# Patient Record
Sex: MALE | Race: WHITE | NOT HISPANIC OR LATINO | Employment: OTHER | URBAN - METROPOLITAN AREA
[De-identification: names, ages, dates, MRNs, and addresses within clinical notes are randomized per-mention and may not be internally consistent; named-entity substitution may affect disease eponyms.]

---

## 2018-03-22 ENCOUNTER — OFFICE VISIT (OUTPATIENT)
Dept: MEDICAL GROUP | Facility: MEDICAL CENTER | Age: 73
End: 2018-03-22
Payer: MEDICARE

## 2018-03-22 VITALS
HEIGHT: 74 IN | TEMPERATURE: 98.6 F | WEIGHT: 277 LBS | DIASTOLIC BLOOD PRESSURE: 74 MMHG | BODY MASS INDEX: 35.55 KG/M2 | OXYGEN SATURATION: 94 % | HEART RATE: 74 BPM | SYSTOLIC BLOOD PRESSURE: 122 MMHG | RESPIRATION RATE: 16 BRPM

## 2018-03-22 DIAGNOSIS — D49.2 ABNORMAL SKIN GROWTH: ICD-10-CM

## 2018-03-22 DIAGNOSIS — N40.1 BENIGN PROSTATIC HYPERPLASIA WITH NOCTURIA: ICD-10-CM

## 2018-03-22 DIAGNOSIS — Z12.5 SCREENING FOR PROSTATE CANCER: ICD-10-CM

## 2018-03-22 DIAGNOSIS — L60.3 NAIL DYSTROPHY: ICD-10-CM

## 2018-03-22 DIAGNOSIS — R26.89 IMBALANCE: ICD-10-CM

## 2018-03-22 DIAGNOSIS — H26.9 CATARACT, UNSPECIFIED CATARACT TYPE, UNSPECIFIED LATERALITY: ICD-10-CM

## 2018-03-22 DIAGNOSIS — L03.039 CELLULITIS OF TOE, UNSPECIFIED LATERALITY: ICD-10-CM

## 2018-03-22 DIAGNOSIS — E66.9 OBESITY (BMI 30-39.9): ICD-10-CM

## 2018-03-22 DIAGNOSIS — R35.1 BENIGN PROSTATIC HYPERPLASIA WITH NOCTURIA: ICD-10-CM

## 2018-03-22 DIAGNOSIS — Z13.6 SCREENING FOR CARDIOVASCULAR CONDITION: ICD-10-CM

## 2018-03-22 PROCEDURE — 99203 OFFICE O/P NEW LOW 30 MIN: CPT | Performed by: FAMILY MEDICINE

## 2018-03-22 ASSESSMENT — PATIENT HEALTH QUESTIONNAIRE - PHQ9: CLINICAL INTERPRETATION OF PHQ2 SCORE: 0

## 2018-03-22 NOTE — LETTER
[facility]  Hugo Chase M.D.  [address]  [place] [address]  Fax: [phone]   Authorization for Release/Disclosure of   Protected Health Information   Name: SINA DIEHL : 1945 SSN: [id number]   [place] [facility] [address] Phone:    [phone] (home)    I authorize the entity listed below to release/disclose the PHI below to:   [facility]/Hugo Chase M.D. and Hugo Chase M.D.   Provider or Entity Name:  Dr Noguera ([facility])   Address   City, [facility], [facility]   Phone:      Fax:     Reason for request: continuity of care   Information to be released:    [x ] LAST COLONOSCOPY,  including any PATH REPORT and follow-up  [  ] LAST FIT/COLOGUARD RESULT [  ] LAST DEXA  [  ] LAST MAMMOGRAM  [  ] LAST PAP  [  ] LAST LABS [  ] RETINA EXAM REPORT  [  ] IMMUNIZATION RECORDS  [  ] Release all info      [  ] Check here and initial the line next to each item to release ALL health information INCLUDING  _____ Care and treatment for drug and / or alcohol abuse  _____ HIV testing, infection status, or AIDS  _____ Genetic Testing    DATES OF SERVICE OR TIME PERIOD TO BE DISCLOSED: _____________  I understand and acknowledge that:  * This Authorization may be revoked at any time by you in writing, except if your health information has already been used or disclosed.  * Your health information that will be used or disclosed as a result of you signing this authorization could be re-disclosed by the recipient. If this occurs, your re-disclosed health information may no longer be protected by State or Federal laws.  * You may refuse to sign this Authorization. Your refusal will not affect your ability to obtain treatment.  * This Authorization becomes effective upon signing and will  on (date) __________.      If no date is indicated, this Authorization will  one (1) year from the signature date.    Name: Sina DIEHL    Signature:   Date:     3/22/2018       PLEASE FAX REQUESTED RECORDS BACK TO: [phone]

## 2018-03-22 NOTE — LETTER
[facility]  Hugo Chase M.D.  [address]  [place] [address]  Fax: [phone]   Authorization for Release/Disclosure of   Protected Health Information   Name: SINA MILLIGAN SHANITA : 1945 SSN: [id number]   Address: [address] [place] [address] Phone:    [phone] (home)    I authorize the entity listed below to release/disclose the PHI below to:   [facility]/Hugo Chase M.D. and Hugo Chase M.D.   Provider or Entity Name:  [facility]   Address   City, [facility], [place], [place] Phone:      Fax:     Reason for request: continuity of care   Information to be released:    [  ] LAST COLONOSCOPY,  including any PATH REPORT and follow-up  [  ] LAST FIT/COLOGUARD RESULT [  ] LAST DEXA  [  ] LAST MAMMOGRAM  [  ] LAST PAP  [  ] LAST LABS [  ] RETINA EXAM REPORT  [  ] IMMUNIZATION RECORDS  [ x] Release all info      [  ] Check here and initial the line next to each item to release ALL health information INCLUDING  _____ Care and treatment for drug and / or alcohol abuse  _____ HIV testing, infection status, or AIDS  _____ Genetic Testing    DATES OF SERVICE OR TIME PERIOD TO BE DISCLOSED: _____________  I understand and acknowledge that:  * This Authorization may be revoked at any time by you in writing, except if your health information has already been used or disclosed.  * Your health information that will be used or disclosed as a result of you signing this authorization could be re-disclosed by the recipient. If this occurs, your re-disclosed health information may no longer be protected by State or Federal laws.  * You may refuse to sign this Authorization. Your refusal will not affect your ability to obtain treatment.  * This Authorization becomes effective upon signing and will  on (date) __________.      If no date is indicated, this Authorization will  one (1) year from the signature date.    Name: Sina MILLIGAN SHANITA    Signature:   Date:          3/22/2018       PLEASE FAX REQUESTED RECORDS BACK TO: [phone]

## 2018-03-22 NOTE — PROGRESS NOTES
cc: BPH    Subjective:     Sina DIEHL is a 72 y.o. male presenting with his wife to establish care:    1. BPH: Has a long-standing history of an enlarged prostate. He underwent TURP twice. He continues to have nocturia, but it is not significant for him, is able to cope with the symptoms. Has tried Cardura, finasteride, Flomax in the past. Is not interested in trying another medication. He would like his TSH checked. Follows with urology regularly.    2. Dermatology: Sees his regular dermatologist about once or twice a year. Would like a referral    3. Toenail: He was seeing a podiatrist regularly for toenail trimming as he is unable to do this himself. Denies any symptoms today. He did have an ulcer in the past on his toe, was admitted to the hospital with an infection, but this has resolved.    Review of systems:  See above.   Denies any weight loss, fevers, fatigue, vision changes, sore throat, swollen glands, rashes, shortness of breath, chest pain, numbness, nausea, vomiting, diarrhea, constipation, abdominal pain, dysuria, hematuria,  joint pain, muscle weakness, depression. He does note occasionally feeling off balance. He continues to work as a  and walks on very narrow beams.    No current outpatient prescriptions on file.    Allergies   Allergen Reactions   • Azithromycin      nausea   • Nsaids      bleeding   • Penicillins Anaphylaxis       Past Medical History:   Diagnosis Date   • BPH (benign prostatic hyperplasia)    • Sciatica      Past Surgical History:   Procedure Laterality Date   • APPENDECTOMY     • HIP REPLACEMENT, TOTAL Right    • TRANS URETHRAL RESECTION      x2     Family History   Problem Relation Age of Onset   • Hypertension Mother    • Diabetes Mother      Social History     Social History   • Marital status: Single     Spouse name: N/A   • Number of children: N/A   • Years of education: N/A     Occupational History   • Not on file.     Social History Main Topics   •  "Smoking status: Never Smoker   • Smokeless tobacco: Never Used   • Alcohol use Yes      Comment: 4 beers/year   • Drug use: No   • Sexual activity: Not on file     Other Topics Concern   • Not on file     Social History Narrative           Objective:     Vitals: /74   Pulse 74   Temp 37 °C (98.6 °F)   Resp 16   Ht 1.88 m (6' 2\")   Wt (!) 125.6 kg (277 lb)   SpO2 94%   BMI 35.56 kg/m²   General: Alert, pleasant, NAD  HEENT: Normocephalic.  EOMI, no icterus or pallor.  Conjunctivae and lids normal. External ears normal. Oropharynx non-erythematous, mucous membranes moist.  Neck supple.  No thyromegaly or masses palpated. No cervical or supraclavicular lymphadenopathy.  Heart: Regular rate and rhythm.  S1 and S2 normal.  No murmurs appreciated.  Respiratory: Normal respiratory effort.  Clear to auscultation bilaterally.  Abdomen: Non-distended, soft  Skin: Warm, dry, no rashes.  Musculoskeletal: Gait is normal.  Moves all extremities well.  Extremities: No leg edema.    Neurological: No tremors  Psych:  Affect/mood is normal, judgement is good, memory is intact, grooming is appropriate.    Assessment/Plan:     Sina was seen today for [facility] care and other.    Diagnoses and all orders for this visit:    Benign prostatic hyperplasia with nocturia  Stable, continue monitoring. Follow up in 6 months  -     PROSTATE SPECIFIC AG SCREENING; Future    Nail dystrophy  -     REFERRAL TO PODIATRY    Abnormal skin growth  -     REFERRAL TO DERMATOLOGY    Imbalance  -     REFERRAL TO PHYSICAL THERAPY Reason for Therapy: Eval/Treat/Report    Cataract, unspecified cataract type, unspecified laterality  Patient reports that he will be undergoing cataract surgery in the near future    Screening for cardiovascular condition  -     LIPID PROFILE; Future    Cellulitis of toe, unspecified laterality  Has resolved, records requested.  -     CBC WITHOUT DIFFERENTIAL; Future  -     COMP METABOLIC PANEL; " Future    Screening for prostate cancer  -     PROSTATE SPECIFIC AG SCREENING; Future    Obesity (BMI 30-39.9)  -     Patient identified as having weight management issue.  Appropriate orders and counseling given.      Return in about 6 months (around 9/22/2018) for routine follow up.

## 2018-03-22 NOTE — LETTER
[facility]  Hugo Chase M.D.  [address]  [place] [address]  Fax: [phone]   Authorization for Release/Disclosure of   Protected Health Information   Name: SINA MILLIGAN SHANITA : 1945 SSN: [id number]   [address] [address] [facility] [place] [address] Phone:    [phone] (home)    I authorize the entity listed below to release/disclose the PHI below to:   [facility]/Hugo Chase M.D. and Hugo Chase M.D.   Provider or Entity Name:  Dr Mcclure   Address   City, State, Zip   Phone:  [phone]    Fax:  [phone]   Reason for request: continuity of care   Information to be released:    [  ] LAST COLONOSCOPY,  including any PATH REPORT and follow-up  [  ] LAST FIT/COLOGUARD RESULT [  ] LAST DEXA  [  ] LAST MAMMOGRAM  [  ] LAST PAP  [  ] LAST LABS [  ] RETINA EXAM REPORT  [x ] IMMUNIZATION RECORDS  [x ] Release all info      [  ] Check here and initial the line next to each item to release ALL health information INCLUDING  _____ Care and treatment for drug and / or alcohol abuse  _____ HIV testing, infection status, or AIDS  _____ Genetic Testing    DATES OF SERVICE OR TIME PERIOD TO BE DISCLOSED: _____________  I understand and acknowledge that:  * This Authorization may be revoked at any time by you in writing, except if your health information has already been used or disclosed.  * Your health information that will be used or disclosed as a result of you signing this authorization could be re-disclosed by the recipient. If this occurs, your re-disclosed health information may no longer be protected by State or Federal laws.  * You may refuse to sign this Authorization. Your refusal will not affect your ability to obtain treatment.  * This Authorization becomes effective upon signing and will  on (date) __________.      If no date is indicated, this Authorization will  one (1) year from the signature date.    Name: Sina DIEHL    Signature:   Date:          3/22/2018       PLEASE FAX REQUESTED RECORDS BACK TO: [phone]

## 2018-03-30 ENCOUNTER — HOSPITAL ENCOUNTER (OUTPATIENT)
Dept: LAB | Facility: MEDICAL CENTER | Age: 73
End: 2018-03-30
Attending: FAMILY MEDICINE
Payer: MEDICARE

## 2018-03-30 DIAGNOSIS — Z13.6 SCREENING FOR CARDIOVASCULAR CONDITION: ICD-10-CM

## 2018-03-30 DIAGNOSIS — Z12.5 SCREENING FOR PROSTATE CANCER: ICD-10-CM

## 2018-03-30 DIAGNOSIS — N40.1 BENIGN PROSTATIC HYPERPLASIA WITH NOCTURIA: ICD-10-CM

## 2018-03-30 DIAGNOSIS — R35.1 BENIGN PROSTATIC HYPERPLASIA WITH NOCTURIA: ICD-10-CM

## 2018-03-30 DIAGNOSIS — L03.039 CELLULITIS OF TOE, UNSPECIFIED LATERALITY: ICD-10-CM

## 2018-03-30 LAB
ALBUMIN SERPL BCP-MCNC: 4.3 G/DL (ref 3.2–4.9)
ALBUMIN/GLOB SERPL: 1.9 G/DL
ALP SERPL-CCNC: 50 U/L (ref 30–99)
ALT SERPL-CCNC: 27 U/L (ref 2–50)
ANION GAP SERPL CALC-SCNC: 7 MMOL/L (ref 0–11.9)
AST SERPL-CCNC: 32 U/L (ref 12–45)
BILIRUB SERPL-MCNC: 0.9 MG/DL (ref 0.1–1.5)
BUN SERPL-MCNC: 11 MG/DL (ref 8–22)
CALCIUM SERPL-MCNC: 9.6 MG/DL (ref 8.5–10.5)
CHLORIDE SERPL-SCNC: 106 MMOL/L (ref 96–112)
CHOLEST SERPL-MCNC: 214 MG/DL (ref 100–199)
CO2 SERPL-SCNC: 25 MMOL/L (ref 20–33)
CREAT SERPL-MCNC: 0.78 MG/DL (ref 0.5–1.4)
ERYTHROCYTE [DISTWIDTH] IN BLOOD BY AUTOMATED COUNT: 44 FL (ref 35.9–50)
GLOBULIN SER CALC-MCNC: 2.3 G/DL (ref 1.9–3.5)
GLUCOSE SERPL-MCNC: 110 MG/DL (ref 65–99)
HCT VFR BLD AUTO: 50.5 % (ref 42–52)
HDLC SERPL-MCNC: 38 MG/DL
HGB BLD-MCNC: 17 G/DL (ref 14–18)
LDLC SERPL CALC-MCNC: 140 MG/DL
MCH RBC QN AUTO: 33 PG (ref 27–33)
MCHC RBC AUTO-ENTMCNC: 33.7 G/DL (ref 33.7–35.3)
MCV RBC AUTO: 98.1 FL (ref 81.4–97.8)
PLATELET # BLD AUTO: 249 K/UL (ref 164–446)
PMV BLD AUTO: 11 FL (ref 9–12.9)
POTASSIUM SERPL-SCNC: 4.6 MMOL/L (ref 3.6–5.5)
PROT SERPL-MCNC: 6.6 G/DL (ref 6–8.2)
PSA SERPL-MCNC: 4.24 NG/ML (ref 0–4)
RBC # BLD AUTO: 5.15 M/UL (ref 4.7–6.1)
SODIUM SERPL-SCNC: 138 MMOL/L (ref 135–145)
TRIGL SERPL-MCNC: 181 MG/DL (ref 0–149)
WBC # BLD AUTO: 7 K/UL (ref 4.8–10.8)

## 2018-03-30 PROCEDURE — 36415 COLL VENOUS BLD VENIPUNCTURE: CPT

## 2018-03-30 PROCEDURE — 80053 COMPREHEN METABOLIC PANEL: CPT

## 2018-03-30 PROCEDURE — 85027 COMPLETE CBC AUTOMATED: CPT

## 2018-03-30 PROCEDURE — 80061 LIPID PANEL: CPT

## 2018-03-30 PROCEDURE — 84153 ASSAY OF PSA TOTAL: CPT

## 2018-05-25 ENCOUNTER — OFFICE VISIT (OUTPATIENT)
Dept: MEDICAL GROUP | Facility: MEDICAL CENTER | Age: 73
End: 2018-05-25
Payer: MEDICARE

## 2018-05-25 VITALS
DIASTOLIC BLOOD PRESSURE: 80 MMHG | OXYGEN SATURATION: 96 % | HEIGHT: 74 IN | WEIGHT: 275 LBS | SYSTOLIC BLOOD PRESSURE: 136 MMHG | RESPIRATION RATE: 14 BRPM | TEMPERATURE: 98.7 F | BODY MASS INDEX: 35.29 KG/M2 | HEART RATE: 72 BPM

## 2018-05-25 DIAGNOSIS — N40.1 BENIGN PROSTATIC HYPERPLASIA WITH NOCTURIA: ICD-10-CM

## 2018-05-25 DIAGNOSIS — E78.2 MIXED HYPERLIPIDEMIA: ICD-10-CM

## 2018-05-25 DIAGNOSIS — R07.9 CHEST PAIN, UNSPECIFIED TYPE: ICD-10-CM

## 2018-05-25 DIAGNOSIS — Z00.00 MEDICARE ANNUAL WELLNESS VISIT, INITIAL: ICD-10-CM

## 2018-05-25 DIAGNOSIS — E66.9 OBESITY (BMI 30-39.9): ICD-10-CM

## 2018-05-25 DIAGNOSIS — R42 DIZZINESS: ICD-10-CM

## 2018-05-25 DIAGNOSIS — H26.9 CATARACT, UNSPECIFIED CATARACT TYPE, UNSPECIFIED LATERALITY: ICD-10-CM

## 2018-05-25 DIAGNOSIS — R35.1 BENIGN PROSTATIC HYPERPLASIA WITH NOCTURIA: ICD-10-CM

## 2018-05-25 DIAGNOSIS — R06.02 SHORTNESS OF BREATH ON EXERTION: ICD-10-CM

## 2018-05-25 PROCEDURE — 99213 OFFICE O/P EST LOW 20 MIN: CPT | Mod: 25 | Performed by: FAMILY MEDICINE

## 2018-05-25 PROCEDURE — G0439 PPPS, SUBSEQ VISIT: HCPCS | Performed by: FAMILY MEDICINE

## 2018-05-25 PROCEDURE — 93000 ELECTROCARDIOGRAM COMPLETE: CPT | Performed by: FAMILY MEDICINE

## 2018-05-25 RX ORDER — ATORVASTATIN CALCIUM 40 MG/1
40 TABLET, FILM COATED ORAL DAILY
Qty: 90 TAB | Refills: 1 | Status: SHIPPED | OUTPATIENT
Start: 2018-05-25 | End: 2019-02-22 | Stop reason: SDUPTHER

## 2018-05-25 ASSESSMENT — PATIENT HEALTH QUESTIONNAIRE - PHQ9: CLINICAL INTERPRETATION OF PHQ2 SCORE: 0

## 2018-05-25 ASSESSMENT — ACTIVITIES OF DAILY LIVING (ADL): BATHING_REQUIRES_ASSISTANCE: 0

## 2018-05-25 ASSESSMENT — ENCOUNTER SYMPTOMS: GENERAL WELL-BEING: GOOD

## 2018-05-25 NOTE — PROGRESS NOTES
Cc: shortness of breath      HPI:  Sina DIEHL is a 73 y.o. here for Medicare Annual Wellness Visit     Patient Active Problem List    Diagnosis Date Noted   • Mixed hyperlipidemia 05/25/2018   • Benign prostatic hyperplasia with nocturia 03/22/2018   • Obesity (BMI 30-39.9) 03/22/2018   • Cataract 03/22/2018       Current Outpatient Prescriptions   Medication Sig Dispense Refill   • atorvastatin (LIPITOR) 40 MG Tab Take 1 Tab by mouth every day. 90 Tab 1     No current facility-administered medications for this visit.             Current supplements as per medication list.       Allergies: Azithromycin; Nsaids; and Penicillins    Current social contact/activities: working, family     He  reports that he has never smoked. He has never used smokeless tobacco. He reports that he drinks alcohol. He reports that he does not use drugs.  Counseling given: Yes      DPA/Advanced Directive:  Patient does not have an Advanced Directive.  A packet and workshop information was given on Advanced Directives.    ROS:    Gait: Uses no assistive device  Ostomy: No  Other tubes: No  Amputations: No  Chronic oxygen use: No  Last eye exam: past month  Wears hearing aids: No   : Denies any urinary leakage during the last 6 months    Screening:    Depression Screening    Little interest or pleasure in doing things?  0 - not at all  Feeling down, depressed , or hopeless? 0 - not at all  Patient Health Questionnaire Score: 0     If depressive symptoms identified deferred to follow up visit unless specifically addressed in assessment and plan.    Interpretation of PHQ-9 Total Score   Score Severity   1-4 No Depression   5-9 Mild Depression   10-14 Moderate Depression   15-19 Moderately Severe Depression   20-27 Severe Depression    Screening for Cognitive Impairment    Three Minute Recall (leader, season, table) 2/3 2/3  Jerome clock face with all 12 numbers and set the hands to show 10 past 11.  Yes 5/5  Cognitive concerns identified  deferred for follow up unless specifically addressed in assessment and plan.    Fall Risk Assessment    Has the patient had two or more falls in the last year or any fall with injury in the last year?  No    Safety Assessment    Throw rugs on floor.  Yes  Handrails on all stairs.  Yes  Good lighting in all hallways.  Yes  Difficulty hearing.  No  Patient counseled about all safety risks that were identified.    Functional Assessment ADLs    Are there any barriers preventing you from cooking for yourself or meeting nutritional needs?  No.    Are there any barriers preventing you from driving safely or obtaining transportation?  No.    Are there any barriers preventing you from using a telephone or calling for help?  No.    Are there any barriers preventing you from shopping?  No.    Are there any barriers preventing you from taking care of your own finances?  No.    Are there any barriers preventing you from managing your medications?  No.    Are there any barriers preventing you from showering, bathing or dressing yourself?  No.    Are you currently engaging in any exercise or physical activity?  Yes.     What is your perception of your health?  Good.      Health Maintenance Summary                Annual Wellness Visit Overdue 1945     IMM DTaP/Tdap/Td Vaccine Overdue 4/8/1962     COLONOSCOPY Overdue 4/8/1993     IMM PNEUMOCOCCAL 65+ (ADULT) LOW/MEDIUM RISK SERIES Overdue 4/8/2008     IMM INFLUENZA Next Due 9/1/2018           Patient Care Team:  Hugo Chase M.D. as PCP - General (Family Medicine)  Liz Hyman M.D. (Dermatology)  Aldo Nair M.D. (Ophthalmology)  Cruz Reoblledo (Audiology)        Social History   Substance Use Topics   • Smoking status: Never Smoker   • Smokeless tobacco: Never Used   • Alcohol use Yes      Comment: 4 beers/year     Family History   Problem Relation Age of Onset   • Hypertension Mother    • Diabetes Mother      He  has a past medical history of BPH (benign prostatic  "hyperplasia) and Sciatica. He also has no past medical history of Diabetes or Hypertension.   Past Surgical History:   Procedure Laterality Date   • APPENDECTOMY     • HIP REPLACEMENT, TOTAL Right    • TRANS URETHRAL RESECTION      x2       Exam:   Blood pressure 136/80, pulse 72, temperature 37.1 °C (98.7 °F), resp. rate 14, height 1.88 m (6' 2.02\"), weight 124.7 kg (275 lb), SpO2 96 %. Body mass index is 35.29 kg/m².    Hearing good.    Dentition fair  Alert, oriented in no acute distress.  Eye contact is good, speech goal directed, affect calm    Assessment and Plan. The following treatment and monitoring plan is recommended:      Medicare annual wellness visit, initial  -     Initial Wellness Visit - Includes PPPS ()    Mixed hyperlipidemia  Starting Lipitor  -     atorvastatin (LIPITOR) 40 MG Tab; Take 1 Tab by mouth every day.  -     CBC WITHOUT DIFFERENTIAL; Future  -     BASIC METABOLIC PANEL; Future  -     TSH WITH REFLEX TO FT4; Future  -     ECHOCARDIOGRAM COMP W/O CONT; Future  -     CARDIAC STRESS TEST TREADMILL ONLY    Shortness of breath on exertion  Doing following workup  -     EKG  -     PULMONARY FUNCTION TESTS Test requested: Complete Pulmonary Function Test  -     CBC WITHOUT DIFFERENTIAL; Future  -     BASIC METABOLIC PANEL; Future  -     TSH WITH REFLEX TO FT4; Future  -     ECHOCARDIOGRAM COMP W/O CONT; Future  -     CARDIAC STRESS TEST TREADMILL ONLY    Dizziness  Doing following workup  -     EKG  -     PULMONARY FUNCTION TESTS Test requested: Complete Pulmonary Function Test  -     CBC WITHOUT DIFFERENTIAL; Future  -     BASIC METABOLIC PANEL; Future  -     TSH WITH REFLEX TO FT4; Future  -     ECHOCARDIOGRAM COMP W/O CONT; Future  -     CARDIAC STRESS TEST TREADMILL ONLY    Chest pain, unspecified type  Doing following workup  -     CARDIAC STRESS TEST TREADMILL ONLY    Benign prostatic hyperplasia with nocturia  Stable, followed by urology  -     Initial Wellness Visit - Includes " PPPS ()    Cataract, unspecified cataract type, unspecified laterality  Stable, followed by ophthalmology  -     Initial Wellness Visit - Includes PPPS ()    Obesity (BMI 30-39.9)  Stable, exercising regularly.  -     Initial Wellness Visit - Includes PPPS ()        Services suggested: No services needed at this time  Health Care Screening: Age-appropriate preventive services recommended by USPTF and ACIP covered by Medicare were discussed today. Services ordered if indicated and agreed upon by the patient.  Referrals offered: Community-based lifestyle interventions to reduce health risks and promote self-management and wellness, fall prevention, nutrition, physical activity, tobacco-use cessation, weight loss, and mental health services as per orders if indicated.    Discussion today about general wellness and lifestyle habits:    · Prevent falls and reduce trip hazards; Cautioned about securing or removing rugs.  · Have a working fire alarm and carbon monoxide detector;   · Engage in regular physical activity and social activities     Follow-up: Return in about 6 months (around 11/25/2018) for Med check.

## 2018-05-25 NOTE — PROGRESS NOTES
cc: Shortness of breath    Subjective:     Sina DIEHL is a 73 y.o. male presenting with his wife to discuss shortness of breath. Describes mild shortness of breath with exertion, has been going on for the past several weeks, has been stable. Associated with dizziness and occasional numbness in the left hand. The shortness of breath will usually resolve after a couple minutes on its own. Dizziness and numbness usually lasts for about 10 minutes and then resolve on its own. Symptoms are not limiting his function in any way. Has tried nothing. Symptoms only occur with exertion. Better with rest. Denies any headaches, nausea, vomiting, chest pain, palpitations, abdominal pain, diarrhea, constipation, melena, leg swelling. He does occasionally get blood in the urine, about a couple times a year, but he has kidney stones, bladder stones, and multiple TURPs, is followed by urology. He has not had any blood in the urine recently, he avoids NSAIDs as this is a major trigger.      Review of systems:  See above.        Current Outpatient Prescriptions:   •  atorvastatin (LIPITOR) 40 MG Tab, Take 1 Tab by mouth every day., Disp: 90 Tab, Rfl: 1    Allergies   Allergen Reactions   • Azithromycin      nausea   • Nsaids      bleeding   • Penicillins Anaphylaxis       Past Medical History:   Diagnosis Date   • BPH (benign prostatic hyperplasia)    • Sciatica      Past Surgical History:   Procedure Laterality Date   • APPENDECTOMY     • HIP REPLACEMENT, TOTAL Right    • TRANS URETHRAL RESECTION      x2     Family History   Problem Relation Age of Onset   • Hypertension Mother    • Diabetes Mother      Social History     Social History   • Marital status: Single     Spouse name: N/A   • Number of children: N/A   • Years of education: N/A     Occupational History   • Not on file.     Social History Main Topics   • Smoking status: Never Smoker   • Smokeless tobacco: Never Used   • Alcohol use Yes      Comment: 4 beers/year   • Drug  "use: No   • Sexual activity: Not on file     Other Topics Concern   • Not on file     Social History Narrative           Objective:     Vitals: /80   Pulse 72   Temp 37.1 °C (98.7 °F)   Resp 14   Ht 1.88 m (6' 2.02\")   Wt 124.7 kg (275 lb)   SpO2 96%   BMI 35.29 kg/m²   General: Alert, pleasant, NAD  HEENT: Normocephalic.  PERRL, EOMI, no icterus or pallor.  Conjunctivae and lids normal. External ears normal.   Oropharynx non-erythematous, mucous membranes moist.  Neck supple.  No thyromegaly or masses palpated. No cervical or supraclavicular lymphadenopathy.  Heart: Regular rate and rhythm.  S1 and S2 normal.  No murmurs appreciated. No carotid bruits.  Respiratory: Normal respiratory effort.  Clear to auscultation bilaterally.  Abdomen: Non-distended, soft  Skin: Warm, dry, no rashes.  Musculoskeletal: Gait is normal.  Moves all extremities well.  Extremities: No leg edema. Radial pulses 2+ symmetric.   Psych:  Affect/mood is normal, judgement is good, memory is intact, grooming is appropriate.    EKG: Sinus bradycardia. Heart rate 58 bpm. Left axis deviation. Otherwise normal EKG    Assessment/Plan:     Diagnoses and all orders for this visit:    Mixed hyperlipidemia  Discussed recent lipid panel. ASCVD risk is around 30%. He was willing to start a statin. Lipitor 40 mg sent to pharmacy. We also discussed a baby aspirin. However, patient would like to hold off on this for now as this may give him hematuria  -     atorvastatin (LIPITOR) 40 MG Tab; Take 1 Tab by mouth every day.  -     CBC WITHOUT DIFFERENTIAL; Future  -     BASIC METABOLIC PANEL; Future  -     TSH WITH REFLEX TO FT4; Future  -     ECHOCARDIOGRAM COMP W/O CONT; Future  -     CARDIAC STRESS TEST TREADMILL ONLY    Shortness of breath on exertion  Dizziness  Chest pain, unspecified type  EKG was unremarkable today. We'll check the following labs and exams.  -     EKG  -     PULMONARY FUNCTION TESTS Test requested: Complete " Pulmonary Function Test  -     CBC WITHOUT DIFFERENTIAL; Future  -     BASIC METABOLIC PANEL; Future  -     TSH WITH REFLEX TO FT4; Future  -     ECHOCARDIOGRAM COMP W/O CONT; Future  -     CARDIAC STRESS TEST TREADMILL ONLY      Return in about 6 months (around 11/25/2018) for Med check.

## 2018-05-26 ENCOUNTER — HOSPITAL ENCOUNTER (OUTPATIENT)
Dept: LAB | Facility: MEDICAL CENTER | Age: 73
End: 2018-05-26
Attending: FAMILY MEDICINE
Payer: MEDICARE

## 2018-05-26 DIAGNOSIS — E78.2 MIXED HYPERLIPIDEMIA: ICD-10-CM

## 2018-05-26 DIAGNOSIS — R06.02 SHORTNESS OF BREATH ON EXERTION: ICD-10-CM

## 2018-05-26 DIAGNOSIS — R42 DIZZINESS: ICD-10-CM

## 2018-05-26 LAB
ANION GAP SERPL CALC-SCNC: 10 MMOL/L (ref 0–11.9)
BUN SERPL-MCNC: 13 MG/DL (ref 8–22)
CALCIUM SERPL-MCNC: 9.4 MG/DL (ref 8.5–10.5)
CHLORIDE SERPL-SCNC: 104 MMOL/L (ref 96–112)
CO2 SERPL-SCNC: 24 MMOL/L (ref 20–33)
CREAT SERPL-MCNC: 0.81 MG/DL (ref 0.5–1.4)
ERYTHROCYTE [DISTWIDTH] IN BLOOD BY AUTOMATED COUNT: 43.7 FL (ref 35.9–50)
GLUCOSE SERPL-MCNC: 91 MG/DL (ref 65–99)
HCT VFR BLD AUTO: 48.9 % (ref 42–52)
HGB BLD-MCNC: 16.4 G/DL (ref 14–18)
MCH RBC QN AUTO: 32.6 PG (ref 27–33)
MCHC RBC AUTO-ENTMCNC: 33.5 G/DL (ref 33.7–35.3)
MCV RBC AUTO: 97.2 FL (ref 81.4–97.8)
PLATELET # BLD AUTO: 243 K/UL (ref 164–446)
PMV BLD AUTO: 10.9 FL (ref 9–12.9)
POTASSIUM SERPL-SCNC: 4.3 MMOL/L (ref 3.6–5.5)
RBC # BLD AUTO: 5.03 M/UL (ref 4.7–6.1)
SODIUM SERPL-SCNC: 138 MMOL/L (ref 135–145)
TSH SERPL DL<=0.005 MIU/L-ACNC: 1.04 UIU/ML (ref 0.38–5.33)
WBC # BLD AUTO: 7.3 K/UL (ref 4.8–10.8)

## 2018-05-26 PROCEDURE — 85027 COMPLETE CBC AUTOMATED: CPT

## 2018-05-26 PROCEDURE — 80048 BASIC METABOLIC PNL TOTAL CA: CPT

## 2018-05-26 PROCEDURE — 84443 ASSAY THYROID STIM HORMONE: CPT

## 2018-05-26 PROCEDURE — 36415 COLL VENOUS BLD VENIPUNCTURE: CPT

## 2018-05-28 ENCOUNTER — HOSPITAL ENCOUNTER (OUTPATIENT)
Dept: CARDIOLOGY | Facility: MEDICAL CENTER | Age: 73
End: 2018-05-28
Attending: FAMILY MEDICINE
Payer: MEDICARE

## 2018-05-28 DIAGNOSIS — E78.2 MIXED HYPERLIPIDEMIA: ICD-10-CM

## 2018-05-28 DIAGNOSIS — R06.02 SHORTNESS OF BREATH ON EXERTION: ICD-10-CM

## 2018-05-28 DIAGNOSIS — R42 DIZZINESS: ICD-10-CM

## 2018-05-28 LAB
LV EJECT FRACT  99904: 60
LV EJECT FRACT MOD 2C 99903: 54.46
LV EJECT FRACT MOD 4C 99902: 62.61
LV EJECT FRACT MOD BP 99901: 56.12

## 2018-05-28 PROCEDURE — 93306 TTE W/DOPPLER COMPLETE: CPT | Mod: 26 | Performed by: INTERNAL MEDICINE

## 2018-05-28 PROCEDURE — 93306 TTE W/DOPPLER COMPLETE: CPT

## 2018-05-29 ENCOUNTER — OFFICE VISIT (OUTPATIENT)
Dept: MEDICAL GROUP | Facility: MEDICAL CENTER | Age: 73
End: 2018-05-29
Payer: MEDICARE

## 2018-05-29 VITALS
DIASTOLIC BLOOD PRESSURE: 78 MMHG | HEART RATE: 68 BPM | TEMPERATURE: 98.6 F | SYSTOLIC BLOOD PRESSURE: 128 MMHG | HEIGHT: 74 IN | OXYGEN SATURATION: 94 % | WEIGHT: 273 LBS | RESPIRATION RATE: 16 BRPM | BODY MASS INDEX: 35.04 KG/M2

## 2018-05-29 DIAGNOSIS — R68.82 LOW LIBIDO: ICD-10-CM

## 2018-05-29 DIAGNOSIS — N52.9 ERECTILE DYSFUNCTION, UNSPECIFIED ERECTILE DYSFUNCTION TYPE: ICD-10-CM

## 2018-05-29 DIAGNOSIS — R42 DIZZINESS: ICD-10-CM

## 2018-05-29 PROCEDURE — 99213 OFFICE O/P EST LOW 20 MIN: CPT | Performed by: FAMILY MEDICINE

## 2018-05-29 NOTE — PROGRESS NOTES
"cc: Low libido    Subjective:     Sina DIEHL is a 73 y.o. male presenting to discuss a low libido. This has been progressively getting worse over the past several months to years. He has had erectile dysfunction for years, has been using Viagra with good results. He is able to orgasm. Testicular size is normal, no masses. Has been gaining weight. No hair changes or breast development. Relationship with her significant other is good. Is interested in getting his testosterone levels checked.      Review of systems:  See above.      Current Outpatient Prescriptions:   •  atorvastatin (LIPITOR) 40 MG Tab, Take 1 Tab by mouth every day., Disp: 90 Tab, Rfl: 1    Allergies   Allergen Reactions   • Azithromycin      nausea   • Nsaids      bleeding   • Other Misc      Bee and wasp   • Penicillins Anaphylaxis       Past Medical History:   Diagnosis Date   • BPH (benign prostatic hyperplasia)    • Sciatica      Past Surgical History:   Procedure Laterality Date   • APPENDECTOMY     • HIP REPLACEMENT, TOTAL Right    • TRANS URETHRAL RESECTION      x2     Family History   Problem Relation Age of Onset   • Hypertension Mother    • Diabetes Mother      Social History     Social History   • Marital status: Single     Spouse name: N/A   • Number of children: N/A   • Years of education: N/A     Occupational History   • Not on file.     Social History Main Topics   • Smoking status: Never Smoker   • Smokeless tobacco: Never Used   • Alcohol use Yes      Comment: 4 beers/year   • Drug use: No   • Sexual activity: Not on file     Other Topics Concern   • Not on file     Social History Narrative           Objective:     Vitals: /78   Pulse 68   Temp 37 °C (98.6 °F)   Resp 16   Ht 1.88 m (6' 2.02\")   Wt 123.8 kg (273 lb)   SpO2 94%   BMI 35.04 kg/m²   General: Alert, pleasant, NAD  HEENT: Normocephalic.    Psych:  Affect/mood is normal, judgement is good, memory is intact, grooming is " appropriate.    Assessment/Plan:     Diagnoses and all orders for this visit:    Erectile dysfunction, unspecified erectile dysfunction type  Low libido  We discussed continuing Viagra as needed. We also discussed checking his testosterone levels. he was interested in doing testosterone supplementation if needed, discussed pros and cons, would be interested in the topical gel if needed..  -     TESTOSTERONE SERUM; Future  -     TESTOSTERONE F&T MALE ADULT; Future    Dizziness  Discussed normal labs and echocardiogram. He has the stress test scheduled in 2 days. We'll also check a carotid ultrasound  -     US-CAROTID DOPPLER; Future

## 2018-05-30 ENCOUNTER — HOSPITAL ENCOUNTER (OUTPATIENT)
Dept: LAB | Facility: MEDICAL CENTER | Age: 73
End: 2018-05-30
Attending: FAMILY MEDICINE
Payer: MEDICARE

## 2018-05-30 DIAGNOSIS — R68.82 LOW LIBIDO: ICD-10-CM

## 2018-05-30 LAB — TESTOST SERPL-MCNC: 204 NG/DL (ref 175–781)

## 2018-05-30 PROCEDURE — 36415 COLL VENOUS BLD VENIPUNCTURE: CPT

## 2018-05-30 PROCEDURE — 84403 ASSAY OF TOTAL TESTOSTERONE: CPT

## 2018-05-30 PROCEDURE — 84270 ASSAY OF SEX HORMONE GLOBUL: CPT

## 2018-05-31 ENCOUNTER — HOSPITAL ENCOUNTER (OUTPATIENT)
Dept: RADIOLOGY | Facility: MEDICAL CENTER | Age: 73
End: 2018-05-31
Attending: FAMILY MEDICINE
Payer: MEDICARE

## 2018-05-31 DIAGNOSIS — R42 DIZZINESS: ICD-10-CM

## 2018-05-31 PROCEDURE — 93880 EXTRACRANIAL BILAT STUDY: CPT

## 2018-06-01 LAB
SHBG SERPL-SCNC: 43 NMOL/L (ref 11–80)
TESTOST FREE MFR SERPL: 1.5 % (ref 1.6–2.9)
TESTOST FREE SERPL-MCNC: 36 PG/ML (ref 47–244)
TESTOST SERPL-MCNC: 238 NG/DL (ref 300–720)

## 2018-06-04 ENCOUNTER — NON-PROVIDER VISIT (OUTPATIENT)
Dept: CARDIOLOGY | Facility: MEDICAL CENTER | Age: 73
End: 2018-06-04
Attending: FAMILY MEDICINE
Payer: MEDICARE

## 2018-06-04 VITALS
SYSTOLIC BLOOD PRESSURE: 150 MMHG | WEIGHT: 273 LBS | HEIGHT: 74 IN | DIASTOLIC BLOOD PRESSURE: 84 MMHG | OXYGEN SATURATION: 94 % | HEART RATE: 61 BPM | BODY MASS INDEX: 35.04 KG/M2

## 2018-06-04 DIAGNOSIS — R42 DIZZINESS: ICD-10-CM

## 2018-06-04 DIAGNOSIS — R06.09 DOE (DYSPNEA ON EXERTION): ICD-10-CM

## 2018-06-04 LAB — TREADMILL STRESS: NORMAL

## 2018-06-04 PROCEDURE — 93015 CV STRESS TEST SUPVJ I&R: CPT | Performed by: INTERNAL MEDICINE

## 2018-06-14 DIAGNOSIS — N40.1 BENIGN PROSTATIC HYPERPLASIA WITH NOCTURIA: ICD-10-CM

## 2018-06-14 DIAGNOSIS — E66.9 OBESITY (BMI 30-39.9): ICD-10-CM

## 2018-06-14 DIAGNOSIS — R35.1 BENIGN PROSTATIC HYPERPLASIA WITH NOCTURIA: ICD-10-CM

## 2018-06-14 DIAGNOSIS — N52.9 ERECTILE DYSFUNCTION, UNSPECIFIED ERECTILE DYSFUNCTION TYPE: ICD-10-CM

## 2018-06-14 DIAGNOSIS — R68.82 LOW LIBIDO: ICD-10-CM

## 2018-06-14 DIAGNOSIS — E04.1 THYROID NODULE: ICD-10-CM

## 2019-01-10 ENCOUNTER — OFFICE VISIT (OUTPATIENT)
Dept: MEDICAL GROUP | Facility: MEDICAL CENTER | Age: 74
End: 2019-01-10
Payer: MEDICARE

## 2019-01-10 VITALS
BODY MASS INDEX: 34.27 KG/M2 | WEIGHT: 267 LBS | DIASTOLIC BLOOD PRESSURE: 90 MMHG | HEIGHT: 74 IN | HEART RATE: 78 BPM | TEMPERATURE: 98.6 F | OXYGEN SATURATION: 95 % | SYSTOLIC BLOOD PRESSURE: 156 MMHG | RESPIRATION RATE: 16 BRPM

## 2019-01-10 DIAGNOSIS — H61.23 BILATERAL IMPACTED CERUMEN: ICD-10-CM

## 2019-01-10 DIAGNOSIS — L60.3 NAIL DYSTROPHY: ICD-10-CM

## 2019-01-10 DIAGNOSIS — I10 ESSENTIAL HYPERTENSION: ICD-10-CM

## 2019-01-10 DIAGNOSIS — N40.1 BENIGN PROSTATIC HYPERPLASIA WITH NOCTURIA: ICD-10-CM

## 2019-01-10 DIAGNOSIS — Z23 NEED FOR VACCINATION: ICD-10-CM

## 2019-01-10 DIAGNOSIS — R35.1 BENIGN PROSTATIC HYPERPLASIA WITH NOCTURIA: ICD-10-CM

## 2019-01-10 DIAGNOSIS — R79.89 LOW TESTOSTERONE LEVEL IN MALE: ICD-10-CM

## 2019-01-10 DIAGNOSIS — E78.2 MIXED HYPERLIPIDEMIA: ICD-10-CM

## 2019-01-10 DIAGNOSIS — E04.1 THYROID NODULE: ICD-10-CM

## 2019-01-10 DIAGNOSIS — R97.20 ELEVATED PSA: ICD-10-CM

## 2019-01-10 DIAGNOSIS — D49.2 ABNORMAL SKIN GROWTH: ICD-10-CM

## 2019-01-10 PROBLEM — R68.82 LOW LIBIDO: Status: RESOLVED | Noted: 2018-05-29 | Resolved: 2019-01-10

## 2019-01-10 PROCEDURE — 99215 OFFICE O/P EST HI 40 MIN: CPT | Mod: 25 | Performed by: FAMILY MEDICINE

## 2019-01-10 PROCEDURE — G0009 ADMIN PNEUMOCOCCAL VACCINE: HCPCS | Performed by: FAMILY MEDICINE

## 2019-01-10 PROCEDURE — 90715 TDAP VACCINE 7 YRS/> IM: CPT | Performed by: FAMILY MEDICINE

## 2019-01-10 PROCEDURE — 90670 PCV13 VACCINE IM: CPT | Performed by: FAMILY MEDICINE

## 2019-01-10 PROCEDURE — 96372 THER/PROPH/DIAG INJ SC/IM: CPT | Performed by: FAMILY MEDICINE

## 2019-01-10 PROCEDURE — 8041 PR SCP AHA: Performed by: FAMILY MEDICINE

## 2019-01-10 PROCEDURE — 90472 IMMUNIZATION ADMIN EACH ADD: CPT | Performed by: FAMILY MEDICINE

## 2019-01-10 RX ORDER — AMLODIPINE BESYLATE 5 MG/1
5 TABLET ORAL DAILY
COMMUNITY
End: 2019-04-11 | Stop reason: SDUPTHER

## 2019-01-10 RX ORDER — TESTOSTERONE CYPIONATE 200 MG/ML
200 INJECTION, SOLUTION INTRAMUSCULAR
Status: DISCONTINUED | OUTPATIENT
Start: 2019-01-10 | End: 2019-12-02

## 2019-01-10 RX ORDER — TESTOSTERONE CYPIONATE 200 MG/ML
200 INJECTION, SOLUTION INTRAMUSCULAR
COMMUNITY
End: 2019-09-12 | Stop reason: SDUPTHER

## 2019-01-10 RX ORDER — TESTOSTERONE CYPIONATE 200 MG/ML
50 INJECTION, SOLUTION INTRAMUSCULAR ONCE
COMMUNITY
End: 2019-01-10

## 2019-01-10 RX ADMIN — TESTOSTERONE CYPIONATE 200 MG: 200 INJECTION, SOLUTION INTRAMUSCULAR at 15:23

## 2019-01-10 ASSESSMENT — PATIENT HEALTH QUESTIONNAIRE - PHQ9: CLINICAL INTERPRETATION OF PHQ2 SCORE: 0

## 2019-01-10 NOTE — PROGRESS NOTES
cc:  Testosterone    Subjective:     Sina Oliver is a 73 y.o. male presenting with his significant other for:    1.  Low testosterone: Was diagnosed with low testosterone levels.  He also has BPH, underwent a TURP twice.  He consulted with his urologist in [place], was started on testosterone injections 200 mg days.  He reports that his last testosterone level was checked in May in [place].  He would like a referral to a new urologist now that he is back in [place].    2.  Hyperlipidemia: Has a history of elevated cholesterol.  Has been stable.  Is currently on atorvastatin 40 mg daily.  Denies any myalgias, no side effects.    3.  Thyroid nodule: On a carotid ultrasound, the thyroid was noted to be somewhat suspicious for nodules.  Is asymptomatic. Denies any heat/cold intolerance, diarrhea/constipation, abdominal pain, tremors, skin changes, palpitations.    4.  Hypertension: His blood pressure became elevated while he was in [place].  His primary care in [place] started him on amlodipine 5 mg daily.  Has been checking his blood pressures at home, have a range in the 135-140s systolic.  Denies any chest pain, shortness of breath, lightheadedness, dizziness, leg swelling.  He plans to start exercising more regularly.  Has lost about 6 pounds of weight since moving back to [place].  He has also missed a couple doses of his medication    5. Dermatology: Sees his regular dermatologist about once or twice a year. Would like a referral     6.  Toenail: He was seeing a podiatrist regularly for toenail trimming as he is unable to do this himself. Denies any symptoms today.       Review of systems:  See above.       Current Outpatient Prescriptions:   •  amLODIPine (NORVASC) 5 MG Tab, Take 5 mg by mouth every day., Disp: , Rfl:   •  testosterone cypionate (DEPO-TESTOSTERONE) 200 MG/ML Solution injection, 200 mg by Intramuscular route every 14 days., Disp: , Rfl:   •  atorvastatin (LIPITOR) 40 MG Tab, Take 1 Tab by mouth  "every day., Disp: 90 Tab, Rfl: 1    Current Facility-Administered Medications:   •  testosterone cypionate (DEPO-TESTOSTERONE) injection 200 mg, 200 mg, Intramuscular, Q14 DAYS, Hugo Chase M.D.    Allergies, past medical history, past surgical history, family history, social history reviewed and updated    Objective:     Vitals: /90 (BP Location: Right arm, Patient Position: Sitting)   Pulse 78   Temp 37 °C (98.6 °F) (Temporal)   Resp 16   Ht 1.88 m (6' 2.02\")   Wt 121.1 kg (267 lb)   SpO2 95%   BMI 34.27 kg/m²   General: Alert, pleasant, NAD  HEENT: Normocephalic.  EOMI, no icterus or pallor.  Conjunctivae and lids normal. External ears normal. Tympanic membranes obscured by wax bilaterally initially; pearly/opaque after irrigation.  Oropharynx non-erythematous, mucous membranes moist.  Neck supple.  No thyromegaly or masses palpated. No cervical or supraclavicular lymphadenopathy.  Heart: Regular rate and rhythm.  S1 and S2 normal.  No murmurs appreciated.  Respiratory: Normal respiratory effort.  Clear to auscultation bilaterally.  Abdomen: Non-distended, soft  Skin: Warm, dry, no rashes.  Musculoskeletal: Gait is normal.  Moves all extremities well.  Extremities: No leg edema.    Psych:  Affect/mood is normal, judgement is good, memory is intact, grooming is appropriate.    Assessment/Plan:     Diagnoses and all orders for this visit:    Low testosterone level in male  Benign prostatic hyperplasia with nocturia  Elevated PSA  Stable, referral to urology placed.  We will recheck a testosterone level.  He brings in his testosterone today so that he can get it injected by the medical assistants every 2 weeks per pt request.  -     REFERRAL TO UROLOGY  -     TESTOSTERONE SERUM; Future  -     testosterone cypionate (DEPO-TESTOSTERONE) injection 200 mg; 1 mL by Intramuscular route every 14 days.    Mixed hyperlipidemia  Stable, continue atorvastatin.  -     COMP METABOLIC PANEL; Future  -     " Lipid Profile; Future    Thyroid nodule  We will check the following labs and an ultrasound of the thyroid  -     TSH; Future  -     TRIIDOTHYRONINE; Future  -     FREE THYROXINE; Future  -     THYROID PEROXIDASE  (TPO) AB; Future  -     ANTITHYROGLOBULIN AB; Future  -     US-SOFT TISSUES OF HEAD - NECK; Future    Essential hypertension  Somewhat elevated today.  He will continue checking his blood pressures at home, continue amlodipine for now.  We discussed potentially increasing in the future if he is unable to lower his blood pressure with lifestyle modification.  Follow-up in 3 months  -     CBC WITH DIFFERENTIAL; Future  -     COMP METABOLIC PANEL; Future    Nail dystrophy  -     REFERRAL TO PODIATRY    Abnormal skin growth  -     REFERRAL TO DERMATOLOGY    Need for vaccination  -     Tdap Vaccine =>8YO IM  -     Pneumococcal Conjugate Vaccine 13-Valent    Bilateral impacted cerumen  Irrigated today with clearance of cerumen      Return in about 3 months (around 4/10/2019) for routine follow up, High blood pressure.       Patient was seen for a total of 40 minutes face-to-face by myself, with more than half of the time spent counseling treatment and coordinating care regarding his low testosterone level, BPH, elevated PSA, hyperlipidemia, thyroid nodule, essential hypertension, nail dystrophy, abnormal skin issues, vaccinations, impacted cerumen

## 2019-01-10 NOTE — LETTER
[employer] [facility]  Hugo Chase M.D.  [address]  [place] [address]  Fax: [phone]   Authorization for Release/Disclosure of   Protected Health Information   Name: SINA MILLIGAN SHANITA : 1945 SSN: [id number]   [address] [facility] [address] Phone:    [phone] (home)    I authorize the entity listed below to release/disclose the PHI below to:   Pending sale to [facility]/Hugo Chase M.D. and Hugo Chase M.D.   Provider or Entity Name:   Dr Alonzo ([facility])   Address   City, [facility]   Phone:      Fax:     Reason for request: continuity of care   Information to be released:    [x ] LAST COLONOSCOPY,  including any PATH REPORT and follow-up  [  ] LAST FIT/COLOGUARD RESULT [  ] LAST DEXA  [  ] LAST MAMMOGRAM  [  ] LAST PAP  [  ] LAST LABS [  ] RETINA EXAM REPORT  [  ] IMMUNIZATION RECORDS  [  ] Release all info      [  ] Check here and initial the line next to each item to release ALL health information INCLUDING  _____ Care and treatment for drug and / or alcohol abuse  _____ HIV testing, infection status, or AIDS  _____ Genetic Testing    DATES OF SERVICE OR TIME PERIOD TO BE DISCLOSED: _____________  I understand and acknowledge that:  * This Authorization may be revoked at any time by you in writing, except if your health information has already been used or disclosed.  * Your health information that will be used or disclosed as a result of you signing this authorization could be re-disclosed by the recipient. If this occurs, your re-disclosed health information may no longer be protected by State or Federal laws.  * You may refuse to sign this Authorization. Your refusal will not affect your ability to obtain treatment.  * This Authorization becomes effective upon signing and will  on (date) __________.      If no date is indicated, this Authorization will  one (1) year from the signature date.    Name: Sina Oliver    Signature:   Date:      1/10/2019       PLEASE FAX REQUESTED RECORDS BACK TO: [phone]

## 2019-01-10 NOTE — PROGRESS NOTES
Annual Health Assessment Questions:    1.  Are you currently engaging in any exercise or physical activity? Yes    2.  How would you describe your mood or emotional well-being today? good    3.  Have you had any falls in the last year? No    4.  Have you noticed any problems with your balance or had difficulty walking? Yes    5.  In the last six months have you experienced any leakage of urine? Yes    6. DPA/Advanced Directive: Patient has Advanced Directive, but it is not on file. Instructed to bring in a copy to scan into their chart.

## 2019-01-16 ENCOUNTER — HOSPITAL ENCOUNTER (OUTPATIENT)
Dept: LAB | Facility: MEDICAL CENTER | Age: 74
End: 2019-01-16
Attending: FAMILY MEDICINE
Payer: MEDICARE

## 2019-01-16 DIAGNOSIS — E78.2 MIXED HYPERLIPIDEMIA: ICD-10-CM

## 2019-01-16 DIAGNOSIS — I10 ESSENTIAL HYPERTENSION: ICD-10-CM

## 2019-01-16 DIAGNOSIS — E04.1 THYROID NODULE: ICD-10-CM

## 2019-01-16 DIAGNOSIS — R79.89 LOW TESTOSTERONE LEVEL IN MALE: ICD-10-CM

## 2019-01-16 LAB
ALBUMIN SERPL BCP-MCNC: 4.8 G/DL (ref 3.2–4.9)
ALBUMIN/GLOB SERPL: 1.8 G/DL
ALP SERPL-CCNC: 55 U/L (ref 30–99)
ALT SERPL-CCNC: 32 U/L (ref 2–50)
ANION GAP SERPL CALC-SCNC: 8 MMOL/L (ref 0–11.9)
AST SERPL-CCNC: 30 U/L (ref 12–45)
BILIRUB SERPL-MCNC: 0.9 MG/DL (ref 0.1–1.5)
BUN SERPL-MCNC: 13 MG/DL (ref 8–22)
CALCIUM SERPL-MCNC: 9.7 MG/DL (ref 8.5–10.5)
CHLORIDE SERPL-SCNC: 103 MMOL/L (ref 96–112)
CHOLEST SERPL-MCNC: 125 MG/DL (ref 100–199)
CO2 SERPL-SCNC: 26 MMOL/L (ref 20–33)
CREAT SERPL-MCNC: 0.81 MG/DL (ref 0.5–1.4)
FASTING STATUS PATIENT QL REPORTED: NORMAL
GLOBULIN SER CALC-MCNC: 2.6 G/DL (ref 1.9–3.5)
GLUCOSE SERPL-MCNC: 91 MG/DL (ref 65–99)
HDLC SERPL-MCNC: 40 MG/DL
LDLC SERPL CALC-MCNC: 71 MG/DL
POTASSIUM SERPL-SCNC: 4.1 MMOL/L (ref 3.6–5.5)
PROT SERPL-MCNC: 7.4 G/DL (ref 6–8.2)
SODIUM SERPL-SCNC: 137 MMOL/L (ref 135–145)
T3 SERPL-MCNC: 98 NG/DL (ref 60–181)
T4 FREE SERPL-MCNC: 0.79 NG/DL (ref 0.53–1.43)
TESTOST SERPL-MCNC: 795 NG/DL (ref 175–781)
THYROPEROXIDASE AB SERPL-ACNC: 0.3 IU/ML (ref 0–9)
TRIGL SERPL-MCNC: 71 MG/DL (ref 0–149)
TSH SERPL DL<=0.005 MIU/L-ACNC: 0.95 UIU/ML (ref 0.38–5.33)

## 2019-01-16 PROCEDURE — 84439 ASSAY OF FREE THYROXINE: CPT

## 2019-01-16 PROCEDURE — 86376 MICROSOMAL ANTIBODY EACH: CPT

## 2019-01-16 PROCEDURE — 84480 ASSAY TRIIODOTHYRONINE (T3): CPT

## 2019-01-16 PROCEDURE — 80061 LIPID PANEL: CPT

## 2019-01-16 PROCEDURE — 86800 THYROGLOBULIN ANTIBODY: CPT

## 2019-01-16 PROCEDURE — 84443 ASSAY THYROID STIM HORMONE: CPT

## 2019-01-16 PROCEDURE — 85025 COMPLETE CBC W/AUTO DIFF WBC: CPT

## 2019-01-16 PROCEDURE — 80053 COMPREHEN METABOLIC PANEL: CPT

## 2019-01-16 PROCEDURE — 36415 COLL VENOUS BLD VENIPUNCTURE: CPT

## 2019-01-16 PROCEDURE — 84403 ASSAY OF TOTAL TESTOSTERONE: CPT

## 2019-01-17 LAB
BASOPHILS # BLD AUTO: 0.7 % (ref 0–1.8)
BASOPHILS # BLD: 0.05 K/UL (ref 0–0.12)
EOSINOPHIL # BLD AUTO: 0.09 K/UL (ref 0–0.51)
EOSINOPHIL NFR BLD: 1.2 % (ref 0–6.9)
ERYTHROCYTE [DISTWIDTH] IN BLOOD BY AUTOMATED COUNT: 45.2 FL (ref 35.9–50)
HCT VFR BLD AUTO: 53.4 % (ref 42–52)
HGB BLD-MCNC: 17.6 G/DL (ref 14–18)
IMM GRANULOCYTES # BLD AUTO: 0.02 K/UL (ref 0–0.11)
IMM GRANULOCYTES NFR BLD AUTO: 0.3 % (ref 0–0.9)
LYMPHOCYTES # BLD AUTO: 0.94 K/UL (ref 1–4.8)
LYMPHOCYTES NFR BLD: 12.7 % (ref 22–41)
MCH RBC QN AUTO: 32.1 PG (ref 27–33)
MCHC RBC AUTO-ENTMCNC: 33 G/DL (ref 33.7–35.3)
MCV RBC AUTO: 97.4 FL (ref 81.4–97.8)
MONOCYTES # BLD AUTO: 0.64 K/UL (ref 0–0.85)
MONOCYTES NFR BLD AUTO: 8.6 % (ref 0–13.4)
NEUTROPHILS # BLD AUTO: 5.69 K/UL (ref 1.82–7.42)
NEUTROPHILS NFR BLD: 76.5 % (ref 44–72)
NRBC # BLD AUTO: 0 K/UL
NRBC BLD-RTO: 0 /100 WBC
PLATELET # BLD AUTO: 240 K/UL (ref 164–446)
PMV BLD AUTO: 11.2 FL (ref 9–12.9)
RBC # BLD AUTO: 5.48 M/UL (ref 4.7–6.1)
WBC # BLD AUTO: 7.4 K/UL (ref 4.8–10.8)

## 2019-01-19 LAB — THYROGLOB AB SERPL-ACNC: <0.9 IU/ML (ref 0–4)

## 2019-01-23 ENCOUNTER — NON-PROVIDER VISIT (OUTPATIENT)
Dept: MEDICAL GROUP | Facility: MEDICAL CENTER | Age: 74
End: 2019-01-23
Payer: MEDICARE

## 2019-01-23 PROCEDURE — 96372 THER/PROPH/DIAG INJ SC/IM: CPT | Performed by: FAMILY MEDICINE

## 2019-01-23 RX ADMIN — TESTOSTERONE CYPIONATE 200 MG: 200 INJECTION, SOLUTION INTRAMUSCULAR at 16:15

## 2019-01-24 NOTE — PROGRESS NOTES
Sina Oliver is a 73 y.o. male here for a non-provider visit for testosterone injection.    Reason for injection: in need of hormones  Order in MAR?: Yes  Patient supplied?:Yes  Minimum interval has been met for this injection (per MAR order): Yes    Order and dose verified by: CD  Patient tolerated injection and no adverse effects were observed or reported: Yes

## 2019-01-29 ENCOUNTER — HOSPITAL ENCOUNTER (OUTPATIENT)
Dept: RADIOLOGY | Facility: MEDICAL CENTER | Age: 74
End: 2019-01-29
Attending: FAMILY MEDICINE
Payer: MEDICARE

## 2019-01-29 DIAGNOSIS — E04.1 THYROID NODULE: ICD-10-CM

## 2019-01-29 PROCEDURE — 76536 US EXAM OF HEAD AND NECK: CPT

## 2019-01-31 ENCOUNTER — TELEPHONE (OUTPATIENT)
Dept: MEDICAL GROUP | Facility: MEDICAL CENTER | Age: 74
End: 2019-01-31

## 2019-01-31 DIAGNOSIS — E04.1 THYROID NODULE: ICD-10-CM

## 2019-02-13 ENCOUNTER — PATIENT OUTREACH (OUTPATIENT)
Dept: HEALTH INFORMATION MANAGEMENT | Facility: OTHER | Age: 74
End: 2019-02-13

## 2019-02-13 ENCOUNTER — NON-PROVIDER VISIT (OUTPATIENT)
Dept: MEDICAL GROUP | Facility: MEDICAL CENTER | Age: 74
End: 2019-02-13
Payer: MEDICARE

## 2019-02-13 PROCEDURE — 96372 THER/PROPH/DIAG INJ SC/IM: CPT | Performed by: FAMILY MEDICINE

## 2019-02-13 RX ADMIN — TESTOSTERONE CYPIONATE 200 MG: 200 INJECTION, SOLUTION INTRAMUSCULAR at 15:11

## 2019-02-13 NOTE — NON-PROVIDER
Sina Oliver is a 73 y.o. male here for a non-provider visit for testosterone injection.  Reason for injection:  Low testosterone level in male     Order in MAR?: Yes  Patient supplied?:Yes  Minimum interval has been met for this injection (per MAR order): Yes    Order and dose verified by: cd  Patient tolerated injection and no adverse effects were observed or reported: Yes    # of Administrations remaining in MAR: 3

## 2019-02-21 NOTE — PROGRESS NOTES
Outcome: Called pt 2 times and he was unable to hear me. Pt said he would call back couldn't hear anyhting     Please transfer to Patient Outreach Team at [phone] when patient returns call.    Attempt # 2

## 2019-02-22 DIAGNOSIS — E78.2 MIXED HYPERLIPIDEMIA: ICD-10-CM

## 2019-02-22 RX ORDER — ATORVASTATIN CALCIUM 40 MG/1
40 TABLET, FILM COATED ORAL DAILY
Qty: 90 TAB | Refills: 3 | Status: SHIPPED | OUTPATIENT
Start: 2019-02-22 | End: 2019-04-11 | Stop reason: SDUPTHER

## 2019-03-01 ENCOUNTER — NON-PROVIDER VISIT (OUTPATIENT)
Dept: MEDICAL GROUP | Facility: MEDICAL CENTER | Age: 74
End: 2019-03-01
Payer: MEDICARE

## 2019-03-01 PROCEDURE — 96372 THER/PROPH/DIAG INJ SC/IM: CPT | Performed by: FAMILY MEDICINE

## 2019-03-01 RX ADMIN — TESTOSTERONE CYPIONATE 200 MG: 200 INJECTION, SOLUTION INTRAMUSCULAR at 13:12

## 2019-03-01 NOTE — NON-PROVIDER
Sina Oliver is a 73 y.o. male here for a non-provider visit for testosterone injection.    Reason for injection:  Low testosterone level in male     Order in MAR?: Yes  Patient supplied?:Yes  Minimum interval has been met for this injection (per MAR order): Yes    Order and dose verified by: AL  Patient tolerated injection and no adverse effects were observed or reported: Yes    # of Administrations remaining in MAR: 2

## 2019-03-05 ENCOUNTER — OFFICE VISIT (OUTPATIENT)
Dept: MEDICAL GROUP | Facility: MEDICAL CENTER | Age: 74
End: 2019-03-05
Payer: MEDICARE

## 2019-03-05 VITALS
SYSTOLIC BLOOD PRESSURE: 136 MMHG | HEIGHT: 74 IN | HEART RATE: 78 BPM | BODY MASS INDEX: 32.47 KG/M2 | WEIGHT: 253 LBS | RESPIRATION RATE: 16 BRPM | TEMPERATURE: 98.6 F | OXYGEN SATURATION: 96 % | DIASTOLIC BLOOD PRESSURE: 78 MMHG

## 2019-03-05 DIAGNOSIS — E04.1 THYROID NODULE: ICD-10-CM

## 2019-03-05 DIAGNOSIS — L03.115 CELLULITIS OF RIGHT LOWER EXTREMITY: ICD-10-CM

## 2019-03-05 PROCEDURE — 99214 OFFICE O/P EST MOD 30 MIN: CPT | Performed by: FAMILY MEDICINE

## 2019-03-05 RX ORDER — SULFAMETHOXAZOLE AND TRIMETHOPRIM 800; 160 MG/1; MG/1
1 TABLET ORAL 2 TIMES DAILY
Qty: 14 TAB | Refills: 0 | Status: SHIPPED | OUTPATIENT
Start: 2019-03-05 | End: 2019-03-12

## 2019-03-05 NOTE — PROGRESS NOTES
"cc: Rash    Subjective:     Sina Oliver is a 73 y.o. male presenting to discuss a rash on his right leg.  He noticed it about 3 days ago, is red, sore, and somewhat swollen.  Denies any fevers, nausea, vomiting, lesions elsewhere.  He overall feels quite well.  He is exercising regularly and eating healthier, has been able to lose about 15 pounds over the past 2 months.      Review of systems:  See above.  Denies any chest pain, shortness of breath, lightheadedness, dizziness      Current Outpatient Prescriptions:   •  sulfamethoxazole-trimethoprim (BACTRIM DS) 800-160 MG tablet, Take 1 Tab by mouth 2 times a day for 7 days., Disp: 14 Tab, Rfl: 0  •  atorvastatin (LIPITOR) 40 MG Tab, Take 1 Tab by mouth every day., Disp: 90 Tab, Rfl: 3  •  amLODIPine (NORVASC) 5 MG Tab, Take 5 mg by mouth every day., Disp: , Rfl:   •  testosterone cypionate (DEPO-TESTOSTERONE) 200 MG/ML Solution injection, 200 mg by Intramuscular route every 14 days., Disp: , Rfl:     Current Facility-Administered Medications:   •  testosterone cypionate (DEPO-TESTOSTERONE) injection 200 mg, 200 mg, Intramuscular, Q14 DAYS, Hugo Chase M.D., 200 mg at 03/01/19 1312    Allergies, past medical history, past surgical history, family history, social history reviewed and updated    Objective:     Vitals: /78 (BP Location: Right arm, Patient Position: Sitting)   Pulse 78   Temp 37 °C (98.6 °F) (Temporal)   Resp 16   Ht 1.88 m (6' 2.02\")   Wt 114.8 kg (253 lb)   SpO2 96%   BMI 32.47 kg/m²   General: Alert, pleasant, NAD  HEENT: Normocephalic.   Skin: Warm, dry.  Approximately 3 cm in diameter irregularly-shaped with ill-defined borders, blanchable, erythematous patch on his anterior right leg just below the tibial tuberosity.  Area of induration in the central portion of the erythematous patch.  Slight warmth, tenderness  Psych:  Affect/mood is normal, judgement is good, memory is intact, grooming is appropriate.    Assessment/Plan: "     Diagnoses and all orders for this visit:    Cellulitis of right lower extremity  New problem, appears to have a cellulitis, possible early abscess, but no areas of fluctuance, nothing to drain today.  We will start a course of Bactrim for a week.  He will let us know if symptoms worsen in any way.  He has an MA visit in 10 days for his testosterone injection, will reevaluate the skin at that time too  -     sulfamethoxazole-trimethoprim (BACTRIM DS) 800-160 MG tablet; Take 1 Tab by mouth 2 times a day for 7 days.    Thyroid nodule  He has not scheduled an appointment with ENT yet.  Information given to patient.        Return in about 2 weeks (around 3/19/2019), or if symptoms worsen or fail to improve.

## 2019-03-07 ENCOUNTER — APPOINTMENT (OUTPATIENT)
Dept: ADMISSIONS | Facility: MEDICAL CENTER | Age: 74
End: 2019-03-07
Attending: OPHTHALMOLOGY
Payer: MEDICARE

## 2019-03-12 ENCOUNTER — HOSPITAL ENCOUNTER (OUTPATIENT)
Facility: MEDICAL CENTER | Age: 74
End: 2019-03-12
Attending: OPHTHALMOLOGY | Admitting: OPHTHALMOLOGY
Payer: MEDICARE

## 2019-03-12 VITALS
TEMPERATURE: 97.1 F | WEIGHT: 252.65 LBS | RESPIRATION RATE: 15 BRPM | SYSTOLIC BLOOD PRESSURE: 140 MMHG | HEART RATE: 61 BPM | HEIGHT: 74 IN | DIASTOLIC BLOOD PRESSURE: 74 MMHG | OXYGEN SATURATION: 92 % | BODY MASS INDEX: 32.42 KG/M2

## 2019-03-12 PROCEDURE — 160046 HCHG PACU - 1ST 60 MINS PHASE II: Performed by: OPHTHALMOLOGY

## 2019-03-12 PROCEDURE — 160002 HCHG RECOVERY MINUTES (STAT): Performed by: OPHTHALMOLOGY

## 2019-03-12 PROCEDURE — 501539 HCHG TIP, PHACO: Performed by: OPHTHALMOLOGY

## 2019-03-12 PROCEDURE — 160048 HCHG OR STATISTICAL LEVEL 1-5: Performed by: OPHTHALMOLOGY

## 2019-03-12 PROCEDURE — 700111 HCHG RX REV CODE 636 W/ 250 OVERRIDE (IP)

## 2019-03-12 PROCEDURE — 700101 HCHG RX REV CODE 250

## 2019-03-12 PROCEDURE — 160029 HCHG SURGERY MINUTES - 1ST 30 MINS LEVEL 4: Performed by: OPHTHALMOLOGY

## 2019-03-12 PROCEDURE — 160035 HCHG PACU - 1ST 60 MINS PHASE I: Performed by: OPHTHALMOLOGY

## 2019-03-12 PROCEDURE — V2788 PRESBYOPIA-CORRECT FUNCTION: HCPCS | Performed by: OPHTHALMOLOGY

## 2019-03-12 PROCEDURE — 160025 RECOVERY II MINUTES (STATS): Performed by: OPHTHALMOLOGY

## 2019-03-12 PROCEDURE — 500855 HCHG NEEDLE, IRRIG CYSTOTOME 27G: Performed by: OPHTHALMOLOGY

## 2019-03-12 PROCEDURE — 502240 HCHG MISC OR SUPPLY RC 0272: Performed by: OPHTHALMOLOGY

## 2019-03-12 PROCEDURE — 99152 MOD SED SAME PHYS/QHP 5/>YRS: CPT | Performed by: OPHTHALMOLOGY

## 2019-03-12 RX ORDER — ONDANSETRON 2 MG/ML
4 INJECTION INTRAMUSCULAR; INTRAVENOUS
Status: DISCONTINUED | OUTPATIENT
Start: 2019-03-12 | End: 2019-03-12 | Stop reason: HOSPADM

## 2019-03-12 RX ORDER — PHENYLEPHRINE HYDROCHLORIDE 25 MG/ML
SOLUTION/ DROPS OPHTHALMIC
Status: COMPLETED
Start: 2019-03-12 | End: 2019-03-12

## 2019-03-12 RX ORDER — SODIUM CHLORIDE, SODIUM LACTATE, POTASSIUM CHLORIDE, CALCIUM CHLORIDE 600; 310; 30; 20 MG/100ML; MG/100ML; MG/100ML; MG/100ML
INJECTION, SOLUTION INTRAVENOUS CONTINUOUS
Status: DISCONTINUED | OUTPATIENT
Start: 2019-03-12 | End: 2019-03-12 | Stop reason: HOSPADM

## 2019-03-12 RX ORDER — TROPICAMIDE 10 MG/ML
SOLUTION/ DROPS OPHTHALMIC
Status: COMPLETED
Start: 2019-03-12 | End: 2019-03-12

## 2019-03-12 RX ORDER — TETRACAINE HYDROCHLORIDE 5 MG/ML
SOLUTION OPHTHALMIC
Status: COMPLETED
Start: 2019-03-12 | End: 2019-03-12

## 2019-03-12 RX ORDER — METOPROLOL TARTRATE 1 MG/ML
1 INJECTION, SOLUTION INTRAVENOUS
Status: DISCONTINUED | OUTPATIENT
Start: 2019-03-12 | End: 2019-03-12 | Stop reason: HOSPADM

## 2019-03-12 RX ADMIN — TETRACAINE HYDROCHLORIDE 1 DROP: 5 SOLUTION OPHTHALMIC at 12:40

## 2019-03-12 RX ADMIN — TROPICAMIDE 1 DROP: 10 SOLUTION/ DROPS OPHTHALMIC at 12:40

## 2019-03-12 RX ADMIN — PHENYLEPHRINE HYDROCHLORIDE 1 DROP: 2.5 SOLUTION/ DROPS OPHTHALMIC at 12:40

## 2019-03-12 NOTE — OR NURSING
1349 Pt to PACU from OR. Report from anesthesia and OR RN. R eye dilated. Respirations even and unlabored. VSS. Declines pain/nausea.     1354 Tolerating sips of water, wife brought to bedside.     1401 Discharge orders received. Meets discharge criteria at this time. Declines pain. No nausea. Tolerating PO. On RA. All lines and monitors discontinued. Reviewed discharge paperwork with pt and wife. Discussed diet, activity, medications, follow up care and worsening symptoms. No questions at this time. Pt to be discharged to home via private vehicle. Offered hospital escort and wc, pt declined, ambulated out of department with wife, RN, and all belongings.

## 2019-03-12 NOTE — DISCHARGE INSTRUCTIONS
HOME CARE INSTRUCTIONS FOR CATARACT SURGERY    ACTIVITY: Rest and take it easy for the first 24 hours. We strongly suggest that a responsible adult remain with you during that time. It is normal to feel sleepy. We encourage you to not do anything that requires balance, judgment or coordination. Be extra careful when walking (with a dilated eye, it is easier to trip and fall).     FOR 24 HOURS, DO NOT:       Drive, operate machinery or run household appliances.        Drink beer or alcoholic beverages.        Make important decisions or sign legal documents.     DIET: To avoid nausea, slowly advance diet as tolerated, avoiding spicy or greasy foods for the first meal.     MEDICATIONS: Resume taking daily medication. You may take Tylenol for mild discomfort, if needed.     SURGICAL DRESSING: Eye shield as instructed by your doctor. Dark glasses should be worn while in the sunlight.     Follow your Physician's instruction Sheet. Eye Kit Given    A follow-up appointment is scheduled with your doctor tomorrow at __9:15___ am.     You should call 911 if you develop problems with breathing or chest pain.  You should CALL YOUR PHYSICIAN if you develop: Sharp stabbing pain or sudden change in vision in your operative eye. If you are unable to contact your doctor or the surgical center, you should go to the nearest emergency room or urgent care center. Physician's telephone # ____Dr. [email or web address]_[phone]_____________________    If any questions arise, call your doctor. If your doctor is not available, please feel free to call Same Day Surgery at [phone]. You can also call the [employer] Hotline open 24 hours/day, 7 days/week and speak to a nurse at [phone] or [phone].     I acknowledge receipt and understanding of these Home Care Instructions.    ______________________________     _______________________________            Signature of Patient / Responsible Adult                                                        RN Signature    A registered nurse may call you a few days after your surgery to see how you are doing.   You may also receive a survey in the mail within the next two weeks and we ask that you take a few moments to complete and return the survey. Our goal is to provide you with very good care and we value your comments. Thank you for choosing [facility].

## 2019-03-12 NOTE — OP REPORT
DATE OF SERVICE:  03/12/2019    PREOPERATIVE DIAGNOSIS:  Cataract, right eye.    POSTOPERATIVE DIAGNOSIS:  Cataract, right eye.    PROCEDURE:  Phacoemulsification with intraocular lens implant, right eye.    SURGEON:  Eddie Nair MD    ANESTHESIA:  Local MAC.    PROSTHETIC DEVICES:  Ulises and [employer] Vision intraocular lens, model ZXR00,   22.5 diopter, serial #[id number].    INDICATIONS:  The patient has a visually significant cataract in the right   eye.    DESCRIPTION OF OPERATION:  The patient was placed in the supine position on   the operating room table.  Appropriate anesthetic monitors were positioned.    The eye was prepped and draped in the usual sterile fashion for ocular surgery   using Betadine solution.  A wire lid speculum was positioned for exposure.  A   clear cornea temporal incision was made with a myron keratome and a   paracentesis was made with a myron knife.  The anterior chamber was filled   with viscoelastic and a continuous curvilinear capsulorrhexis was made with   the capsulorrhexis forceps.  The lens was hydrodissected and the nucleus was   removed using the phacoemulsification handpiece and the cortex was aspirated   with the IA handpiece.  The capsular bag was filled with viscoelastic and the   intraocular lens was positioned into the capsular bag.  Residual viscoelastic   was aspirated from the anterior chamber, and the wound was hydrated with   saline solution producing a watertight closure.  The wire lid speculum was   removed and the patient was taken to the recovery room in stable condition.       ____________________________________     EDDIE NAIR MD    CAM / NTS    DD:  03/12/2019 14:35:43  DT:  03/12/2019 14:51:29    D#:  [id number]  Job#:  [id number]

## 2019-03-20 ENCOUNTER — NON-PROVIDER VISIT (OUTPATIENT)
Dept: MEDICAL GROUP | Facility: MEDICAL CENTER | Age: 74
End: 2019-03-20
Payer: MEDICARE

## 2019-03-20 PROCEDURE — 96372 THER/PROPH/DIAG INJ SC/IM: CPT | Performed by: FAMILY MEDICINE

## 2019-03-20 RX ADMIN — TESTOSTERONE CYPIONATE 200 MG: 200 INJECTION, SOLUTION INTRAMUSCULAR at 11:40

## 2019-03-20 NOTE — NON-PROVIDER
Sina Oliver is a 73 y.o. male here for a non-provider visit for testosterone injection.    Reason for injection:  Low testosterone level in male     Order in MAR?: Yes  Patient supplied?:Yes  Minimum interval has been met for this injection (per MAR order): Yes    Order and dose verified by: al  Patient tolerated injection and no adverse effects were observed or reported: Yes    # of Administrations remaining in MAR: 3

## 2019-03-26 ENCOUNTER — ANESTHESIA (OUTPATIENT)
Dept: SURGERY | Facility: MEDICAL CENTER | Age: 74
End: 2019-03-26
Payer: MEDICARE

## 2019-03-26 ENCOUNTER — HOSPITAL ENCOUNTER (OUTPATIENT)
Facility: MEDICAL CENTER | Age: 74
End: 2019-03-26
Attending: OPHTHALMOLOGY | Admitting: OPHTHALMOLOGY
Payer: MEDICARE

## 2019-03-26 ENCOUNTER — ANESTHESIA EVENT (OUTPATIENT)
Dept: SURGERY | Facility: MEDICAL CENTER | Age: 74
End: 2019-03-26
Payer: MEDICARE

## 2019-03-26 VITALS
TEMPERATURE: 97.6 F | RESPIRATION RATE: 17 BRPM | HEIGHT: 74 IN | DIASTOLIC BLOOD PRESSURE: 76 MMHG | HEART RATE: 55 BPM | OXYGEN SATURATION: 95 % | BODY MASS INDEX: 31.46 KG/M2 | SYSTOLIC BLOOD PRESSURE: 140 MMHG | WEIGHT: 245.15 LBS

## 2019-03-26 PROCEDURE — 160048 HCHG OR STATISTICAL LEVEL 1-5: Performed by: OPHTHALMOLOGY

## 2019-03-26 PROCEDURE — 160009 HCHG ANES TIME/MIN: Performed by: OPHTHALMOLOGY

## 2019-03-26 PROCEDURE — V2788 PRESBYOPIA-CORRECT FUNCTION: HCPCS | Performed by: OPHTHALMOLOGY

## 2019-03-26 PROCEDURE — 501539 HCHG TIP, PHACO: Performed by: OPHTHALMOLOGY

## 2019-03-26 PROCEDURE — 160035 HCHG PACU - 1ST 60 MINS PHASE I: Performed by: OPHTHALMOLOGY

## 2019-03-26 PROCEDURE — 700111 HCHG RX REV CODE 636 W/ 250 OVERRIDE (IP): Performed by: ANESTHESIOLOGY

## 2019-03-26 PROCEDURE — 700101 HCHG RX REV CODE 250

## 2019-03-26 PROCEDURE — 502240 HCHG MISC OR SUPPLY RC 0272: Performed by: OPHTHALMOLOGY

## 2019-03-26 PROCEDURE — 160002 HCHG RECOVERY MINUTES (STAT): Performed by: OPHTHALMOLOGY

## 2019-03-26 PROCEDURE — 160025 RECOVERY II MINUTES (STATS): Performed by: OPHTHALMOLOGY

## 2019-03-26 PROCEDURE — 160029 HCHG SURGERY MINUTES - 1ST 30 MINS LEVEL 4: Performed by: OPHTHALMOLOGY

## 2019-03-26 PROCEDURE — 700111 HCHG RX REV CODE 636 W/ 250 OVERRIDE (IP)

## 2019-03-26 PROCEDURE — 160046 HCHG PACU - 1ST 60 MINS PHASE II: Performed by: OPHTHALMOLOGY

## 2019-03-26 PROCEDURE — 500855 HCHG NEEDLE, IRRIG CYSTOTOME 27G: Performed by: OPHTHALMOLOGY

## 2019-03-26 DEVICE — IMPLANTABLE DEVICE: Type: IMPLANTABLE DEVICE | Site: EYE | Status: FUNCTIONAL

## 2019-03-26 RX ORDER — OXYCODONE HCL 5 MG/5 ML
5 SOLUTION, ORAL ORAL
Status: DISCONTINUED | OUTPATIENT
Start: 2019-03-26 | End: 2019-03-26 | Stop reason: HOSPADM

## 2019-03-26 RX ORDER — DIPHENHYDRAMINE HYDROCHLORIDE 50 MG/ML
12.5 INJECTION INTRAMUSCULAR; INTRAVENOUS
Status: DISCONTINUED | OUTPATIENT
Start: 2019-03-26 | End: 2019-03-26 | Stop reason: HOSPADM

## 2019-03-26 RX ORDER — SODIUM CHLORIDE, SODIUM LACTATE, POTASSIUM CHLORIDE, CALCIUM CHLORIDE 600; 310; 30; 20 MG/100ML; MG/100ML; MG/100ML; MG/100ML
INJECTION, SOLUTION INTRAVENOUS CONTINUOUS
Status: DISCONTINUED | OUTPATIENT
Start: 2019-03-26 | End: 2019-03-26 | Stop reason: HOSPADM

## 2019-03-26 RX ORDER — TETRACAINE HYDROCHLORIDE 5 MG/ML
SOLUTION OPHTHALMIC
Status: COMPLETED
Start: 2019-03-26 | End: 2019-03-26

## 2019-03-26 RX ORDER — HALOPERIDOL 5 MG/ML
1 INJECTION INTRAMUSCULAR
Status: DISCONTINUED | OUTPATIENT
Start: 2019-03-26 | End: 2019-03-26 | Stop reason: HOSPADM

## 2019-03-26 RX ORDER — PHENYLEPHRINE HYDROCHLORIDE 25 MG/ML
SOLUTION/ DROPS OPHTHALMIC
Status: COMPLETED
Start: 2019-03-26 | End: 2019-03-26

## 2019-03-26 RX ORDER — ONDANSETRON 2 MG/ML
4 INJECTION INTRAMUSCULAR; INTRAVENOUS
Status: DISCONTINUED | OUTPATIENT
Start: 2019-03-26 | End: 2019-03-26 | Stop reason: HOSPADM

## 2019-03-26 RX ORDER — OXYCODONE HCL 5 MG/5 ML
10 SOLUTION, ORAL ORAL
Status: DISCONTINUED | OUTPATIENT
Start: 2019-03-26 | End: 2019-03-26 | Stop reason: HOSPADM

## 2019-03-26 RX ORDER — TROPICAMIDE 10 MG/ML
SOLUTION/ DROPS OPHTHALMIC
Status: COMPLETED
Start: 2019-03-26 | End: 2019-03-26

## 2019-03-26 RX ORDER — IPRATROPIUM BROMIDE AND ALBUTEROL SULFATE 2.5; .5 MG/3ML; MG/3ML
3 SOLUTION RESPIRATORY (INHALATION)
Status: DISCONTINUED | OUTPATIENT
Start: 2019-03-26 | End: 2019-03-26 | Stop reason: HOSPADM

## 2019-03-26 RX ADMIN — TETRACAINE HYDROCHLORIDE 1 DROP: 5 SOLUTION OPHTHALMIC at 07:35

## 2019-03-26 RX ADMIN — SODIUM CHLORIDE, SODIUM LACTATE, POTASSIUM CHLORIDE, CALCIUM CHLORIDE: 600; 310; 30; 20 INJECTION, SOLUTION INTRAVENOUS at 07:49

## 2019-03-26 RX ADMIN — FENTANYL CITRATE 50 MCG: 50 INJECTION, SOLUTION INTRAMUSCULAR; INTRAVENOUS at 08:39

## 2019-03-26 RX ADMIN — MIDAZOLAM HYDROCHLORIDE 1 MG: 1 INJECTION, SOLUTION INTRAMUSCULAR; INTRAVENOUS at 08:39

## 2019-03-26 RX ADMIN — PHENYLEPHRINE HYDROCHLORIDE 1 DROP: 2.5 SOLUTION/ DROPS OPHTHALMIC at 07:35

## 2019-03-26 RX ADMIN — TROPICAMIDE 1 DROP: 10 SOLUTION/ DROPS OPHTHALMIC at 07:35

## 2019-03-26 ASSESSMENT — PAIN SCALES - GENERAL: PAIN_LEVEL: 0

## 2019-03-26 NOTE — DISCHARGE INSTRUCTIONS
HOME CARE INSTRUCTIONS FOR CATARACT SURGERY    ACTIVITY: Rest and take it easy for the first 24 hours. We strongly suggest that a responsible adult remain with you during that time. It is normal to feel sleepy. We encourage you to not do anything that requires balance, judgment or coordination. Be extra careful when walking (with a dilated eye, it is easier to trip and fall).     FOR 24 HOURS, DO NOT:       Drive, operate machinery or run household appliances.        Drink beer or alcoholic beverages.        Make important decisions or sign legal documents.     DIET: To avoid nausea, slowly advance diet as tolerated, avoiding spicy or greasy foods for the first meal.     MEDICATIONS: Resume taking daily medication. You may take Tylenol for mild discomfort, if needed.     SURGICAL DRESSING: Eye shield as instructed by your doctor. Dark glasses should be worn while in the sunlight.     Follow your Physician's instruction Sheet. Eye Kit Given    A follow-up appointment is scheduled with your doctor tomorrow at ____8:30___ am.     You should call 911 if you develop problems with breathing or chest pain.  You should CALL YOUR PHYSICIAN if you develop: Sharp stabbing pain or sudden change in vision in your operative eye. If you are unable to contact your doctor or the surgical center, you should go to the nearest emergency room or urgent care center. Physician's telephone # _____[phone]_____________________    If any questions arise, call your doctor. If your doctor is not available, please feel free to call Same Day Surgery at [phone]. You can also call the [employer] [employer] Hotline open 24 hours/day, 7 days/week and speak to a nurse at [phone] or [phone].     I acknowledge receipt and understanding of these Home Care Instructions.    ______________________________     _______________________________            Signature of Patient / Responsible Adult                                                       RN  Signature    A registered nurse may call you a few days after your surgery to see how you are doing.   You may also receive a survey in the mail within the next two weeks and we ask that you take a few moments to complete and return the survey. Our goal is to provide you with very good care and we value your comments. Thank you for choosing [facility].

## 2019-03-26 NOTE — ANESTHESIA PREPROCEDURE EVALUATION
Left eye cataract.     Relevant Problems   (+) Essential hypertension       Physical Exam    Airway   Mallampati: II  TM distance: >3 FB  Neck ROM: full       Cardiovascular - normal exam  Rhythm: regular  Rate: normal  (-) murmur     Dental - normal exam         Pulmonary - normal exam  Breath sounds clear to auscultation     Abdominal    Neurological - normal exam                 Anesthesia Plan    ASA 2       Plan - MAC             Induction: intravenous      Pertinent diagnostic labs and testing reviewed    Informed Consent:    Anesthetic plan and risks discussed with patient.

## 2019-03-26 NOTE — ANESTHESIA TIME REPORT
Anesthesia Start and Stop Event Times     Date Time Event    3/26/2019 0838 Anesthesia Start     0901 Anesthesia Stop        Responsible Staff  03/26/19    Name Role Begin End    Pieter Núñez M.D. Anesth 0838 0901        Preop Diagnosis (Free Text):  Pre-op Diagnosis     COMBINED FORM CATARACT        Preop Diagnosis (Codes):  Diagnosis Information     Diagnosis Code(s): Cataract [366]        Post op Diagnosis  Cataract      Premium Reason  Non-Premium    Comments:

## 2019-03-26 NOTE — ANESTHESIA QCDR
2019 Qualified Clinical Data Registry (for Quality Improvement)     Postoperative nausea/vomiting risk protocol (Adult = 18 yrs and Pediatric 3-17 yrs)- (430 and 463)  General inhalation anesthetic (NOT TIVA) with PONV risk factors: No  Provision of anti-emetic therapy with at least 2 different classes of agents: N/A  Patient DID NOT receive anti-emetic therapy and reason is documented in Medical Record: N/A    Multimodal Pain Management- (AQI59)  Patient undergoing Elective Surgery (i.e. Outpatient, or ASC, or Prescheduled Surgery prior to Hospital Admission): Yes  Use of Multimodal Pain Management, two or more drugs and/or interventions, NOT including systemic opioids: No   Exception: Documented allergy to multiple classes of analgesics: No        PACU assessment of acute postoperative pain prior to Anesthesia Care End- Applies to Patients Age = 18- (ABG7)  Initial PACU pain score is which of the following: < 7/10  Patient unable to report pain score: N/A    Post-anesthetic transfer of care checklist/protocol to PACU/ICU- (426 and 427)  Upon conclusion of case, patient transferred to which of the following locations: PACU/Non-ICU  Use of transfer checklist/protocol: Yes  Exclusion: Service Performed in Patient Hospital Room (and thus did not require transfer): N/A    PACU Reintubation- (AQI31)  General anesthesia requiring endotracheal intubation (ETT) along with subsequent extubation in OR or PACU: No  Required reintubation in the PACU: N/A  Extubation was a planned trial documented in the medical record prior to removal of the original airway device: N/A    Unplanned admission to ICU related to anesthesia service up through end of PACU care- (MD51)  Unplanned admission to ICU (not initially anticipated at anesthesia start time): No

## 2019-03-26 NOTE — OP REPORT
DATE OF SERVICE:  03/26/2019    PREOPERATIVE DIAGNOSIS:  Cataract, left eye.    POSTOPERATIVE DIAGNOSIS:  Cataract, left eye.    PROCEDURE:  Phacoemulsification with intraocular lens implant, left eye.    SURGEON:  Aldo Nair MD    ANESTHESIA:  Local MAC.    PROSTHETIC DEVICES:  Ulises and [employer] Vision intraocular lens, model ZLB00,   22.0 diopter plus 3.25 diopter add, serial #[id number].    INDICATIONS:  The patient has a visually significant cataract in the left eye.    Following a discussion of the risks and benefits of surgery, the decision   was made to proceed with elective cataract surgery using a multifocal lens   implant.    DESCRIPTION OF OPERATION:  The patient was placed in the supine position on   the operating room table.  Appropriate anesthetic monitors were positioned.    The eye was prepped and draped in the usual sterile fashion for ocular surgery   using Betadine solution.  A wire lid speculum was positioned for exposure.  A   clear cornea temporal incision was made with a myron keratome and a   paracentesis was made with a myron knife.  The anterior chamber was filled   with viscoelastic and a continuous curvilinear capsulorrhexis was made with   the capsulorrhexis forceps.  The lens was hydrodissected and the nucleus was   removed using the phacoemulsification handpiece and the cortex was aspirated   with the IA handpiece.  The capsular bag was filled with viscoelastic and the   intraocular lens was positioned into the capsular bag.  Residual viscoelastic   was aspirated from the anterior chamber, and the wound was hydrated with   saline solution producing a watertight closure.  The wire lid speculum was   removed and the patient was taken to the recovery room in stable condition.    Because the pupil failed to adequately dilate, a pupillary expansion device   (I-ring) was inserted into the anterior chamber under viscoelastic prior to   performing the capsulorrhexis.  This device  was removed following insertion of   the lens implant at the completion of the surgery.       ____________________________________     MD CORBY COTA / CINDY    DD:  03/26/2019 14:11:29  DT:  03/26/2019 14:19:12    D#:  [id number]  Job#:  [id number]

## 2019-03-26 NOTE — ANESTHESIA POSTPROCEDURE EVALUATION
Patient: Sina Oliver    Procedure Summary     Date:  03/26/19 Room / Location:  [facility] ROOM 27 / SURGERY SAME DAY [facility]    Anesthesia Start:  0838 Anesthesia Stop:  0901    Procedure:  CATARACT PHACO WITH IOL (Left Eye) Diagnosis:       Cataract      (COMBINED FORM CATARACT)    Surgeon:  Aldo Nair M.D. Responsible Provider:  Pieter Núñez M.D.    Anesthesia Type:  MAC ASA Status:  2          Final Anesthesia Type: MAC  Last vitals  BP   Blood Pressure : 140/76, NIBP: 123/65    Temp   36.4 °C (97.6 °F)    Pulse   Pulse: (!) 55, Heart Rate (Monitored): (!) 55   Resp   17    SpO2   95 %      Anesthesia Post Evaluation    Patient location during evaluation: PACU  Patient participation: complete - patient participated  Level of consciousness: awake and alert  Pain score: 0    Airway patency: patent  Anesthetic complications: no  Cardiovascular status: hemodynamically stable  Respiratory status: acceptable  Hydration status: euvolemic    PONV: none           Nurse Pain Score: 0 (NPRS)

## 2019-04-11 ENCOUNTER — OFFICE VISIT (OUTPATIENT)
Dept: MEDICAL GROUP | Facility: MEDICAL CENTER | Age: 74
End: 2019-04-11
Payer: MEDICARE

## 2019-04-11 VITALS
SYSTOLIC BLOOD PRESSURE: 118 MMHG | RESPIRATION RATE: 16 BRPM | HEIGHT: 74 IN | TEMPERATURE: 98.6 F | HEART RATE: 76 BPM | OXYGEN SATURATION: 98 % | BODY MASS INDEX: 32.08 KG/M2 | DIASTOLIC BLOOD PRESSURE: 72 MMHG | WEIGHT: 250 LBS

## 2019-04-11 DIAGNOSIS — E66.9 OBESITY (BMI 30-39.9): ICD-10-CM

## 2019-04-11 DIAGNOSIS — I10 ESSENTIAL HYPERTENSION: ICD-10-CM

## 2019-04-11 DIAGNOSIS — N40.1 BENIGN PROSTATIC HYPERPLASIA WITH NOCTURIA: ICD-10-CM

## 2019-04-11 DIAGNOSIS — E78.2 MIXED HYPERLIPIDEMIA: ICD-10-CM

## 2019-04-11 DIAGNOSIS — R79.89 LOW TESTOSTERONE LEVEL IN MALE: ICD-10-CM

## 2019-04-11 DIAGNOSIS — E04.1 THYROID NODULE: ICD-10-CM

## 2019-04-11 DIAGNOSIS — R35.1 BENIGN PROSTATIC HYPERPLASIA WITH NOCTURIA: ICD-10-CM

## 2019-04-11 PROBLEM — H26.9 CATARACT: Status: RESOLVED | Noted: 2018-03-22 | Resolved: 2019-04-11

## 2019-04-11 PROCEDURE — 99214 OFFICE O/P EST MOD 30 MIN: CPT | Performed by: FAMILY MEDICINE

## 2019-04-11 RX ORDER — ATORVASTATIN CALCIUM 40 MG/1
40 TABLET, FILM COATED ORAL DAILY
Qty: 90 TAB | Refills: 1 | Status: SHIPPED | OUTPATIENT
Start: 2019-04-11 | End: 2019-04-22 | Stop reason: SDUPTHER

## 2019-04-11 RX ORDER — AMLODIPINE BESYLATE 5 MG/1
5 TABLET ORAL DAILY
Qty: 90 TAB | Refills: 1 | Status: SHIPPED | OUTPATIENT
Start: 2019-04-11 | End: 2019-04-25 | Stop reason: SDUPTHER

## 2019-04-11 RX ADMIN — TESTOSTERONE CYPIONATE 200 MG: 200 INJECTION, SOLUTION INTRAMUSCULAR at 14:17

## 2019-04-11 NOTE — PROGRESS NOTES
cc:  HLD    Subjective:     Sina Oliver is a 74 y.o. male presenting for:    1. Hyperlipidemia: Has a history of elevated cholesterol.  Has been stable.  Is currently on atorvastatin 40 mg daily.  Denies any myalgias, no side effects.      2.  Hypertension: Has a history of arterial hypertension, has been stable.  Is currently on amlodipine 5 mg daily with no issues.  Denies any chest pain, shortness of breath, lightheadedness, dizziness, leg swelling.  He has been exercising regularly, has been gradually losing weight.     3.  Thyroid nodule: He has a suspicious thyroid nodule, referral to ENT was placed at his last visit.  He has not schedule an appointment yet, plans to do so soon. Denies any heat/cold intolerance, diarrhea/constipation, abdominal pain, tremors, skin changes, palpitations.     4.  Low testosterone, elevated PSA: Was diagnosed with low testosterone levels.  He also has BPH, underwent a TURP twice.  He consulted with his urologist in [place], was started on testosterone injections 200 mg every 2 weeks.     He reports seeing a urologist in [facility], was told that he can continue with the testosterone injections.      Review of systems:  See above.       Current Outpatient Prescriptions:   •  atorvastatin (LIPITOR) 40 MG Tab, Take 1 Tab by mouth every day., Disp: 90 Tab, Rfl: 1  •  amLODIPine (NORVASC) 5 MG Tab, Take 1 Tab by mouth every day., Disp: 90 Tab, Rfl: 1  •  testosterone cypionate (DEPO-TESTOSTERONE) 200 MG/ML Solution injection, 200 mg by Intramuscular route every 14 days., Disp: , Rfl:     Current Facility-Administered Medications:   •  testosterone cypionate (DEPO-TESTOSTERONE) injection 200 mg, 200 mg, Intramuscular, Q14 DAYS, Hugo Chase M.D., 200 mg at 03/20/19 1140    Allergies, past medical history, past surgical history, family history, social history reviewed and updated    Objective:     Vitals: /72 (BP Location: Right arm, Patient Position: Sitting)   Pulse  "76   Temp 37 °C (98.6 °F) (Temporal)   Resp 16   Ht 1.88 m (6' 2.02\")   Wt 113.4 kg (250 lb)   SpO2 98%   BMI 32.08 kg/m²    General: Alert, pleasant, NAD  HEENT: Normocephalic.    Heart: Regular rate and rhythm.  S1 and S2 normal.  No murmurs appreciated.  Respiratory: Normal respiratory effort.  Clear to auscultation bilaterally.  Abdomen: Non-distended, soft  Skin: Warm, dry, no rashes.  Musculoskeletal: Gait is normal.  Moves all extremities well.  Extremities: No leg edema.  Psych:  Affect/mood is normal, judgement is good, memory is intact, grooming is appropriate.    Assessment/Plan:     Diagnoses and all orders for this visit:    Mixed hyperlipidemia  Stable, continue atorvastatin.  Follow-up in 6 months  -     atorvastatin (LIPITOR) 40 MG Tab; Take 1 Tab by mouth every day.    Essential hypertension  Stable, continue amlodipine.  Follow-up in 6 months  -     amLODIPine (NORVASC) 5 MG Tab; Take 1 Tab by mouth every day.    Thyroid nodule  Stable, advised him to make an appointment in the future.    Obesity (BMI 30-39.9)  Stable, improving.  Continue healthy diet and exercise  -     Patient identified as having weight management issue.  Appropriate orders and counseling given.    Low testosterone level in male  Benign prostatic hyperplasia with nocturia  Stable, continue testosterone, followed by urology      Return in about 6 months (around 10/11/2019) for Med check, (long, 40min appt).  "

## 2019-04-22 DIAGNOSIS — E78.2 MIXED HYPERLIPIDEMIA: ICD-10-CM

## 2019-04-22 RX ORDER — ATORVASTATIN CALCIUM 40 MG/1
40 TABLET, FILM COATED ORAL DAILY
Qty: 100 TAB | Refills: 1 | Status: SHIPPED | OUTPATIENT
Start: 2019-04-22 | End: 2019-04-25 | Stop reason: SDUPTHER

## 2019-04-25 DIAGNOSIS — E78.2 MIXED HYPERLIPIDEMIA: ICD-10-CM

## 2019-04-25 DIAGNOSIS — I10 ESSENTIAL HYPERTENSION: ICD-10-CM

## 2019-04-25 RX ORDER — AMLODIPINE BESYLATE 5 MG/1
5 TABLET ORAL DAILY
Qty: 90 TAB | Refills: 1 | Status: SHIPPED | OUTPATIENT
Start: 2019-04-25 | End: 2019-08-15 | Stop reason: SDUPTHER

## 2019-04-25 RX ORDER — ATORVASTATIN CALCIUM 40 MG/1
40 TABLET, FILM COATED ORAL DAILY
Qty: 100 TAB | Refills: 1 | Status: SHIPPED | OUTPATIENT
Start: 2019-04-25 | End: 2020-08-05

## 2019-04-26 ENCOUNTER — NON-PROVIDER VISIT (OUTPATIENT)
Dept: MEDICAL GROUP | Facility: MEDICAL CENTER | Age: 74
End: 2019-04-26
Payer: MEDICARE

## 2019-04-26 PROCEDURE — 96372 THER/PROPH/DIAG INJ SC/IM: CPT | Performed by: FAMILY MEDICINE

## 2019-04-26 RX ADMIN — TESTOSTERONE CYPIONATE 200 MG: 200 INJECTION, SOLUTION INTRAMUSCULAR at 11:23

## 2019-04-26 NOTE — PROGRESS NOTES
Sina Oliver is a 74 y.o. male here for a non-provider visit for testosterone injection.    Reason for injection:   Low testosterone level in male       Order in MAR?: Yes  Patient supplied?:Yes  Minimum interval has been met for this injection (per MAR order): Yes    Order and dose verified by: al  Patient tolerated injection and no adverse effects were observed or reported: Yes    # of Administrations remaining in MAR: 2

## 2019-05-06 ENCOUNTER — NON-PROVIDER VISIT (OUTPATIENT)
Dept: MEDICAL GROUP | Facility: MEDICAL CENTER | Age: 74
End: 2019-05-06
Payer: MEDICARE

## 2019-05-06 PROCEDURE — 96372 THER/PROPH/DIAG INJ SC/IM: CPT | Performed by: FAMILY MEDICINE

## 2019-05-06 RX ADMIN — TESTOSTERONE CYPIONATE 200 MG: 200 INJECTION, SOLUTION INTRAMUSCULAR at 13:53

## 2019-05-06 NOTE — NON-PROVIDER
Sina Oliver is a 74 y.o. male here for a non-provider visit for TESTOSTERONE injection.    Reason for injection:  Low testosterone level in male     Order in MAR?: Yes  Patient supplied?:Yes  Minimum interval has been met for this injection (per MAR order): Yes    Order and dose verified by: NPM  Patient tolerated injection and no adverse effects were observed or reported: Yes    # of Administrations remaining in MAR: 2

## 2019-05-22 ENCOUNTER — NON-PROVIDER VISIT (OUTPATIENT)
Dept: MEDICAL GROUP | Facility: MEDICAL CENTER | Age: 74
End: 2019-05-22
Payer: MEDICARE

## 2019-05-22 PROCEDURE — 96372 THER/PROPH/DIAG INJ SC/IM: CPT | Performed by: FAMILY MEDICINE

## 2019-05-22 RX ADMIN — TESTOSTERONE CYPIONATE 200 MG: 200 INJECTION, SOLUTION INTRAMUSCULAR at 13:05

## 2019-06-06 ENCOUNTER — TELEPHONE (OUTPATIENT)
Dept: MEDICAL GROUP | Facility: MEDICAL CENTER | Age: 74
End: 2019-06-06

## 2019-06-06 ENCOUNTER — NON-PROVIDER VISIT (OUTPATIENT)
Dept: MEDICAL GROUP | Facility: MEDICAL CENTER | Age: 74
End: 2019-06-06
Payer: MEDICARE

## 2019-06-06 PROCEDURE — 96372 THER/PROPH/DIAG INJ SC/IM: CPT | Performed by: FAMILY MEDICINE

## 2019-06-06 RX ADMIN — TESTOSTERONE CYPIONATE 200 MG: 200 INJECTION, SOLUTION INTRAMUSCULAR at 13:50

## 2019-06-06 NOTE — TELEPHONE ENCOUNTER
He can stop the cholesterol medicine if he wants, but the side effect of stopping the medication would be an increase in cholesterol levels, risk of heart attacks/strokes.

## 2019-06-06 NOTE — TELEPHONE ENCOUNTER
Patient is asking if he can stop his cholesterol medication and if he does, what are the side affects to it?

## 2019-06-06 NOTE — PROGRESS NOTES
Sina Oliver is a 74 y.o. male here for a non-provider visit for testosterone injection.    Reason for injection: Deficiency   Order in MAR?: Yes  Patient supplied?:Yes  Minimum interval has been met for this injection (per MAR order): Yes    Order and dose verified by: NP  Patient tolerated injection and no adverse effects were observed or reported: Yes    # of Administrations remaining in MAR:

## 2019-06-17 ENCOUNTER — NON-PROVIDER VISIT (OUTPATIENT)
Dept: MEDICAL GROUP | Facility: MEDICAL CENTER | Age: 74
End: 2019-06-17
Payer: MEDICARE

## 2019-06-17 DIAGNOSIS — R79.89 LOW TESTOSTERONE LEVEL IN MALE: ICD-10-CM

## 2019-06-17 DIAGNOSIS — R35.1 BENIGN PROSTATIC HYPERPLASIA WITH NOCTURIA: ICD-10-CM

## 2019-06-17 DIAGNOSIS — R97.20 ELEVATED PSA: ICD-10-CM

## 2019-06-17 DIAGNOSIS — N40.1 BENIGN PROSTATIC HYPERPLASIA WITH NOCTURIA: ICD-10-CM

## 2019-06-17 PROCEDURE — 96372 THER/PROPH/DIAG INJ SC/IM: CPT | Performed by: FAMILY MEDICINE

## 2019-06-17 RX ADMIN — TESTOSTERONE CYPIONATE 200 MG: 200 INJECTION, SOLUTION INTRAMUSCULAR at 02:00

## 2019-08-15 DIAGNOSIS — I10 ESSENTIAL HYPERTENSION: ICD-10-CM

## 2019-08-15 RX ORDER — AMLODIPINE BESYLATE 5 MG/1
5 TABLET ORAL DAILY
Qty: 90 TAB | Refills: 3 | Status: SHIPPED | OUTPATIENT
Start: 2019-08-15 | End: 2019-09-12

## 2019-08-16 DIAGNOSIS — I10 ESSENTIAL HYPERTENSION: ICD-10-CM

## 2019-08-16 RX ORDER — AMLODIPINE BESYLATE 5 MG/1
5 TABLET ORAL DAILY
Qty: 100 TAB | Refills: 2 | Status: SHIPPED | OUTPATIENT
Start: 2019-08-16 | End: 2019-10-14 | Stop reason: SDUPTHER

## 2019-09-12 ENCOUNTER — HOSPITAL ENCOUNTER (OUTPATIENT)
Dept: RADIOLOGY | Facility: MEDICAL CENTER | Age: 74
End: 2019-09-12
Attending: FAMILY MEDICINE
Payer: MEDICARE

## 2019-09-12 ENCOUNTER — OFFICE VISIT (OUTPATIENT)
Dept: MEDICAL GROUP | Facility: MEDICAL CENTER | Age: 74
End: 2019-09-12
Payer: MEDICARE

## 2019-09-12 VITALS
SYSTOLIC BLOOD PRESSURE: 160 MMHG | OXYGEN SATURATION: 94 % | HEART RATE: 58 BPM | BODY MASS INDEX: 32.14 KG/M2 | WEIGHT: 250.44 LBS | HEIGHT: 74 IN | TEMPERATURE: 97.1 F | DIASTOLIC BLOOD PRESSURE: 80 MMHG
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
## 2019-09-12 DIAGNOSIS — G89.29 CHRONIC BILATERAL LOW BACK PAIN WITHOUT SCIATICA: ICD-10-CM

## 2019-09-12 DIAGNOSIS — M62.830 SPASM OF MUSCLE OF LOWER BACK: ICD-10-CM

## 2019-09-12 DIAGNOSIS — L60.3 NAIL DYSTROPHY: ICD-10-CM

## 2019-09-12 DIAGNOSIS — E66.9 OBESITY (BMI 30-39.9): ICD-10-CM

## 2019-09-12 DIAGNOSIS — M54.50 CHRONIC BILATERAL LOW BACK PAIN WITHOUT SCIATICA: ICD-10-CM

## 2019-09-12 DIAGNOSIS — Z85.820 HX OF MALIGNANT MELANOMA: ICD-10-CM

## 2019-09-12 DIAGNOSIS — I10 ESSENTIAL HYPERTENSION: ICD-10-CM

## 2019-09-12 DIAGNOSIS — R79.89 LOW TESTOSTERONE LEVEL IN MALE: ICD-10-CM

## 2019-09-12 PROCEDURE — 72100 X-RAY EXAM L-S SPINE 2/3 VWS: CPT

## 2019-09-12 PROCEDURE — 99214 OFFICE O/P EST MOD 30 MIN: CPT | Performed by: FAMILY MEDICINE

## 2019-09-12 RX ORDER — IBUPROFEN 800 MG/1
TABLET ORAL
COMMUNITY
Start: 2019-07-30 | End: 2020-01-03

## 2019-09-12 RX ORDER — CYCLOBENZAPRINE HCL 10 MG
10 TABLET ORAL 3 TIMES DAILY PRN
Qty: 90 TAB | Refills: 1 | Status: SHIPPED | OUTPATIENT
Start: 2019-09-12 | End: 2019-10-14 | Stop reason: SDUPTHER

## 2019-09-12 RX ORDER — TESTOSTERONE CYPIONATE 200 MG/ML
200 INJECTION, SOLUTION INTRAMUSCULAR
Qty: 6 ML | Refills: 3 | Status: SHIPPED
Start: 2019-09-12 | End: 2019-12-11

## 2019-09-12 SDOH — HEALTH STABILITY: MENTAL HEALTH: HOW OFTEN DO YOU HAVE A DRINK CONTAINING ALCOHOL?: NEVER

## 2019-09-12 SDOH — HEALTH STABILITY: MENTAL HEALTH: HOW OFTEN DO YOU HAVE 6 OR MORE DRINKS ON ONE OCCASION?: NEVER
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
## 2019-09-24 ENCOUNTER — NON-PROVIDER VISIT (OUTPATIENT)
Dept: MEDICAL GROUP | Facility: MEDICAL CENTER | Age: 74
End: 2019-09-24
Payer: MEDICARE

## 2019-09-24 PROCEDURE — 96372 THER/PROPH/DIAG INJ SC/IM: CPT | Performed by: FAMILY MEDICINE

## 2019-09-24 RX ADMIN — TESTOSTERONE CYPIONATE 200 MG: 200 INJECTION, SOLUTION INTRAMUSCULAR at 08:17

## 2019-09-25 ENCOUNTER — HOSPITAL ENCOUNTER (OUTPATIENT)
Dept: LAB | Facility: MEDICAL CENTER | Age: 74
End: 2019-09-25
Attending: PHYSICIAN ASSISTANT
Payer: MEDICARE

## 2019-09-25 LAB
ALBUMIN SERPL BCP-MCNC: 4.5 G/DL (ref 3.2–4.9)
ALBUMIN/GLOB SERPL: 2 G/DL
ALP SERPL-CCNC: 62 U/L (ref 30–99)
ALT SERPL-CCNC: 24 U/L (ref 2–50)
ANION GAP SERPL CALC-SCNC: 7 MMOL/L (ref 0–11.9)
AST SERPL-CCNC: 25 U/L (ref 12–45)
BASOPHILS # BLD AUTO: 0.4 % (ref 0–1.8)
BASOPHILS # BLD: 0.03 K/UL (ref 0–0.12)
BILIRUB SERPL-MCNC: 0.7 MG/DL (ref 0.1–1.5)
BUN SERPL-MCNC: 15 MG/DL (ref 8–22)
CALCIUM SERPL-MCNC: 9.3 MG/DL (ref 8.5–10.5)
CHLORIDE SERPL-SCNC: 103 MMOL/L (ref 96–112)
CO2 SERPL-SCNC: 27 MMOL/L (ref 20–33)
CREAT SERPL-MCNC: 0.83 MG/DL (ref 0.5–1.4)
EOSINOPHIL # BLD AUTO: 0.1 K/UL (ref 0–0.51)
EOSINOPHIL NFR BLD: 1.4 % (ref 0–6.9)
ERYTHROCYTE [DISTWIDTH] IN BLOOD BY AUTOMATED COUNT: 48.2 FL (ref 35.9–50)
FASTING STATUS PATIENT QL REPORTED: NORMAL
GLOBULIN SER CALC-MCNC: 2.2 G/DL (ref 1.9–3.5)
GLUCOSE SERPL-MCNC: 92 MG/DL (ref 65–99)
HCT VFR BLD AUTO: 52 % (ref 42–52)
HGB BLD-MCNC: 17.4 G/DL (ref 14–18)
IMM GRANULOCYTES # BLD AUTO: 0.03 K/UL (ref 0–0.11)
IMM GRANULOCYTES NFR BLD AUTO: 0.4 % (ref 0–0.9)
LYMPHOCYTES # BLD AUTO: 1.02 K/UL (ref 1–4.8)
LYMPHOCYTES NFR BLD: 14 % (ref 22–41)
MCH RBC QN AUTO: 33.9 PG (ref 27–33)
MCHC RBC AUTO-ENTMCNC: 33.5 G/DL (ref 33.7–35.3)
MCV RBC AUTO: 101.4 FL (ref 81.4–97.8)
MONOCYTES # BLD AUTO: 0.87 K/UL (ref 0–0.85)
MONOCYTES NFR BLD AUTO: 12 % (ref 0–13.4)
NEUTROPHILS # BLD AUTO: 5.21 K/UL (ref 1.82–7.42)
NEUTROPHILS NFR BLD: 71.8 % (ref 44–72)
NRBC # BLD AUTO: 0 K/UL
NRBC BLD-RTO: 0 /100 WBC
PLATELET # BLD AUTO: 246 K/UL (ref 164–446)
PMV BLD AUTO: 11.3 FL (ref 9–12.9)
POTASSIUM SERPL-SCNC: 4.1 MMOL/L (ref 3.6–5.5)
PROT SERPL-MCNC: 6.7 G/DL (ref 6–8.2)
PSA SERPL-MCNC: 5.18 NG/ML (ref 0–4)
RBC # BLD AUTO: 5.13 M/UL (ref 4.7–6.1)
SODIUM SERPL-SCNC: 137 MMOL/L (ref 135–145)
TESTOST SERPL-MCNC: 746 NG/DL (ref 175–781)
WBC # BLD AUTO: 7.3 K/UL (ref 4.8–10.8)

## 2019-09-25 PROCEDURE — 84153 ASSAY OF PSA TOTAL: CPT

## 2019-09-25 PROCEDURE — 84403 ASSAY OF TOTAL TESTOSTERONE: CPT

## 2019-09-25 PROCEDURE — 80053 COMPREHEN METABOLIC PANEL: CPT

## 2019-09-25 PROCEDURE — 85025 COMPLETE CBC W/AUTO DIFF WBC: CPT

## 2019-09-25 PROCEDURE — 36415 COLL VENOUS BLD VENIPUNCTURE: CPT

## 2019-10-14 ENCOUNTER — OFFICE VISIT (OUTPATIENT)
Dept: MEDICAL GROUP | Facility: MEDICAL CENTER | Age: 74
End: 2019-10-14
Payer: MEDICARE

## 2019-10-14 VITALS
WEIGHT: 249.78 LBS | DIASTOLIC BLOOD PRESSURE: 68 MMHG | SYSTOLIC BLOOD PRESSURE: 160 MMHG | OXYGEN SATURATION: 93 % | HEART RATE: 80 BPM | BODY MASS INDEX: 32.07 KG/M2 | TEMPERATURE: 97.6 F
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
## 2019-10-14 DIAGNOSIS — M54.50 CHRONIC BILATERAL LOW BACK PAIN WITHOUT SCIATICA: ICD-10-CM

## 2019-10-14 DIAGNOSIS — R79.89 LOW TESTOSTERONE LEVEL IN MALE: ICD-10-CM

## 2019-10-14 DIAGNOSIS — M62.830 SPASM OF MUSCLE OF LOWER BACK: ICD-10-CM

## 2019-10-14 DIAGNOSIS — I10 ESSENTIAL HYPERTENSION: ICD-10-CM

## 2019-10-14 DIAGNOSIS — I73.9 CLAUDICATION (HCC): ICD-10-CM

## 2019-10-14 DIAGNOSIS — G89.29 CHRONIC BILATERAL LOW BACK PAIN WITHOUT SCIATICA: ICD-10-CM

## 2019-10-14 DIAGNOSIS — R97.20 ELEVATED PSA: ICD-10-CM

## 2019-10-14 DIAGNOSIS — R20.2 PARESTHESIAS: ICD-10-CM

## 2019-10-14 PROCEDURE — 96372 THER/PROPH/DIAG INJ SC/IM: CPT | Performed by: FAMILY MEDICINE

## 2019-10-14 PROCEDURE — 99214 OFFICE O/P EST MOD 30 MIN: CPT | Mod: 25 | Performed by: FAMILY MEDICINE

## 2019-10-14 RX ORDER — CYCLOBENZAPRINE HCL 10 MG
10 TABLET ORAL 3 TIMES DAILY PRN
Qty: 90 TAB | Refills: 1 | Status: SHIPPED
Start: 2019-10-14 | End: 2020-01-03

## 2019-10-14 RX ORDER — AMLODIPINE BESYLATE 10 MG/1
10 TABLET ORAL DAILY
Qty: 100 TAB | Refills: 1 | Status: SHIPPED | OUTPATIENT
Start: 2019-10-14 | End: 2020-05-26

## 2019-10-14 RX ADMIN — TESTOSTERONE CYPIONATE 200 MG: 200 INJECTION, SOLUTION INTRAMUSCULAR at 12:01
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
## 2019-10-16 ENCOUNTER — HOSPITAL ENCOUNTER (OUTPATIENT)
Dept: RADIOLOGY | Facility: MEDICAL CENTER | Age: 74
End: 2019-10-16
Attending: FAMILY MEDICINE
Payer: MEDICARE
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
## 2019-10-16 DIAGNOSIS — M62.830 SPASM OF MUSCLE OF LOWER BACK: ICD-10-CM

## 2019-10-16 DIAGNOSIS — G89.29 CHRONIC BILATERAL LOW BACK PAIN WITHOUT SCIATICA: ICD-10-CM

## 2019-10-16 DIAGNOSIS — M54.50 CHRONIC BILATERAL LOW BACK PAIN WITHOUT SCIATICA: ICD-10-CM

## 2019-10-16 PROCEDURE — 72148 MRI LUMBAR SPINE W/O DYE: CPT

## 2019-10-17 ENCOUNTER — TELEPHONE (OUTPATIENT)
Dept: MEDICAL GROUP | Facility: MEDICAL CENTER | Age: 74
End: 2019-10-17

## 2019-11-15 ENCOUNTER — NON-PROVIDER VISIT (OUTPATIENT)
Dept: MEDICAL GROUP | Facility: MEDICAL CENTER | Age: 74
End: 2019-11-15
Payer: MEDICARE

## 2019-11-15 PROCEDURE — 96372 THER/PROPH/DIAG INJ SC/IM: CPT | Performed by: FAMILY MEDICINE

## 2019-11-15 RX ADMIN — TESTOSTERONE CYPIONATE 200 MG: 200 INJECTION, SOLUTION INTRAMUSCULAR at 13:21

## 2019-11-15 NOTE — PROGRESS NOTES
Sina Oliver is a 74 y.o. male here for a non-provider visit for Test injection.    Reason for injection: MAr hold  Order in MAR?: Yes  Patient supplied?:Yes  Minimum interval has been met for this injection (per MAR order): Yes    Order and dose verified by: AL  Patient tolerated injection and no adverse effects were observed or reported: Yes    # of Administrations remaining in MAR: 4

## 2019-12-02 ENCOUNTER — NON-PROVIDER VISIT (OUTPATIENT)
Dept: MEDICAL GROUP | Facility: MEDICAL CENTER | Age: 74
End: 2019-12-02
Payer: MEDICARE

## 2019-12-02 VITALS — DIASTOLIC BLOOD PRESSURE: 72 MMHG | SYSTOLIC BLOOD PRESSURE: 138 MMHG

## 2019-12-02 PROCEDURE — 96372 THER/PROPH/DIAG INJ SC/IM: CPT | Performed by: INTERNAL MEDICINE

## 2019-12-02 RX ADMIN — TESTOSTERONE CYPIONATE 200 MG: 200 INJECTION, SOLUTION INTRAMUSCULAR at 14:03

## 2019-12-02 NOTE — PROGRESS NOTES
Sina Oliver is a 74 y.o. male here for a non-provider visit for Testosterone injection.    Reason for injection: Testosterone  Order in MAR?: Yes  Patient supplied?:Yes  Minimum interval has been met for this injection (per MAR order): Yes    Order and dose verified by: TH  Patient tolerated injection and no adverse effects were observed or reported: Yes    # of Administrations remaining in MAR:

## 2019-12-16 ENCOUNTER — APPOINTMENT (OUTPATIENT)
Dept: MEDICAL GROUP | Facility: MEDICAL CENTER | Age: 74
End: 2019-12-16
Payer: MEDICARE

## 2019-12-17 ENCOUNTER — TELEPHONE (OUTPATIENT)
Dept: MEDICAL GROUP | Facility: MEDICAL CENTER | Age: 74
End: 2019-12-17

## 2019-12-17 DIAGNOSIS — R79.89 LOW TESTOSTERONE LEVEL IN MALE: ICD-10-CM

## 2019-12-17 RX ORDER — TESTOSTERONE CYPIONATE 200 MG/ML
200 INJECTION, SOLUTION INTRAMUSCULAR
Qty: 1 ML | Refills: 24 | Status: SHIPPED
Start: 2019-12-17 | End: 2020-02-06

## 2019-12-17 RX ORDER — TESTOSTERONE CYPIONATE 200 MG/ML
INJECTION, SOLUTION INTRAMUSCULAR
COMMUNITY
Start: 2019-12-16 | End: 2019-12-17 | Stop reason: SDUPTHER

## 2019-12-17 NOTE — TELEPHONE ENCOUNTER
Called pt and LM letting him know that the script is ready to  for the testosterone and to call us back if he has any further questions.

## 2019-12-17 NOTE — TELEPHONE ENCOUNTER
1. Caller Name: Sina Oliver                                           Call Back Number: [phone] (home)         Patient approves a detailed voicemail message: N\A    Pt LM asking where he can get his testosterone injected at, he said that he has already gone to his pharmacy, and other urgent cares in the area and they would not inject the medication.     Due to a new brown bagging policy we will need a new prescription with instructions for the [facility] pharmacy [address] to administer testosterone injection.   .

## 2020-01-02 ENCOUNTER — HOSPITAL ENCOUNTER (OUTPATIENT)
Facility: MEDICAL CENTER | Age: 75
End: 2020-01-02
Attending: FAMILY MEDICINE
Payer: MEDICARE

## 2020-01-02 ENCOUNTER — OFFICE VISIT (OUTPATIENT)
Dept: URGENT CARE | Facility: PHYSICIAN GROUP | Age: 75
End: 2020-01-02
Payer: MEDICARE

## 2020-01-02 VITALS
RESPIRATION RATE: 15 BRPM | HEART RATE: 74 BPM | WEIGHT: 253 LBS | OXYGEN SATURATION: 98 % | HEIGHT: 74 IN | DIASTOLIC BLOOD PRESSURE: 82 MMHG | SYSTOLIC BLOOD PRESSURE: 142 MMHG | TEMPERATURE: 97.8 F | BODY MASS INDEX: 32.47 KG/M2

## 2020-01-02 DIAGNOSIS — J22 LRTI (LOWER RESPIRATORY TRACT INFECTION): ICD-10-CM

## 2020-01-02 DIAGNOSIS — N30.01 ACUTE CYSTITIS WITH HEMATURIA: ICD-10-CM

## 2020-01-02 DIAGNOSIS — R31.0 GROSS HEMATURIA: ICD-10-CM

## 2020-01-02 LAB
APPEARANCE UR: ABNORMAL
BILIRUB UR STRIP-MCNC: ABNORMAL MG/DL
COLOR UR AUTO: ABNORMAL
GLUCOSE UR STRIP.AUTO-MCNC: 250 MG/DL
KETONES UR STRIP.AUTO-MCNC: 80 MG/DL
LEUKOCYTE ESTERASE UR QL STRIP.AUTO: ABNORMAL
NITRITE UR QL STRIP.AUTO: ABNORMAL
PH UR STRIP.AUTO: 9 [PH] (ref 5–8)
PROT UR QL STRIP: 300 MG/DL
RBC UR QL AUTO: ABNORMAL
SP GR UR STRIP.AUTO: 1
UROBILINOGEN UR STRIP-MCNC: 8 MG/DL

## 2020-01-02 PROCEDURE — 87086 URINE CULTURE/COLONY COUNT: CPT

## 2020-01-02 PROCEDURE — 99204 OFFICE O/P NEW MOD 45 MIN: CPT | Performed by: FAMILY MEDICINE

## 2020-01-02 PROCEDURE — 81002 URINALYSIS NONAUTO W/O SCOPE: CPT | Performed by: FAMILY MEDICINE

## 2020-01-02 RX ORDER — CIPROFLOXACIN 500 MG/1
500 TABLET, FILM COATED ORAL 2 TIMES DAILY
Qty: 28 TAB | Refills: 0 | Status: SHIPPED | OUTPATIENT
Start: 2020-01-02 | End: 2020-01-16

## 2020-01-02 RX ORDER — DOXYCYCLINE HYCLATE 100 MG
100 TABLET ORAL 2 TIMES DAILY
Qty: 20 TAB | Refills: 0 | Status: SHIPPED | OUTPATIENT
Start: 2020-01-02 | End: 2020-01-12

## 2020-01-02 ASSESSMENT — ENCOUNTER SYMPTOMS
SHORTNESS OF BREATH: 0
SINUS PAIN: 0
NAUSEA: 0
CHILLS: 0
FEVER: 0
VOMITING: 0
ABDOMINAL PAIN: 0
WHEEZING: 0
MYALGIAS: 0
EYE PAIN: 0
EASY BRUISING: 1
RHINORRHEA: 1
DIZZINESS: 0
SORE THROAT: 1
COUGH: 1

## 2020-01-02 NOTE — PATIENT INSTRUCTIONS
Hematuria, Adult  Hematuria is blood in your urine. It can be caused by a bladder infection, kidney infection, prostate infection, kidney stone, or cancer of your urinary tract. Infections can usually be treated with medicine, and a kidney stone usually will pass through your urine. If neither of these is the cause of your hematuria, further workup to find out the reason may be needed.  It is very important that you tell your health care provider about any blood you see in your urine, even if the blood stops without treatment or happens without causing pain. Blood in your urine that happens and then stops and then happens again can be a symptom of a very serious condition. Also, pain is not a symptom in the initial stages of many urinary cancers.  Follow these instructions at home:  · Drink lots of fluid, 3-4 quarts a day. If you have been diagnosed with an infection, cranberry juice is especially recommended, in addition to large amounts of water.  · Avoid caffeine, tea, and carbonated beverages because they tend to irritate the bladder.  · Avoid alcohol because it may irritate the prostate.  · Take all medicines as directed by your health care provider.  · If you were prescribed an antibiotic medicine, finish it all even if you start to feel better.  · If you have been diagnosed with a kidney stone, follow your health care provider's instructions regarding straining your urine to catch the stone.  · Empty your bladder often. Avoid holding urine for long periods of time.  · After a bowel movement, women should cleanse front to back. Use each tissue only once.  · Empty your bladder before and after sexual intercourse if you are a female.  Contact a health care provider if:  · You develop back pain.  · You have a fever.  · You have a feeling of sickness in your stomach (nausea) or vomiting.  · Your symptoms are not better in 3 days. Return sooner if you are getting worse.  Get help right away if:  · You develop  severe vomiting and are unable to keep the medicine down.  · You develop severe back or abdominal pain despite taking your medicines.  · You begin passing a large amount of blood or clots in your urine.  · You feel extremely weak or faint, or you pass out.  This information is not intended to replace advice given to you by your health care provider. Make sure you discuss any questions you have with your health care provider.  Document Released: 12/18/2006 Document Revised: 05/25/2017 Document Reviewed: 08/18/2014  [employer] Interactive Patient Education © 2017 [employer]

## 2020-01-02 NOTE — PROGRESS NOTES
Subjective:     Sina Oliver  is a 74 y.o. male who presents for URI (x 2-3 weeks, blood in urine x 4 days)       URI    This is a new problem. The current episode started 1 to 4 weeks ago. The problem has been gradually improving. There has been no fever. Associated symptoms include congestion, coughing, rhinorrhea and a sore throat. Pertinent negatives include no abdominal pain, chest pain, nausea, rash, sinus pain, vomiting or wheezing.   Bleeding/Bruising   This is a new problem. The current episode started in the past 7 days. The problem occurs constantly. The problem has been gradually worsening. Associated symptoms include congestion, coughing, a sore throat and urinary symptoms. Pertinent negatives include no abdominal pain, chest pain, chills, fever, myalgias, nausea, rash or vomiting.     Past Medical History:   Diagnosis Date   • Arthritis 03/07/2019   • Blood clotting disorder (HCC) 1990    left leg   • BPH (benign prostatic hyperplasia)    • Essential hypertension 1/10/2019   • Sciatica    • Tuberculosis     1990 with tx     Past Surgical History:   Procedure Laterality Date   • [place] CATARACT SURG W/IOL 1 STAGE WO ENDO Left 3/26/2019    Procedure: CATARACT PHACO WITH IOL;  Surgeon: Aldo Nair M.D.;  Location: SURGERY SAME DAY [facility] ORS;  Service: Ophthalmology   • CATARACT PHACO WITH IOL Right 3/12/2019    Procedure: CATARACT PHACO WITH IOL;  Surgeon: Aldo Nair M.D.;  Location: SURGERY SAME DAY [facility] ORS;  Service: Ophthalmology   • APPENDECTOMY     • HIP REPLACEMENT, TOTAL Right    • TRANS URETHRAL RESECTION      x2     Social History     Socioeconomic History   • Marital status:      Spouse name: Not on file   • Number of children: Not on file   • Years of education: Not on file   • Highest education level: Not on file   Occupational History   • Not on file   Social Needs   • Financial resource strain: Not on file   • Food insecurity:     Worry: Not on file      Inability: Not on file   • Transportation needs:     Medical: Not on file     Non-medical: Not on file   Tobacco Use   • Smoking status: Never Smoker   • Smokeless tobacco: Never Used   Substance and Sexual Activity   • Alcohol use: Yes     Frequency: Never     Binge frequency: Never     Comment: 6 per year   • Drug use: No   • Sexual activity: Yes     Partners: Female   Lifestyle   • Physical activity:     Days per week: Not on file     Minutes per session: Not on file   • Stress: Not on file   Relationships   • Social connections:     Talks on phone: Not on file     Gets together: Not on file     Attends Mandaen service: Not on file     Active member of club or organization: Not on file     Attends meetings of clubs or organizations: Not on file     Relationship status: Not on file   • Intimate partner violence:     Fear of current or ex partner: Not on file     Emotionally abused: Not on file     Physically abused: Not on file     Forced sexual activity: Not on file   Other Topics Concern   • Not on file   Social History Narrative          Family History   Problem Relation Age of Onset   • Hypertension Mother    • Diabetes Mother     Review of Systems   Constitutional: Negative for chills and fever.   HENT: Positive for congestion, rhinorrhea and sore throat. Negative for sinus pain.    Eyes: Negative for pain.   Respiratory: Positive for cough. Negative for shortness of breath and wheezing.    Cardiovascular: Negative for chest pain.   Gastrointestinal: Negative for abdominal pain, nausea and vomiting.   Genitourinary: Negative for hematuria.   Musculoskeletal: Negative for myalgias.   Skin: Negative for rash.   Neurological: Negative for dizziness.     Allergies   Allergen Reactions   • Azithromycin      nausea   • Coumadin [Warfarin]    • Ibuprofen    • Nsaids      bleeding   • Other Misc      Bee and wasp cause swelling and difficulty breathing   • Penicillins Anaphylaxis   • Plavix [Clopidogrel]   "  • Sulfa Drugs      rash   • Xarelto [Rivaroxaban]       Objective:   /82   Pulse 74   Temp 36.6 °C (97.8 °F)   Resp 15   Ht 1.88 m (6' 2\")   Wt 114.8 kg (253 lb)   SpO2 98%   BMI 32.48 kg/m²   Physical Exam  Constitutional:       General: He is not in acute distress.     Appearance: He is well-developed.   HENT:      Head: Normocephalic and atraumatic.   Eyes:      Conjunctiva/sclera: Conjunctivae normal.      Pupils: Pupils are equal, round, and reactive to light.   Cardiovascular:      Rate and Rhythm: Normal rate and regular rhythm.      Heart sounds: No murmur.   Pulmonary:      Effort: Pulmonary effort is normal. No respiratory distress.      Breath sounds: Wheezing present. No rhonchi or rales.   Chest:      Chest wall: No tenderness.   Abdominal:      General: There is no distension.      Palpations: Abdomen is soft.      Tenderness: There is no tenderness.   Skin:     General: Skin is warm and dry.   Neurological:      Mental Status: He is alert and oriented to person, place, and time.      Sensory: No sensory deficit.      Deep Tendon Reflexes: Reflexes are normal and symmetric.           Assessment/Plan:   Assessment    1. Acute cystitis with hematuria  - REFERRAL TO UROLOGY  - ciprofloxacin (CIPRO) 500 MG Tab; Take 1 Tab by mouth 2 times a day for 14 days.  Dispense: 28 Tab; Refill: 0    2. Gross hematuria  - POCT Urinalysis  - URINE CULTURE(NEW); Future  - REFERRAL TO UROLOGY  - ciprofloxacin (CIPRO) 500 MG Tab; Take 1 Tab by mouth 2 times a day for 14 days.  Dispense: 28 Tab; Refill: 0    3. LRTI (lower respiratory tract infection)  - doxycycline (VIBRAMYCIN) 100 MG Tab; Take 1 Tab by mouth 2 times a day for 10 days.  Dispense: 20 Tab; Refill: 0    Differential diagnosis, natural history, supportive care, and indications for immediate follow-up discussed.    "

## 2020-01-03 ENCOUNTER — OFFICE VISIT (OUTPATIENT)
Dept: MEDICAL GROUP | Facility: MEDICAL CENTER | Age: 75
End: 2020-01-03
Payer: MEDICARE

## 2020-01-03 VITALS
OXYGEN SATURATION: 93 % | RESPIRATION RATE: 16 BRPM | SYSTOLIC BLOOD PRESSURE: 142 MMHG | WEIGHT: 260 LBS | HEART RATE: 91 BPM | TEMPERATURE: 98.7 F | DIASTOLIC BLOOD PRESSURE: 88 MMHG | BODY MASS INDEX: 33.37 KG/M2 | HEIGHT: 74 IN

## 2020-01-03 DIAGNOSIS — D75.89 MACROCYTOSIS: ICD-10-CM

## 2020-01-03 DIAGNOSIS — I49.9 IRREGULAR HEART RHYTHM: ICD-10-CM

## 2020-01-03 DIAGNOSIS — E04.1 THYROID NODULE: ICD-10-CM

## 2020-01-03 DIAGNOSIS — E78.2 MIXED HYPERLIPIDEMIA: ICD-10-CM

## 2020-01-03 DIAGNOSIS — R31.0 GROSS HEMATURIA: ICD-10-CM

## 2020-01-03 DIAGNOSIS — I10 ESSENTIAL HYPERTENSION: ICD-10-CM

## 2020-01-03 PROCEDURE — 99214 OFFICE O/P EST MOD 30 MIN: CPT | Mod: 25 | Performed by: FAMILY MEDICINE

## 2020-01-03 PROCEDURE — 93000 ELECTROCARDIOGRAM COMPLETE: CPT | Performed by: FAMILY MEDICINE

## 2020-01-03 PROCEDURE — 8041 PR SCP AHA: Performed by: FAMILY MEDICINE

## 2020-01-03 RX ORDER — GABAPENTIN 300 MG/1
CAPSULE ORAL
Refills: 3 | COMMUNITY
Start: 2019-11-27 | End: 2020-01-30

## 2020-01-03 RX ORDER — AMLODIPINE BESYLATE 5 MG/1
TABLET ORAL
COMMUNITY
Start: 2019-10-14 | End: 2020-01-03

## 2020-01-03 NOTE — PROGRESS NOTES
"cc:  Irregular heart beat    Subjective:     Sina Oliver is a 74 y.o. male presenting to discuss an irregular heartbeat.  He was at his urologist's appointment yesterday, when he was noted to have an irregular heart sound.  It was recommended that he see a cardiologist.  He denies any chest pain, shortness of breath, lightheadedness, dizziness, palpitations.  Rare alcohol use.  He continues to workout regularly.  He is currently undergoing work-up with urology regarding gross hematuria.  He also reports that his blood pressures at home have been normal, well controlled on the amlodipine.  He recently started ciprofloxacin and doxycycline for an upper respiratory infection and possible UTI.      Review of systems:  See above.       Current Outpatient Medications:   •  gabapentin (NEURONTIN) 300 MG Cap, TK 1 C PO QPM IF TOLERATING. SLOWLY INCREASE TO TID, Disp: , Rfl: 3  •  ciprofloxacin (CIPRO) 500 MG Tab, Take 1 Tab by mouth 2 times a day for 14 days., Disp: 28 Tab, Rfl: 0  •  doxycycline (VIBRAMYCIN) 100 MG Tab, Take 1 Tab by mouth 2 times a day for 10 days., Disp: 20 Tab, Rfl: 0  •  testosterone cypionate (DEPO-TESTOSTERONE) 200 MG/ML Solution injection, 1 mL by Intramuscular route every 14 days for 90 days. Ok for pharmacy to administer and inject, Disp: 1 mL, Rfl: 24  •  amLODIPine (NORVASC) 10 MG Tab, Take 1 Tab by mouth every day., Disp: 100 Tab, Rfl: 1  •  atorvastatin (LIPITOR) 40 MG Tab, Take 1 Tab by mouth every day., Disp: 100 Tab, Rfl: 1    Allergies, past medical history, past surgical history, family history, social history reviewed and updated    Objective:     Vitals: /88 (BP Location: Left arm)   Pulse 91   Temp 37.1 °C (98.7 °F)   Resp 16   Ht 1.88 m (6' 2\")   Wt 117.9 kg (260 lb)   SpO2 93%   BMI 33.38 kg/m²   General: Alert, pleasant, NAD  HEENT: Normocephalic.  EOMI, no icterus or pallor.  Conjunctivae and lids normal. External ears normal. Oropharynx non-erythematous, " mucous membranes moist.  Neck supple.  No thyromegaly or masses palpated. No cervical or supraclavicular lymphadenopathy.  Heart: Regular rate and rhythm.  S1 and S2 normal.  No murmurs appreciated.  Respiratory: Normal respiratory effort.  Clear to auscultation bilaterally.  Abdomen: Non-distended, soft  Skin: Warm, dry, no rashes.  Musculoskeletal: Gait is normal.  Moves all extremities well.  Extremities: No leg edema.  Psych:  Affect/mood is normal, judgement is good, memory is intact, grooming is appropriate.    EKG: Sinus rhythm.  Heart rate 76 bpm.  Left axis deviation.  Left anterior fascicular block.  EKG is unchanged from before    Assessment/Plan:     Sina was seen today for cough.    Diagnoses and all orders for this visit:    Irregular heart rhythm  EKG is unremarkable, but will refer to cardiology for another evaluation.  We will check the following labs in the meantime.  He had an echocardiogram and a stress test about a year and a half ago.  -     EKG  -     CBC WITH DIFFERENTIAL; Future  -     TRIIDOTHYRONINE; Future  -     FREE THYROXINE; Future  -     TSH; Future  -     Basic Metabolic Panel; Future  -     REFERRAL TO CARDIOLOGY    Mixed hyperlipidemia  Stable, due for recheck.  Continue atorvastatin  -     CBC WITH DIFFERENTIAL; Future  -     Lipid Profile; Future  -     REFERRAL TO CARDIOLOGY    Essential hypertension  Stable, continue amlodipine  -     CBC WITH DIFFERENTIAL; Future  -     Basic Metabolic Panel; Future  -     REFERRAL TO CARDIOLOGY    Macrocytosis  We will check the following labs  -     CBC WITH DIFFERENTIAL; Future  -     VITAMIN B12; Future    Thyroid nodule  Stable, will check a thyroid panel  -     TRIIDOTHYRONINE; Future  -     FREE THYROXINE; Future  -     TSH; Future    Gross hematuria  Managed by urology        Return if symptoms worsen or fail to improve.

## 2020-01-04 LAB
BACTERIA UR CULT: NORMAL
SIGNIFICANT IND 70042: NORMAL
SITE SITE: NORMAL
SOURCE SOURCE: NORMAL

## 2020-01-07 ENCOUNTER — PATIENT OUTREACH (OUTPATIENT)
Dept: HEALTH INFORMATION MANAGEMENT | Facility: OTHER | Age: 75
End: 2020-01-07

## 2020-01-07 NOTE — PROGRESS NOTES
Outcome: Left Message PA Introduction Call     Please transfer to Patient Outreach Team at [phone] when patient returns call.    [facility] Verified: yes    Attempt # 1

## 2020-01-14 ENCOUNTER — HOSPITAL ENCOUNTER (OUTPATIENT)
Dept: LAB | Facility: MEDICAL CENTER | Age: 75
End: 2020-01-14
Attending: FAMILY MEDICINE
Payer: MEDICARE

## 2020-01-14 DIAGNOSIS — I10 ESSENTIAL HYPERTENSION: ICD-10-CM

## 2020-01-14 DIAGNOSIS — E78.2 MIXED HYPERLIPIDEMIA: ICD-10-CM

## 2020-01-14 DIAGNOSIS — I49.9 IRREGULAR HEART RHYTHM: ICD-10-CM

## 2020-01-14 DIAGNOSIS — D75.89 MACROCYTOSIS: ICD-10-CM

## 2020-01-14 DIAGNOSIS — E04.1 THYROID NODULE: ICD-10-CM

## 2020-01-14 LAB
ANION GAP SERPL CALC-SCNC: 6 MMOL/L (ref 0–11.9)
BASOPHILS # BLD AUTO: 0.5 % (ref 0–1.8)
BASOPHILS # BLD: 0.03 K/UL (ref 0–0.12)
BUN SERPL-MCNC: 13 MG/DL (ref 8–22)
CALCIUM SERPL-MCNC: 8.8 MG/DL (ref 8.5–10.5)
CHLORIDE SERPL-SCNC: 105 MMOL/L (ref 96–112)
CHOLEST SERPL-MCNC: 195 MG/DL (ref 100–199)
CO2 SERPL-SCNC: 27 MMOL/L (ref 20–33)
CREAT SERPL-MCNC: 0.83 MG/DL (ref 0.5–1.4)
EOSINOPHIL # BLD AUTO: 0.12 K/UL (ref 0–0.51)
EOSINOPHIL NFR BLD: 1.9 % (ref 0–6.9)
ERYTHROCYTE [DISTWIDTH] IN BLOOD BY AUTOMATED COUNT: 51.7 FL (ref 35.9–50)
GLUCOSE SERPL-MCNC: 107 MG/DL (ref 65–99)
HCT VFR BLD AUTO: 42.7 % (ref 42–52)
HDLC SERPL-MCNC: 41 MG/DL
HGB BLD-MCNC: 13.9 G/DL (ref 14–18)
IMM GRANULOCYTES # BLD AUTO: 0.02 K/UL (ref 0–0.11)
IMM GRANULOCYTES NFR BLD AUTO: 0.3 % (ref 0–0.9)
LDLC SERPL CALC-MCNC: 117 MG/DL
LYMPHOCYTES # BLD AUTO: 1 K/UL (ref 1–4.8)
LYMPHOCYTES NFR BLD: 16 % (ref 22–41)
MCH RBC QN AUTO: 33.8 PG (ref 27–33)
MCHC RBC AUTO-ENTMCNC: 32.6 G/DL (ref 33.7–35.3)
MCV RBC AUTO: 103.9 FL (ref 81.4–97.8)
MONOCYTES # BLD AUTO: 0.64 K/UL (ref 0–0.85)
MONOCYTES NFR BLD AUTO: 10.2 % (ref 0–13.4)
NEUTROPHILS # BLD AUTO: 4.44 K/UL (ref 1.82–7.42)
NEUTROPHILS NFR BLD: 71.1 % (ref 44–72)
NRBC # BLD AUTO: 0 K/UL
NRBC BLD-RTO: 0 /100 WBC
PLATELET # BLD AUTO: 309 K/UL (ref 164–446)
PMV BLD AUTO: 10.7 FL (ref 9–12.9)
POTASSIUM SERPL-SCNC: 4.6 MMOL/L (ref 3.6–5.5)
RBC # BLD AUTO: 4.11 M/UL (ref 4.7–6.1)
SODIUM SERPL-SCNC: 138 MMOL/L (ref 135–145)
T3 SERPL-MCNC: 86.5 NG/DL (ref 60–181)
T4 FREE SERPL-MCNC: 0.74 NG/DL (ref 0.53–1.43)
TRIGL SERPL-MCNC: 185 MG/DL (ref 0–149)
TSH SERPL DL<=0.005 MIU/L-ACNC: 1.11 UIU/ML (ref 0.38–5.33)
VIT B12 SERPL-MCNC: 471 PG/ML (ref 211–911)
WBC # BLD AUTO: 6.3 K/UL (ref 4.8–10.8)

## 2020-01-14 PROCEDURE — 85025 COMPLETE CBC W/AUTO DIFF WBC: CPT

## 2020-01-14 PROCEDURE — 80061 LIPID PANEL: CPT

## 2020-01-14 PROCEDURE — 82607 VITAMIN B-12: CPT

## 2020-01-14 PROCEDURE — 84443 ASSAY THYROID STIM HORMONE: CPT

## 2020-01-14 PROCEDURE — 84480 ASSAY TRIIODOTHYRONINE (T3): CPT

## 2020-01-14 PROCEDURE — 84439 ASSAY OF FREE THYROXINE: CPT

## 2020-01-14 PROCEDURE — 80048 BASIC METABOLIC PNL TOTAL CA: CPT

## 2020-01-14 PROCEDURE — 36415 COLL VENOUS BLD VENIPUNCTURE: CPT

## 2020-01-17 ENCOUNTER — HOSPITAL ENCOUNTER (OUTPATIENT)
Dept: RADIOLOGY | Facility: MEDICAL CENTER | Age: 75
End: 2020-01-17
Attending: PHYSICIAN ASSISTANT
Payer: MEDICARE

## 2020-01-17 DIAGNOSIS — R31.0 GROSS HEMATURIA: ICD-10-CM

## 2020-01-17 DIAGNOSIS — R31.29 OTHER MICROSCOPIC HEMATURIA: ICD-10-CM

## 2020-01-17 PROCEDURE — 700117 HCHG RX CONTRAST REV CODE 255: Performed by: PHYSICIAN ASSISTANT

## 2020-01-17 PROCEDURE — 74178 CT ABD&PLV WO CNTR FLWD CNTR: CPT

## 2020-01-17 RX ADMIN — IOHEXOL 100 ML: 350 INJECTION, SOLUTION INTRAVENOUS at 10:45

## 2020-01-29 ENCOUNTER — HOSPITAL ENCOUNTER (OUTPATIENT)
Dept: LAB | Facility: MEDICAL CENTER | Age: 75
End: 2020-01-29
Attending: UROLOGY
Payer: MEDICARE

## 2020-01-29 PROCEDURE — 87086 URINE CULTURE/COLONY COUNT: CPT

## 2020-01-30 ENCOUNTER — TELEPHONE (OUTPATIENT)
Dept: CARDIOLOGY | Facility: MEDICAL CENTER | Age: 75
End: 2020-01-30

## 2020-01-30 LAB
AMBIGUOUS DTTM AMBI4: NORMAL
SIGNIFICANT IND 70042: NORMAL
SITE SITE: NORMAL
SOURCE SOURCE: NORMAL

## 2020-01-30 NOTE — TELEPHONE ENCOUNTER
LVM for pt about a prior cardiologist and most recent labs with a request to call back if they have ever seen someone in the past.

## 2020-01-31 ENCOUNTER — ANESTHESIA EVENT (OUTPATIENT)
Dept: SURGERY | Facility: MEDICAL CENTER | Age: 75
End: 2020-01-31
Payer: MEDICARE

## 2020-01-31 ENCOUNTER — HOSPITAL ENCOUNTER (OUTPATIENT)
Facility: MEDICAL CENTER | Age: 75
End: 2020-01-31
Attending: UROLOGY | Admitting: UROLOGY
Payer: MEDICARE

## 2020-01-31 ENCOUNTER — ANESTHESIA (OUTPATIENT)
Dept: SURGERY | Facility: MEDICAL CENTER | Age: 75
End: 2020-01-31
Payer: MEDICARE

## 2020-01-31 VITALS
BODY MASS INDEX: 33.53 KG/M2 | WEIGHT: 261.25 LBS | HEART RATE: 54 BPM | TEMPERATURE: 96.8 F | HEIGHT: 74 IN | OXYGEN SATURATION: 93 % | SYSTOLIC BLOOD PRESSURE: 140 MMHG | RESPIRATION RATE: 12 BRPM | DIASTOLIC BLOOD PRESSURE: 76 MMHG

## 2020-01-31 PROCEDURE — 160035 HCHG PACU - 1ST 60 MINS PHASE I: Performed by: UROLOGY

## 2020-01-31 PROCEDURE — 160048 HCHG OR STATISTICAL LEVEL 1-5: Performed by: UROLOGY

## 2020-01-31 PROCEDURE — 160009 HCHG ANES TIME/MIN: Performed by: UROLOGY

## 2020-01-31 PROCEDURE — 160039 HCHG SURGERY MINUTES - EA ADDL 1 MIN LEVEL 3: Performed by: UROLOGY

## 2020-01-31 PROCEDURE — 160028 HCHG SURGERY MINUTES - 1ST 30 MINS LEVEL 3: Performed by: UROLOGY

## 2020-01-31 PROCEDURE — C1769 GUIDE WIRE: HCPCS | Performed by: UROLOGY

## 2020-01-31 PROCEDURE — 502240 HCHG MISC OR SUPPLY RC 0272: Performed by: UROLOGY

## 2020-01-31 PROCEDURE — 160046 HCHG PACU - 1ST 60 MINS PHASE II: Performed by: UROLOGY

## 2020-01-31 PROCEDURE — 700111 HCHG RX REV CODE 636 W/ 250 OVERRIDE (IP): Performed by: ANESTHESIOLOGY

## 2020-01-31 PROCEDURE — 700105 HCHG RX REV CODE 258: Performed by: UROLOGY

## 2020-01-31 PROCEDURE — 160036 HCHG PACU - EA ADDL 30 MINS PHASE I: Performed by: UROLOGY

## 2020-01-31 PROCEDURE — 88300 SURGICAL PATH GROSS: CPT

## 2020-01-31 PROCEDURE — 700101 HCHG RX REV CODE 250: Performed by: ANESTHESIOLOGY

## 2020-01-31 PROCEDURE — C1758 CATHETER, URETERAL: HCPCS | Performed by: UROLOGY

## 2020-01-31 PROCEDURE — 700111 HCHG RX REV CODE 636 W/ 250 OVERRIDE (IP)

## 2020-01-31 PROCEDURE — 501329 HCHG SET, CYSTO IRRIG Y TUR: Performed by: UROLOGY

## 2020-01-31 PROCEDURE — 500879 HCHG PACK, CYSTO: Performed by: UROLOGY

## 2020-01-31 PROCEDURE — 160002 HCHG RECOVERY MINUTES (STAT): Performed by: UROLOGY

## 2020-01-31 PROCEDURE — 160025 RECOVERY II MINUTES (STATS): Performed by: UROLOGY

## 2020-01-31 PROCEDURE — 700101 HCHG RX REV CODE 250: Performed by: UROLOGY

## 2020-01-31 RX ORDER — LIDOCAINE HYDROCHLORIDE 10 MG/ML
INJECTION, SOLUTION EPIDURAL; INFILTRATION; INTRACAUDAL; PERINEURAL
Status: COMPLETED
Start: 2020-01-31 | End: 2020-01-31

## 2020-01-31 RX ORDER — MIDAZOLAM HYDROCHLORIDE 1 MG/ML
INJECTION INTRAMUSCULAR; INTRAVENOUS PRN
Status: DISCONTINUED | OUTPATIENT
Start: 2020-01-31 | End: 2020-01-31 | Stop reason: SURG

## 2020-01-31 RX ORDER — CEFAZOLIN SODIUM 1 G/3ML
INJECTION, POWDER, FOR SOLUTION INTRAMUSCULAR; INTRAVENOUS PRN
Status: DISCONTINUED | OUTPATIENT
Start: 2020-01-31 | End: 2020-01-31 | Stop reason: SURG

## 2020-01-31 RX ORDER — LORAZEPAM 2 MG/ML
0.5 INJECTION INTRAMUSCULAR
Status: DISCONTINUED | OUTPATIENT
Start: 2020-01-31 | End: 2020-01-31 | Stop reason: HOSPADM

## 2020-01-31 RX ORDER — OXYCODONE HYDROCHLORIDE AND ACETAMINOPHEN 5; 325 MG/1; MG/1
1 TABLET ORAL
Status: DISCONTINUED | OUTPATIENT
Start: 2020-01-31 | End: 2020-01-31 | Stop reason: HOSPADM

## 2020-01-31 RX ORDER — MAGNESIUM HYDROXIDE 1200 MG/15ML
LIQUID ORAL
Status: DISCONTINUED | OUTPATIENT
Start: 2020-01-31 | End: 2020-01-31 | Stop reason: HOSPADM

## 2020-01-31 RX ORDER — ONDANSETRON 2 MG/ML
INJECTION INTRAMUSCULAR; INTRAVENOUS PRN
Status: DISCONTINUED | OUTPATIENT
Start: 2020-01-31 | End: 2020-01-31 | Stop reason: SURG

## 2020-01-31 RX ORDER — SODIUM CHLORIDE, SODIUM LACTATE, POTASSIUM CHLORIDE, CALCIUM CHLORIDE 600; 310; 30; 20 MG/100ML; MG/100ML; MG/100ML; MG/100ML
INJECTION, SOLUTION INTRAVENOUS CONTINUOUS
Status: DISCONTINUED | OUTPATIENT
Start: 2020-01-31 | End: 2020-01-31 | Stop reason: HOSPADM

## 2020-01-31 RX ORDER — HALOPERIDOL 5 MG/ML
1 INJECTION INTRAMUSCULAR
Status: DISCONTINUED | OUTPATIENT
Start: 2020-01-31 | End: 2020-01-31 | Stop reason: HOSPADM

## 2020-01-31 RX ORDER — DIPHENHYDRAMINE HYDROCHLORIDE 50 MG/ML
12.5 INJECTION INTRAMUSCULAR; INTRAVENOUS
Status: DISCONTINUED | OUTPATIENT
Start: 2020-01-31 | End: 2020-01-31 | Stop reason: HOSPADM

## 2020-01-31 RX ORDER — MEPERIDINE HYDROCHLORIDE 25 MG/ML
6.25 INJECTION INTRAMUSCULAR; INTRAVENOUS; SUBCUTANEOUS
Status: DISCONTINUED | OUTPATIENT
Start: 2020-01-31 | End: 2020-01-31 | Stop reason: HOSPADM

## 2020-01-31 RX ORDER — LIDOCAINE HYDROCHLORIDE 20 MG/ML
INJECTION, SOLUTION EPIDURAL; INFILTRATION; INTRACAUDAL; PERINEURAL PRN
Status: DISCONTINUED | OUTPATIENT
Start: 2020-01-31 | End: 2020-01-31 | Stop reason: SURG

## 2020-01-31 RX ORDER — OXYCODONE HYDROCHLORIDE AND ACETAMINOPHEN 5; 325 MG/1; MG/1
2 TABLET ORAL
Status: DISCONTINUED | OUTPATIENT
Start: 2020-01-31 | End: 2020-01-31 | Stop reason: HOSPADM

## 2020-01-31 RX ORDER — VITS A,C,E/LUTEIN/MINERALS 300MCG-200
1 TABLET ORAL DAILY
COMMUNITY
End: 2020-02-06

## 2020-01-31 RX ORDER — ONDANSETRON 2 MG/ML
4 INJECTION INTRAMUSCULAR; INTRAVENOUS
Status: DISCONTINUED | OUTPATIENT
Start: 2020-01-31 | End: 2020-01-31 | Stop reason: HOSPADM

## 2020-01-31 RX ORDER — CYCLOBENZAPRINE HCL 10 MG
10 TABLET ORAL 2 TIMES DAILY PRN
COMMUNITY
End: 2020-02-06

## 2020-01-31 RX ADMIN — Medication 0.5 ML: at 14:10

## 2020-01-31 RX ADMIN — MIDAZOLAM HYDROCHLORIDE 2 MG: 1 INJECTION, SOLUTION INTRAMUSCULAR; INTRAVENOUS at 15:29

## 2020-01-31 RX ADMIN — CEFAZOLIN 2 G: 330 INJECTION, POWDER, FOR SOLUTION INTRAMUSCULAR; INTRAVENOUS at 15:43

## 2020-01-31 RX ADMIN — LIDOCAINE HYDROCHLORIDE 0.5 ML: 10 INJECTION, SOLUTION EPIDURAL; INFILTRATION; INTRACAUDAL at 14:10

## 2020-01-31 RX ADMIN — FENTANYL CITRATE 50 MCG: 50 INJECTION, SOLUTION INTRAMUSCULAR; INTRAVENOUS at 15:57

## 2020-01-31 RX ADMIN — LIDOCAINE HYDROCHLORIDE 60 MG: 20 INJECTION, SOLUTION EPIDURAL; INFILTRATION; INTRACAUDAL at 15:33

## 2020-01-31 RX ADMIN — PROPOFOL 200 MG: 10 INJECTION, EMULSION INTRAVENOUS at 15:33

## 2020-01-31 RX ADMIN — SODIUM CHLORIDE, POTASSIUM CHLORIDE, SODIUM LACTATE AND CALCIUM CHLORIDE: 600; 310; 30; 20 INJECTION, SOLUTION INTRAVENOUS at 14:10

## 2020-01-31 RX ADMIN — ONDANSETRON 4 MG: 2 INJECTION INTRAMUSCULAR; INTRAVENOUS at 16:07

## 2020-01-31 RX ADMIN — FENTANYL CITRATE 50 MCG: 50 INJECTION, SOLUTION INTRAMUSCULAR; INTRAVENOUS at 15:33

## 2020-01-31 ASSESSMENT — PAIN SCALES - GENERAL: PAIN_LEVEL: 0

## 2020-01-31 NOTE — ANESTHESIA PREPROCEDURE EVALUATION
Relevant Problems   CARDIAC   (+) Essential hypertension       Physical Exam    Airway   Mallampati: I  TM distance: >3 FB  Neck ROM: full       Cardiovascular - normal exam  Rhythm: regular  Rate: normal  (-) murmur     Dental - normal exam         Pulmonary - normal exam  Breath sounds clear to auscultation     Abdominal - normal exam     Neurological - normal exam                 Anesthesia Plan    ASA 2       Plan - general       Airway plan will be LMA      Plan Factors:   Patient did not smoke on day of procedure.      Induction: intravenous      Pertinent diagnostic labs and testing reviewed    Informed Consent:    Anesthetic plan and risks discussed with patient.    Use of blood products discussed with: patient whom.

## 2020-01-31 NOTE — ANESTHESIA PROCEDURE NOTES
Airway  Date/Time: 1/31/2020 3:34 PM  Performed by: Mary Bautista M.D.  Authorized by: Mary Bautista M.D.     Location:  OR  Urgency:  Elective  Difficult Airway: No    Indications for Airway Management:  Anesthesia  Spontaneous Ventilation: absent    Sedation Level:  Deep  Preoxygenated: Yes    MILS Maintained Throughout: No    Mask Difficulty Assessment:  1 - vent by mask  Final Airway Type:  Supraglottic airway  Final Supraglottic Airway:  Standard LMA  SGA Size:  4  Number of Attempts at Approach:  1  Number of Other Approaches Attempted:  0

## 2020-02-01 LAB
BACTERIA UR CULT: NORMAL
SIGNIFICANT IND 70042: NORMAL
SITE SITE: NORMAL
SOURCE SOURCE: NORMAL

## 2020-02-01 NOTE — PROGRESS NOTES
Received from recovery, AO x4, voided 200 ml pink tinged urine without difficulty, tolerating PO fluids

## 2020-02-01 NOTE — DISCHARGE INSTRUCTIONS
ACTIVITY: Rest and take it easy for the first 24 hours.  A responsible adult is recommended to remain with you during that time.  It is normal to feel sleepy.  We encourage you to not do anything that requires balance, judgment or coordination.    MILD FLU-LIKE SYMPTOMS ARE NORMAL. YOU MAY EXPERIENCE GENERALIZED MUSCLE ACHES, THROAT IRRITATION, HEADACHE AND/OR SOME NAUSEA.    FOR 24 HOURS DO NOT:  Drive, operate machinery or run household appliances.  Drink beer or alcoholic beverages.   Make important decisions or sign legal documents.    SPECIAL INSTRUCTIONS: Follow your surgeons instructions    DIET: To avoid nausea, slowly advance diet as tolerated, avoiding spicy or greasy foods for the first day.  Add more substantial food to your diet according to your physician's instructions.  INCREASE FLUIDS AND FIBER TO AVOID CONSTIPATION.    SURGICAL DRESSING/BATHING: follow surgeon's instructions    FOLLOW-UP APPOINTMENT:  A follow-up appointment should be arranged with your doctor in Dr Solorio's office will call for follow up apointment; call to schedule.    You should CALL YOUR PHYSICIAN if you develop:  Fever greater than 101 degrees F.  Pain not relieved by medication, or persistent nausea or vomiting.  Excessive bleeding (blood soaking through dressing) or unexpected drainage from the wound.  Extreme redness or swelling around the incision site, drainage of pus or foul smelling drainage.  Inability to urinate or empty your bladder within 8 hours.  Problems with breathing or chest pain.    You should call 911 if you develop problems with breathing or chest pain.  If you are unable to contact your doctor or surgical center, you should go to the nearest emergency room or urgent care center.  Physician's telephone #: [phone]    If any questions arise, call your doctor.  If your doctor is not available, please feel free to call the Surgical Center at [phone].  The Center is open Monday through Friday  from 7AM to 7PM.  You can also call the HEALTH HOTLINE open 24 hours/day, 7 days/week and speak to a nurse at [phone], or toll free at [phone].    A registered nurse may call you a few days after your surgery to see how you are doing after your procedure.    MEDICATIONS: Resume taking daily medication.  Take prescribed pain medication with food.  If no medication is prescribed, you may take non-aspirin pain medication if needed.  PAIN MEDICATION CAN BE VERY CONSTIPATING.  Take a stool softener or laxative such as senokot, pericolace, or milk of magnesia if needed.     Last pain medication given at     If your physician has prescribed pain medication that includes Acetaminophen (Tylenol), do not take additional Acetaminophen (Tylenol) while taking the prescribed medication.    Depression / Suicide Risk    As you are discharged from this [facility] facility, it is important to learn how to keep safe from harming yourself.    Recognize the warning signs:  · Abrupt changes in personality, positive or negative- including increase in energy   · Giving away possessions  · Change in eating patterns- significant weight changes-  positive or negative  · Change in sleeping patterns- unable to sleep or sleeping all the time   · Unwillingness or inability to communicate  · Depression  · Unusual sadness, discouragement and loneliness  · Talk of wanting to die  · Neglect of personal appearance   · Rebelliousness- reckless behavior  · Withdrawal from people/activities they love  · Confusion- inability to concentrate     If you or a loved one observes any of these behaviors or has concerns about self-harm, here's what you can do:  · Talk about it- your feelings and reasons for harming yourself  · Remove any means that you might use to hurt yourself (examples: pills, rope, extension cords, firearm)  · Get professional help from the community (Mental Health, Substance Abuse, psychological counseling)  · Do not be  alone:Call your Safe Contact- someone whom you trust who will be there for you.  · Call your local CRISIS HOTLINE 551-0355 or 890-767-6217  · Call your local Children's Mobile Crisis Response Team [place] (461) 002-9634 or www.CloudSway  · Call the toll free National Suicide Prevention Hotlines   · National Suicide Prevention Lifeline 133-826-PFQT (2285)  · National Real[employer] Line Network 800-SUICIDE (549-6335)

## 2020-02-01 NOTE — ANESTHESIA POSTPROCEDURE EVALUATION
Patient: Sina Oliver    Procedure Summary     Date:  01/31/20 Room / Location:  [place] [address] / SURGERY [facility]    Anesthesia Start:  1529 Anesthesia Stop:  1632    Procedure:  Cystolitholapaxy (Bladder) Diagnosis:  (URINARY BLADDER STONE)    Surgeon:  Lukasz Solorio M.D. Responsible Provider:  Mary Bautista M.D.    Anesthesia Type:  general ASA Status:  2          Final Anesthesia Type: general  Last vitals  BP   Blood Pressure : 150/70    Temp   36.2 °C (97.2 °F)    Pulse   Pulse: (!) 53   Resp   12    SpO2   93 %      Anesthesia Post Evaluation    Patient location during evaluation: PACU  Patient participation: complete - patient participated  Level of consciousness: awake and alert  Pain score: 0    Airway patency: patent  Anesthetic complications: no  Cardiovascular status: hemodynamically stable  Respiratory status: acceptable  Hydration status: euvolemic    PONV: none           Nurse Pain Score: 0 (NPRS)

## 2020-02-01 NOTE — OR SURGEON
Immediate Post OP Note    PreOp Diagnosis: prostatic urethral stones, bladder stones    PostOp Diagnosis: same    Procedure(s):  Cystolitholapaxy - Wound Class: Clean Contaminated    Surgeon(s):  Lukasz Solorio M.D.    Anesthesiologist/Type of Anesthesia:  Anesthesiologist: Mary Bautista M.D./General    Surgical Staff:  Circulator: Kiara Bazan R.N.  Laser Staff: Low Irving  Scrub Person: Haylee Lenz    Specimens removed if any:  ID Type Source Tests Collected by Time Destination   A : Bladder Stones Stone Bladder PATHOLOGY SPECIMEN Lukasz Solorio M.D. 1/31/2020  4:15 PM        Estimated Blood Loss: min    Findings: friable, very large prostate, prior resection at bladder neck only.  ~5-7 stones each ~5-8mm, all broken up     Complications: none        1/31/2020 4:30 PM Lukasz Solorio M.D.

## 2020-02-01 NOTE — PROGRESS NOTES
VSS, pt reports no pain, no N/V/ discharge instructions given to pt and wife, all questions answered, discharged to home

## 2020-02-01 NOTE — OP REPORT
DATE OF SERVICE:  01/31/2020    NAME OF OPERATION:  Cystolitholapaxy.    PREOPERATIVE DIAGNOSES:  1.  Prostatic urethral stones.  2.  Bladder stones.  3.  Benign prostatic hypertrophy with obstruction.    POSTOPERATIVE DIAGNOSES:  1.  Prostatic urethral stones.  2.  Bladder stones.  3.  Benign prostatic hypertrophy with obstruction.    PRIMARY SURGEON:  Lukasz Solorio MD.    ANESTHESIOLOGIST:  Mary Bautista MD.    FINDINGS:  Approximately 5-7 stones, several in the prostatic urethra, several   in the bladder, each measuring approximately 7-8 mm.  All completely   fragmented and all pieces removed.  Nodular obstruction of his prostatic fossa   with edema, very friable blood vessels.  Massive enlargement.    INDICATIONS:  Briefly, the patient is a 74-year-old gentleman with a history   of gross hematuria.  Cystoscopy demonstrated the presence of stones in his   urethra.  Upon consideration of his options, he elected to undergo surgical   management.  Informed consent has been obtained.    OPERATION IN DETAIL:  The patient was taken to the operating room and placed   on the operating table in supine position.  After administration of general   anesthetic, he was placed in lithotomy.  Genitals were prepped and draped   sterilely.  A 22-Amharic cystoscope was passed in the bladder with visualizing   obturator.  Urethra was within normal limits.  Prostatic urethra demonstrated   just at the apex presence of stones.    A bridge was then employed and a 365 micron Holmium laser fiber was used to   begin fragmenting the stones.  Stones were very hard.  I was able to fragment   several of these stones into small enough pieces that these did make it   retrograde through the prostatic fossa into the bladder.  Here, I was able to   identify multiple other stones, each measuring approximately the same size and   these were fragmented into multiple pieces as well.    The patient had evidence of prior prostate resection  just apparently at the   bladder neck only.  Remainder of the anatomy was somewhat difficult given the   very high elevation of his bladder neck in general.  Stones were hiding behind   his bladder neck.  I ultimately was able to treat all of these.    I pulled back into the prostatic urethra and again more stones were found in   the deep crevice towards the apex.  I was able to manipulate these up towards   the bladder and work on these as well.  Finally, all stones were fragmented   and removed.    With all the bladder scope movement, there was certainly some bleeding of   surface blood vessels of the prostatic fossa.  A Bugbee electrode was utilized   to cauterize these bleeders.    After ablations, bladder was drained, the case concluded.  He tolerated the   procedure well and was taken to recovery room in stable condition.       ____________________________________     Lukasz Solorio MD MCM / CINDY    DD:  01/31/2020 16:35:00  DT:  01/31/2020 19:41:49    D#:  [id number]  Job#:  [id number]

## 2020-02-01 NOTE — OR NURSING
"O2 to remains in place per patient request.  States he feels that \"it will help to blow off anesthesia gas\"  Tolerating fluids well; no complaints of nausea  Denies pain/discomfort  No drainage noted at meatus  Patient voided 150 ml of pink red urine X1  Transferred to Phase II via [place]  "

## 2020-02-01 NOTE — ANESTHESIA TIME REPORT
Anesthesia Start and Stop Event Times     Date Time Event    1/31/2020 1520 Ready for Procedure     1529 Anesthesia Start     1632 Anesthesia Stop        Responsible Staff  01/31/20    Name Role Begin End    Mary Bautista M.D. Anesth 1529 1632        Preop Diagnosis (Free Text):  Pre-op Diagnosis     URINARY BLADDER STONE        Preop Diagnosis (Codes):    Post op Diagnosis  Bladder stone      Premium Reason  A. 3PM - 7AM    Comments:

## 2020-02-03 LAB — PATHOLOGY CONSULT NOTE: NORMAL

## 2020-02-06 ENCOUNTER — OFFICE VISIT (OUTPATIENT)
Dept: CARDIOLOGY | Facility: MEDICAL CENTER | Age: 75
End: 2020-02-06
Payer: MEDICARE

## 2020-02-06 VITALS
HEIGHT: 74 IN | HEART RATE: 69 BPM | BODY MASS INDEX: 33.5 KG/M2 | WEIGHT: 261 LBS | OXYGEN SATURATION: 90 % | RESPIRATION RATE: 12 BRPM | DIASTOLIC BLOOD PRESSURE: 62 MMHG | SYSTOLIC BLOOD PRESSURE: 142 MMHG

## 2020-02-06 DIAGNOSIS — I10 ESSENTIAL HYPERTENSION: ICD-10-CM

## 2020-02-06 DIAGNOSIS — R07.2 PRECORDIAL PAIN: ICD-10-CM

## 2020-02-06 DIAGNOSIS — E78.2 MIXED HYPERLIPIDEMIA: ICD-10-CM

## 2020-02-06 DIAGNOSIS — R00.2 PALPITATIONS: ICD-10-CM

## 2020-02-06 LAB — EKG IMPRESSION: NORMAL

## 2020-02-06 PROCEDURE — 93000 ELECTROCARDIOGRAM COMPLETE: CPT | Performed by: INTERNAL MEDICINE

## 2020-02-06 PROCEDURE — 99204 OFFICE O/P NEW MOD 45 MIN: CPT | Performed by: INTERNAL MEDICINE

## 2020-02-06 RX ORDER — OLMESARTAN MEDOXOMIL AND HYDROCHLOROTHIAZIDE 20/12.5 20; 12.5 MG/1; MG/1
1 TABLET ORAL DAILY
Qty: 90 TAB | Refills: 3 | Status: SHIPPED | OUTPATIENT
Start: 2020-02-06 | End: 2020-02-12 | Stop reason: SDUPTHER
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
## 2020-02-10 RX ORDER — CYCLOBENZAPRINE HCL 10 MG
10 TABLET ORAL 2 TIMES DAILY PRN
Qty: 180 TAB | Refills: 0 | Status: SHIPPED | OUTPATIENT
Start: 2020-02-10 | End: 2021-03-12 | Stop reason: SDUPTHER

## 2020-02-11 ENCOUNTER — HOSPITAL ENCOUNTER (OUTPATIENT)
Dept: LAB | Facility: MEDICAL CENTER | Age: 75
End: 2020-02-11
Attending: INTERNAL MEDICINE
Payer: MEDICARE

## 2020-02-11 DIAGNOSIS — I10 ESSENTIAL HYPERTENSION: ICD-10-CM

## 2020-02-11 PROCEDURE — 80048 BASIC METABOLIC PNL TOTAL CA: CPT

## 2020-02-11 PROCEDURE — 36415 COLL VENOUS BLD VENIPUNCTURE: CPT

## 2020-02-12 DIAGNOSIS — R07.2 PRECORDIAL PAIN: ICD-10-CM

## 2020-02-12 LAB
ANION GAP SERPL CALC-SCNC: 8 MMOL/L (ref 0–11.9)
BUN SERPL-MCNC: 14 MG/DL (ref 8–22)
CALCIUM SERPL-MCNC: 9.4 MG/DL (ref 8.5–10.5)
CHLORIDE SERPL-SCNC: 105 MMOL/L (ref 96–112)
CO2 SERPL-SCNC: 25 MMOL/L (ref 20–33)
CREAT SERPL-MCNC: 0.87 MG/DL (ref 0.5–1.4)
GLUCOSE SERPL-MCNC: 111 MG/DL (ref 65–99)
POTASSIUM SERPL-SCNC: 4.1 MMOL/L (ref 3.6–5.5)
SODIUM SERPL-SCNC: 138 MMOL/L (ref 135–145)

## 2020-02-12 RX ORDER — OLMESARTAN MEDOXOMIL AND HYDROCHLOROTHIAZIDE 20/12.5 20; 12.5 MG/1; MG/1
1 TABLET ORAL DAILY
Qty: 100 TAB | Refills: 3 | Status: SHIPPED | OUTPATIENT
Start: 2020-02-12 | End: 2020-07-08 | Stop reason: SDUPTHER

## 2020-02-25 ENCOUNTER — HOSPITAL ENCOUNTER (OUTPATIENT)
Dept: CARDIOLOGY | Facility: MEDICAL CENTER | Age: 75
End: 2020-02-25
Attending: INTERNAL MEDICINE
Payer: MEDICARE

## 2020-02-25 DIAGNOSIS — R07.2 PRECORDIAL PAIN: ICD-10-CM

## 2020-02-25 LAB
LV EJECT FRACT  99904: 55
LV EJECT FRACT  99904: 65
LV EJECT FRACT MOD 2C 99903: 56.57
LV EJECT FRACT MOD 4C 99902: 48.28
LV EJECT FRACT MOD BP 99901: 54.23

## 2020-02-25 PROCEDURE — 93306 TTE W/DOPPLER COMPLETE: CPT

## 2020-02-25 PROCEDURE — 93018 CV STRESS TEST I&R ONLY: CPT | Performed by: INTERNAL MEDICINE

## 2020-02-25 PROCEDURE — 93017 CV STRESS TEST TRACING ONLY: CPT

## 2020-02-25 PROCEDURE — 93350 STRESS TTE ONLY: CPT | Mod: 26 | Performed by: INTERNAL MEDICINE

## 2020-02-25 PROCEDURE — 93306 TTE W/DOPPLER COMPLETE: CPT | Mod: 26,59 | Performed by: INTERNAL MEDICINE

## 2020-02-26 ENCOUNTER — TELEPHONE (OUTPATIENT)
Dept: CARDIOLOGY | Facility: MEDICAL CENTER | Age: 75
End: 2020-02-26

## 2020-02-26 NOTE — TELEPHONE ENCOUNTER
Result Notes for EC-ECHOCARDIOGRAM COMPLETE W/O CONT   Notes recorded by Evan Wise M.D. on 2/25/2020 at 4:32 PM PST  The echocardiogram looks relatively normal.  There is mild mitral regurgitation and left ventricular hypertrophy which is not of immediate concern and can be followed up over time     Result Notes for EC-ECHOCARDIOGRAM REST/STRESS W/O CONT   Notes recorded by Evan Wise M.D. on 2/25/2020 at 4:54 PM PST  Overall the stress echocardiogram is reassuring for the absence of ischemic heart disease.  I recommend graded aerobic exercise training and continuing the current plan of care     Called pt and discussed echo and stress echo results. Encouraged him to gradually increase his exercise tolerance, which he does say he's been meaning to get back to the gym. Encouraged him to call us with future concerns, and he will plan on coming to his 4/9/20 BE appt.

## 2020-02-27 DIAGNOSIS — I10 ESSENTIAL HYPERTENSION: ICD-10-CM

## 2020-02-28 RX ORDER — TESTOSTERONE CYPIONATE 200 MG/ML
INJECTION, SOLUTION INTRAMUSCULAR
Qty: 6 ML | Refills: 2 | OUTPATIENT
Start: 2020-02-28

## 2020-02-28 RX ORDER — AMLODIPINE BESYLATE 5 MG/1
5 TABLET ORAL DAILY
Qty: 90 TAB | Refills: 0 | OUTPATIENT
Start: 2020-02-28

## 2020-03-02 DIAGNOSIS — D64.9 ANEMIA, UNSPECIFIED TYPE: ICD-10-CM

## 2020-03-11 ENCOUNTER — HOSPITAL ENCOUNTER (OUTPATIENT)
Dept: LAB | Facility: MEDICAL CENTER | Age: 75
End: 2020-03-11
Attending: FAMILY MEDICINE
Payer: MEDICARE

## 2020-03-11 DIAGNOSIS — D64.9 ANEMIA, UNSPECIFIED TYPE: ICD-10-CM

## 2020-03-11 LAB
BASOPHILS # BLD AUTO: 0.5 % (ref 0–1.8)
BASOPHILS # BLD: 0.04 K/UL (ref 0–0.12)
EOSINOPHIL # BLD AUTO: 0.08 K/UL (ref 0–0.51)
EOSINOPHIL NFR BLD: 1.1 % (ref 0–6.9)
ERYTHROCYTE [DISTWIDTH] IN BLOOD BY AUTOMATED COUNT: 43.7 FL (ref 35.9–50)
HCT VFR BLD AUTO: 47.4 % (ref 42–52)
HGB BLD-MCNC: 15.7 G/DL (ref 14–18)
IMM GRANULOCYTES # BLD AUTO: 0.04 K/UL (ref 0–0.11)
IMM GRANULOCYTES NFR BLD AUTO: 0.5 % (ref 0–0.9)
LYMPHOCYTES # BLD AUTO: 1.05 K/UL (ref 1–4.8)
LYMPHOCYTES NFR BLD: 13.8 % (ref 22–41)
MCH RBC QN AUTO: 32.8 PG (ref 27–33)
MCHC RBC AUTO-ENTMCNC: 33.1 G/DL (ref 33.7–35.3)
MCV RBC AUTO: 99.2 FL (ref 81.4–97.8)
MONOCYTES # BLD AUTO: 0.78 K/UL (ref 0–0.85)
MONOCYTES NFR BLD AUTO: 10.3 % (ref 0–13.4)
NEUTROPHILS # BLD AUTO: 5.61 K/UL (ref 1.82–7.42)
NEUTROPHILS NFR BLD: 73.8 % (ref 44–72)
NRBC # BLD AUTO: 0 K/UL
NRBC BLD-RTO: 0 /100 WBC
PLATELET # BLD AUTO: 236 K/UL (ref 164–446)
PMV BLD AUTO: 10.7 FL (ref 9–12.9)
RBC # BLD AUTO: 4.78 M/UL (ref 4.7–6.1)
WBC # BLD AUTO: 7.6 K/UL (ref 4.8–10.8)

## 2020-03-11 PROCEDURE — 83540 ASSAY OF IRON: CPT

## 2020-03-11 PROCEDURE — 83550 IRON BINDING TEST: CPT

## 2020-03-11 PROCEDURE — 82728 ASSAY OF FERRITIN: CPT

## 2020-03-11 PROCEDURE — 85025 COMPLETE CBC W/AUTO DIFF WBC: CPT

## 2020-03-11 PROCEDURE — 36415 COLL VENOUS BLD VENIPUNCTURE: CPT

## 2020-03-12 LAB
FERRITIN SERPL-MCNC: 152.9 NG/ML (ref 22–322)
IRON SATN MFR SERPL: 26 % (ref 15–55)
IRON SERPL-MCNC: 83 UG/DL (ref 50–180)
TIBC SERPL-MCNC: 321 UG/DL (ref 250–450)

## 2020-04-08 NOTE — PROGRESS NOTES
Outcome: Left Message dutch & pa intro     Please transfer to Patient Outreach Team at [phone] when patient returns call.    [facility]Connect Verified: yes    Attempt # 2

## 2020-04-13 ENCOUNTER — TELEPHONE (OUTPATIENT)
Dept: MEDICAL GROUP | Facility: MEDICAL CENTER | Age: 75
End: 2020-04-13

## 2020-04-13 DIAGNOSIS — Z85.820 HX OF MALIGNANT MELANOMA: ICD-10-CM

## 2020-04-13 NOTE — TELEPHONE ENCOUNTER
Pt needs a new referral to [facility] with Dr Hyman.    Please advise

## 2020-05-13 ENCOUNTER — TELEPHONE (OUTPATIENT)
Dept: MEDICAL GROUP | Facility: MEDICAL CENTER | Age: 75
End: 2020-05-13

## 2020-05-13 DIAGNOSIS — I10 ESSENTIAL HYPERTENSION: ICD-10-CM

## 2020-05-13 DIAGNOSIS — N52.9 ERECTILE DYSFUNCTION, UNSPECIFIED ERECTILE DYSFUNCTION TYPE: ICD-10-CM

## 2020-05-13 DIAGNOSIS — E78.2 MIXED HYPERLIPIDEMIA: ICD-10-CM

## 2020-05-13 DIAGNOSIS — R35.1 BENIGN PROSTATIC HYPERPLASIA WITH NOCTURIA: ICD-10-CM

## 2020-05-13 DIAGNOSIS — R79.89 LOW TESTOSTERONE LEVEL IN MALE: ICD-10-CM

## 2020-05-13 DIAGNOSIS — N40.1 BENIGN PROSTATIC HYPERPLASIA WITH NOCTURIA: ICD-10-CM

## 2020-05-13 DIAGNOSIS — R97.20 ELEVATED PSA: ICD-10-CM

## 2020-05-13 DIAGNOSIS — E04.1 THYROID NODULE: ICD-10-CM

## 2020-05-13 NOTE — TELEPHONE ENCOUNTER
Good Afternoon,   Chanda rodriguez called in right now and said that her  Sina goes to [place] for a couple of months out of the year and she stated that in the past you have helped them out with putting in referrals for the pt to see a urologist and primary provider out of state? Sina would like to see Aniceto Silva [id number] [address] [place] [address] ph. [phone] [address] [id number] [address] [facility] [address] ph.[phone] she asked if you have questions to please call her please advise thank you.     Rose

## 2020-05-23 NOTE — PROGRESS NOTES
Outcome: Left Message    Please transfer to Patient Outreach Team at [phone] when patient returns call.    WebIZ Checked & Epic Updated:  no    [facility]Connect Verified: yes    Attempt # 1       Patient unable to complete

## 2020-05-26 ENCOUNTER — OFFICE VISIT (OUTPATIENT)
Dept: MEDICAL GROUP | Facility: MEDICAL CENTER | Age: 75
End: 2020-05-26
Payer: MEDICARE

## 2020-05-26 ENCOUNTER — TELEPHONE (OUTPATIENT)
Dept: SCHEDULING | Facility: IMAGING CENTER | Age: 75
End: 2020-05-26

## 2020-05-26 VITALS
TEMPERATURE: 98.6 F | BODY MASS INDEX: 34.65 KG/M2 | SYSTOLIC BLOOD PRESSURE: 132 MMHG | HEIGHT: 74 IN | OXYGEN SATURATION: 93 % | HEART RATE: 74 BPM | WEIGHT: 270 LBS | DIASTOLIC BLOOD PRESSURE: 78 MMHG | RESPIRATION RATE: 16 BRPM

## 2020-05-26 DIAGNOSIS — I10 ESSENTIAL HYPERTENSION: ICD-10-CM

## 2020-05-26 DIAGNOSIS — M79.89 LEG SWELLING: ICD-10-CM

## 2020-05-26 PROCEDURE — 99213 OFFICE O/P EST LOW 20 MIN: CPT | Performed by: FAMILY MEDICINE

## 2020-05-26 RX ORDER — TAMSULOSIN HYDROCHLORIDE 0.4 MG/1
0.4 CAPSULE ORAL DAILY
COMMUNITY
Start: 2020-04-24 | End: 2021-03-12 | Stop reason: SDUPTHER

## 2020-05-26 RX ORDER — AMLODIPINE BESYLATE 5 MG/1
TABLET ORAL
COMMUNITY
Start: 2020-04-06 | End: 2020-05-26

## 2020-05-26 RX ORDER — AMLODIPINE BESYLATE 10 MG/1
5 TABLET ORAL DAILY
COMMUNITY
Start: 2020-05-26 | End: 2020-10-08

## 2020-05-26 RX ORDER — TESTOSTERONE CYPIONATE 200 MG/ML
INJECTION, SOLUTION INTRAMUSCULAR
COMMUNITY
Start: 2020-03-02 | End: 2020-10-08

## 2020-05-26 ASSESSMENT — PATIENT HEALTH QUESTIONNAIRE - PHQ9: CLINICAL INTERPRETATION OF PHQ2 SCORE: 0

## 2020-05-26 ASSESSMENT — FIBROSIS 4 INDEX: FIB4 SCORE: 1.66

## 2020-05-26 NOTE — PROGRESS NOTES
"cc: Leg swelling    Subjective:     Sina Oliver is a 75 y.o. male presenting to discuss leg swelling.  Started 4 days ago, has noticed leg swelling in the ankle up to the knees bilaterally.  Symptoms are worse in the evening.  Little swelling has resolved in the morning.  No particular triggers.  Denies any changes to diet, salt intake.  Denies any recent travel, long car rides, immobilization.  Denies any associated symptoms, no chest pain, shortness of breath, lightheadedness, dizziness, rashes, heart palpitations.      Review of systems:  See above.       Current Outpatient Medications:   •  tamsulosin (FLOMAX) 0.4 MG capsule, , Disp: , Rfl:   •  testosterone cypionate (DEPO-TESTOSTERONE) 200 MG/ML Solution injection, , Disp: , Rfl:   •  amLODIPine (NORVASC) 10 MG Tab, Take 0.5 Tabs by mouth every day., Disp: , Rfl:   •  olmesartan-hydrochlorothiazide (BENICAR HCT) 20-12.5 MG per tablet, Take 1 Tab by mouth every day., Disp: 100 Tab, Rfl: 3  •  cyclobenzaprine (FLEXERIL) 10 MG Tab, Take 1 Tab by mouth 2 times a day as needed for Muscle Spasms., Disp: 180 Tab, Rfl: 0  •  atorvastatin (LIPITOR) 40 MG Tab, Take 1 Tab by mouth every day., Disp: 100 Tab, Rfl: 1    Allergies, past medical history, past surgical history, family history, social history reviewed and updated    Objective:     Vitals: /78   Pulse 74   Temp 37 °C (98.6 °F)   Resp 16   Ht 1.88 m (6' 2\")   Wt 122.5 kg (270 lb)   SpO2 93%   BMI 34.67 kg/m²   General: Alert, pleasant, NAD  HEENT: Normocephalic.  Heart: Regular rate and rhythm, occasional extra beats.  S1 and S2 normal.  No murmurs appreciated.  Respiratory: Normal respiratory effort.  Clear to auscultation bilaterally.  Abdomen: Non-distended, soft  Skin: Warm, dry, no rashes.  Musculoskeletal: Gait is normal.  Moves all extremities well.  Extremities: 2+ pitting edema bilaterally up to his mid shins  Psych:  Affect/mood is normal, judgement is good, memory is intact, " grooming is appropriate.    Assessment/Plan:     Sina was seen today for leg swelling.    Diagnoses and all orders for this visit:    Leg swelling  New problem.  We will check an ultrasound to rule out any DVTs.  His last echocardiogram in February was overall normal.  Recent labs unremarkable, TSH normal in January.  Suspect this may be due to his amlodipine dose at 10 mg.  We discussed decreasing it to 5 mg daily.  He will return in 1 week for a blood pressure check, will consider increasing his hydrochlorothiazide if needed.  We also discussed wearing compression socks  -     US-EXTREMITY VENOUS LOWER BILAT; Future    Essential hypertension  Stable, but will decrease his amlodipine to 5 mg daily.  Follow-up in a week        Return in about 1 week (around 6/2/2020) for Med check.

## 2020-05-27 ENCOUNTER — HOSPITAL ENCOUNTER (OUTPATIENT)
Dept: RADIOLOGY | Facility: MEDICAL CENTER | Age: 75
End: 2020-05-27
Attending: FAMILY MEDICINE
Payer: MEDICARE

## 2020-05-27 DIAGNOSIS — M79.89 LEG SWELLING: ICD-10-CM

## 2020-05-27 PROCEDURE — 93970 EXTREMITY STUDY: CPT

## 2020-06-01 RX ORDER — TESTOSTERONE CYPIONATE 200 MG/ML
INJECTION, SOLUTION INTRAMUSCULAR
Refills: 2 | OUTPATIENT
Start: 2020-06-01

## 2020-07-02 DIAGNOSIS — I10 ESSENTIAL HYPERTENSION: ICD-10-CM

## 2020-07-02 RX ORDER — AMLODIPINE BESYLATE 5 MG/1
5 TABLET ORAL DAILY
Qty: 90 TAB | Refills: 1 | Status: SHIPPED | OUTPATIENT
Start: 2020-07-02 | End: 2020-12-04

## 2020-07-08 DIAGNOSIS — R07.2 PRECORDIAL PAIN: ICD-10-CM

## 2020-07-08 RX ORDER — OLMESARTAN MEDOXOMIL AND HYDROCHLOROTHIAZIDE 20/12.5 20; 12.5 MG/1; MG/1
1 TABLET ORAL DAILY
Qty: 100 TAB | Refills: 3 | Status: SHIPPED
Start: 2020-07-08 | End: 2020-10-08

## 2020-08-05 DIAGNOSIS — E78.2 MIXED HYPERLIPIDEMIA: ICD-10-CM

## 2020-08-05 RX ORDER — ATORVASTATIN CALCIUM 40 MG/1
40 TABLET, FILM COATED ORAL DAILY
Qty: 100 TAB | Refills: 3 | Status: SHIPPED | OUTPATIENT
Start: 2020-08-05 | End: 2020-12-04 | Stop reason: SDUPTHER

## 2020-08-13 PROCEDURE — RXMED WILLOW AMBULATORY MEDICATION CHARGE

## 2020-08-18 ENCOUNTER — PHARMACY VISIT (OUTPATIENT)
Dept: PHARMACY | Facility: MEDICAL CENTER | Age: 75
End: 2020-08-18
Payer: COMMERCIAL

## 2020-09-04 ENCOUNTER — PHARMACY VISIT (OUTPATIENT)
Dept: PHARMACY | Facility: MEDICAL CENTER | Age: 75
End: 2020-09-04
Payer: COMMERCIAL

## 2020-09-04 PROCEDURE — RXMED WILLOW AMBULATORY MEDICATION CHARGE

## 2020-09-18 ENCOUNTER — PHARMACY VISIT (OUTPATIENT)
Dept: PHARMACY | Facility: MEDICAL CENTER | Age: 75
End: 2020-09-18
Payer: COMMERCIAL

## 2020-09-18 PROCEDURE — RXMED WILLOW AMBULATORY MEDICATION CHARGE

## 2020-10-02 ENCOUNTER — PHARMACY VISIT (OUTPATIENT)
Dept: PHARMACY | Facility: MEDICAL CENTER | Age: 75
End: 2020-10-02
Payer: COMMERCIAL

## 2020-10-02 PROCEDURE — RXMED WILLOW AMBULATORY MEDICATION CHARGE

## 2020-10-05 ENCOUNTER — NURSE TRIAGE (OUTPATIENT)
Dept: HEALTH INFORMATION MANAGEMENT | Facility: OTHER | Age: 75
End: 2020-10-05

## 2020-10-05 NOTE — TELEPHONE ENCOUNTER
Regarding: NEXT COURSE OF ACTION  ----- Message from Vania Rm sent at 10/5/2020  9:15 AM PDT -----  LEGS SWELLING FROM MEDICATION. NEXT COURSE OF ACTION

## 2020-10-05 NOTE — TELEPHONE ENCOUNTER
1. Caller Name: Sina Oliver                       Call Back Number: [phone] (home)   [facility] PCP or Specialty Provider: Yes         2.  In the last two weeks, has the patient had any new or worsening symptoms (not explained by alternative diagnosis)? No.    3.  Does patient have any comoribidities? None     4.  Has the patient traveled in the last 14 days OR had any known contact with someone who is suspected or confirmed to have COVID-19?  No.    5. Disposition: Cleared by RN Triage as potential is low for COVID-19; OK to keep/schedule appointment    Note routed to [facility] Provider: [facility] only.

## 2020-10-05 NOTE — TELEPHONE ENCOUNTER
"  Reason for Disposition  • Patient wants to be seen    Additional Information  • Negative: Sounds like a life-threatening emergency to the triager  • Negative: Chest pain  • Negative: Small area of swelling and followed an insect bite to the area  • Negative: Followed a knee injury  • Negative: Ankle or foot injury  • Negative: Pregnant with leg swelling or edema  • Negative: Difficulty breathing at rest  • Negative: Entire foot is cool or blue in comparison to other side  • Negative: SEVERE swelling (e.g., swelling extends above knee, entire leg is swollen, weeping fluid)  • Negative: Thigh or calf pain and only 1 side and present > 1 hour  • Negative: Thigh, calf, or ankle swelling in only one leg  • Negative: Thigh, calf, or ankle swelling in both legs, but one side is definitely more swollen  • Negative: Cast on leg or ankle and has increasing pain  • Negative: Can't walk or can barely stand (new onset)  • Negative: Fever and red area (or area very tender to touch)  • Negative: Patient sounds very sick or weak to the triager  • Negative: Swelling of face, arm or hands (Exception: slight puffiness of fingers during hot weather)  • Negative: Pregnant > 20 weeks and sudden weight gain (i.e., > 2 lbs, 1 kg in one week)  • Negative: MODERATE swelling of both ankles (e.g., swelling extends up to the knees) AND new onset or worsening  • Negative: Difficulty breathing with exertion AND worsening or new onset  • Negative: Looks like a boil, infected sore, deep ulcer, or other infected rash (spreading redness, pus)    Answer Assessment - Initial Assessment Questions  1. ONSET: \"When did the swelling start?\" (e.g., minutes, hours, days)      Five months ago  2. LOCATION: \"What part of the leg is swollen?\"  \"Are both legs swollen or just one leg?\"      Ankle to knee  3. SEVERITY: \"How bad is the swelling?\" (e.g., localized; mild, moderate, severe)   - Localized - small area of swelling localized to one leg   - MILD pedal " "edema - swelling limited to foot and ankle, pitting edema < 1/4 inch (6 mm) deep, rest and elevation eliminate most or all swelling   - MODERATE edema - swelling of lower leg to knee, pitting edema > 1/4 inch (6 mm) deep, rest and elevation only partially reduce swelling   - SEVERE edema - swelling extends above knee, facial or hand swelling present       moderate  4. REDNESS: \"Does the swelling look red or infected?\"      none  5. PAIN: \"Is the swelling painful to touch?\" If so, ask: \"How painful is it?\"   (Scale 1-10; mild, moderate or severe)      none  6. FEVER: \"Do you have a fever?\" If so, ask: \"What is it, how was it measured, and when did it start?\"       none  7. CAUSE: \"What do you think is causing the leg swelling?\"      unknown  8. MEDICAL HISTORY: \"Do you have a history of heart failure, kidney disease, liver failure, or cancer?\"      none  9. RECURRENT SYMPTOM: \"Have you had leg swelling before?\" If so, ask: \"When was the last time?\" \"What happened that time?\"      Yes at night, then swell during day  10. OTHER SYMPTOMS: \"Do you have any other symptoms?\" (e.g., chest pain, difficulty breathing)        Difficulty breathing due to weight gain  11. PREGNANCY: \"Is there any chance you are pregnant?\" \"When was your last menstrual period?\"        n/a    Protocols used: LEG SWELLING AND EDEMA-A-OH    "

## 2020-10-08 ENCOUNTER — OFFICE VISIT (OUTPATIENT)
Dept: MEDICAL GROUP | Facility: MEDICAL CENTER | Age: 75
End: 2020-10-08
Payer: MEDICARE

## 2020-10-08 VITALS
SYSTOLIC BLOOD PRESSURE: 148 MMHG | HEIGHT: 74 IN | HEART RATE: 64 BPM | RESPIRATION RATE: 16 BRPM | WEIGHT: 265 LBS | OXYGEN SATURATION: 94 % | TEMPERATURE: 97.6 F | BODY MASS INDEX: 34.01 KG/M2 | DIASTOLIC BLOOD PRESSURE: 82 MMHG

## 2020-10-08 DIAGNOSIS — M79.89 LEG SWELLING: ICD-10-CM

## 2020-10-08 DIAGNOSIS — E66.9 OBESITY (BMI 30-39.9): ICD-10-CM

## 2020-10-08 DIAGNOSIS — E78.2 MIXED HYPERLIPIDEMIA: ICD-10-CM

## 2020-10-08 DIAGNOSIS — Z23 NEED FOR VACCINATION: ICD-10-CM

## 2020-10-08 DIAGNOSIS — Z12.5 ENCOUNTER FOR SCREENING FOR MALIGNANT NEOPLASM OF PROSTATE: ICD-10-CM

## 2020-10-08 DIAGNOSIS — R79.89 LOW TESTOSTERONE LEVEL IN MALE: ICD-10-CM

## 2020-10-08 DIAGNOSIS — I10 ESSENTIAL HYPERTENSION: ICD-10-CM

## 2020-10-08 DIAGNOSIS — E04.1 THYROID NODULE: ICD-10-CM

## 2020-10-08 PROBLEM — D64.9 ANEMIA: Status: RESOLVED | Noted: 2020-03-02 | Resolved: 2020-10-08

## 2020-10-08 PROCEDURE — G0009 ADMIN PNEUMOCOCCAL VACCINE: HCPCS | Performed by: FAMILY MEDICINE

## 2020-10-08 PROCEDURE — 90732 PPSV23 VACC 2 YRS+ SUBQ/IM: CPT | Performed by: FAMILY MEDICINE

## 2020-10-08 PROCEDURE — 99214 OFFICE O/P EST MOD 30 MIN: CPT | Mod: 25 | Performed by: FAMILY MEDICINE

## 2020-10-08 RX ORDER — OLMESARTAN MEDOXOMIL 20 MG/1
20 TABLET ORAL DAILY
Qty: 100 TAB | Refills: 1 | Status: ON HOLD
Start: 2020-10-08 | End: 2020-11-09

## 2020-10-08 RX ORDER — HYDROCHLOROTHIAZIDE 25 MG/1
25 TABLET ORAL DAILY
Qty: 100 TAB | Refills: 1 | Status: ON HOLD
Start: 2020-10-08 | End: 2020-11-09

## 2020-10-08 ASSESSMENT — FIBROSIS 4 INDEX: FIB4 SCORE: 1.66

## 2020-10-08 NOTE — PROGRESS NOTES
Annual Health Assessment Questions:    1.  Are you currently engaging in any exercise or physical activity? No    2.  How would you describe your mood or emotional well-being today? good    3.  Have you had any falls in the last year? No    4.  Have you noticed any problems with your balance or had difficulty walking? Yes    5.  In the last six months have you experienced any leakage of urine? No    6. DPA/Advanced Directive: Patient has no Advanced Directive on file.

## 2020-10-08 NOTE — PROGRESS NOTES
"cc: Leg swelling    Subjective:     Sina Oliver is a 75 y.o. male presenting to discuss leg swelling.  He noticed that his leg started swelling 6 weeks ago.  Has been stable.  He stopped all his medications as he thought that they may be causing it, it did not help.  Denies any chest pain, shortness of breath, lightheadedness, dizziness, leg pain.       Review of systems:  See above.       Current Outpatient Medications:   •  olmesartan (BENICAR) 20 MG Tab, Take 1 Tab by mouth every day., Disp: 100 Tab, Rfl: 1  •  hydroCHLOROthiazide (HYDRODIURIL) 25 MG Tab, Take 1 Tab by mouth every day., Disp: 100 Tab, Rfl: 1  •  testosterone cypionate (DEPO-TESTOSTERONE) 200 MG/ML Solution injection, 1 mL by Intramuscular route every 14 days., Disp: 1 mL, Rfl: 5  •  atorvastatin (LIPITOR) 40 MG Tab, Take 1 Tab by mouth every day., Disp: 100 Tab, Rfl: 3  •  amLODIPine (NORVASC) 5 MG Tab, Take 1 Tab by mouth every day., Disp: 90 Tab, Rfl: 1  •  tamsulosin (FLOMAX) 0.4 MG capsule, , Disp: , Rfl:   •  cyclobenzaprine (FLEXERIL) 10 MG Tab, Take 1 Tab by mouth 2 times a day as needed for Muscle Spasms., Disp: 180 Tab, Rfl: 0    Allergies, past medical history, past surgical history, family history, social history reviewed and updated    Objective:     Vitals: /82   Pulse 64   Temp 36.4 °C (97.6 °F)   Resp 16   Ht 1.88 m (6' 2\")   Wt 120.2 kg (265 lb)   SpO2 94%   BMI 34.02 kg/m²   General: Alert, pleasant, NAD  HEENT: Normocephalic.  EOMI, no icterus or pallor.  Conjunctivae and lids normal. External ears normal. Oropharynx non-erythematous, mucous membranes moist.  Neck supple.  No thyromegaly or masses palpated. No cervical or supraclavicular lymphadenopathy.  Heart: Regular rate and rhythm.  S1 and S2 normal.  No murmurs appreciated.  Respiratory: Normal respiratory effort.  Clear to auscultation bilaterally.  Abdomen: Non-distended, soft  Skin: Warm, dry, no rashes.  Musculoskeletal: Gait is normal.  Moves " all extremities well.  Extremities: 2+ pitting edema bilaterally up to his knees  Psych:  Affect/mood is normal, judgement is good, memory is intact, grooming is appropriate.    Assessment/Plan:     Sina was seen today for follow-up.    Diagnoses and all orders for this visit:    Leg swelling  Recurrent problem, will increase his hydrochlorothiazide.  Recommended that he restart his regular medications.  Follow-up in a week, will recheck a CMP  -     Comp Metabolic Panel; Future    Essential hypertension  Uncontrolled, likely because he is not taking his medications.  We will stop his olmesartan-hydrochlorothiazide and prescribe olmesartan 20 mg and hydrochlorothiazide 25 mg daily.  Continue with amlodipine 5 mg daily.  Follow-up in a week  -     CBC WITHOUT DIFFERENTIAL; Future  -     Comp Metabolic Panel; Future  -     olmesartan (BENICAR) 20 MG Tab; Take 1 Tab by mouth every day.  -     hydroCHLOROthiazide (HYDRODIURIL) 25 MG Tab; Take 1 Tab by mouth every day.    Mixed hyperlipidemia  Stable, continue atorvastatin  -     Lipid Profile; Future    Thyroid nodule  Stable, continue to monitor  -     US-THYROID; Future  -     TSH; Future    Low testosterone level in male R  Stable, continue to monitor  -     TESTOSTERONE SERUM; Future    Encounter for screening for malignant neoplasm of prostate  -     PROSTATE SPECIFIC AG SCREENING; Future    Obesity (BMI 30-39.9)  -     Patient identified as having weight management issue.  Appropriate orders and counseling given.    Need for vaccination  -     Pneumococal Polysaccharide Vaccine 23-Valent =>1YO SQ/IM      Return in about 1 week (around 10/15/2020) for routine follow up, (long, 40min appt).      Annual Health Assessment Questions:     1.  Are you currently engaging in any exercise or physical activity? No     2.  How would you describe your mood or emotional well-being today? good     3.  Have you had any falls in the last year? No     4.  Have you noticed any  problems with your balance or had difficulty walking? Yes     5.  In the last six months have you experienced any leakage of urine? No     6. DPA/Advanced Directive: Patient has no Advanced Directive on file.

## 2020-10-12 ENCOUNTER — HOSPITAL ENCOUNTER (OUTPATIENT)
Dept: RADIOLOGY | Facility: MEDICAL CENTER | Age: 75
End: 2020-10-12
Attending: FAMILY MEDICINE
Payer: MEDICARE

## 2020-10-12 DIAGNOSIS — E04.1 THYROID NODULE: ICD-10-CM

## 2020-10-12 PROCEDURE — 76536 US EXAM OF HEAD AND NECK: CPT

## 2020-10-13 DIAGNOSIS — E04.1 THYROID NODULE: ICD-10-CM

## 2020-10-17 ENCOUNTER — HOSPITAL ENCOUNTER (OUTPATIENT)
Dept: LAB | Facility: MEDICAL CENTER | Age: 75
End: 2020-10-17
Attending: FAMILY MEDICINE
Payer: MEDICARE

## 2020-10-17 DIAGNOSIS — I10 ESSENTIAL HYPERTENSION: ICD-10-CM

## 2020-10-17 DIAGNOSIS — E78.2 MIXED HYPERLIPIDEMIA: ICD-10-CM

## 2020-10-17 DIAGNOSIS — E04.1 THYROID NODULE: ICD-10-CM

## 2020-10-17 DIAGNOSIS — Z12.5 ENCOUNTER FOR SCREENING FOR MALIGNANT NEOPLASM OF PROSTATE: ICD-10-CM

## 2020-10-17 DIAGNOSIS — R79.89 LOW TESTOSTERONE LEVEL IN MALE: ICD-10-CM

## 2020-10-17 DIAGNOSIS — M79.89 LEG SWELLING: ICD-10-CM

## 2020-10-17 LAB
ALBUMIN SERPL BCP-MCNC: 4.4 G/DL (ref 3.2–4.9)
ALBUMIN/GLOB SERPL: 1.6 G/DL
ALP SERPL-CCNC: 83 U/L (ref 30–99)
ALT SERPL-CCNC: 49 U/L (ref 2–50)
ANION GAP SERPL CALC-SCNC: 11 MMOL/L (ref 7–16)
AST SERPL-CCNC: 31 U/L (ref 12–45)
BILIRUB SERPL-MCNC: 1.4 MG/DL (ref 0.1–1.5)
BUN SERPL-MCNC: 15 MG/DL (ref 8–22)
CALCIUM SERPL-MCNC: 9.2 MG/DL (ref 8.5–10.5)
CHLORIDE SERPL-SCNC: 104 MMOL/L (ref 96–112)
CHOLEST SERPL-MCNC: 166 MG/DL (ref 100–199)
CO2 SERPL-SCNC: 23 MMOL/L (ref 20–33)
CREAT SERPL-MCNC: 0.78 MG/DL (ref 0.5–1.4)
ERYTHROCYTE [DISTWIDTH] IN BLOOD BY AUTOMATED COUNT: 51.2 FL (ref 35.9–50)
FASTING STATUS PATIENT QL REPORTED: NORMAL
GLOBULIN SER CALC-MCNC: 2.7 G/DL (ref 1.9–3.5)
GLUCOSE SERPL-MCNC: 116 MG/DL (ref 65–99)
HCT VFR BLD AUTO: 51.4 % (ref 42–52)
HDLC SERPL-MCNC: 38 MG/DL
HGB BLD-MCNC: 16.7 G/DL (ref 14–18)
LDLC SERPL CALC-MCNC: 115 MG/DL
MCH RBC QN AUTO: 33.1 PG (ref 27–33)
MCHC RBC AUTO-ENTMCNC: 32.5 G/DL (ref 33.7–35.3)
MCV RBC AUTO: 102 FL (ref 81.4–97.8)
PLATELET # BLD AUTO: 259 K/UL (ref 164–446)
PMV BLD AUTO: 11.1 FL (ref 9–12.9)
POTASSIUM SERPL-SCNC: 4.1 MMOL/L (ref 3.6–5.5)
PROT SERPL-MCNC: 7.1 G/DL (ref 6–8.2)
PSA SERPL-MCNC: 6.44 NG/ML (ref 0–4)
RBC # BLD AUTO: 5.04 M/UL (ref 4.7–6.1)
SODIUM SERPL-SCNC: 138 MMOL/L (ref 135–145)
TESTOST SERPL-MCNC: 820 NG/DL (ref 175–781)
TRIGL SERPL-MCNC: 67 MG/DL (ref 0–149)
TSH SERPL DL<=0.005 MIU/L-ACNC: 1.39 UIU/ML (ref 0.38–5.33)
WBC # BLD AUTO: 8 K/UL (ref 4.8–10.8)

## 2020-10-17 PROCEDURE — 84443 ASSAY THYROID STIM HORMONE: CPT

## 2020-10-17 PROCEDURE — 80061 LIPID PANEL: CPT

## 2020-10-17 PROCEDURE — 80053 COMPREHEN METABOLIC PANEL: CPT

## 2020-10-17 PROCEDURE — 85027 COMPLETE CBC AUTOMATED: CPT

## 2020-10-17 PROCEDURE — 36415 COLL VENOUS BLD VENIPUNCTURE: CPT

## 2020-10-17 PROCEDURE — 84153 ASSAY OF PSA TOTAL: CPT

## 2020-10-17 PROCEDURE — 84403 ASSAY OF TOTAL TESTOSTERONE: CPT

## 2020-10-19 ENCOUNTER — OFFICE VISIT (OUTPATIENT)
Dept: HEMATOLOGY ONCOLOGY | Facility: MEDICAL CENTER | Age: 75
End: 2020-10-19
Payer: MEDICARE

## 2020-10-19 VITALS
TEMPERATURE: 98.3 F | RESPIRATION RATE: 16 BRPM | OXYGEN SATURATION: 94 % | HEART RATE: 77 BPM | WEIGHT: 270.17 LBS | HEIGHT: 73 IN | BODY MASS INDEX: 35.81 KG/M2 | SYSTOLIC BLOOD PRESSURE: 138 MMHG | DIASTOLIC BLOOD PRESSURE: 82 MMHG

## 2020-10-19 DIAGNOSIS — E04.2 MULTIPLE THYROID NODULES: ICD-10-CM

## 2020-10-19 DIAGNOSIS — R73.01 IMPAIRED FASTING GLUCOSE: ICD-10-CM

## 2020-10-19 PROCEDURE — 99204 OFFICE O/P NEW MOD 45 MIN: CPT | Performed by: NURSE PRACTITIONER

## 2020-10-19 ASSESSMENT — ENCOUNTER SYMPTOMS
DIAPHORESIS: 1
BACK PAIN: 0
NAUSEA: 0
SORE THROAT: 0
CHILLS: 0
COUGH: 0
CONSTIPATION: 0
PALPITATIONS: 0
VOMITING: 0
DIZZINESS: 0
WHEEZING: 0
DIARRHEA: 0
HEADACHES: 0
WEIGHT LOSS: 0
FEVER: 0
INSOMNIA: 0
SHORTNESS OF BREATH: 1

## 2020-10-19 ASSESSMENT — FIBROSIS 4 INDEX: FIB4 SCORE: 1.32

## 2020-10-19 NOTE — PROGRESS NOTES
Subjective:      Sina Oliver is a 75 y.o. male who presents as a New Patient (NP/ SCP/ Thyroid nodule/ Salvatore).          HPI    Patient referred to me, Intake Oncology Coordinator by his PCP Dr. Chase for thyroid nodules.  Patient is unaccompanied for today's visit.    Patient with a history of thyroid nodules, being monitored by his PCP.  He was sent for follow-up ultrasound of the thyroid for continued monitoring which he completed on 10/13/2020.  Patient noted to have 3 nodules.  #1-left upper noted a moderately suspicious TR 4 category solid, hypoechoic 1.24 x 0.2 x 0.8 cm nodule.  #2-left lower moderately suspicious TR 4 category mixed, hypoechoic, 6.28 x 4.74 x 3.68 cm in size.  #3-right upper moderately suspicious TR 4 category solid, hypoechoic, 2.32 x 2.10 x 2.09 cm in size.      Patient had a previous US of the neck in 2018.  Previously in 2018 he had a mid right thyroid lobe nodule measuring 2.7 x 2.1 x 2.2 cm in size and a second heterogeneous nodule with coarse calcifications measuring 2.1 x 1.9 x 2.1 cm in size.  Patient also with a mid left thyroid nodule measuring 5.4 x 3.2 x 5.5 cm in size.  I suspect nodule #2 in the left lower lobe that now measures 6.28 x 4.74 x 3.68 cm in size was the previous nodule that measured 5.4 x 3.2 x 5.5 cm.  Therefore he has had increase in size of these thyroid nodules.  I did personally review the imaging reports from the recent one on 10/13/2020 as well as the one previously in 2018.    Patient denies any significant changes to his current clinical status.  He does have an occasional night sweat which is chronic.  Main complaint at this time is increased fatigue which she has noticed more so recently.  He denies fevers chills, weight loss, sore throat or difficulty swallowing.  He does have chronic sinus congestion and does experience some shortness of breath which he attributes to being overweight.  He denies any coughing or wheezing, chest pain  palpitations, nausea, vomiting, diarrhea or constipation.  He denies any generalized pain, rashes or itching, dizziness or headaches or any dysuria or hematuria.    Please see past medical and surgical history below.    Patient denies a family history of cancer.    Patient is a never smoker.    Family History   Problem Relation Age of Onset   • Hypertension Mother    • Diabetes Mother    • Cancer Neg Hx      Social History     Socioeconomic History   • Marital status:      Spouse name: Not on file   • Number of children: Not on file   • Years of education: Not on file   • Highest education level: Not on file   Occupational History   • Not on file   Social Needs   • Financial resource strain: Not on file   • Food insecurity     Worry: Not on file     Inability: Not on file   • Transportation needs     Medical: Not on file     Non-medical: Not on file   Tobacco Use   • Smoking status: Never Smoker   • Smokeless tobacco: Never Used   Substance and Sexual Activity   • Alcohol use: Yes     Frequency: Never     Binge frequency: Never     Comment: 6 per year    1-30-20 4/year   • Drug use: No   • Sexual activity: Yes     Partners: Female   Lifestyle   • Physical activity     Days per week: Not on file     Minutes per session: Not on file   • Stress: Not on file   Relationships   • Social connections     Talks on phone: Not on file     Gets together: Not on file     Attends [employer] witness service: Not on file     Active member of club or organization: Not on file     Attends meetings of clubs or organizations: Not on file     Relationship status: Not on file   • Intimate partner violence     Fear of current or ex partner: Not on file     Emotionally abused: Not on file     Physically abused: Not on file     Forced sexual activity: Not on file   Other Topics Concern   • Not on file   Social History Narrative           Review of Systems   Constitutional: Positive for diaphoresis (occasionally which is chronic) and  "malaise/fatigue (more recently noted). Negative for chills, fever and weight loss.   HENT: Positive for congestion (sinus - chronic). Negative for sore throat.         Denies difficulty swallowing   Respiratory: Positive for shortness of breath (attributes to over-weight). Negative for cough and wheezing.    Cardiovascular: Negative for chest pain and palpitations.   Gastrointestinal: Negative for constipation, diarrhea, nausea and vomiting.   Genitourinary: Negative for dysuria.   Musculoskeletal: Negative for back pain and joint pain.   Skin: Negative for itching and rash.   Neurological: Negative for dizziness and headaches.   Psychiatric/Behavioral: The patient does not have insomnia.           Objective:     /82   Pulse 77   Temp 36.8 °C (98.3 °F) (Temporal)   Resp 16   Ht 1.855 m (6' 1.03\")   Wt 122.6 kg (270 lb 2.8 oz)   SpO2 94%   BMI 35.61 kg/m²      Physical Exam  Vitals signs reviewed.   Constitutional:       General: He is not in acute distress.     Appearance: Normal appearance. He is well-developed. He is obese. He is not diaphoretic.   HENT:      Head: Normocephalic and atraumatic.      Mouth/Throat:      Mouth: Mucous membranes are moist.      Pharynx: Oropharynx is clear. No oropharyngeal exudate.   Eyes:      General: No scleral icterus.        Right eye: No discharge.         Left eye: No discharge.      Conjunctiva/sclera: Conjunctivae normal.      Pupils: Pupils are equal, round, and reactive to light.   Neck:      Musculoskeletal: Normal range of motion and neck supple. No muscular tenderness.      Thyroid: No thyromegaly.   Cardiovascular:      Rate and Rhythm: Normal rate and regular rhythm.      Pulses: Normal pulses.      Heart sounds: Normal heart sounds. No murmur. No friction rub. No gallop.    Pulmonary:      Effort: Pulmonary effort is normal. No respiratory distress.      Breath sounds: No wheezing.      Comments: Diminished throughout  Abdominal:      General: Bowel " sounds are normal. There is no distension.      Palpations: Abdomen is soft.      Tenderness: There is no abdominal tenderness.   Musculoskeletal: Normal range of motion.         General: No tenderness.      Right lower leg: Edema present.      Left lower leg: Edema present.      Comments: No pitting noted   Lymphadenopathy:      Head:      Right side of head: No submental, submandibular, tonsillar, preauricular, posterior auricular or occipital adenopathy.      Left side of head: No submental, submandibular, tonsillar, preauricular, posterior auricular or occipital adenopathy.      Cervical: No cervical adenopathy.      Right cervical: No superficial, deep or posterior cervical adenopathy.     Left cervical: No superficial, deep or posterior cervical adenopathy.      Upper Body:      Right upper body: No supraclavicular adenopathy.      Left upper body: No supraclavicular adenopathy.   Skin:     General: Skin is warm and dry.      Coloration: Skin is not pale.      Findings: No erythema or rash.   Neurological:      Mental Status: He is alert and oriented to person, place, and time.   Psychiatric:         Mood and Affect: Mood normal.         Behavior: Behavior normal.       Us-thyroid    Result Date: 10/13/2020  10/12/2020 3:37 PM HISTORY/REASON FOR EXAM:  Follow-up thyroid nodules TECHNIQUE/EXAM DESCRIPTION: Ultrasound of the soft tissues of the head and neck. COMPARISON:  Thyroid ultrasound 1/29/2019 FINDINGS: The thyroid gland is heterogeneous. Vascularity is normal. The right lobe of the thyroid gland measures 2.85 cm x 5.05 cm x 2.81 cm. The left lobe of the thyroid gland measures 4.05 cm x 7.36 cm x 3.03 cm. The isthmus measures 1.13 cm. Nodules >= 1cm: less than 5 Nodule #1 Location:  Left  upper Size:  1.24 x 0.82 x 0.88 cm Composition:  Solid-2 Echogenicity:  Hypoechoic-2 Shape:  Wider than tall-0 Margins:  Smooth-0 Echogenic Foci:  None-0 ACR TIRADS points/category:  4 - TR4 - Moderately Suspicious  Nodule #2 Location:  Left  lower Size:  6.28 x 4.74 x 3.68 cm Composition:  Mixed-1 Echogenicity:  Hypoechoic-2 Shape:  Wider than tall-0 Margins:  Smooth-0 Echogenic Foci:  Macroscopic-1 ACR TIRADS points/category:  4 - TR4 - Moderately Suspicious Nodule #3 Location:  Right  upper Size:  2.32 x 2.10 x 2.09 cm Composition:  Solid-2 Echogenicity:  Hypoechoic-2 Shape:  Wider than tall-0 Margins:  Smooth-0 Echogenic Foci:  Macroscopic-1 ACR TIRADS points/category:  5 - TR4 - Moderately Suspicious     1.  There are bilateral moderately suspicious thyroid nodules. ACR TI-RADS Recommendations TR4 - FNA recommended of the largest right and largest left thyroid nodule. Recommendations based on the American College of Radiology Thyroid imaging, reporting and Data System (TI-RADS) 2017. INTERPRETING LOCATION:  [facility] [place], [id number]         Assessment/Plan:        1. Multiple thyroid nodules  US-FNA BIOPSY W/ US GUIDANCE       Patient with 3 moderately suspicious thyroid nodules, one in the left upper and left lower, as one in the right upper lobe of the thyroid.  Patient does have a history of thyroid nodules and approximately 2 years ago it does appear that there is a left thyroid nodule that is likely increasing in size.  Due to the moderate suspicion I have asked that bilateral FNA biopsy of the 2 largest nodules, #2 in the left lower and #3 in the right upper be biopsied.  I discussed with the patient and he is in agreement with the plan.    I will have patient follow-up with me in the clinic to review the results and discuss further plan of care at that time.      Please note that this dictation was created using voice recognition software. I have made every reasonable attempt to correct obvious errors, but I expect that there are errors of grammar and possibly content that I did not discover before finalizing the note.

## 2020-10-21 ENCOUNTER — PHARMACY VISIT (OUTPATIENT)
Dept: PHARMACY | Facility: MEDICAL CENTER | Age: 75
End: 2020-10-21
Payer: COMMERCIAL

## 2020-10-21 PROCEDURE — RXMED WILLOW AMBULATORY MEDICATION CHARGE

## 2020-10-21 NOTE — PROGRESS NOTES
Wife called stating that member has been waiting for his Olmesartan and hydrochlorothiazide for almost a week and hasn't received yet. I called to member's pharmacy  [facility] at [phone], spoke with Tri and I was told that they were only waiting for member's ok, to ship the medications. So, he will receive it between 2-3 business day.   Wife was informed and has no more request at this time

## 2020-10-27 ENCOUNTER — HOSPITAL ENCOUNTER (OUTPATIENT)
Dept: RADIOLOGY | Facility: MEDICAL CENTER | Age: 75
End: 2020-10-27
Attending: NURSE PRACTITIONER
Payer: MEDICARE

## 2020-10-27 DIAGNOSIS — E04.2 MULTIPLE THYROID NODULES: ICD-10-CM

## 2020-10-27 LAB — CYTOLOGY REG CYTOL: NORMAL

## 2020-10-27 PROCEDURE — 88112 CYTOPATH CELL ENHANCE TECH: CPT | Mod: 91

## 2020-10-27 PROCEDURE — 10006 FNA BX W/US GDN EA ADDL: CPT

## 2020-10-29 ENCOUNTER — OFFICE VISIT (OUTPATIENT)
Dept: HEMATOLOGY ONCOLOGY | Facility: MEDICAL CENTER | Age: 75
End: 2020-10-29
Payer: MEDICARE

## 2020-10-29 VITALS
BODY MASS INDEX: 35.84 KG/M2 | TEMPERATURE: 98.3 F | WEIGHT: 270.39 LBS | HEIGHT: 73 IN | DIASTOLIC BLOOD PRESSURE: 78 MMHG | RESPIRATION RATE: 14 BRPM | SYSTOLIC BLOOD PRESSURE: 142 MMHG | HEART RATE: 82 BPM | OXYGEN SATURATION: 93 %

## 2020-10-29 DIAGNOSIS — R89.9 ABNORMAL THYROID BIOPSY: ICD-10-CM

## 2020-10-29 DIAGNOSIS — E04.2 MULTIPLE THYROID NODULES: ICD-10-CM

## 2020-10-29 PROCEDURE — 99213 OFFICE O/P EST LOW 20 MIN: CPT | Performed by: NURSE PRACTITIONER

## 2020-10-29 ASSESSMENT — ENCOUNTER SYMPTOMS
WHEEZING: 0
PALPITATIONS: 0
COUGH: 0
SORE THROAT: 0
SHORTNESS OF BREATH: 0

## 2020-10-29 ASSESSMENT — FIBROSIS 4 INDEX: FIB4 SCORE: 1.32

## 2020-10-29 NOTE — PROGRESS NOTES
Subjective:      Sina Oliver is a 75 y.o. male who presents for thyroid nodule Results.        HPI    Patient seen today in follow-up for thyroid nodule biopsy results.  He presents unaccompanied for today's visit.    Patient with a history of thyroid nodules currently being monitored with a repeat ultrasound of the thyroid which completed on 10/13/2020. Patient noted to have 3 nodules.  #1-left upper noted a moderately suspicious TR 4 category solid, hypoechoic 1.24 x 0.2 x 0.8 cm nodule.  #2-left lower moderately suspicious TR 4 category mixed, hypoechoic, 6.28 x 4.74 x 3.68 cm in size.  #3-right upper moderately suspicious TR 4 category solid, hypoechoic, 2.32 x 2.10 x 2.09 cm in size.  Based on the size of these nodules I did proceed with a biopsy of bilateral thyroid nodules which patient is here to review the results today.    Largest left thyroid nodule was found to be benign, [place] category 2.  Right thyroid nodule did come back positive with [place] category 5, suspicious for malignancy, papillary thyroid carcinoma, based on cytology.    I personally reviewed the results of the biopsy with the patient today in detail and discuss further plan of care.  Patient stated that he tolerated the biopsy well with no significant side effects.    Allergies   Allergen Reactions   • Azithromycin      nausea   • Coumadin [Warfarin]    • Gabapentin      Pt does not want too many side effects   • Ibuprofen    • Nsaids      bleeding   • Other Misc      Bee and wasp cause swelling and difficulty breathing   • Penicillins Anaphylaxis   • Plavix [Clopidogrel]    • Pseudoephedrine      Locked up bladder   • Sulfa Drugs      rash   • Xarelto [Rivaroxaban]      Current Outpatient Medications on File Prior to Visit   Medication Sig Dispense Refill   • olmesartan (BENICAR) 20 MG Tab Take 1 Tab by mouth every day. 100 Tab 1   • testosterone cypionate (DEPO-TESTOSTERONE) 200 MG/ML Solution injection 1 mL by  "Intramuscular route every 14 days. 1 mL 5   • amLODIPine (NORVASC) 5 MG Tab Take 1 Tab by mouth every day. 90 Tab 1   • tamsulosin (FLOMAX) 0.4 MG capsule      • cyclobenzaprine (FLEXERIL) 10 MG Tab Take 1 Tab by mouth 2 times a day as needed for Muscle Spasms. 180 Tab 0   • hydroCHLOROthiazide (HYDRODIURIL) 25 MG Tab Take 1 Tab by mouth every day. (Patient not taking: Reported on 10/19/2020) 100 Tab 1   • atorvastatin (LIPITOR) 40 MG Tab Take 1 Tab by mouth every day. (Patient not taking: Reported on 10/19/2020) 100 Tab 3     No current facility-administered medications on file prior to visit.        Review of Systems   HENT: Negative for sore throat.    Respiratory: Negative for cough, shortness of breath and wheezing.    Cardiovascular: Negative for chest pain and palpitations.          Objective:     /78   Pulse 82   Temp 36.8 °C (98.3 °F) (Temporal)   Resp 14   Ht 1.855 m (6' 1.03\")   Wt 122.7 kg (270 lb 6.3 oz)   SpO2 93%   BMI 35.64 kg/m²      Physical Exam  Vitals signs reviewed.   Constitutional:       General: He is not in acute distress.     Appearance: Normal appearance. He is not diaphoretic.   Neck:      Comments: Mild bruising noted from biopsy  Cardiovascular:      Rate and Rhythm: Regular rhythm.   Pulmonary:      Effort: Pulmonary effort is normal. No respiratory distress.      Breath sounds: Normal breath sounds. No wheezing.   Neurological:      Mental Status: He is alert.       FINAL DIAGNOSIS:     A. Right thyroid, fine needle aspiration:          Suspicious for malignancy ([place] category V), papillary           thyroid carcinoma.   B. Left thyroid, fine needle aspiration:          Benign ([place] category II).          [place]-appearing follicular cells consistent with benign thyroid           nodule.     Comment: Lesions diagnosed as [place] category II carry a 0-3% implied   risk of malignancy and those diagnosed as [place] category V carry a   60-75% implied risk of " malignancy. This case has been reviewed in   conjunction with Dr. Lopez, who agrees with the above diagnoses.        Assessment/Plan:     1. Multiple thyroid nodules  CT-SOFT TISSUE NECK WITH    REFERRAL TO GENERAL SURGERY   2. Abnormal thyroid biopsy  CT-SOFT TISSUE NECK WITH    REFERRAL TO GENERAL SURGERY       Plan  1.  Patient with multiple thyroid nodules that have been present for quite some time.  Patient with 2 left thyroid nodules and one right thyroid nodule on last U/S.  He is status post biopsy of the largest left nodule and the right sided nodule.  Left nodule is benign, however the right-sided thyroid nodule did come back highly suspicious for malignancy, papillary thyroid carcinoma, [place] category 5, based on cytology. Based on these findings I will go ahead and proceed with referral to thyroid surgeon, Dr. Mendoza with [facility].  I did discuss the case in detail with Dr. Mendoza and she is requesting a CT of the neck for complete evaluation.  CT has been ordered and referral has been placed.    There is no further follow-up with [facility] as we will defer all further management of thyroid nodules to Dr. Mendoza.

## 2020-11-04 ENCOUNTER — HOSPITAL ENCOUNTER (OUTPATIENT)
Dept: RADIOLOGY | Facility: MEDICAL CENTER | Age: 75
DRG: 291 | End: 2020-11-04
Attending: NURSE PRACTITIONER
Payer: MEDICARE

## 2020-11-04 DIAGNOSIS — R89.9 ABNORMAL THYROID BIOPSY: ICD-10-CM

## 2020-11-04 DIAGNOSIS — E04.2 MULTIPLE THYROID NODULES: ICD-10-CM

## 2020-11-04 PROCEDURE — 700117 HCHG RX CONTRAST REV CODE 255: Performed by: NURSE PRACTITIONER

## 2020-11-04 PROCEDURE — 70491 CT SOFT TISSUE NECK W/DYE: CPT

## 2020-11-04 RX ADMIN — IOHEXOL 80 ML: 350 INJECTION, SOLUTION INTRAVENOUS at 14:30

## 2020-11-06 ENCOUNTER — APPOINTMENT (OUTPATIENT)
Dept: RADIOLOGY | Facility: MEDICAL CENTER | Age: 75
DRG: 291 | End: 2020-11-06
Attending: EMERGENCY MEDICINE
Payer: MEDICARE

## 2020-11-06 ENCOUNTER — TELEPHONE (OUTPATIENT)
Dept: HEALTH INFORMATION MANAGEMENT | Facility: OTHER | Age: 75
End: 2020-11-06

## 2020-11-06 ENCOUNTER — HOSPITAL ENCOUNTER (INPATIENT)
Facility: MEDICAL CENTER | Age: 75
LOS: 2 days | DRG: 291 | End: 2020-11-09
Attending: EMERGENCY MEDICINE | Admitting: STUDENT IN AN ORGANIZED HEALTH CARE EDUCATION/TRAINING PROGRAM
Payer: MEDICARE

## 2020-11-06 DIAGNOSIS — R60.9 PERIPHERAL EDEMA: ICD-10-CM

## 2020-11-06 DIAGNOSIS — R79.89 ELEVATED TROPONIN: ICD-10-CM

## 2020-11-06 DIAGNOSIS — I50.9 ACUTE CONGESTIVE HEART FAILURE, UNSPECIFIED HEART FAILURE TYPE (HCC): ICD-10-CM

## 2020-11-06 DIAGNOSIS — R06.02 SHORTNESS OF BREATH: ICD-10-CM

## 2020-11-06 LAB
ALBUMIN SERPL BCP-MCNC: 4.3 G/DL (ref 3.2–4.9)
ALBUMIN/GLOB SERPL: 1.6 G/DL
ALP SERPL-CCNC: 97 U/L (ref 30–99)
ALT SERPL-CCNC: 62 U/L (ref 2–50)
ANION GAP SERPL CALC-SCNC: 13 MMOL/L (ref 7–16)
APTT PPP: 28.8 SEC (ref 24.7–36)
AST SERPL-CCNC: 43 U/L (ref 12–45)
BASOPHILS # BLD AUTO: 0.4 % (ref 0–1.8)
BASOPHILS # BLD: 0.04 K/UL (ref 0–0.12)
BILIRUB SERPL-MCNC: 1.4 MG/DL (ref 0.1–1.5)
BUN SERPL-MCNC: 13 MG/DL (ref 8–22)
CALCIUM SERPL-MCNC: 9.3 MG/DL (ref 8.5–10.5)
CHLORIDE SERPL-SCNC: 102 MMOL/L (ref 96–112)
CO2 SERPL-SCNC: 22 MMOL/L (ref 20–33)
CREAT SERPL-MCNC: 0.76 MG/DL (ref 0.5–1.4)
EOSINOPHIL # BLD AUTO: 0.07 K/UL (ref 0–0.51)
EOSINOPHIL NFR BLD: 0.7 % (ref 0–6.9)
ERYTHROCYTE [DISTWIDTH] IN BLOOD BY AUTOMATED COUNT: 51 FL (ref 35.9–50)
GLOBULIN SER CALC-MCNC: 2.7 G/DL (ref 1.9–3.5)
GLUCOSE SERPL-MCNC: 115 MG/DL (ref 65–99)
HCT VFR BLD AUTO: 52.8 % (ref 42–52)
HGB BLD-MCNC: 17.3 G/DL (ref 14–18)
IMM GRANULOCYTES # BLD AUTO: 0.04 K/UL (ref 0–0.11)
IMM GRANULOCYTES NFR BLD AUTO: 0.4 % (ref 0–0.9)
INR PPP: 1.11 (ref 0.87–1.13)
LYMPHOCYTES # BLD AUTO: 1.09 K/UL (ref 1–4.8)
LYMPHOCYTES NFR BLD: 11.3 % (ref 22–41)
MCH RBC QN AUTO: 33.7 PG (ref 27–33)
MCHC RBC AUTO-ENTMCNC: 32.8 G/DL (ref 33.7–35.3)
MCV RBC AUTO: 102.9 FL (ref 81.4–97.8)
MONOCYTES # BLD AUTO: 0.96 K/UL (ref 0–0.85)
MONOCYTES NFR BLD AUTO: 9.9 % (ref 0–13.4)
NEUTROPHILS # BLD AUTO: 7.46 K/UL (ref 1.82–7.42)
NEUTROPHILS NFR BLD: 77.3 % (ref 44–72)
NRBC # BLD AUTO: 0 K/UL
NRBC BLD-RTO: 0 /100 WBC
NT-PROBNP SERPL IA-MCNC: 1167 PG/ML (ref 0–125)
PLATELET # BLD AUTO: 198 K/UL (ref 164–446)
PMV BLD AUTO: 10.8 FL (ref 9–12.9)
POTASSIUM SERPL-SCNC: 4.2 MMOL/L (ref 3.6–5.5)
PROT SERPL-MCNC: 7 G/DL (ref 6–8.2)
PROTHROMBIN TIME: 14.6 SEC (ref 12–14.6)
RBC # BLD AUTO: 5.13 M/UL (ref 4.7–6.1)
SODIUM SERPL-SCNC: 137 MMOL/L (ref 135–145)
TROPONIN T SERPL-MCNC: 36 NG/L (ref 6–19)
WBC # BLD AUTO: 9.7 K/UL (ref 4.8–10.8)

## 2020-11-06 PROCEDURE — 94760 N-INVAS EAR/PLS OXIMETRY 1: CPT

## 2020-11-06 PROCEDURE — 84484 ASSAY OF TROPONIN QUANT: CPT

## 2020-11-06 PROCEDURE — 83880 ASSAY OF NATRIURETIC PEPTIDE: CPT

## 2020-11-06 PROCEDURE — 93005 ELECTROCARDIOGRAM TRACING: CPT

## 2020-11-06 PROCEDURE — 96374 THER/PROPH/DIAG INJ IV PUSH: CPT

## 2020-11-06 PROCEDURE — 71045 X-RAY EXAM CHEST 1 VIEW: CPT

## 2020-11-06 PROCEDURE — 36415 COLL VENOUS BLD VENIPUNCTURE: CPT

## 2020-11-06 PROCEDURE — 99285 EMERGENCY DEPT VISIT HI MDM: CPT

## 2020-11-06 PROCEDURE — 80053 COMPREHEN METABOLIC PANEL: CPT

## 2020-11-06 PROCEDURE — 85025 COMPLETE CBC W/AUTO DIFF WBC: CPT

## 2020-11-06 PROCEDURE — 85610 PROTHROMBIN TIME: CPT

## 2020-11-06 PROCEDURE — 85730 THROMBOPLASTIN TIME PARTIAL: CPT

## 2020-11-06 PROCEDURE — 93005 ELECTROCARDIOGRAM TRACING: CPT | Performed by: EMERGENCY MEDICINE

## 2020-11-06 PROCEDURE — 700111 HCHG RX REV CODE 636 W/ 250 OVERRIDE (IP): Performed by: EMERGENCY MEDICINE

## 2020-11-06 RX ORDER — METOPROLOL TARTRATE 1 MG/ML
5 INJECTION, SOLUTION INTRAVENOUS ONCE
Status: DISPENSED | OUTPATIENT
Start: 2020-11-06 | End: 2020-11-07

## 2020-11-06 RX ORDER — FUROSEMIDE 10 MG/ML
40 INJECTION INTRAMUSCULAR; INTRAVENOUS ONCE
Status: COMPLETED | OUTPATIENT
Start: 2020-11-06 | End: 2020-11-06

## 2020-11-06 RX ADMIN — FUROSEMIDE 40 MG: 10 INJECTION, SOLUTION INTRAMUSCULAR; INTRAVENOUS at 22:58

## 2020-11-06 SDOH — HEALTH STABILITY: MENTAL HEALTH: HOW OFTEN DO YOU HAVE A DRINK CONTAINING ALCOHOL?: NOT ASKED

## 2020-11-06 SDOH — HEALTH STABILITY: MENTAL HEALTH: HOW OFTEN DO YOU HAVE 6 OR MORE DRINKS ON ONE OCCASION?: NOT ASKED

## 2020-11-06 ASSESSMENT — FIBROSIS 4 INDEX: FIB4 SCORE: 1.32

## 2020-11-06 NOTE — TELEPHONE ENCOUNTER
Patient called and is experiencing the following systems, Leg swelling Fatigue shallow breathing.  Is there a way you can get him in today 11/6 or would you recommend him going to UC or ER?   Please let me know!

## 2020-11-07 ENCOUNTER — APPOINTMENT (OUTPATIENT)
Dept: CARDIOLOGY | Facility: MEDICAL CENTER | Age: 75
DRG: 291 | End: 2020-11-07
Attending: STUDENT IN AN ORGANIZED HEALTH CARE EDUCATION/TRAINING PROGRAM
Payer: MEDICARE

## 2020-11-07 PROBLEM — I48.91 A-FIB (HCC): Status: ACTIVE | Noted: 2020-11-07

## 2020-11-07 PROBLEM — I50.9 CHF (CONGESTIVE HEART FAILURE) (HCC): Status: ACTIVE | Noted: 2020-11-07

## 2020-11-07 PROBLEM — E78.5 HYPERLIPIDEMIA: Status: ACTIVE | Noted: 2018-05-25

## 2020-11-07 LAB
COVID ORDER STATUS COVID19: NORMAL
EKG IMPRESSION: NORMAL
EST. AVERAGE GLUCOSE BLD GHB EST-MCNC: 120 MG/DL
HBA1C MFR BLD: 5.8 % (ref 0–5.6)
LV EJECT FRACT  99904: 50
LV EJECT FRACT MOD 2C 99903: 43.46
LV EJECT FRACT MOD 4C 99902: 50.14
LV EJECT FRACT MOD BP 99901: 61.52
MAGNESIUM SERPL-MCNC: 2.1 MG/DL (ref 1.5–2.5)
SARS-COV-2 RNA RESP QL NAA+PROBE: NOTDETECTED
SPECIMEN SOURCE: NORMAL
TROPONIN T SERPL-MCNC: 34 NG/L (ref 6–19)
TROPONIN T SERPL-MCNC: 36 NG/L (ref 6–19)
TSH SERPL DL<=0.005 MIU/L-ACNC: 1.23 UIU/ML (ref 0.38–5.33)

## 2020-11-07 PROCEDURE — 99223 1ST HOSP IP/OBS HIGH 75: CPT | Performed by: STUDENT IN AN ORGANIZED HEALTH CARE EDUCATION/TRAINING PROGRAM

## 2020-11-07 PROCEDURE — U0003 INFECTIOUS AGENT DETECTION BY NUCLEIC ACID (DNA OR RNA); SEVERE ACUTE RESPIRATORY SYNDROME CORONAVIRUS 2 (SARS-COV-2) (CORONAVIRUS DISEASE [COVID-19]), AMPLIFIED PROBE TECHNIQUE, MAKING USE OF HIGH THROUGHPUT TECHNOLOGIES AS DESCRIBED BY CMS-2020-01-R: HCPCS

## 2020-11-07 PROCEDURE — 96375 TX/PRO/DX INJ NEW DRUG ADDON: CPT

## 2020-11-07 PROCEDURE — 83036 HEMOGLOBIN GLYCOSYLATED A1C: CPT

## 2020-11-07 PROCEDURE — 700102 HCHG RX REV CODE 250 W/ 637 OVERRIDE(OP): Performed by: STUDENT IN AN ORGANIZED HEALTH CARE EDUCATION/TRAINING PROGRAM

## 2020-11-07 PROCEDURE — 83735 ASSAY OF MAGNESIUM: CPT

## 2020-11-07 PROCEDURE — 36415 COLL VENOUS BLD VENIPUNCTURE: CPT

## 2020-11-07 PROCEDURE — 770020 HCHG ROOM/CARE - TELE (206)

## 2020-11-07 PROCEDURE — A9270 NON-COVERED ITEM OR SERVICE: HCPCS | Performed by: STUDENT IN AN ORGANIZED HEALTH CARE EDUCATION/TRAINING PROGRAM

## 2020-11-07 PROCEDURE — 93306 TTE W/DOPPLER COMPLETE: CPT

## 2020-11-07 PROCEDURE — 700111 HCHG RX REV CODE 636 W/ 250 OVERRIDE (IP): Performed by: STUDENT IN AN ORGANIZED HEALTH CARE EDUCATION/TRAINING PROGRAM

## 2020-11-07 PROCEDURE — 84443 ASSAY THYROID STIM HORMONE: CPT

## 2020-11-07 PROCEDURE — 99205 OFFICE O/P NEW HI 60 MIN: CPT | Performed by: INTERNAL MEDICINE

## 2020-11-07 PROCEDURE — 84484 ASSAY OF TROPONIN QUANT: CPT

## 2020-11-07 PROCEDURE — C9803 HOPD COVID-19 SPEC COLLECT: HCPCS | Performed by: STUDENT IN AN ORGANIZED HEALTH CARE EDUCATION/TRAINING PROGRAM

## 2020-11-07 PROCEDURE — 93306 TTE W/DOPPLER COMPLETE: CPT | Mod: 26 | Performed by: INTERNAL MEDICINE

## 2020-11-07 RX ORDER — LABETALOL HYDROCHLORIDE 5 MG/ML
10 INJECTION, SOLUTION INTRAVENOUS EVERY 4 HOURS PRN
Status: DISCONTINUED | OUTPATIENT
Start: 2020-11-07 | End: 2020-11-09 | Stop reason: HOSPADM

## 2020-11-07 RX ORDER — ONDANSETRON 2 MG/ML
4 INJECTION INTRAMUSCULAR; INTRAVENOUS EVERY 4 HOURS PRN
Status: DISCONTINUED | OUTPATIENT
Start: 2020-11-07 | End: 2020-11-09 | Stop reason: HOSPADM

## 2020-11-07 RX ORDER — ONDANSETRON 4 MG/1
4 TABLET, ORALLY DISINTEGRATING ORAL EVERY 4 HOURS PRN
Status: DISCONTINUED | OUTPATIENT
Start: 2020-11-07 | End: 2020-11-09 | Stop reason: HOSPADM

## 2020-11-07 RX ORDER — ACETAMINOPHEN 325 MG/1
650 TABLET ORAL EVERY 6 HOURS PRN
Status: DISCONTINUED | OUTPATIENT
Start: 2020-11-07 | End: 2020-11-09 | Stop reason: HOSPADM

## 2020-11-07 RX ORDER — AMLODIPINE BESYLATE 5 MG/1
5 TABLET ORAL DAILY
Status: DISCONTINUED | OUTPATIENT
Start: 2020-11-07 | End: 2020-11-09 | Stop reason: HOSPADM

## 2020-11-07 RX ORDER — POLYETHYLENE GLYCOL 3350 17 G/17G
1 POWDER, FOR SOLUTION ORAL
Status: DISCONTINUED | OUTPATIENT
Start: 2020-11-07 | End: 2020-11-09 | Stop reason: HOSPADM

## 2020-11-07 RX ORDER — ATORVASTATIN CALCIUM 40 MG/1
40 TABLET, FILM COATED ORAL DAILY
Status: DISCONTINUED | OUTPATIENT
Start: 2020-11-07 | End: 2020-11-09 | Stop reason: HOSPADM

## 2020-11-07 RX ORDER — FUROSEMIDE 10 MG/ML
40 INJECTION INTRAMUSCULAR; INTRAVENOUS
Status: COMPLETED | OUTPATIENT
Start: 2020-11-07 | End: 2020-11-07

## 2020-11-07 RX ORDER — AMOXICILLIN 250 MG
2 CAPSULE ORAL 2 TIMES DAILY
Status: DISCONTINUED | OUTPATIENT
Start: 2020-11-07 | End: 2020-11-09 | Stop reason: HOSPADM

## 2020-11-07 RX ORDER — OLMESARTAN MEDOXOMIL 20 MG/1
20 TABLET ORAL DAILY
Status: DISCONTINUED | OUTPATIENT
Start: 2020-11-07 | End: 2020-11-08

## 2020-11-07 RX ORDER — BISACODYL 10 MG
10 SUPPOSITORY, RECTAL RECTAL
Status: DISCONTINUED | OUTPATIENT
Start: 2020-11-07 | End: 2020-11-09 | Stop reason: HOSPADM

## 2020-11-07 RX ORDER — HYDROCHLOROTHIAZIDE 25 MG/1
25 TABLET ORAL DAILY
Status: DISCONTINUED | OUTPATIENT
Start: 2020-11-07 | End: 2020-11-07

## 2020-11-07 RX ADMIN — APIXABAN 5 MG: 5 TABLET, FILM COATED ORAL at 16:24

## 2020-11-07 RX ADMIN — ATORVASTATIN CALCIUM 40 MG: 40 TABLET, FILM COATED ORAL at 06:07

## 2020-11-07 RX ADMIN — OLMESARTAN MEDOXOMIL 20 MG: 20 TABLET, FILM COATED ORAL at 06:07

## 2020-11-07 RX ADMIN — AMLODIPINE BESYLATE 5 MG: 5 TABLET ORAL at 06:07

## 2020-11-07 RX ADMIN — APIXABAN 5 MG: 5 TABLET, FILM COATED ORAL at 06:07

## 2020-11-07 RX ADMIN — FUROSEMIDE 40 MG: 10 INJECTION, SOLUTION INTRAMUSCULAR; INTRAVENOUS at 06:07

## 2020-11-07 RX ADMIN — FUROSEMIDE 40 MG: 10 INJECTION, SOLUTION INTRAMUSCULAR; INTRAVENOUS at 16:24

## 2020-11-07 ASSESSMENT — ENCOUNTER SYMPTOMS
TINGLING: 0
EYE DISCHARGE: 0
DIARRHEA: 0
DEPRESSION: 0
HEARTBURN: 0
NAUSEA: 0
SHORTNESS OF BREATH: 0
CONSTIPATION: 0
FOCAL WEAKNESS: 0
EYE REDNESS: 0
INSOMNIA: 0
ABDOMINAL PAIN: 0
SORE THROAT: 0
NERVOUS/ANXIOUS: 0
SENSORY CHANGE: 0
BACK PAIN: 0
VOMITING: 0
WEIGHT LOSS: 0
COUGH: 0
FALLS: 0
HEADACHES: 0
WHEEZING: 0
PALPITATIONS: 0
HALLUCINATIONS: 0
FEVER: 0
CHILLS: 0
DIZZINESS: 0
SHORTNESS OF BREATH: 1

## 2020-11-07 ASSESSMENT — LIFESTYLE VARIABLES
HAVE YOU EVER FELT YOU SHOULD CUT DOWN ON YOUR DRINKING: NO
ALCOHOL_USE: NO
HAVE PEOPLE ANNOYED YOU BY CRITICIZING YOUR DRINKING: NO
EVER HAD A DRINK FIRST THING IN THE MORNING TO STEADY YOUR NERVES TO GET RID OF A HANGOVER: NO
DOES PATIENT WANT TO STOP DRINKING: NO
CONSUMPTION TOTAL: NEGATIVE
TOTAL SCORE: 0
ON A TYPICAL DAY WHEN YOU DRINK ALCOHOL HOW MANY DRINKS DO YOU HAVE: 0
TOTAL SCORE: 0
AVERAGE NUMBER OF DAYS PER WEEK YOU HAVE A DRINK CONTAINING ALCOHOL: 0
HOW MANY TIMES IN THE PAST YEAR HAVE YOU HAD 5 OR MORE DRINKS IN A DAY: 0
SUBSTANCE_ABUSE: 0
TOTAL SCORE: 0
EVER FELT BAD OR GUILTY ABOUT YOUR DRINKING: NO

## 2020-11-07 ASSESSMENT — COGNITIVE AND FUNCTIONAL STATUS - GENERAL
STANDING UP FROM CHAIR USING ARMS: A LITTLE
SUGGESTED CMS G CODE MODIFIER MOBILITY: CK
MOBILITY SCORE: 19
DAILY ACTIVITIY SCORE: 24
MOVING TO AND FROM BED TO CHAIR: A LITTLE
SUGGESTED CMS G CODE MODIFIER DAILY ACTIVITY: CH
MOVING FROM LYING ON BACK TO SITTING ON SIDE OF FLAT BED: A LITTLE
TURNING FROM BACK TO SIDE WHILE IN FLAT BAD: A LITTLE
CLIMB 3 TO 5 STEPS WITH RAILING: A LITTLE

## 2020-11-07 ASSESSMENT — CHA2DS2 SCORE
HYPERTENSION: YES
AGE 65 TO 74: NO
CHA2DS2 VASC SCORE: 4
VASCULAR DISEASE: NO
DIABETES: NO
SEX: MALE
AGE 75 OR GREATER: YES
PRIOR STROKE OR TIA OR THROMBOEMBOLISM: NO
CHF OR LEFT VENTRICULAR DYSFUNCTION: YES

## 2020-11-07 ASSESSMENT — PATIENT HEALTH QUESTIONNAIRE - PHQ9
2. FEELING DOWN, DEPRESSED, IRRITABLE, OR HOPELESS: NOT AT ALL
SUM OF ALL RESPONSES TO PHQ9 QUESTIONS 1 AND 2: 0
1. LITTLE INTEREST OR PLEASURE IN DOING THINGS: NOT AT ALL

## 2020-11-07 ASSESSMENT — FIBROSIS 4 INDEX: FIB4 SCORE: 2.12

## 2020-11-07 NOTE — PROGRESS NOTES
[facility] TRIAGE OFFICER ER REPORT  Consult/Admission requested by: Dr Lundberg  Chief complaint: SOB, leg swelling  Pertinent history/ER Course: 74 yo male with past medical history of hypertension, BPH, presents with complaints of shortness of breath, leg swelling, poorly controlled hypertension, mildly elevated troponin, elevated BNP at 1100, presumed new onset CHF.  A. fib on EKG, unclear onset.  Code Status: Fullcode per ERP, I personally verified with the ERP patient's code status and the ERP has confirmed this with the patient.   Patient meets admission criterion: Yes..  Recommendations given or work up & consultations requested per triage officer: Echo, lasix, BP control  Consultants involved and pertinent input from consultants:   Admission status: Observation.   Admission order placed: Yes.   Floor requested: tele  Assigned hospitalist: Dr Hansen

## 2020-11-07 NOTE — ED NOTES
Pt rounded on by RN. Pt reports symptoms are the same, no changes to initial triage. Vitals obtained and charted by tech. Blood drawn and tubed to lab.   Pt placed back in hallway, educated to notify staff of changes.

## 2020-11-07 NOTE — ED TRIAGE NOTES
".  Chief Complaint   Patient presents with   • Shortness of Breath   • Leg Swelling     Onset X approximately 2 months   • Cancer     \" cancerous nodules in thyroid. \"    Pt sent by PMD for further evaluation.  Pt reports difficulty breathing \" for more than 8 weeks, breathing hard just going up the stairs, even ordinary activities. \"  No chest pain.  + 2 pitting BLE leg edema noted.  Pt also reports \" slight nausea no matter what I eat. \"  Pt has follow up appointment for thyroid surgery next week.      Pt educated on the triage process.  Pt was instructed to notify staff for any worsening symptoms.  Pt denies any recent travel, denies exposure to COVID positive individuals.  Pt also denies flu like symptoms at this time.  Mask in place.     "

## 2020-11-07 NOTE — PROGRESS NOTES
Pt picked up from ED. Report received by RN. Wife just left once transported to tele 7. Assumed patient care. Patient's plan of care reviewed. Patient sitting up in bed, A&Ox 4. On 4l NC (in ED 2L, increased o2 for increased sob upon exertion. VSS. Educated patient on using call light to call for needs. No signs of distress, declines pain at this time. All needs met. Safety precautions in place. Tele box on. Monitor room notified. Bed in lowest/locked position. Bed alarm off. Pt is up self, steady, and calls appropriately. Call light and personal belongings within reach. Will continue to monitor.

## 2020-11-07 NOTE — PROGRESS NOTES
Received Bedside report. This pt is AOx4, self ambulates, voiding, denies pain at this time. Tele box in place. Pt is on 4 L of O2 via NC. Call light in place, fall precautions in place, patient educated on importance of calling for assistance. No additional needs at this time. Awaiting COVID results. A. Fib on the monitor. Discussed plan of care with MD.

## 2020-11-07 NOTE — PROGRESS NOTES
[facility] Medicine Daily Progress Note    Date of Service  11/7/2020    Chief Complaint  75 y.o. male admitted 11/6/2020 with shortness of breath and leg swelling    Hospital Course  75 y.o. male with a past medical history of hypertension, hyperlipidemia, BPH, thyroid cancer pending surgery who presented 11/6/2020 to the emergency Department for evaluation of shortness of breath and leg swelling.  In the ED patient is seen to have A. fib on EKG with unclear onset.  Chest x-ray showing cardiomegaly hazy infiltrates at lungs. Patient is given 40IV lasix for his new onset CHF and patient is admitted to the hospitalist service for further management.    Heart rate is within normal range vital signs stable, CHADS-VASc score 4.  Patient was started with Eliquis 5 mg twice daily on admission.  Echo was done to evaluate his heart function.  Bilateral lower leg ultrasound ordered to rule out DVT.  Consulted cardiology for needs of catheter.      Interval Problem Update  Patient was seen bedside, lying flat on the bed, states his shortness of breath is getting better; which started about 2 months ago.  His leg swelling is still there and this is the first time he was diagnosed with CHF.  He had a history of hypertension, not taking, much medication.    Surgery scheduled for has thyroid cancer  Consultants/Specialty  None    Code Status  Full Code    Disposition  Likely home    Review of Systems  Review of Systems   Constitutional: Negative for chills, fever, malaise/fatigue and weight loss.   HENT: Negative for congestion and sore throat.    Eyes: Negative for discharge and redness.   Respiratory: Negative for cough, shortness of breath (For 2 months) and wheezing.    Cardiovascular: Negative for chest pain, palpitations and leg swelling.   Gastrointestinal: Negative for abdominal pain, constipation, diarrhea, heartburn, nausea and vomiting.   Genitourinary: Negative for dysuria, frequency and urgency.   Musculoskeletal:  Negative for back pain, falls and joint pain.        Bilateral leg edema for about 90 days   Skin: Negative for itching and rash.   Neurological: Negative for dizziness, tingling, sensory change, focal weakness and headaches.   Psychiatric/Behavioral: Negative for depression, hallucinations and substance abuse. The patient is not nervous/anxious and does not have insomnia.    All other systems reviewed and are negative.       Physical Exam  Temp:  [36.1 °C (97 °F)-36.4 °C (97.6 °F)] 36.1 °C (97 °F)  Pulse:  [74-95] 74  Resp:  [13-19] 17  BP: (129-170)/() 129/80  SpO2:  [89 %-96 %] 91 %    Physical Exam  Vitals signs and nursing note reviewed.   Constitutional:       General: He is not in acute distress.     Appearance: Normal appearance.      Comments: Lying flat on the bed with one pillow   HENT:      Head: Normocephalic and atraumatic.      Nose: Nose normal. No congestion or rhinorrhea.      Mouth/Throat:      Pharynx: Oropharynx is clear. No posterior oropharyngeal erythema.   Eyes:      Extraocular Movements: Extraocular movements intact.      Conjunctiva/sclera: Conjunctivae normal.      Pupils: Pupils are equal, round, and reactive to light.   Neck:      Musculoskeletal: Normal range of motion and neck supple.      Vascular: No carotid bruit.   Cardiovascular:      Rate and Rhythm: Normal rate and regular rhythm.      Pulses: Normal pulses.      Heart sounds: Normal heart sounds.   Pulmonary:      Effort: Pulmonary effort is normal. No respiratory distress.      Breath sounds: Normal breath sounds. No wheezing or rales.   Chest:      Chest wall: No tenderness.   Abdominal:      General: Abdomen is flat. Bowel sounds are normal. There is no distension.      Palpations: Abdomen is soft.      Tenderness: There is no abdominal tenderness. There is no guarding or rebound.   Musculoskeletal: Normal range of motion.         General: Swelling (Bilateral lower leg up to knee) present.      Left lower leg: No  edema.   Skin:     General: Skin is warm.   Neurological:      General: No focal deficit present.      Mental Status: He is alert and oriented to person, place, and time.      Cranial Nerves: No cranial nerve deficit.      Motor: No weakness.      Gait: Gait normal.      Deep Tendon Reflexes: Reflexes normal.   Psychiatric:         Mood and Affect: Mood normal.         Behavior: Behavior normal.         Judgment: Judgment normal.         Fluids    Intake/Output Summary (Last 24 hours) at 11/7/2020 1242  Last data filed at 11/7/2020 0833  Gross per 24 hour   Intake --   Output 1875 ml   Net -1875 ml       Laboratory  Recent Labs     11/06/20 2020   WBC 9.7   RBC 5.13   HEMOGLOBIN 17.3   HEMATOCRIT 52.8*   .9*   MCH 33.7*   MCHC 32.8*   RDW 51.0*   PLATELETCT 198   MPV 10.8     Recent Labs     11/06/20 2020   SODIUM 137   POTASSIUM 4.2   CHLORIDE 102   CO2 22   GLUCOSE 115*   BUN 13   CREATININE 0.76   CALCIUM 9.3     Recent Labs     11/06/20 2020   APTT 28.8   INR 1.11               Imaging  DX-CHEST-PORTABLE (1 VIEW)   Final Result         1.  Hazy bibasilar opacities suggests infiltrates.   2.  Cardiomegaly      US-EXTREMITY VENOUS UPPER BILAT    (Results Pending)   EC-ECHOCARDIOGRAM COMPLETE W/O CONT    (Results Pending)        Assessment/Plan  * CHF (congestive heart failure) (HCC)- (present on admission)  Assessment & Plan  New onset CHF  BNP 1167  Shortness of breath, Covid ruled out  Hazy infiltrates on chest x-ray  Echo report pending  Lasix IV 40mg twice daily given  cardiology consult on board  Ultrasound bilateral lower leg to rule out DVT.  Report pending    A-fib (HCC)- (present on admission)  Assessment & Plan  Unclear onset   Vitals stable  Chadsvasc score >4mod. High risk  Eliquis 5 bid started  Echo report pending  TSH ordered  cardiology consult Dr. Wesley will see patient    HTN (hypertension)  Assessment & Plan  Amlodipine 5 mg, losartan 20 mg, furosemide 40 mg IV twice daily  EKG showing  left ventricular hypertrophy  Chest x-ray showing cardiomegaly  Echo was done to evaluate his heart function.          Thyroid nodule- (present on admission)  Assessment & Plan  Surgery scheduled    Hyperlipidemia  Assessment & Plan  Atorvastatin 40 mg       VTE prophylaxis: On Eliquis
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
## 2020-11-07 NOTE — ED NOTES
Report given to tele RN. All questions answered. Patient transported up to floor via ACLs RN on zoll. All belongings sent to floor with patient. Family educated regarding visitation policy.

## 2020-11-07 NOTE — ASSESSMENT & PLAN NOTE
New onset CHF  BNP 1167  Shortness of breath, Covid ruled out  Hazy infiltrates on chest x-ray  Echo report see above  Lasix IV 40mg daily given  cardiology consult appreciated  Ultrasound bilateral lower leg to ruled out DVT  Cardioversion planning

## 2020-11-07 NOTE — PROGRESS NOTES
2 RN skin check completed with JAYLIN Tomlin Devices in place: , nasal cannula, IV, ID band, socks.  Skin assessed under devices all devices.  Confirmed pressure ulcers found on none.  New potential pressure ulcers notes on none.  The following interventions in place repositioned, educated pt, provided extra blanket and pillow.    All bony prominences, occiput, scapulae, elbows, sacrum, coccyx, ischium, spinous processes, heels, ankles, condyles have been assessed. Skin is red, flushed, warm. Turgor is age appropriate. No petechiae, purpura, lesions or excoriations noted. No open incisions or breakdown noted. Noted distended/firm abd. 3+ BLE, right big toe has toe nail that is on the underside of toe.

## 2020-11-07 NOTE — ASSESSMENT & PLAN NOTE
Unclear onset   Vitals stable  Chadsvasc score >4mod. High risk  Eliquis 5 bid started  Echo report: Prior echocardiogram 2/25/2020, patient is now in atrial fibrillation; and EF is slightly depressed.Mild concentric left ventricular hypertrophy.Low-normal left ventricular systolic function with [id number] variability in atrial fibrillation.Left ventricular ejection fraction is visually estimated to be 50%.Reduced right ventricular systolic function.    TSH normal  Planning for cardioversion after thyroid cancer surgery

## 2020-11-07 NOTE — ASSESSMENT & PLAN NOTE
Amlodipine 5 mg,olmesartan 40 mg, metoprolol 25mg bid ,furosemide 40 mg IV daily  EKG showing left ventricular hypertrophy  Chest x-ray showing cardiomegaly  Echo was done to evaluate his heart function.
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
## 2020-11-08 ENCOUNTER — APPOINTMENT (OUTPATIENT)
Dept: RADIOLOGY | Facility: MEDICAL CENTER | Age: 75
DRG: 291 | End: 2020-11-08
Attending: STUDENT IN AN ORGANIZED HEALTH CARE EDUCATION/TRAINING PROGRAM
Payer: MEDICARE

## 2020-11-08 PROBLEM — R73.03 PRE-DIABETES: Status: ACTIVE | Noted: 2020-11-08

## 2020-11-08 PROBLEM — E11.9 DM (DIABETES MELLITUS) (HCC): Status: ACTIVE | Noted: 2020-11-08

## 2020-11-08 LAB
ALBUMIN SERPL BCP-MCNC: 4 G/DL (ref 3.2–4.9)
ALBUMIN/GLOB SERPL: 1.7 G/DL
ALP SERPL-CCNC: 89 U/L (ref 30–99)
ALT SERPL-CCNC: 46 U/L (ref 2–50)
ANION GAP SERPL CALC-SCNC: 9 MMOL/L (ref 7–16)
AST SERPL-CCNC: 30 U/L (ref 12–45)
BASOPHILS # BLD AUTO: 0.3 % (ref 0–1.8)
BASOPHILS # BLD: 0.03 K/UL (ref 0–0.12)
BILIRUB SERPL-MCNC: 1.4 MG/DL (ref 0.1–1.5)
BUN SERPL-MCNC: 14 MG/DL (ref 8–22)
CALCIUM SERPL-MCNC: 9 MG/DL (ref 8.5–10.5)
CHLORIDE SERPL-SCNC: 100 MMOL/L (ref 96–112)
CHOLEST SERPL-MCNC: 141 MG/DL (ref 100–199)
CO2 SERPL-SCNC: 28 MMOL/L (ref 20–33)
CREAT SERPL-MCNC: 0.8 MG/DL (ref 0.5–1.4)
EOSINOPHIL # BLD AUTO: 0.17 K/UL (ref 0–0.51)
EOSINOPHIL NFR BLD: 1.8 % (ref 0–6.9)
ERYTHROCYTE [DISTWIDTH] IN BLOOD BY AUTOMATED COUNT: 49.8 FL (ref 35.9–50)
GLOBULIN SER CALC-MCNC: 2.4 G/DL (ref 1.9–3.5)
GLUCOSE SERPL-MCNC: 100 MG/DL (ref 65–99)
HCT VFR BLD AUTO: 49.7 % (ref 42–52)
HDLC SERPL-MCNC: 31 MG/DL
HGB BLD-MCNC: 16.3 G/DL (ref 14–18)
IMM GRANULOCYTES # BLD AUTO: 0.06 K/UL (ref 0–0.11)
IMM GRANULOCYTES NFR BLD AUTO: 0.6 % (ref 0–0.9)
LDLC SERPL CALC-MCNC: 94 MG/DL
LYMPHOCYTES # BLD AUTO: 1.04 K/UL (ref 1–4.8)
LYMPHOCYTES NFR BLD: 10.9 % (ref 22–41)
MAGNESIUM SERPL-MCNC: 2.1 MG/DL (ref 1.5–2.5)
MCH RBC QN AUTO: 33.1 PG (ref 27–33)
MCHC RBC AUTO-ENTMCNC: 32.8 G/DL (ref 33.7–35.3)
MCV RBC AUTO: 100.8 FL (ref 81.4–97.8)
MONOCYTES # BLD AUTO: 1.04 K/UL (ref 0–0.85)
MONOCYTES NFR BLD AUTO: 10.9 % (ref 0–13.4)
NEUTROPHILS # BLD AUTO: 7.23 K/UL (ref 1.82–7.42)
NEUTROPHILS NFR BLD: 75.5 % (ref 44–72)
NRBC # BLD AUTO: 0 K/UL
NRBC BLD-RTO: 0 /100 WBC
PLATELET # BLD AUTO: 215 K/UL (ref 164–446)
PMV BLD AUTO: 10.8 FL (ref 9–12.9)
POTASSIUM SERPL-SCNC: 3.8 MMOL/L (ref 3.6–5.5)
PROT SERPL-MCNC: 6.4 G/DL (ref 6–8.2)
RBC # BLD AUTO: 4.93 M/UL (ref 4.7–6.1)
SODIUM SERPL-SCNC: 137 MMOL/L (ref 135–145)
TRIGL SERPL-MCNC: 79 MG/DL (ref 0–149)
WBC # BLD AUTO: 9.6 K/UL (ref 4.8–10.8)

## 2020-11-08 PROCEDURE — 99232 SBSQ HOSP IP/OBS MODERATE 35: CPT | Performed by: STUDENT IN AN ORGANIZED HEALTH CARE EDUCATION/TRAINING PROGRAM

## 2020-11-08 PROCEDURE — 700102 HCHG RX REV CODE 250 W/ 637 OVERRIDE(OP): Performed by: PHYSICIAN ASSISTANT

## 2020-11-08 PROCEDURE — 80053 COMPREHEN METABOLIC PANEL: CPT

## 2020-11-08 PROCEDURE — A9270 NON-COVERED ITEM OR SERVICE: HCPCS | Performed by: PHYSICIAN ASSISTANT

## 2020-11-08 PROCEDURE — 85025 COMPLETE CBC W/AUTO DIFF WBC: CPT

## 2020-11-08 PROCEDURE — 700102 HCHG RX REV CODE 250 W/ 637 OVERRIDE(OP): Performed by: STUDENT IN AN ORGANIZED HEALTH CARE EDUCATION/TRAINING PROGRAM

## 2020-11-08 PROCEDURE — 93971 EXTREMITY STUDY: CPT | Mod: RT

## 2020-11-08 PROCEDURE — A9270 NON-COVERED ITEM OR SERVICE: HCPCS | Performed by: STUDENT IN AN ORGANIZED HEALTH CARE EDUCATION/TRAINING PROGRAM

## 2020-11-08 PROCEDURE — 700111 HCHG RX REV CODE 636 W/ 250 OVERRIDE (IP): Performed by: PHYSICIAN ASSISTANT

## 2020-11-08 PROCEDURE — 36415 COLL VENOUS BLD VENIPUNCTURE: CPT

## 2020-11-08 PROCEDURE — 83735 ASSAY OF MAGNESIUM: CPT

## 2020-11-08 PROCEDURE — 80061 LIPID PANEL: CPT

## 2020-11-08 PROCEDURE — 770006 HCHG ROOM/CARE - MED/SURG/GYN SEMI*

## 2020-11-08 RX ORDER — FUROSEMIDE 10 MG/ML
40 INJECTION INTRAMUSCULAR; INTRAVENOUS
Status: DISCONTINUED | OUTPATIENT
Start: 2020-11-08 | End: 2020-11-09 | Stop reason: HOSPADM

## 2020-11-08 RX ORDER — OLMESARTAN MEDOXOMIL 20 MG/1
20 TABLET ORAL ONCE
Status: COMPLETED | OUTPATIENT
Start: 2020-11-08 | End: 2020-11-08

## 2020-11-08 RX ORDER — POTASSIUM CHLORIDE 20 MEQ/1
20 TABLET, EXTENDED RELEASE ORAL DAILY
Status: DISCONTINUED | OUTPATIENT
Start: 2020-11-08 | End: 2020-11-08

## 2020-11-08 RX ORDER — POTASSIUM CHLORIDE 20 MEQ/1
40 TABLET, EXTENDED RELEASE ORAL DAILY
Status: DISCONTINUED | OUTPATIENT
Start: 2020-11-08 | End: 2020-11-09 | Stop reason: HOSPADM

## 2020-11-08 RX ORDER — OLMESARTAN MEDOXOMIL 20 MG/1
40 TABLET ORAL DAILY
Status: DISCONTINUED | OUTPATIENT
Start: 2020-11-09 | End: 2020-11-09 | Stop reason: HOSPADM

## 2020-11-08 RX ADMIN — AMLODIPINE BESYLATE 5 MG: 5 TABLET ORAL at 05:43

## 2020-11-08 RX ADMIN — ATORVASTATIN CALCIUM 40 MG: 40 TABLET, FILM COATED ORAL at 05:42

## 2020-11-08 RX ADMIN — FUROSEMIDE 40 MG: 10 INJECTION, SOLUTION INTRAMUSCULAR; INTRAVENOUS at 09:15

## 2020-11-08 RX ADMIN — OLMESARTAN MEDOXOMIL 20 MG: 20 TABLET, FILM COATED ORAL at 05:43

## 2020-11-08 RX ADMIN — APIXABAN 5 MG: 5 TABLET, FILM COATED ORAL at 17:07

## 2020-11-08 RX ADMIN — POTASSIUM CHLORIDE 40 MEQ: 20 TABLET, EXTENDED RELEASE ORAL at 09:15

## 2020-11-08 RX ADMIN — OLMESARTAN MEDOXOMIL 20 MG: 20 TABLET, FILM COATED ORAL at 11:09

## 2020-11-08 RX ADMIN — APIXABAN 5 MG: 5 TABLET, FILM COATED ORAL at 05:42

## 2020-11-08 RX ADMIN — METOPROLOL TARTRATE 12.5 MG: 25 TABLET, FILM COATED ORAL at 12:30

## 2020-11-08 ASSESSMENT — ENCOUNTER SYMPTOMS
BACK PAIN: 0
FALLS: 0
FEVER: 0
INSOMNIA: 0
DIZZINESS: 0
DIARRHEA: 0
WHEEZING: 0
ABDOMINAL DISTENTION: 0
VOMITING: 0
UNEXPECTED WEIGHT CHANGE: 0
SHORTNESS OF BREATH: 0
PALPITATIONS: 0
HEADACHES: 0
HEARTBURN: 0
CHEST TIGHTNESS: 0
HALLUCINATIONS: 0
TINGLING: 0
FATIGUE: 0
COUGH: 0
BLOOD IN STOOL: 0
DEPRESSION: 0
DIAPHORESIS: 0
CHILLS: 0
LIGHT-HEADEDNESS: 0
EYE REDNESS: 0
SORE THROAT: 0
SENSORY CHANGE: 0
ABDOMINAL PAIN: 0
CONSTIPATION: 0
FOCAL WEAKNESS: 0
EYE DISCHARGE: 0
WEIGHT LOSS: 0
BRUISES/BLEEDS EASILY: 0
NERVOUS/ANXIOUS: 0
NAUSEA: 0

## 2020-11-08 ASSESSMENT — LIFESTYLE VARIABLES: SUBSTANCE_ABUSE: 0

## 2020-11-08 ASSESSMENT — FIBROSIS 4 INDEX
FIB4 SCORE: 1.58
FIB4 SCORE: 1.58

## 2020-11-08 NOTE — PROGRESS NOTES
Report received from T7 RN. Updated on POC.  Assumed care of patient upon arrival to unit. Patient currently A & O x 4; on 4 L O2 nasal cannula; up self, steady on feet with steady gait; without complaints of acute pain. Pt placed on monitor a-fib 72. Patient oriented to unit and to call light system. Call light within reach. Pt educated to fall risk. Fall precautions in place. Pt provided with personal grooming items. Bed locked and in lowest position. All questions answered. No other needs indicated at this time.

## 2020-11-08 NOTE — PROGRESS NOTES
Cardiology Follow Up Progress Note    Date of Service  11/8/2020    Attending Physician  Keira Prabhakar M.D.    Chief Complaint   shortness of breath    HPI  Sina Oliver is a 75 y.o. male admitted 11/6/2020 with progressive leg swelling, dyspnea on exertion and fatigue. Hospital investigations revealed pulmonary edema on CXR, NTproBNP on 1167, new diagnosis of atrial fibrillation.     Interim Events  Patient is laying flat when I see him. No orthopnea, no shortness of breath currently (not active in the hospital). Still has some leg swelling. Denies any palpitations or dizziness. He would like to be cardioverted this hospitalization.    He is scheduled (?) for a surgery consult on Thursday for excision of thyroid nodules.      BPs still above goal, 130-150s  AFIB 66-92  Review of Systems  Review of Systems   Constitutional: Negative for chills, diaphoresis, fatigue, fever and unexpected weight change.   Respiratory: Negative for cough, chest tightness and shortness of breath.    Cardiovascular: Positive for leg swelling. Negative for chest pain and palpitations.   Gastrointestinal: Negative for abdominal distention and blood in stool.   Musculoskeletal: Negative for gait problem.   Neurological: Negative for dizziness and light-headedness.   Hematological: Does not bruise/bleed easily.       Vital signs in last 24 hours  Temp:  [36 °C (96.8 °F)-36.8 °C (98.3 °F)] 36.4 °C (97.6 °F)  Pulse:  [74-87] 80  Resp:  [17-18] 18  BP: (107-150)/(65-99) 131/76  SpO2:  [90 %-98 %] 95 %    Physical Exam  Physical Exam  Vitals signs reviewed.   Constitutional:       General: He is not in acute distress.  HENT:      Head: Normocephalic and atraumatic.   Eyes:      General: No scleral icterus.  Neck:      Comments: Elevated JVD  Cardiovascular:      Rate and Rhythm: Normal rate. Rhythm irregular.      Heart sounds: No murmur.      Comments: Radial pulses 2+ bilaterally  PT pulses 2+ bilaterally    Pulmonary:      Effort:  Pulmonary effort is normal. No respiratory distress.      Breath sounds: No rales.   Abdominal:      General: There is no distension.   Musculoskeletal:      Right lower leg: Edema present.      Left lower leg: Edema present.   Skin:     General: Skin is warm and dry.   Neurological:      General: No focal deficit present.      Mental Status: He is alert.      Gait: Gait normal.   Psychiatric:         Judgment: Judgment normal.         Lab Review  Lab Results   Component Value Date/Time    WBC 9.6 11/08/2020 03:19 AM    RBC 4.93 11/08/2020 03:19 AM    HEMOGLOBIN 16.3 11/08/2020 03:19 AM    HEMATOCRIT 49.7 11/08/2020 03:19 AM    .8 (H) 11/08/2020 03:19 AM    MCH 33.1 (H) 11/08/2020 03:19 AM    MCHC 32.8 (L) 11/08/2020 03:19 AM    MPV 10.8 11/08/2020 03:19 AM      Lab Results   Component Value Date/Time    SODIUM 137 11/08/2020 03:19 AM    POTASSIUM 3.8 11/08/2020 03:19 AM    CHLORIDE 100 11/08/2020 03:19 AM    CO2 28 11/08/2020 03:19 AM    GLUCOSE 100 (H) 11/08/2020 03:19 AM    BUN 14 11/08/2020 03:19 AM    CREATININE 0.80 11/08/2020 03:19 AM      Lab Results   Component Value Date/Time    ASTSGOT 30 11/08/2020 03:19 AM    ALTSGPT 46 11/08/2020 03:19 AM     Lab Results   Component Value Date/Time    CHOLSTRLTOT 141 11/08/2020 03:19 AM    LDL 94 11/08/2020 03:19 AM    HDL 31 (A) 11/08/2020 03:19 AM    TRIGLYCERIDE 79 11/08/2020 03:19 AM    TROPONINT 36 (H) 11/07/2020 05:33 AM       Recent Labs     11/06/20  2020   NTPROBNP 1167*       Cardiac Imaging and Procedures Review  EKG 11/6/20: atrial fibrillation with left axis deviation, LVH and poor r wave progrssion, QTc ~450ms    Echocardiogram:  11/7/20  CONCLUSIONS  Prior echocardiogram 2/25/2020, patient is now in atrial fibrillation   and EF is slightly depressed.  Mild concentric left ventricular hypertrophy.  Low-normal left ventricular systolic function with yhyi-qc-vovl   variability in atrial fibrillation.  Left ventricular ejection fraction is visually  estimated to be 50%.  Reduced right ventricular systolic function.  Left Ventricle  Normal left ventricular chamber size. Mild concentric left ventricular   hypertrophy. Low-normal left ventricular systolic function with beat-  to-beat variability in atrial fibrillation. Left ventricular ejection   fraction is visually estimated to be 50%. Contrast study suggested for   better EF estimation. Diastolic function is difficult to assess with   atrial fibrillation.     Right Ventricle  Normal right ventricular size. Reduced right ventricular systolic   function.     Right Atrium  The right atrium is normal in size. Normal inferior vena cava size   without inspiratory collapse.     Left Atrium  The left atrium is normal in size. Left atrial volume index is 31 mL/sq   m.     Mitral Valve  Mitral annular calcification. No mitral stenosis. Mild mitral   regurgitation.     Aortic Valve  The aortic valve is not well visualized. No stenosis or regurgitation   seen.     Tricuspid Valve  No tricuspid stenosis. Trace tricuspid regurgitation. Estimated right   ventricular systolic pressure  is 35 mmHg. Right atrial pressure is   estimated to be 8 mmHg.     Pulmonic Valve  Structurally normal pulmonic valve without significant stenosis or   regurgitation.     Pericardium  Normal pericardium without effusion.     Aorta  The aortic root is normal. Ascending aorta diameter is 3.9 cm.    Assessment/Plan  Acute HFpEF stage C NYHA class III on admission   - weights down 4lbs from admission, still hypervolemic on exam, continue with lasix 40mg IV once a day today with potassium supplementation to keep K>4    HTN   - with lvh findings on ekg, will arim for tighter control on his BP  - Amlodipine  - increase olmesartan to 40mg     Atrial fibrillation, rate controlled  - start Metoprolol SR, rates have been <110 this admission  - we discussed DCCV tomorrow however this would require patient to be on uninterrupted anticoagulation for 30 days  following cardioversion. It sounds like surgery or surgery consultation for malignancy is planned this week in which case we would recommend postponing cardioversion until after his surgery to avoid risk of CVA. I will discuss timing with Dr. Prabhakar   - Continue eliquis 5mg bid for now    History of thyroid cancer with recent finding of pulmonary nodule    Staffed with Dr. Soria

## 2020-11-08 NOTE — PROGRESS NOTES
Report received from day shift Rn. Assumed pt care. Pt is resting in bed on 4 L 02 NC. Pt A&O x 4. Fall precautions in place, call light and belongings within reach, bed in lowest position. No signs of distress. No complaints of pain. Will continue to monitor.

## 2020-11-08 NOTE — PROGRESS NOTES
[facility] Medicine Daily Progress Note    Date of Service  11/8/2020    Chief Complaint  75 y.o. male admitted 11/6/2020 with shortness of breath and leg swelling    Hospital Course  75 y.o. male with a past medical history of hypertension, hyperlipidemia, BPH, thyroid cancer pending surgery who presented 11/6/2020 to the emergency Department for evaluation of shortness of breath and leg swelling.  In the ED patient is seen to have A. fib on EKG with unclear onset.  Chest x-ray showing cardiomegaly hazy infiltrates at lungs. Patient is given 40IV lasix for his new onset CHF and patient is admitted to the hospitalist service for further management.    Heart rate is within normal range vital signs stable, CHADS-VASc score 4.  Patient was started with Eliquis 5 mg twice daily on admission.  Echo report: Prior echocardiogram 2/25/2020, patient is now in atrial fibrillation; and EF is slightly depressed.Mild concentric left ventricular hypertrophy.Low-normal left ventricular systolic function with arlu-mg-gdpq variability in atrial fibrillation.Left ventricular ejection fraction is visually estimated to be 50%.Reduced right ventricular systolic function.  Bilateral lower leg ultrasound ordered to rule out DVT.  Consulted cardiology planned rhythm control with SUSAN and cardioversion after he is adequately diuresed.     Interval Problem Update  Patient was seen bedside, sitting comfortably on the bed, states his shortness of breath is getting better; His leg swelling is still there on right side, dramatically improved on left side. He had a history of hypertension, not taking, much medication.    Fine-needle aspiration of thyroid confirmed thyroid cancer, plan to meet with his doctor on Thursday for surgery planning      Consultants/Specialty  None    Code Status  Full Code    Disposition  Likely home    Review of Systems  Review of Systems   Constitutional: Negative for chills, fever, malaise/fatigue and weight loss.    HENT: Negative for congestion and sore throat.    Eyes: Negative for discharge and redness.   Respiratory: Negative for cough, shortness of breath (For 2 months) and wheezing.    Cardiovascular: Negative for chest pain, palpitations and leg swelling.   Gastrointestinal: Negative for abdominal pain, constipation, diarrhea, heartburn, nausea and vomiting.   Genitourinary: Negative for dysuria, frequency and urgency.   Musculoskeletal: Negative for back pain, falls and joint pain.        Bilateral leg edema for about 90 days   Skin: Negative for itching and rash.   Neurological: Negative for dizziness, tingling, sensory change, focal weakness and headaches.   Psychiatric/Behavioral: Negative for depression, hallucinations and substance abuse. The patient is not nervous/anxious and does not have insomnia.    All other systems reviewed and are negative.       Physical Exam  Temp:  [36 °C (96.8 °F)-36.8 °C (98.3 °F)] 36 °C (96.8 °F)  Pulse:  [74-87] 76  Resp:  [17-18] 18  BP: (107-155)/(65-99) 109/66  SpO2:  [90 %-98 %] 91 %    Physical Exam  Vitals signs and nursing note reviewed.   Constitutional:       General: He is not in acute distress.     Appearance: Normal appearance.      Comments: Lying flat on the bed with one pillow   HENT:      Head: Normocephalic and atraumatic.      Nose: Nose normal. No congestion or rhinorrhea.      Mouth/Throat:      Pharynx: Oropharynx is clear. No posterior oropharyngeal erythema.   Eyes:      Extraocular Movements: Extraocular movements intact.      Conjunctiva/sclera: Conjunctivae normal.      Pupils: Pupils are equal, round, and reactive to light.   Neck:      Musculoskeletal: Normal range of motion and neck supple.      Vascular: No carotid bruit.   Cardiovascular:      Rate and Rhythm: Normal rate and regular rhythm.      Pulses: Normal pulses.      Heart sounds: Normal heart sounds.   Pulmonary:      Effort: Pulmonary effort is normal. No respiratory distress.      Breath  sounds: Normal breath sounds. No wheezing or rales.   Chest:      Chest wall: No tenderness.   Abdominal:      General: Abdomen is flat. Bowel sounds are normal. There is no distension.      Palpations: Abdomen is soft.      Tenderness: There is no abdominal tenderness. There is no guarding or rebound.   Musculoskeletal: Normal range of motion.         General: Swelling (Right lower leg) present.      Left lower leg: No edema.   Skin:     General: Skin is warm.   Neurological:      General: No focal deficit present.      Mental Status: He is alert and oriented to person, place, and time.      Cranial Nerves: No cranial nerve deficit.      Motor: No weakness.      Gait: Gait normal.      Deep Tendon Reflexes: Reflexes normal.   Psychiatric:         Mood and Affect: Mood normal.         Behavior: Behavior normal.         Judgment: Judgment normal.         Fluids    Intake/Output Summary (Last 24 hours) at 11/8/2020 0703  Last data filed at 11/8/2020 0536  Gross per 24 hour   Intake 480 ml   Output 4875 ml   Net -4395 ml       Laboratory  Recent Labs     11/06/20 2020 11/08/20 0319   WBC 9.7 9.6   RBC 5.13 4.93   HEMOGLOBIN 17.3 16.3   HEMATOCRIT 52.8* 49.7   .9* 100.8*   MCH 33.7* 33.1*   MCHC 32.8* 32.8*   RDW 51.0* 49.8   PLATELETCT 198 215   MPV 10.8 10.8     Recent Labs     11/06/20 2020 11/08/20 0319   SODIUM 137 137   POTASSIUM 4.2 3.8   CHLORIDE 102 100   CO2 22 28   GLUCOSE 115* 100*   BUN 13 14   CREATININE 0.76 0.80   CALCIUM 9.3 9.0     Recent Labs     11/06/20 2020   APTT 28.8   INR 1.11         Recent Labs     11/08/20 0319   TRIGLYCERIDE 79   HDL 31*   LDL 94       Imaging  EC-ECHOCARDIOGRAM COMPLETE W/O CONT   Final Result      DX-CHEST-PORTABLE (1 VIEW)   Final Result         1.  Hazy bibasilar opacities suggests infiltrates.   2.  Cardiomegaly      US-EXTREMITY VENOUS UPPER BILAT    (Results Pending)        Assessment/Plan  * CHF (congestive heart failure) (HCC)- (present on  admission)  Assessment & Plan  New onset CHF  BNP 1167  Shortness of breath, Covid ruled out  Hazy infiltrates on chest x-ray  Echo report see above  Lasix IV 40mg daily given  cardiology consult appreciated  Ultrasound bilateral lower leg to ruled out DVT  Cardioversion planning    A-fib (HCC)- (present on admission)  Assessment & Plan  Unclear onset   Vitals stable  Chadsvasc score >4mod. High risk  Eliquis 5 bid started  Echo report: Prior echocardiogram 2/25/2020, patient is now in atrial fibrillation; and EF is slightly depressed.Mild concentric left ventricular hypertrophy.Low-normal left ventricular systolic function with uaib-mf-[id number] variability in atrial fibrillation.Left ventricular ejection fraction is visually estimated to be 50%.Reduced right ventricular systolic function.    TSH normal  Planning for cardioversion    HTN (hypertension)  Assessment & Plan  Amlodipine 5 mg, losartan 20 mg, furosemide 40 mg IV twice daily  EKG showing left ventricular hypertrophy  Chest x-ray showing cardiomegaly  Echo was done to evaluate his heart function.          Pre-diabetes  Assessment & Plan  a1c 5.8  ISS  Diabetes education    Thyroid nodule- (present on admission)  Assessment & Plan  Will need to visit his doctor on Thursday for surgery schedule    Hyperlipidemia  Assessment & Plan  Atorvastatin 40 mg       VTE prophylaxis: On Eliquis

## 2020-11-08 NOTE — PROGRESS NOTES
Received bedside report from RN, pt care assumed, VSS, pt assessment complete. Pt A&Ox4, denies pain at this time. No signs of acute distress noted at this time. POC discussed with pt and verbalizes no questions. Pt denies any additional needs at this time. Bed in lowest position, pt educated on fall risk and verbalized understanding, call light within reach, hourly rounding initiated.

## 2020-11-09 VITALS
HEART RATE: 65 BPM | HEIGHT: 74 IN | BODY MASS INDEX: 33.75 KG/M2 | TEMPERATURE: 98 F | SYSTOLIC BLOOD PRESSURE: 123 MMHG | DIASTOLIC BLOOD PRESSURE: 75 MMHG | OXYGEN SATURATION: 94 % | WEIGHT: 263 LBS | RESPIRATION RATE: 18 BRPM

## 2020-11-09 LAB
ALBUMIN SERPL BCP-MCNC: 4 G/DL (ref 3.2–4.9)
ALBUMIN/GLOB SERPL: 1.6 G/DL
ALP SERPL-CCNC: 90 U/L (ref 30–99)
ALT SERPL-CCNC: 37 U/L (ref 2–50)
ANION GAP SERPL CALC-SCNC: 12 MMOL/L (ref 7–16)
AST SERPL-CCNC: 24 U/L (ref 12–45)
BASOPHILS # BLD AUTO: 0.5 % (ref 0–1.8)
BASOPHILS # BLD: 0.05 K/UL (ref 0–0.12)
BILIRUB SERPL-MCNC: 1.1 MG/DL (ref 0.1–1.5)
BUN SERPL-MCNC: 16 MG/DL (ref 8–22)
CALCIUM SERPL-MCNC: 9.3 MG/DL (ref 8.5–10.5)
CHLORIDE SERPL-SCNC: 96 MMOL/L (ref 96–112)
CO2 SERPL-SCNC: 25 MMOL/L (ref 20–33)
CREAT SERPL-MCNC: 0.79 MG/DL (ref 0.5–1.4)
EOSINOPHIL # BLD AUTO: 0.18 K/UL (ref 0–0.51)
EOSINOPHIL NFR BLD: 1.8 % (ref 0–6.9)
ERYTHROCYTE [DISTWIDTH] IN BLOOD BY AUTOMATED COUNT: 47.8 FL (ref 35.9–50)
GLOBULIN SER CALC-MCNC: 2.5 G/DL (ref 1.9–3.5)
GLUCOSE SERPL-MCNC: 101 MG/DL (ref 65–99)
HCT VFR BLD AUTO: 50.8 % (ref 42–52)
HGB BLD-MCNC: 16.7 G/DL (ref 14–18)
IMM GRANULOCYTES # BLD AUTO: 0.05 K/UL (ref 0–0.11)
IMM GRANULOCYTES NFR BLD AUTO: 0.5 % (ref 0–0.9)
LYMPHOCYTES # BLD AUTO: 1.2 K/UL (ref 1–4.8)
LYMPHOCYTES NFR BLD: 11.8 % (ref 22–41)
MCH RBC QN AUTO: 32.9 PG (ref 27–33)
MCHC RBC AUTO-ENTMCNC: 32.9 G/DL (ref 33.7–35.3)
MCV RBC AUTO: 100.2 FL (ref 81.4–97.8)
MONOCYTES # BLD AUTO: 0.99 K/UL (ref 0–0.85)
MONOCYTES NFR BLD AUTO: 9.8 % (ref 0–13.4)
NEUTROPHILS # BLD AUTO: 7.68 K/UL (ref 1.82–7.42)
NEUTROPHILS NFR BLD: 75.6 % (ref 44–72)
NRBC # BLD AUTO: 0 K/UL
NRBC BLD-RTO: 0 /100 WBC
PLATELET # BLD AUTO: 212 K/UL (ref 164–446)
PMV BLD AUTO: 11.1 FL (ref 9–12.9)
POTASSIUM SERPL-SCNC: 3.9 MMOL/L (ref 3.6–5.5)
PROT SERPL-MCNC: 6.5 G/DL (ref 6–8.2)
RBC # BLD AUTO: 5.07 M/UL (ref 4.7–6.1)
SODIUM SERPL-SCNC: 133 MMOL/L (ref 135–145)
WBC # BLD AUTO: 10.2 K/UL (ref 4.8–10.8)

## 2020-11-09 PROCEDURE — 36415 COLL VENOUS BLD VENIPUNCTURE: CPT

## 2020-11-09 PROCEDURE — A9270 NON-COVERED ITEM OR SERVICE: HCPCS | Performed by: PHYSICIAN ASSISTANT

## 2020-11-09 PROCEDURE — A9270 NON-COVERED ITEM OR SERVICE: HCPCS | Performed by: STUDENT IN AN ORGANIZED HEALTH CARE EDUCATION/TRAINING PROGRAM

## 2020-11-09 PROCEDURE — A9270 NON-COVERED ITEM OR SERVICE: HCPCS

## 2020-11-09 PROCEDURE — 700102 HCHG RX REV CODE 250 W/ 637 OVERRIDE(OP): Performed by: STUDENT IN AN ORGANIZED HEALTH CARE EDUCATION/TRAINING PROGRAM

## 2020-11-09 PROCEDURE — 700102 HCHG RX REV CODE 250 W/ 637 OVERRIDE(OP): Performed by: PHYSICIAN ASSISTANT

## 2020-11-09 PROCEDURE — 700102 HCHG RX REV CODE 250 W/ 637 OVERRIDE(OP)

## 2020-11-09 PROCEDURE — 700111 HCHG RX REV CODE 636 W/ 250 OVERRIDE (IP): Performed by: PHYSICIAN ASSISTANT

## 2020-11-09 PROCEDURE — 99239 HOSP IP/OBS DSCHRG MGMT >30: CPT | Performed by: STUDENT IN AN ORGANIZED HEALTH CARE EDUCATION/TRAINING PROGRAM

## 2020-11-09 PROCEDURE — 97161 PT EVAL LOW COMPLEX 20 MIN: CPT

## 2020-11-09 PROCEDURE — 99232 SBSQ HOSP IP/OBS MODERATE 35: CPT | Performed by: INTERNAL MEDICINE

## 2020-11-09 PROCEDURE — 80053 COMPREHEN METABOLIC PANEL: CPT

## 2020-11-09 PROCEDURE — 85025 COMPLETE CBC W/AUTO DIFF WBC: CPT

## 2020-11-09 RX ORDER — OLMESARTAN MEDOXOMIL 40 MG/1
40 TABLET ORAL DAILY
Qty: 30 TAB | Refills: 0 | Status: SHIPPED
Start: 2020-11-10 | End: 2020-11-09

## 2020-11-09 RX ORDER — FUROSEMIDE 40 MG/1
40 TABLET ORAL DAILY
Qty: 30 TAB | Refills: 0 | Status: SHIPPED | OUTPATIENT
Start: 2020-11-09 | End: 2020-11-12 | Stop reason: SDUPTHER

## 2020-11-09 RX ORDER — FUROSEMIDE 40 MG/1
40 TABLET ORAL DAILY
Qty: 30 TAB | Refills: 0 | Status: SHIPPED | OUTPATIENT
Start: 2020-11-09 | End: 2020-11-09 | Stop reason: SDUPTHER

## 2020-11-09 RX ORDER — AMOXICILLIN 250 MG
CAPSULE ORAL
Status: DISCONTINUED
Start: 2020-11-09 | End: 2020-11-09 | Stop reason: HOSPADM

## 2020-11-09 RX ORDER — OLMESARTAN MEDOXOMIL 40 MG/1
40 TABLET ORAL DAILY
Qty: 30 TAB | Refills: 0 | Status: SHIPPED | OUTPATIENT
Start: 2020-11-10 | End: 2020-12-04 | Stop reason: SDUPTHER

## 2020-11-09 RX ORDER — POTASSIUM CHLORIDE 20 MEQ/1
20 TABLET, EXTENDED RELEASE ORAL DAILY
Qty: 60 TAB | Refills: 11 | Status: SHIPPED | OUTPATIENT
Start: 2020-11-10 | End: 2021-01-29 | Stop reason: SDUPTHER

## 2020-11-09 RX ORDER — POTASSIUM CHLORIDE 20 MEQ/1
20 TABLET, EXTENDED RELEASE ORAL DAILY
Qty: 60 TAB | Refills: 11 | Status: SHIPPED
Start: 2020-11-10 | End: 2020-11-09

## 2020-11-09 RX ADMIN — AMLODIPINE BESYLATE 5 MG: 5 TABLET ORAL at 06:05

## 2020-11-09 RX ADMIN — FUROSEMIDE 40 MG: 10 INJECTION, SOLUTION INTRAMUSCULAR; INTRAVENOUS at 06:05

## 2020-11-09 RX ADMIN — OLMESARTAN MEDOXOMIL 40 MG: 20 TABLET, FILM COATED ORAL at 06:06

## 2020-11-09 RX ADMIN — METOPROLOL TARTRATE 12.5 MG: 25 TABLET, FILM COATED ORAL at 06:05

## 2020-11-09 RX ADMIN — POTASSIUM CHLORIDE 40 MEQ: 20 TABLET, EXTENDED RELEASE ORAL at 06:05

## 2020-11-09 RX ADMIN — APIXABAN 5 MG: 5 TABLET, FILM COATED ORAL at 06:06

## 2020-11-09 RX ADMIN — ATORVASTATIN CALCIUM 40 MG: 40 TABLET, FILM COATED ORAL at 06:10

## 2020-11-09 ASSESSMENT — GAIT ASSESSMENTS
DEVIATION: ANTALGIC
DISTANCE (FEET): 200
GAIT LEVEL OF ASSIST: SUPERVISED

## 2020-11-09 ASSESSMENT — COGNITIVE AND FUNCTIONAL STATUS - GENERAL
SUGGESTED CMS G CODE MODIFIER MOBILITY: CH
MOBILITY SCORE: 24

## 2020-11-09 NOTE — PROGRESS NOTES
"Interval History:  Feels well, denies chest pain, shortness of breath and leg swelling is improved.  No fever, chills.    Physical Exam   Blood pressure 126/78, pulse 68, temperature 36.4 °C (97.6 °F), temperature source Temporal, resp. rate 17, height 1.88 m (6' 2\"), weight 119.3 kg (263 lb), SpO2 96 %.    Constitutional:  Appears well-developed.   HENT: Normocephalic and atraumatic. No scleral icterus.   Neck: No JVD present.   Cardiovascular: Normal rate. Exam reveals no gallop and no friction rub. No murmur heard.   Pulmonary/Chest: CTAB    Abdominal: S/NT/ND BS+   Musculoskeletal: Right leg 2+ edema  Skin: Skin is warm and dry.     ROS: As HPI other reviewed and negative       Intake/Output Summary (Last 24 hours) at 11/9/2020 1202  Last data filed at 11/9/2020 0700  Gross per 24 hour   Intake 1080 ml   Output 2175 ml   Net -1095 ml       Recent Labs     11/06/20 2020 11/08/20 0319 11/09/20 0314   WBC 9.7 9.6 10.2   RBC 5.13 4.93 5.07   HEMOGLOBIN 17.3 16.3 16.7   HEMATOCRIT 52.8* 49.7 50.8   .9* 100.8* 100.2*   MCH 33.7* 33.1* 32.9   MCHC 32.8* 32.8* 32.9*   RDW 51.0* 49.8 47.8   PLATELETCT 198 215 212   MPV 10.8 10.8 11.1     Recent Labs     11/06/20 2020 11/08/20 0319 11/09/20 0314   SODIUM 137 137 133*   POTASSIUM 4.2 3.8 3.9   CHLORIDE 102 100 96   CO2 22 28 25   GLUCOSE 115* 100* 101*   BUN 13 14 16   CREATININE 0.76 0.80 0.79   CALCIUM 9.3 9.0 9.3     Recent Labs     11/06/20 2020   APTT 28.8   INR 1.11             Recent Labs     11/08/20 0319   TRIGLYCERIDE 79   HDL 31*   LDL 94       No current facility-administered medications on file prior to encounter.      Current Outpatient Medications on File Prior to Encounter   Medication Sig Dispense Refill   • testosterone cypionate (DEPO-TESTOSTERONE) 200 MG/ML Solution injection Inject 1 mL every 2 weeks intramuscularly for 140 days. 10 mL 3   • olmesartan (BENICAR) 20 MG Tab Take 1 Tab by mouth every day. 100 Tab 1   • hydroCHLOROthiazide " (HYDRODIURIL) 25 MG Tab Take 1 Tab by mouth every day. (Patient not taking: Reported on 10/19/2020) 100 Tab 1   • testosterone cypionate (DEPO-TESTOSTERONE) 200 MG/ML Solution injection 1 mL by Intramuscular route every 14 days. 1 mL 5   • atorvastatin (LIPITOR) 40 MG Tab Take 1 Tab by mouth every day. (Patient not taking: Reported on 10/19/2020) 100 Tab 3   • amLODIPine (NORVASC) 5 MG Tab Take 1 Tab by mouth every day. 90 Tab 1   • tamsulosin (FLOMAX) 0.4 MG capsule      • cyclobenzaprine (FLEXERIL) 10 MG Tab Take 1 Tab by mouth 2 times a day as needed for Muscle Spasms. 180 Tab 0       Current Facility-Administered Medications   Medication Dose Frequency Provider Last Rate Last Admin   • metoprolol (LOPRESSOR) tablet 25 mg  25 mg TWICE DAILY Keira Prabhakar M.D.       • furosemide (LASIX) injection 40 mg  40 mg Q DAY Veda Barragan P.A.-C.   40 mg at 11/09/20 0605   • potassium chloride SA (Kdur) tablet 40 mEq  40 mEq DAILY Veda Barragan P.A.-C.   40 mEq at 11/09/20 0605   • olmesartan (BENICAR) tablet 40 mg  40 mg DAILY Veda Barragan, P.A.-C.   40 mg at 11/09/20 0606   • amLODIPine (NORVASC) tablet 5 mg  5 mg DAILY Tanesha Hansen M.D.   5 mg at 11/09/20 0605   • atorvastatin (LIPITOR) tablet 40 mg  40 mg DAILY Tanesha Hansen M.D.   40 mg at 11/09/20 0610   • senna-docusate (PERICOLACE or SENOKOT S) 8.6-50 MG per tablet 2 Tab  2 Tab BID Tanesha Hansen M.D.        And   • polyethylene glycol/lytes (MIRALAX) PACKET 1 Packet  1 Packet QDAY PRN Tanesha Hansen M.D.        And   • magnesium hydroxide (MILK OF MAGNESIA) suspension 30 mL  30 mL QDAY PRN Tanesha Hansen M.D.        And   • bisacodyl (DULCOLAX) suppository 10 mg  10 mg QDAY PRN Tanesha Hansen M.D.       • acetaminophen (Tylenol) tablet 650 mg  650 mg Q6HRS PRN Tanesha Hansen M.D.       • labetalol (NORMODYNE/TRANDATE) injection 10 mg  10 mg Q4HRS PRN Tanesha Hansen M.D.       • ondansetron (ZOFRAN) syringe/vial injection 4  mg  4 mg Q4HRS PRN Tanesha Hansen M.D.       • ondansetron (ZOFRAN ODT) dispertab 4 mg  4 mg Q4HRS PRN Tanesha Hansen M.D.       • apixaban (ELIQUIS) tablet 5 mg  5 mg BID Tanesha Hansen M.D.   5 mg at 11/09/20 0606   Last reviewed on 11/7/2020  4:25 AM by Ayala George R.N.    Medications reviewed    Imaging reviewed    ECHO(11/7/20):     CONCLUSIONS  Prior echocardiogram 2/25/2020, patient is now in atrial fibrillation   and EF is slightly depressed.  Mild concentric left ventricular hypertrophy.  Low-normal left ventricular systolic function with vcix-ba-pnma   variability in atrial fibrillation.  Left ventricular ejection fraction is visually estimated to be 50%.  Reduced right ventricular systolic function.    Impressions:  75-year-old male patient with history of hypertension, dyslipidemia, history of thyroid cancer with lung nodule presented with congestive heart failure with preserved ejection fraction, new diagnosis of atrial fibrillation.    Recommendations:  Congestive heart failure compensated except some edema in his right lower extremity.  Atrial fibrillation rates are well controlled.  Recommend continue metoprolol 25 twice daily, Eliquis 5 twice daily for anticoagulation.  Recommend discharging on Lasix 40 mg daily along with 10 to 20 mEq of potassium.  Close follow-up with cardiology this week.  Blood pressure well controlled, continue amlodipine and Benicar.  Okay to discharge today from cardiac standpoint.  Whenever he he will schedule surgery he needs to hold Eliquis 2 days prior, no bridging is needed.    Discussed with primary physician and bedside nursing.    Do not hesitate to call me with questions regarding the patient care.    Viraj Lynn  Interventional cardiologist  Cell: [phone]

## 2020-11-09 NOTE — CARE PLAN
Problem: Communication  Goal: The ability to communicate needs accurately and effectively will improve  Outcome: PROGRESSING AS EXPECTED  Note: Pt anxious about recent diagnoses and plan of care for the future     Problem: Safety  Goal: Will remain free from injury  Outcome: PROGRESSING AS EXPECTED  Goal: Will remain free from falls  Outcome: PROGRESSING AS EXPECTED  Note: Fall precautions maintained.

## 2020-11-09 NOTE — CARE PLAN
Problem: Communication  Goal: The ability to communicate needs accurately and effectively will improve  Outcome: PROGRESSING AS EXPECTED  Intervention: [place] patient and significant other/support system to call light to alert staff of needs  Flowsheets (Taken 11/8/2020 8100)  Oriented to:: All of the Following : Location of Bathroom, Visiting Policy, Unit Routine, Call Light and Bedside Controls, Bedside Rail Policy, Smoking Policy, Rights and Responsibilities, Bedside Report, and Patient Education Notebook     Problem: Knowledge Deficit  Goal: Knowledge of disease process/condition, treatment plan, diagnostic tests, and medications will improve  Outcome: PROGRESSING AS EXPECTED

## 2020-11-09 NOTE — THERAPY
Occupational Therapy Note     Patient Name: Sina Oliver  Age:  75 y.o., Sex:  male  Medical Record #: [id number]  Today's Date: 11/9/2020 11/09/20 1254   Interdisciplinary Plan of Care Collaboration   Collaboration Comments OT order received. Spoke with the PT who evaluated the pt. Pt is up ad shana to the bathroom and denies any difficulty with basic ADLs.  No OT eval indicated in the acute care setting at this time,

## 2020-11-09 NOTE — THERAPY
"Physical Therapy   Initial Evaluation     Patient Name: Sina Oliver  Age:  75 y.o., Sex:  male  Medical Record #: [id number]  Today's Date: 11/9/2020          Assessment  Patient is 75 y.o. male admitted with SOB and LE edema. Pt presents to physical therapy at PLOF baseline. No acute physical impairments noted during PT evaluation (see documentation below), functionally capable of DC home at this time. No further PT needs     Plan    Recommend Physical Therapy for Evaluation only            11/09/20 1223   Precautions   Comments new onset CHF   Vitals   O2 Delivery Device Room air w/o2 available   Pain 0 - 10 Group   Therapist Pain Assessment Nurse Notified;0   Prior Living Situation   Prior Services Home-Independent   Housing / Facility 2 Story House   Steps Into Home 2   Steps In Home   (flight with railing)   Equipment Owned None   Lives with - Patient's Self Care Capacity Spouse   Comments reports Son also lives locally   Prior Level of Functional Mobility   Bed Mobility Independent   Transfer Status Independent   Ambulation Independent   Distance Ambulation (Feet)   (community)   Assistive Devices Used None   Stairs Independent   Comments reports his \"normal activity/gym routine\" has not been consistent over past year   Cognition    Cognition / Consciousness WDL   Comments agreeable and receptive to PT   Active ROM Lower Body    Active ROM Lower Body  WDL   Strength Lower Body   Lower Body Strength  WDL   Balance Assessment   Sitting Balance (Static) Good   Sitting Balance (Dynamic) Good   Standing Balance (Static) Fair +   Standing Balance (Dynamic) Fair +   Weight Shift Sitting Good   Weight Shift Standing Good   Comments w/o AD   Gait Analysis   Gait Level Of Assist Supervised   Assistive Device None   Distance (Feet) 200   # of Times Distance was Traveled 1   Deviation Antalgic  (baseline following hip replacement)   # of Stairs Climbed 0   Weight Bearing Status no restrictions   Comments pt reports " current gait is baseline. do not anticipate issues with stairs upon DC   Bed Mobility    Supine to Sit Modified Independent  (HOB flat, railing used)   Scooting Independent   Functional Mobility   Sit to Stand Independent   Bed, Chair, Wheelchair Transfer Independent   Toilet Transfers Independent

## 2020-11-09 NOTE — DISCHARGE SUMMARY
"Discharge Summary    CHIEF COMPLAINT ON ADMISSION  Chief Complaint   Patient presents with   • Shortness of Breath   • Leg Swelling     Onset X approximately 2 months   • Cancer     \" cancerous nodules in thyroid. \"        Reason for Admission  Sent by MD     Admission Date  11/6/2020    CODE STATUS  Full Code    HPI & HOSPITAL COURSE  75 y.o. male with a past medical history of hypertension, hyperlipidemia, BPH, thyroid cancer pending surgery who presented 11/6/2020 to the emergency Department for evaluation of shortness of breath and leg swelling.  In the ED patient is seen to have A. fib on EKG with unclear onset.  Chest x-ray showing cardiomegaly hazy infiltrates at lungs. Patient is given 40IV lasix for his new onset CHF and patient is admitted to the hospitalist service for further management.    Heart rate is within normal range vital signs stable, CHADS-VASc score 4.  Patient was started with Eliquis 5 mg twice daily on admission.  Echo report: Prior echocardiogram 2/25/2020, patient is now in atrial fibrillation; and EF is slightly depressed.Mild concentric left ventricular hypertrophy.Low-normal left ventricular systolic function with [id number] variability in atrial fibrillation.Left ventricular ejection fraction is visually estimated to be 50%.Reduced right ventricular systolic function.  Bilateral lower leg ultrasound ordered to rule out DVT.  Consulted cardiology planned rhythm control with SUSAN and cardioversion after he is adequately diuresed and surgery for thyroid cancer done.  Fine-needle aspiration of thyroid confirmed thyroid cancer, plan to meet with his doctor on Thursday for surgery planning    Therefore, he is discharged in good and stable condition to home with close outpatient follow-up.    The patient met 2-midnight criteria for an inpatient stay at the time of discharge.    Discharge Date  11/9/2020    FOLLOW UP ITEMS POST DISCHARGE  Cardiology  Primary care    DISCHARGE " DIAGNOSES  Principal Problem:    CHF (congestive heart failure) (HCC) POA: Yes  Active Problems:    A-fib (HCC) POA: Yes    HTN (hypertension) POA: Unknown    Hyperlipidemia POA: Unknown    Thyroid nodule POA: Yes    Pre-diabetes POA: Unknown  Resolved Problems:    * No resolved hospital problems. *      FOLLOW UP  No future appointments.  Hugo Chase M.D.  [address] [place] [address] [address]  [phone]            MEDICATIONS ON DISCHARGE     Medication List      START taking these medications      Instructions   apixaban 5mg Tabs  Commonly known as: ELIQUIS   Take 1 Tab by mouth 2 Times a Day. Indications: Thromboembolism secondary to Atrial Fibrillation  Dose: 5 mg     furosemide 40 MG Tabs  Commonly known as: LASIX   Take 1 Tab by mouth every day.  Dose: 40 mg     metoprolol 25 MG Tabs  Commonly known as: LOPRESSOR   Take 1 Tab by mouth 2 Times a Day.  Dose: 25 mg     potassium chloride SA 20 MEQ Tbcr  Start taking on: November 10, 2020  Commonly known as: Kdur   Take 1 Tab by mouth every day.  Dose: 20 mEq        CHANGE how you take these medications      Instructions   olmesartan 40 MG Tabs  Start taking on: November 10, 2020  What changed:   · medication strength  · how much to take  Commonly known as: BENICAR   Take 1 Tab by mouth every day.  Dose: 40 mg        CONTINUE taking these medications      Instructions   amLODIPine 5 MG Tabs  Commonly known as: NORVASC   Doctor's comments: Refill [id number]  Take 1 Tab by mouth every day.  Dose: 5 mg     atorvastatin 40 MG Tabs  Commonly known as: LIPITOR   Take 1 Tab by mouth every day.  Dose: 40 mg     cyclobenzaprine 10 mg Tabs  Commonly known as: Flexeril   Take 1 Tab by mouth 2 times a day as needed for Muscle Spasms.  Dose: 10 mg     tamsulosin 0.4 MG capsule  Commonly known as: FLOMAX      * testosterone cypionate 200 MG/ML Soln injection  Commonly known as: DEPO-TESTOSTERONE   1 mL by Intramuscular route every 14 days.  Dose: 200 mg     *  testosterone cypionate 200 MG/ML Soln injection  Commonly known as: DEPO-TESTOSTERONE   Doctor's comments: ICD10 E29.1  Inject 1 mL every 2 weeks intramuscularly for 140 days.         * This list has 2 medication(s) that are the same as other medications prescribed for you. Read the directions carefully, and ask your doctor or other care provider to review them with you.            STOP taking these medications    hydroCHLOROthiazide 25 MG Tabs  Commonly known as: HYDRODIURIL            Allergies  Allergies   Allergen Reactions   • Azithromycin      nausea   • Coumadin [Warfarin]    • Gabapentin      Pt does not want too many side effects   • Ibuprofen    • Nsaids      bleeding   • Other Misc      Bee and wasp cause swelling and difficulty breathing   • Penicillins Anaphylaxis   • Plavix [Clopidogrel]    • Pseudoephedrine      Locked up bladder   • Sulfa Drugs      rash   • Xarelto [Rivaroxaban]        DIET  Orders Placed This Encounter   Procedures   • Diet Order Diet: Cardiac     Standing Status:   Standing     Number of Occurrences:   1     Order Specific Question:   Diet:     Answer:   Cardiac [6]       ACTIVITY  As tolerated.  Weight bearing as tolerated    CONSULTATIONS  cardiology  Interventional cardiology    PROCEDURES  none    LABORATORY  Lab Results   Component Value Date    SODIUM 133 (L) 11/09/2020    POTASSIUM 3.9 11/09/2020    CHLORIDE 96 11/09/2020    CO2 25 11/09/2020    GLUCOSE 101 (H) 11/09/2020    BUN 16 11/09/2020    CREATININE 0.79 11/09/2020        Lab Results   Component Value Date    WBC 10.2 11/09/2020    HEMOGLOBIN 16.7 11/09/2020    HEMATOCRIT 50.8 11/09/2020    PLATELETCT 212 11/09/2020        Total time of the discharge process exceeds 45 minutes.

## 2020-11-09 NOTE — HEART FAILURE PROGRAM
Patient admitted on 11/6/20 with newly diagnosed AF c RVR and HFpEF.    Patient apparently has a surgical consult for a malignancy this week which means that he would not be able to be on uninterrupted anticoagulation for 30 days which is a requirement of [facility]. As such, this is postponed and patient is being rate controlled and anticoagulated with apixaban.    Patient has had his pneumococcal vaccine, needs to be assessed for influenza vaccine.    Please see below for measures that must be addressed prior to discharge.     Daily Nurse: please begin to fill out the HF checklist (pink sheet in hard chart) and use it to guide your daily care.    Discharge Nurse: please ensure completeness of the HF checklist (pink sheet in hard chart) and have it co-signed by the charge RN before the patient leaves the hospital.    Thank you, Tanesha, Cardio RN Navigator [phone]    HF Measures:  1. Documentation of LV systolic function (echo or cath) PTA, during this hospitalization, or plan to assess post discharge or reason for not assessing documented  2. Documentation of fluid intake and urine output every nursing shift  3. 2 hour post diuretic assessment documented 2 hours after diuretic given  4. HF Patient Education using the Living Well With Heart Failure Booklet and Symptom Tracker documented every nursing shift  5. Nutrition consult for diet education  6. Daily weights (one weight documented every 24 hours) on a standing scale unless standing is contraindicated in which case bed scale can be used - have patient write weight on symptom tracker  7. For LVEF less than or equal to 40%, ACE-I, ARNI or ARB prescribed at discharge   8. For LVEF less than or equal to 40%, an Evidence Based Beta Blocker (bisoprolol, carvedilol, toprol xl) must be prescribed at discharge  9. For LVEF less than or equal to 35% aldosterone blockade prescribed at discharge  10. The combination of hydralazine and isosorbide dinitrate is recommended to  reduce morbidity and mortality for patients self-described  Americans with NYHA class III-IV HFrEF (EF 40% or less), receiving optimal therapy with ACE inhibitors and beta blockers, unless contraindicated (Class I, [place]: A).  11. If a HF patient is diabetic or is newly diagnosed with DM: prescribed diabetes treatment at discharge in the form of glycemic control (diet or anti-hyperglycemic medication) or f/u appointment for diabetes management scheduled at discharge.  12. If a HF patient has diabetes: prescribed lipid lowering medication at discharge  13. Documented smoking cessation advice or counseling  14. If a HF patient has a-fib: anticoagulation is prescribed upon discharge or contraindication is documented  15. Screening for and administering immunizations as long as no contraindications: Pneumonia (regardless of age) and Influenza  16. Written discharge instructions include:  ? Daily weights  ? Record weight on tracker  ? Bring tracker to appointments  ? Call MD for weight gain of 3lb /day or 5lb/week  ? HF medication teaching  ? Low sodium diet  ? Follow up appointment within seven calendar days of d/c must include: date, time and location  ? Activity  ? Worsening symptoms    What if any of the above HF measures are contraindicated?  ? Request that the discharging provider document the medication/intervention and the contraindication specifically in a progress note  ? For example: “no CHF meds due to hypotension” is not enough. It needs to say: “No ACE-I, ARNI, ARB due to hypotension”; “No Beta Blockade due to bradycardia”…

## 2020-11-09 NOTE — DISCHARGE INSTRUCTIONS
Limit water intake to 2L/day  Limit salt intake to 2g/day  Close follow-up with cardiology this week.  Whenever he he will schedule surgery he needs to hold Eliquis 2 days prior, no bridging is needed    Discharge Instructions    Discharged to home by car with relative. Discharged via wheelchair, hospital escort: Yes.  Special equipment needed: Not Applicable    Be sure to schedule a follow-up appointment with your primary care doctor or any specialists as instructed.     Discharge Plan:   Diet Plan: Discussed  Activity Level: Discussed  Confirmed Follow up Appointment: Appointment Scheduled  Confirmed Symptoms Management: Discussed  Medication Reconciliation Updated: Yes  Influenza Vaccine Indication: Patient Refuses    I understand that a diet low in cholesterol, fat, and sodium is recommended for good health. Unless I have been given specific instructions below for another diet, I accept this instruction as my diet prescription.   Other diet: cardiac    Special Instructions:   HF Patient Discharge Instructions  · Monitor your weight daily, and maintain a weight chart, to track your weight changes.   · Activity as tolerated, unless your Doctor has ordered otherwise. Other activity order: none.  · Follow a low fat, low cholesterol, low salt diet unless instructed otherwise by your Doctor. Read the labels on the back of food products and track your intake of fat, cholesterol and salt.   · Fluid Restriction Yes. If a Fluid Restriction has been ordered by your Doctor, measure fluids with a measuring cup to ensure that you are not exceeding the restriction.   · No smoking.  · Oxygen No. If your Doctor has ordered that you wear Oxygen at home, it is important to wear it as ordered.  · Did you receive an explanation from staff on the importance of taking each of your medications and why it is necessary to keep taking them unless your doctor says to stop? Yes  · Were all of your questions answered about how to manage your  heart failure and what to do if you have increased signs and symptoms after you go home? Yes  · Do you feel like your heart failure care team involved you in the care treatment plan and allowed you to make decisions regarding your care while in the hospital and addressed any discharge needs you might have? Yes    See the educational handout provided at discharge for more information on monitoring your daily weight, activity and diet. This also explains more about Heart Failure, symptoms of a flare-up and some of the tests that you have undergone.     Warning Signs of a Flare-Up include:  · Swelling in the ankles or lower legs.  · Shortness of breath, while at rest, or while doing normal activities.   · Shortness of breath at night when in bed, or coughing in bed.   · Requiring more pillows to sleep at night, or needing to sit up at night to sleep.  · Feeling weak, dizzy or fatigued.     When to call your Doctor:  · [facility] seven days a week from 8:00 a.m. to 8:00 p.m. for medical questions [phone].  · Call your Primary Care Physician or Cardiologist if:   1. You experience any pain radiating to your jaw or neck.  2. You have any difficulty breathing.  3. You experience weight gain of 3 lbs in a day or 5 lbs in a week.   4. You feel any palpitations or irregular heartbeats.  5. You become dizzy or lose consciousness.   If you have had an angiogram or had a pacemaker or AICD placed, and experience:  1. Bleeding, drainage or swelling at the surgical / puncture site.  2. Fever greater than 100.0 F  3. Shock from internal defibrillator.  4. Cool and / or numb extremities.      · Is patient discharged on Warfarin / Coumadin?   No       Heart Failure, Diagnosis    Heart failure means that your heart is not able to pump blood in the right way. This makes it hard for your body to work well. Heart failure is usually a long-term (chronic) condition. You must take good care of yourself and follow your  treatment plan from your doctor.  What are the causes?  This condition may be caused by:  · High blood pressure.  · Build up of cholesterol and fat in the arteries.  · Heart attack. This injures the heart muscle.  · Heart valves that do not open and close properly.  · Damage of the heart muscle. This is also called cardiomyopathy.  · Lung disease.  · Abnormal heart rhythms.  What increases the risk?  The risk of heart failure goes up as a person ages. This condition is also more likely to develop in people who:  · Are overweight.  · Are male.  · Smoke or chew tobacco.  · Abuse alcohol or illegal drugs.  · Have taken medicines that can damage the heart.  · Have diabetes.  · Have abnormal heart rhythms.  · Have thyroid problems.  · Have low blood counts (anemia).  What are the signs or symptoms?  Symptoms of this condition include:  · Shortness of breath.  · Coughing.  · Swelling of the feet, ankles, legs, or belly.  · Losing weight for no reason.  · Trouble breathing.  · Waking from sleep because of the need to sit up and get more air.  · Rapid heartbeat.  · Being very tired.  · Feeling dizzy, or feeling like you may pass out (faint).  · Having no desire to eat.  · Feeling like you may vomit (nauseous).  · Peeing (urinating) more at night.  · Feeling confused.  How is this treated?         This condition may be treated with:  · Medicines. These can be given to treat blood pressure and to make the heart muscles stronger.  · Changes in your daily life. These may include eating a healthy diet, staying at a healthy body weight, quitting tobacco and illegal drug use, or doing exercises.  · Surgery. Surgery can be done to open blocked valves, or to put devices in the heart, such as pacemakers.  · A donor heart (heart transplant). You will receive a healthy heart from a donor.  Follow these instructions at home:  · Treat other conditions as told by your doctor. These may include high blood pressure, diabetes, thyroid  disease, or abnormal heart rhythms.  · Learn as much as you can about heart failure.  · Get support as you need it.  · Keep all follow-up visits as told by your doctor. This is important.  Summary  · Heart failure means that your heart is not able to pump blood in the right way.  · This condition is caused by high blood pressure, heart attack, or damage of the heart muscle.  · Symptoms of this condition include shortness of breath and swelling of the feet, ankles, legs, or belly. You may also feel very tired or feel like you may vomit.  · You may be treated with medicines, surgery, or changes in your daily life.  · Treat other health conditions as told by your doctor.  This information is not intended to replace advice given to you by your health care provider. Make sure you discuss any questions you have with your health care provider.  Document Released: 09/26/2009 Document Revised: 03/06/2020 Document Reviewed: 03/06/2020  [employer] Patient Education © 2020 [employer]      Depression / Suicide Risk    As you are discharged from this [facility] facility, it is important to learn how to keep safe from harming yourself.    Recognize the warning signs:  · Abrupt changes in personality, positive or negative- including increase in energy   · Giving away possessions  · Change in eating patterns- significant weight changes-  positive or negative  · Change in sleeping patterns- unable to sleep or sleeping all the time   · Unwillingness or inability to communicate  · Depression  · Unusual sadness, discouragement and loneliness  · Talk of wanting to die  · Neglect of personal appearance   · Rebelliousness- reckless behavior  · Withdrawal from people/activities they love  · Confusion- inability to concentrate     If you or a loved one observes any of these behaviors or has concerns about self-harm, here's what you can do:  · Talk about it- your feelings and reasons for harming yourself  · Remove any means that you might  use to hurt yourself (examples: pills, rope, extension cords, firearm)  · Get professional help from the community (Mental Health, Substance Abuse, psychological counseling)  · Do not be alone:Call your Safe Contact- someone whom you trust who will be there for you.  · Call your local CRISIS HOTLINE 643-2433 or 874-063-1805  · Call your local Children's Mobile Crisis Response Team [place] (305) 511-6892 or www.Foldax  · Call the toll free National Suicide Prevention Hotlines   · National Suicide Prevention Lifeline 573-720-EUEK (2994)  · National Hope Line Network 800-SUICIDE (477-3887)

## 2020-11-10 ENCOUNTER — NURSE TRIAGE (OUTPATIENT)
Dept: HEALTH INFORMATION MANAGEMENT | Facility: OTHER | Age: 75
End: 2020-11-10

## 2020-11-10 NOTE — TELEPHONE ENCOUNTER
Pt was discharged from [facility] on 11/9.  Edema in leg, SOB, reasons for admission.  Wife wants to know if they can continue vitamins w/new meds ordered.     Referred to PCP & pharmacist who fills his prescriptions.

## 2020-11-10 NOTE — PROGRESS NOTES
Patient's IV discontinued and discharge instructions given. Patient discharged to home with wife via wheelchair.

## 2020-11-12 ENCOUNTER — TELEPHONE (OUTPATIENT)
Dept: CARDIOLOGY | Facility: MEDICAL CENTER | Age: 75
End: 2020-11-12

## 2020-11-12 RX ORDER — FUROSEMIDE 40 MG/1
40 TABLET ORAL DAILY
Qty: 100 TAB | Refills: 0 | Status: SHIPPED | OUTPATIENT
Start: 2020-11-12 | End: 2020-12-01 | Stop reason: SDUPTHER

## 2020-11-12 NOTE — TELEPHONE ENCOUNTER
BE    Hello, patient calling in regards to being discharged from the hospital on Monday and was prescribed blood thinners he is now urinating blood. He will like a call back at [phone].      Thank you!

## 2020-11-13 ENCOUNTER — HOSPITAL ENCOUNTER (OUTPATIENT)
Facility: MEDICAL CENTER | Age: 75
End: 2020-11-13
Attending: SURGERY | Admitting: SURGERY
Payer: MEDICARE

## 2020-11-13 NOTE — TELEPHONE ENCOUNTER
Returned call. Pt was discharged on 11/9 with Eliquis, and at noon today he noticed blood in his urine. He measures his urine and estimates that 8 oz was urine and 4 oz was bright red blood. He said he has had this issue in the past and after his 2nd TURP has been prone to hematuria. He denies any bleeding elsewhere.

## 2020-11-13 NOTE — TELEPHONE ENCOUNTER
You  Evan Wise M.D. 1 hour ago (4:23 PM)     Pt recently hospitalized with AF and new CHF, and was discharged on Eliquis. Today he noticed blood in his urine. Can he hold Eliquis for a couple days? Also, they referred him to heart failure clinic and he's scheduled to see Dr. Easton next week. But for some reason he's also scheduled to see Dr. Soria the same day. I would just think he needs the heart failure appt, right?      Evan Wise M.D.  You 3 minutes ago (5:22 PM)     Okay to hold Xarelto until his cardiology appointment.  He will benefit from being on anticoagulation and needs to see urology to determine if the medication can be resumed safely.      D/w BE. Pt can keep either TT or HK appt next week, but doesn't need both.   Called and s/w pt's wife. Informed that pt should hold Eliquis until cardiology appt, and f/u with his urologist about restarting. He just saw his urologist a few weeks ago, but she will have pt give him a call to update him. Cancelled HK appt next week and kept new heart failure appt with TT.

## 2020-11-16 ENCOUNTER — OFFICE VISIT (OUTPATIENT)
Dept: MEDICAL GROUP | Facility: MEDICAL CENTER | Age: 75
End: 2020-11-16
Payer: MEDICARE

## 2020-11-16 VITALS
TEMPERATURE: 97.1 F | OXYGEN SATURATION: 94 % | DIASTOLIC BLOOD PRESSURE: 70 MMHG | SYSTOLIC BLOOD PRESSURE: 128 MMHG | HEART RATE: 74 BPM | HEIGHT: 73 IN | RESPIRATION RATE: 16 BRPM | BODY MASS INDEX: 32.6 KG/M2 | WEIGHT: 246 LBS

## 2020-11-16 DIAGNOSIS — C73 PAPILLARY THYROID CARCINOMA (HCC): ICD-10-CM

## 2020-11-16 DIAGNOSIS — R97.20 ELEVATED PSA: ICD-10-CM

## 2020-11-16 DIAGNOSIS — R79.89 LOW TESTOSTERONE LEVEL IN MALE: ICD-10-CM

## 2020-11-16 DIAGNOSIS — R31.0 GROSS HEMATURIA: ICD-10-CM

## 2020-11-16 DIAGNOSIS — I10 ESSENTIAL HYPERTENSION: ICD-10-CM

## 2020-11-16 DIAGNOSIS — I48.0 PAROXYSMAL ATRIAL FIBRILLATION (HCC): ICD-10-CM

## 2020-11-16 DIAGNOSIS — N40.1 BENIGN PROSTATIC HYPERPLASIA WITH NOCTURIA: ICD-10-CM

## 2020-11-16 DIAGNOSIS — R35.1 BENIGN PROSTATIC HYPERPLASIA WITH NOCTURIA: ICD-10-CM

## 2020-11-16 PROBLEM — E04.1 THYROID NODULE: Status: RESOLVED | Noted: 2018-06-14 | Resolved: 2020-11-16

## 2020-11-16 PROCEDURE — 99214 OFFICE O/P EST MOD 30 MIN: CPT | Performed by: FAMILY MEDICINE

## 2020-11-16 ASSESSMENT — FIBROSIS 4 INDEX: FIB4 SCORE: 1.43

## 2020-11-16 NOTE — PROGRESS NOTES
cc:  afib    Subjective:     Sina Oliver is a 75 y.o. male presenting with his wife for a hospital follow-up.  Was admitted 11/7-11/9 with shortness of breath and leg swelling.  He was noted to be in atrial fibrillation.  His EF was slightly diminished as well.  He is now taking amlodipine 5 mg daily, apixaban 5 mg daily, atorvastatin 40 mg daily, furosemide 40 mg daily, metoprolol 25 mg twice a day, olmesartan 40 mg daily, potassium 20 mEq daily.  He was taking Eliquis twice a day, but he was developing hematuria.  This has happened to him in the past with any blood thinner.  He does continue to see urology regularly, is on Flomax and testosterone injections.  He denies any chest pain, shortness of breath, lightheadedness, dizziness.  Leg swelling has mostly resolved.  He is cut down on his water intake and salt intake.  He has an appointment with cardiology next week.    He was also diagnosed with thyroid cancer several weeks ago, has a thyroidectomy scheduled for later this month.      Review of systems:  See above.  Positive for nasal congestion that he has had for over 20 years.  Denies any fevers, shortness of breath, loss of taste/smell, diarrhea, abdominal pain      Current Outpatient Medications:   •  apixaban (ELIQUIS) 5mg Tab, Take 1 Tab by mouth every day. Indications: Thromboembolism secondary to Atrial Fibrillation, Disp: , Rfl:   •  furosemide (LASIX) 40 MG Tab, Take 1 Tab by mouth every day., Disp: 100 Tab, Rfl: 0  •  olmesartan (BENICAR) 40 MG Tab, Take 1 Tab by mouth every day., Disp: 30 Tab, Rfl: 0  •  metoprolol (LOPRESSOR) 25 MG Tab, Take 1 Tab by mouth 2 Times a Day., Disp: 60 Tab, Rfl: 0  •  potassium chloride SA (KDUR) 20 MEQ Tab CR, Take 1 Tab by mouth every day., Disp: 60 Tab, Rfl: 11  •  testosterone cypionate (DEPO-TESTOSTERONE) 200 MG/ML Solution injection, Inject 1 mL every 2 weeks intramuscularly for 140 days., Disp: 10 mL, Rfl: 3  •  atorvastatin (LIPITOR) 40 MG Tab, Take 1  "Tab by mouth every day., Disp: 100 Tab, Rfl: 3  •  amLODIPine (NORVASC) 5 MG Tab, Take 1 Tab by mouth every day., Disp: 90 Tab, Rfl: 1  •  tamsulosin (FLOMAX) 0.4 MG capsule, , Disp: , Rfl:   •  cyclobenzaprine (FLEXERIL) 10 MG Tab, Take 1 Tab by mouth 2 times a day as needed for Muscle Spasms., Disp: 180 Tab, Rfl: 0    Allergies, past medical history, past surgical history, family history, social history reviewed and updated    Objective:     Vitals: /70   Pulse 74   Temp 36.2 °C (97.1 °F)   Resp 16   Ht 1.855 m (6' 1.03\")   Wt 111.6 kg (246 lb)   SpO2 94%   BMI 32.43 kg/m²   General: Alert, pleasant, NAD  HEENT: Normocephalic.    Heart: Regular rate and rhythm.  S1 and S2 normal.  No murmurs appreciated.  Respiratory: Normal respiratory effort.  Clear to auscultation bilaterally.  Abdomen: Non-distended, soft  Skin: Warm, dry, no rashes.  Musculoskeletal: Gait is normal.  Moves all extremities well.  Extremities: No leg edema on the left, minimal on the right.  Psych:  Affect/mood is normal, judgement is good, memory is intact, grooming is appropriate.    Assessment/Plan:     Sina was seen today for hospital follow-up.    Diagnoses and all orders for this visit:    Paroxysmal atrial fibrillation (HCC)  New problem, we reviewed his hospitalization.  Continue current medications, has appoint with cardiology later this month.    Essential hypertension  Stable, continue current medications    Papillary thyroid carcinoma (HCC)  New problem, is being managed by surgery    Benign prostatic hyperplasia with nocturia  Elevated PSA  Low testosterone level in male  Gross hematuria  Stable, managed by urology        Return in about 3 months (around 2/16/2021) for routine follow up.      "

## 2020-11-19 ENCOUNTER — OFFICE VISIT (OUTPATIENT)
Dept: CARDIOLOGY | Facility: MEDICAL CENTER | Age: 75
End: 2020-11-19
Payer: MEDICARE

## 2020-11-19 VITALS
SYSTOLIC BLOOD PRESSURE: 120 MMHG | HEART RATE: 75 BPM | HEIGHT: 74 IN | OXYGEN SATURATION: 92 % | WEIGHT: 254.2 LBS | BODY MASS INDEX: 32.62 KG/M2 | DIASTOLIC BLOOD PRESSURE: 62 MMHG

## 2020-11-19 DIAGNOSIS — I48.19 PERSISTENT ATRIAL FIBRILLATION (HCC): ICD-10-CM

## 2020-11-19 DIAGNOSIS — Z79.899 HIGH RISK MEDICATION USE: ICD-10-CM

## 2020-11-19 DIAGNOSIS — I50.30 ACC/AHA STAGE C HEART FAILURE WITH PRESERVED EJECTION FRACTION (HCC): ICD-10-CM

## 2020-11-19 DIAGNOSIS — R06.09 DYSPNEA ON EXERTION: ICD-10-CM

## 2020-11-19 DIAGNOSIS — I10 HTN (HYPERTENSION), MALIGNANT: ICD-10-CM

## 2020-11-19 DIAGNOSIS — I51.89 LEFT VENTRICULAR DIASTOLIC DYSFUNCTION, NYHA CLASS 3: ICD-10-CM

## 2020-11-19 DIAGNOSIS — E07.9 THYROID DISORDER: ICD-10-CM

## 2020-11-19 DIAGNOSIS — E78.2 MIXED HYPERLIPIDEMIA: ICD-10-CM

## 2020-11-19 LAB — EKG IMPRESSION: NORMAL

## 2020-11-19 PROCEDURE — 99215 OFFICE O/P EST HI 40 MIN: CPT | Performed by: INTERNAL MEDICINE

## 2020-11-19 PROCEDURE — 93000 ELECTROCARDIOGRAM COMPLETE: CPT | Performed by: INTERNAL MEDICINE

## 2020-11-19 RX ORDER — METOPROLOL TARTRATE 50 MG/1
50 TABLET, FILM COATED ORAL 2 TIMES DAILY
Qty: 60 TAB | Refills: 11 | Status: SHIPPED | OUTPATIENT
Start: 2020-11-19 | End: 2020-12-08 | Stop reason: SDUPTHER

## 2020-11-19 ASSESSMENT — ENCOUNTER SYMPTOMS
MEMORY LOSS: 0
COUGH: 0
HEADACHES: 0
DEPRESSION: 0
DIAPHORESIS: 0
FEVER: 0
MYALGIAS: 0
FALLS: 0
DIZZINESS: 0
DOUBLE VISION: 0
ABDOMINAL PAIN: 0
SHORTNESS OF BREATH: 1
BLURRED VISION: 0
BRUISES/BLEEDS EASILY: 0
SENSORY CHANGE: 0
PALPITATIONS: 0

## 2020-11-19 ASSESSMENT — FIBROSIS 4 INDEX: FIB4 SCORE: 1.43
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
## 2020-11-23 ENCOUNTER — PRE-ADMISSION TESTING (OUTPATIENT)
Dept: ADMISSIONS | Facility: MEDICAL CENTER | Age: 75
End: 2020-11-23
Attending: SURGERY
Payer: MEDICARE

## 2020-11-23 VITALS — HEIGHT: 74 IN | BODY MASS INDEX: 31.6 KG/M2 | WEIGHT: 246.25 LBS

## 2020-11-23 DIAGNOSIS — Z01.812 PRE-OPERATIVE LABORATORY EXAMINATION: ICD-10-CM

## 2020-11-23 LAB
COVID ORDER STATUS COVID19: NORMAL
SARS-COV-2 RNA RESP QL NAA+PROBE: DETECTED
SPECIMEN SOURCE: ABNORMAL

## 2020-11-23 PROCEDURE — 84432 ASSAY OF THYROGLOBULIN: CPT

## 2020-11-23 PROCEDURE — 36415 COLL VENOUS BLD VENIPUNCTURE: CPT

## 2020-11-23 PROCEDURE — 86800 THYROGLOBULIN ANTIBODY: CPT

## 2020-11-23 PROCEDURE — U0003 INFECTIOUS AGENT DETECTION BY NUCLEIC ACID (DNA OR RNA); SEVERE ACUTE RESPIRATORY SYNDROME CORONAVIRUS 2 (SARS-COV-2) (CORONAVIRUS DISEASE [COVID-19]), AMPLIFIED PROBE TECHNIQUE, MAKING USE OF HIGH THROUGHPUT TECHNOLOGIES AS DESCRIBED BY CMS-2020-01-R: HCPCS

## 2020-11-23 ASSESSMENT — FIBROSIS 4 INDEX: FIB4 SCORE: 1.43

## 2020-11-24 NOTE — OR NURSING
Patient tested positive for Covid 19 on 11/23/2020. He is feeling fine. Informed patient that he should self isolate, and that anyone else in the household should also get tested and self isolate. Provided the number for the [facility] Department for further questions, testing and concerns. He is told that she should notify the health department of the positive test. Told patient that Dr Mendoza's office will be contacting him regarding surgery necessity scheduling procedure from this point. Awaiting Dr Mendoza's office to open this morning to notify surgery scheudler, [facility] surgery scheduling also notified.

## 2020-11-24 NOTE — OR NURSING
Dr Mendoza's office notified of 11/23/2020 positive covid test, office will follow up with patient.

## 2020-11-25 LAB
THYROGLOB AB SERPL-ACNC: <0.9 IU/ML (ref 0–4)
THYROGLOB SERPL-MCNC: 157.3 NG/ML (ref 1.3–31.8)
THYROGLOB SERPL-MCNC: ABNORMAL NG/ML (ref 1.3–31.8)

## 2020-11-30 ENCOUNTER — TELEPHONE (OUTPATIENT)
Dept: CARDIOLOGY | Facility: MEDICAL CENTER | Age: 75
End: 2020-11-30

## 2020-11-30 NOTE — TELEPHONE ENCOUNTER
----- Message -----   From: Marilin Trinidad, Med Ass't   Sent: 2020   1:44 PM PST   To: Oswaldo Hooks/Ishmael   Subject: refill                                           AW   TO:    NM: Sina Montemayor     PH: [phone]    PT NM: Sina Montemayor     : 41    REG DR:  To    RE: Is almost out of Furosemide 40 mg   rx not sure if is continuing rx or not   please call to advise     DISP HIST: 2020 12:46P   --------------------------------------------------------------------------------    Called pt and informed him it looks like his PCP refilled lasix on 20, he will follow up w/ pharmacy and/or PCP.

## 2020-12-01 RX ORDER — FUROSEMIDE 40 MG/1
40 TABLET ORAL DAILY
Qty: 100 TAB | Refills: 0 | Status: SHIPPED | OUTPATIENT
Start: 2020-12-01 | End: 2021-01-29 | Stop reason: SDUPTHER

## 2020-12-01 NOTE — TELEPHONE ENCOUNTER
Pt called and asked If you think he should still take this medication, if so he does need a refill. Pt was suppose to have a Thyroidectomy, but surgery got canceled due to him testing positive for covid. pts surgery should be rescheduled in the next few weeks.    Please advise on refill

## 2020-12-04 ENCOUNTER — TELEPHONE (OUTPATIENT)
Dept: MEDICAL GROUP | Facility: MEDICAL CENTER | Age: 75
End: 2020-12-04

## 2020-12-04 DIAGNOSIS — C73 PAPILLARY THYROID CARCINOMA (HCC): ICD-10-CM

## 2020-12-04 DIAGNOSIS — I10 ESSENTIAL HYPERTENSION: ICD-10-CM

## 2020-12-04 DIAGNOSIS — E04.1 THYROID NODULE: ICD-10-CM

## 2020-12-04 DIAGNOSIS — E78.2 MIXED HYPERLIPIDEMIA: ICD-10-CM

## 2020-12-04 RX ORDER — AMLODIPINE BESYLATE 5 MG/1
5 TABLET ORAL DAILY
Qty: 90 TAB | Refills: 3 | Status: SHIPPED | OUTPATIENT
Start: 2020-12-04 | End: 2020-12-08 | Stop reason: SDUPTHER

## 2020-12-04 NOTE — TELEPHONE ENCOUNTER
Pt called asking for a second opinion about his thyroid  Surgery. He is not too happy with the surgeon/surgeons office and isnt too sure if he should have his whole thyroid removed or if there is something else he can do.    Please advise

## 2020-12-05 RX ORDER — ATORVASTATIN CALCIUM 40 MG/1
40 TABLET, FILM COATED ORAL DAILY
Qty: 100 TAB | Refills: 3 | Status: SHIPPED | OUTPATIENT
Start: 2020-12-05 | End: 2020-12-08 | Stop reason: SDUPTHER

## 2020-12-05 RX ORDER — OLMESARTAN MEDOXOMIL 40 MG/1
40 TABLET ORAL DAILY
Qty: 100 TAB | Refills: 1 | Status: SHIPPED | OUTPATIENT
Start: 2020-12-05 | End: 2020-12-08 | Stop reason: SDUPTHER

## 2020-12-08 DIAGNOSIS — I10 ESSENTIAL HYPERTENSION: ICD-10-CM

## 2020-12-08 DIAGNOSIS — E78.2 MIXED HYPERLIPIDEMIA: ICD-10-CM

## 2020-12-08 RX ORDER — AMLODIPINE BESYLATE 10 MG/1
10 TABLET ORAL DAILY
Qty: 90 TAB | Refills: 1 | Status: SHIPPED | OUTPATIENT
Start: 2020-12-08 | End: 2021-02-09

## 2020-12-08 RX ORDER — OLMESARTAN MEDOXOMIL 40 MG/1
40 TABLET ORAL DAILY
Qty: 90 TAB | Refills: 1 | Status: SHIPPED | OUTPATIENT
Start: 2020-12-08 | End: 2021-02-22 | Stop reason: SDUPTHER

## 2020-12-08 RX ORDER — ATORVASTATIN CALCIUM 40 MG/1
40 TABLET, FILM COATED ORAL DAILY
Qty: 90 TAB | Refills: 1 | Status: SHIPPED | OUTPATIENT
Start: 2020-12-08 | End: 2021-02-22 | Stop reason: SDUPTHER

## 2020-12-08 RX ORDER — METOPROLOL TARTRATE 50 MG/1
50 TABLET, FILM COATED ORAL 2 TIMES DAILY
Qty: 180 TAB | Refills: 1 | Status: SHIPPED | OUTPATIENT
Start: 2020-12-08 | End: 2021-02-22 | Stop reason: SDUPTHER

## 2020-12-08 NOTE — TELEPHONE ENCOUNTER
Received request via: Patient    Was the patient seen in the last year in this department? Yes    Does the patient have an active prescription (recently filled or refills available) for medication(s) requested? No     Pharmacy asking for 90 day for all 4

## 2020-12-28 ENCOUNTER — TELEPHONE (OUTPATIENT)
Dept: MEDICAL GROUP | Facility: MEDICAL CENTER | Age: 75
End: 2020-12-28

## 2020-12-28 DIAGNOSIS — E04.1 THYROID NODULE: ICD-10-CM

## 2020-12-28 DIAGNOSIS — C73 PAPILLARY THYROID CARCINOMA (HCC): ICD-10-CM

## 2020-12-28 NOTE — TELEPHONE ENCOUNTER
"Phone Number Called: [phone]    Call outcome: Spoke to patient regarding message below.    Message: Called to follow up with patient regarding message that the patient left. Patient is requesting written confirmation of the COVID positive results. Gave verbal notice to patient of positive COVID result on 11/23/2020. Will mail results to patient via letter. Patient would like a referral to a cancer doctor. Patient wants a thorough analysis of the \"alleged cancer of my thyroid\". Told patient that thyroid cancer is usually treated via surgery. Patient wants to know if they can remove the tumor without taking out the whole thyroid or just take part of the thyroid. Informed the patient that these questions are usually answered by the surgeon. Patient willing to pay out pocket for services that the insurance will not pay for if under a thousand dollars. Informed patient that referral to the surgeon was treatment driven by best practice and not because insurances says you have to see a surgeon instead of an oncologist. Informed patient I will send the referral request to Dr Chase and she will place it if she feels it is appropriate. Patient wanting to know if he would be retested for COVID after having it before. Informed patient that he would be tested before any surgery he would have. Patient asking about A. Fib. Patient informed that he should be treated for the A. Fib per cardiology recommendation as the side effect of A Fib could be a stroke, PE, or Heart attack related to a blood clot from turbulent blood. Patient verbalizes understanding and plan of treatment for his heart. Patient will wait to hear back regarding referral to oncology.      "

## 2020-12-29 ENCOUNTER — OFFICE VISIT (OUTPATIENT)
Dept: CARDIOLOGY | Facility: MEDICAL CENTER | Age: 75
End: 2020-12-29
Payer: MEDICARE

## 2020-12-29 ENCOUNTER — PHARMACY VISIT (OUTPATIENT)
Dept: PHARMACY | Facility: MEDICAL CENTER | Age: 75
End: 2020-12-29
Payer: COMMERCIAL

## 2020-12-29 VITALS
HEART RATE: 60 BPM | RESPIRATION RATE: 17 BRPM | OXYGEN SATURATION: 92 % | HEIGHT: 73 IN | DIASTOLIC BLOOD PRESSURE: 70 MMHG | BODY MASS INDEX: 33.73 KG/M2 | SYSTOLIC BLOOD PRESSURE: 130 MMHG | WEIGHT: 254.5 LBS

## 2020-12-29 DIAGNOSIS — I10 HTN (HYPERTENSION), MALIGNANT: ICD-10-CM

## 2020-12-29 DIAGNOSIS — E78.2 MIXED HYPERLIPIDEMIA: ICD-10-CM

## 2020-12-29 DIAGNOSIS — Z79.899 HIGH RISK MEDICATION USE: ICD-10-CM

## 2020-12-29 DIAGNOSIS — I48.19 PERSISTENT ATRIAL FIBRILLATION (HCC): ICD-10-CM

## 2020-12-29 DIAGNOSIS — I51.89 LEFT VENTRICULAR DIASTOLIC DYSFUNCTION, NYHA CLASS 3: ICD-10-CM

## 2020-12-29 DIAGNOSIS — I50.30 ACC/AHA STAGE C HEART FAILURE WITH PRESERVED EJECTION FRACTION (HCC): ICD-10-CM

## 2020-12-29 DIAGNOSIS — R06.09 DYSPNEA ON EXERTION: ICD-10-CM

## 2020-12-29 PROCEDURE — 99214 OFFICE O/P EST MOD 30 MIN: CPT | Performed by: INTERNAL MEDICINE

## 2020-12-29 PROCEDURE — RXMED WILLOW AMBULATORY MEDICATION CHARGE: Performed by: PHYSICIAN ASSISTANT

## 2020-12-29 ASSESSMENT — ENCOUNTER SYMPTOMS
BRUISES/BLEEDS EASILY: 0
MEMORY LOSS: 0
MYALGIAS: 0
SHORTNESS OF BREATH: 1
ABDOMINAL PAIN: 0
HEADACHES: 0
BLURRED VISION: 0
DIAPHORESIS: 0
SENSORY CHANGE: 0
DEPRESSION: 0
COUGH: 0
FEVER: 0
DOUBLE VISION: 0
FALLS: 0
DIZZINESS: 0
PALPITATIONS: 0

## 2020-12-29 ASSESSMENT — FIBROSIS 4 INDEX: FIB4 SCORE: 1.43
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
## 2020-12-29 NOTE — TELEPHONE ENCOUNTER
Phone Number Called: [phone]    Call outcome: Spoke to patient regarding message below.    Message: Called to inform patient that Dr Chase place a referral for the oncologist, but she recommends another opinion surgeon instead.

## 2020-12-29 NOTE — TELEPHONE ENCOUNTER
Referral to oncology placed for a second opinion per his request, but I would recommend another opinion by a surgeon instead.

## 2021-01-04 ENCOUNTER — TELEPHONE (OUTPATIENT)
Dept: MEDICAL GROUP | Facility: MEDICAL CENTER | Age: 76
End: 2021-01-04

## 2021-01-04 DIAGNOSIS — C73 PAPILLARY THYROID CARCINOMA (HCC): ICD-10-CM

## 2021-01-04 DIAGNOSIS — E04.1 THYROID NODULE: ICD-10-CM

## 2021-01-04 NOTE — TELEPHONE ENCOUNTER
Phone Number Called: [phone]    Call outcome: Spoke to patient regarding message below.    Message: Called to inform patient that Dr Chase has placed orders for referral to endocrinology and to surgeon. Notified patient he can call and request an appointment with radiation oncology if he would like.

## 2021-01-04 NOTE — TELEPHONE ENCOUNTER
Phone Number Called: [phone]    Call outcome: Spoke to patient regarding message below.    Message: Called to inform patient that the oncologist does not see patients for thyroid nodule. Patient is still refusing to see the surgeon. Patient did some research and wants to know about getting radioactive iodine treatment done for his thyroid cancer. He would like to know who to talk to regarding alternate treatment options. This RN informed patient I would send request to Dr Chase and follow up with the patient after getting her opinion.

## 2021-01-11 DIAGNOSIS — Z23 NEED FOR VACCINATION: ICD-10-CM

## 2021-01-15 ENCOUNTER — PHARMACY VISIT (OUTPATIENT)
Dept: PHARMACY | Facility: MEDICAL CENTER | Age: 76
End: 2021-01-15
Payer: COMMERCIAL

## 2021-01-15 PROCEDURE — RXMED WILLOW AMBULATORY MEDICATION CHARGE: Performed by: PHYSICIAN ASSISTANT

## 2021-01-27 ENCOUNTER — TELEPHONE (OUTPATIENT)
Dept: CARDIOLOGY | Facility: MEDICAL CENTER | Age: 76
End: 2021-01-27

## 2021-01-27 NOTE — TELEPHONE ENCOUNTER
TO: 2pm/ Wed/Ofc  NM: Sina Oliver   PH: [phone]   PT NM: Sina Oliver   : 1945   REG DR:  To   RE: Calling to set-up an appt. for  the Restart of the heart rhythm  pattern, that requires anesthesia and  electric stimulation.   DISP HIST: 2021 01:41P KRISTINE, pt  dsp

## 2021-01-27 NOTE — TELEPHONE ENCOUNTER
Dr. Easton,    This patient would like to schedule a SUSAN/Cardioversion. Am I ok to schedule or do we need to get an EKG to confirm patient is in afib?    Thank You,  Flaquita

## 2021-01-28 NOTE — TELEPHONE ENCOUNTER
ANA LUISA Land, Med Ass't; Suni Lugo R.N.   Caller: Unspecified (Yesterday,  2:39 PM)             Need to see me first in clinic before scheduling.

## 2021-01-29 ENCOUNTER — OFFICE VISIT (OUTPATIENT)
Dept: CARDIOLOGY | Facility: MEDICAL CENTER | Age: 76
End: 2021-01-29
Payer: MEDICARE

## 2021-01-29 ENCOUNTER — PHARMACY VISIT (OUTPATIENT)
Dept: PHARMACY | Facility: MEDICAL CENTER | Age: 76
End: 2021-01-29
Payer: COMMERCIAL

## 2021-01-29 VITALS
BODY MASS INDEX: 33.67 KG/M2 | DIASTOLIC BLOOD PRESSURE: 78 MMHG | RESPIRATION RATE: 14 BRPM | WEIGHT: 262.4 LBS | HEART RATE: 58 BPM | HEIGHT: 74 IN | SYSTOLIC BLOOD PRESSURE: 112 MMHG | OXYGEN SATURATION: 93 %

## 2021-01-29 DIAGNOSIS — I51.89 LEFT VENTRICULAR DIASTOLIC DYSFUNCTION, NYHA CLASS 3: ICD-10-CM

## 2021-01-29 DIAGNOSIS — R06.09 DYSPNEA ON EXERTION: ICD-10-CM

## 2021-01-29 DIAGNOSIS — Z79.899 HIGH RISK MEDICATION USE: ICD-10-CM

## 2021-01-29 DIAGNOSIS — E78.2 MIXED HYPERLIPIDEMIA: ICD-10-CM

## 2021-01-29 DIAGNOSIS — M79.89 LEG SWELLING: ICD-10-CM

## 2021-01-29 DIAGNOSIS — I50.30 ACC/AHA STAGE C HEART FAILURE WITH PRESERVED EJECTION FRACTION (HCC): ICD-10-CM

## 2021-01-29 DIAGNOSIS — I10 HTN (HYPERTENSION), MALIGNANT: ICD-10-CM

## 2021-01-29 DIAGNOSIS — I48.19 PERSISTENT ATRIAL FIBRILLATION (HCC): ICD-10-CM

## 2021-01-29 PROCEDURE — 99214 OFFICE O/P EST MOD 30 MIN: CPT | Mod: 25 | Performed by: INTERNAL MEDICINE

## 2021-01-29 PROCEDURE — RXMED WILLOW AMBULATORY MEDICATION CHARGE: Performed by: PHYSICIAN ASSISTANT

## 2021-01-29 RX ORDER — FUROSEMIDE 40 MG/1
40 TABLET ORAL
Qty: 60 TAB | Refills: 11 | Status: SHIPPED | OUTPATIENT
Start: 2021-01-29 | End: 2021-02-22 | Stop reason: SDUPTHER

## 2021-01-29 RX ORDER — POTASSIUM CHLORIDE 20 MEQ/1
20 TABLET, EXTENDED RELEASE ORAL 2 TIMES DAILY
Qty: 60 TAB | Refills: 11 | Status: SHIPPED | OUTPATIENT
Start: 2021-01-29 | End: 2021-02-22 | Stop reason: SDUPTHER

## 2021-01-29 ASSESSMENT — ENCOUNTER SYMPTOMS
FEVER: 0
BLURRED VISION: 0
DIZZINESS: 0
DOUBLE VISION: 0
BRUISES/BLEEDS EASILY: 0
PALPITATIONS: 0
ABDOMINAL PAIN: 0
FALLS: 0
SENSORY CHANGE: 0
DEPRESSION: 0
MEMORY LOSS: 0
COUGH: 0
DIAPHORESIS: 0
SHORTNESS OF BREATH: 1
HEADACHES: 0
MYALGIAS: 0

## 2021-01-29 ASSESSMENT — FIBROSIS 4 INDEX: FIB4 SCORE: 1.43
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
## 2021-02-01 LAB — EKG IMPRESSION: NORMAL

## 2021-02-01 PROCEDURE — 93000 ELECTROCARDIOGRAM COMPLETE: CPT | Performed by: INTERNAL MEDICINE

## 2021-02-03 ENCOUNTER — HOSPITAL ENCOUNTER (OUTPATIENT)
Dept: LAB | Facility: MEDICAL CENTER | Age: 76
End: 2021-02-03
Attending: INTERNAL MEDICINE
Payer: MEDICARE

## 2021-02-03 DIAGNOSIS — I50.30 ACC/AHA STAGE C HEART FAILURE WITH PRESERVED EJECTION FRACTION (HCC): ICD-10-CM

## 2021-02-03 DIAGNOSIS — I48.19 PERSISTENT ATRIAL FIBRILLATION (HCC): ICD-10-CM

## 2021-02-03 DIAGNOSIS — M79.89 LEG SWELLING: ICD-10-CM

## 2021-02-03 DIAGNOSIS — I51.89 LEFT VENTRICULAR DIASTOLIC DYSFUNCTION, NYHA CLASS 3: ICD-10-CM

## 2021-02-03 DIAGNOSIS — I10 HTN (HYPERTENSION), MALIGNANT: ICD-10-CM

## 2021-02-03 PROCEDURE — 80048 BASIC METABOLIC PNL TOTAL CA: CPT

## 2021-02-03 PROCEDURE — 83880 ASSAY OF NATRIURETIC PEPTIDE: CPT

## 2021-02-03 PROCEDURE — 36415 COLL VENOUS BLD VENIPUNCTURE: CPT

## 2021-02-04 ENCOUNTER — PATIENT OUTREACH (OUTPATIENT)
Dept: HEALTH INFORMATION MANAGEMENT | Facility: OTHER | Age: 76
End: 2021-02-04

## 2021-02-04 LAB
ANION GAP SERPL CALC-SCNC: 9 MMOL/L (ref 7–16)
BUN SERPL-MCNC: 19 MG/DL (ref 8–22)
CALCIUM SERPL-MCNC: 9.6 MG/DL (ref 8.5–10.5)
CHLORIDE SERPL-SCNC: 103 MMOL/L (ref 96–112)
CO2 SERPL-SCNC: 25 MMOL/L (ref 20–33)
CREAT SERPL-MCNC: 0.78 MG/DL (ref 0.5–1.4)
GLUCOSE SERPL-MCNC: 82 MG/DL (ref 65–99)
NT-PROBNP SERPL IA-MCNC: 1054 PG/ML (ref 0–125)
POTASSIUM SERPL-SCNC: 4 MMOL/L (ref 3.6–5.5)
SODIUM SERPL-SCNC: 137 MMOL/L (ref 135–145)

## 2021-02-05 ENCOUNTER — RESEARCH ENCOUNTER (OUTPATIENT)
Dept: CARDIOLOGY | Facility: MEDICAL CENTER | Age: 76
End: 2021-02-05

## 2021-02-05 ENCOUNTER — TELEPHONE (OUTPATIENT)
Dept: CARDIOLOGY | Facility: MEDICAL CENTER | Age: 76
End: 2021-02-05

## 2021-02-05 ENCOUNTER — APPOINTMENT (OUTPATIENT)
Dept: CARDIOLOGY | Facility: MEDICAL CENTER | Age: 76
End: 2021-02-05
Payer: MEDICARE

## 2021-02-05 ENCOUNTER — OFFICE VISIT (OUTPATIENT)
Dept: CARDIOLOGY | Facility: MEDICAL CENTER | Age: 76
End: 2021-02-05
Payer: MEDICARE

## 2021-02-05 VITALS
DIASTOLIC BLOOD PRESSURE: 62 MMHG | OXYGEN SATURATION: 92 % | SYSTOLIC BLOOD PRESSURE: 100 MMHG | WEIGHT: 253 LBS | BODY MASS INDEX: 32.47 KG/M2 | HEIGHT: 74 IN | HEART RATE: 80 BPM

## 2021-02-05 DIAGNOSIS — I50.30 ACC/AHA STAGE C HEART FAILURE WITH PRESERVED EJECTION FRACTION (HCC): ICD-10-CM

## 2021-02-05 DIAGNOSIS — I10 HTN (HYPERTENSION), MALIGNANT: ICD-10-CM

## 2021-02-05 DIAGNOSIS — R06.09 DYSPNEA ON EXERTION: ICD-10-CM

## 2021-02-05 DIAGNOSIS — E78.2 MIXED HYPERLIPIDEMIA: ICD-10-CM

## 2021-02-05 DIAGNOSIS — Z79.899 HIGH RISK MEDICATION USE: ICD-10-CM

## 2021-02-05 DIAGNOSIS — M79.89 LEG SWELLING: ICD-10-CM

## 2021-02-05 DIAGNOSIS — I51.89 LEFT VENTRICULAR DIASTOLIC DYSFUNCTION, NYHA CLASS 3: ICD-10-CM

## 2021-02-05 DIAGNOSIS — I48.19 PERSISTENT ATRIAL FIBRILLATION (HCC): ICD-10-CM

## 2021-02-05 PROCEDURE — 99214 OFFICE O/P EST MOD 30 MIN: CPT | Performed by: INTERNAL MEDICINE

## 2021-02-05 ASSESSMENT — ENCOUNTER SYMPTOMS
SENSORY CHANGE: 0
SHORTNESS OF BREATH: 1
DEPRESSION: 0
BLURRED VISION: 0
COUGH: 0
HEADACHES: 0
FALLS: 0
DOUBLE VISION: 0
BRUISES/BLEEDS EASILY: 0
FEVER: 0
MEMORY LOSS: 0
MYALGIAS: 0
PALPITATIONS: 0
ABDOMINAL PAIN: 0
DIAPHORESIS: 0
DIZZINESS: 0

## 2021-02-05 ASSESSMENT — FIBROSIS 4 INDEX: FIB4 SCORE: 1.43
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
## 2021-02-05 NOTE — TELEPHONE ENCOUNTER
Patient is scheduled on 2-11-21 for a RHC w/cardiomems with Dr. Easton. Patient was told to hold eliquis for 48hrs prior and to hold lasix AM day of procedure and to check in at 6:30 for an 8:30 procedure. H&P was done on 2-5-21 by Dr. Easton. Pre admit to call patient.

## 2021-02-05 NOTE — PROGRESS NOTES
Patient was seen 8/27/2020 by [facility] with provider Liz Hyman both OON. No authorization on file. [facility] being billed 344.51 for OON. Informed by Felicitas Supervisor ANDRÉS, the provider will need to fax in an appeal to [phone] attn: provider appeals. Informed members spouse Shaniqua and will keep following up with the appeal and keep Shaniqua informed.

## 2021-02-05 NOTE — TELEPHONE ENCOUNTER
----- Message from Kimmy Easton M.D. sent at 2/5/2021 12:09 PM PST -----  Regarding: RE: GUIDE CardioMems Scheduling Today  Done.  Thanks team.  tt  ----- Message -----  From: Melissa Mas  Sent: 2/5/2021  11:44 AM PST  To: Catarina Ervin R.N., Deya Bush, #  Subject: GUIDE CardioMems Scheduling Today                Deya,      This patient was seen by Dr. Easton today and is coming back to the office at 1pm to consent for GUIDE CardioMems. He would like to be added to the schedule this Thursday Feb 11th if possible.   Come over when you can, we will be in the Educations room for about an hour.  Dr. Easton, you will need to put the order in too please.  Melissa

## 2021-02-08 ENCOUNTER — TELEPHONE (OUTPATIENT)
Dept: CARDIOLOGY | Facility: MEDICAL CENTER | Age: 76
End: 2021-02-08

## 2021-02-08 ENCOUNTER — PRE-ADMISSION TESTING (OUTPATIENT)
Dept: ADMISSIONS | Facility: MEDICAL CENTER | Age: 76
End: 2021-02-08
Attending: INTERNAL MEDICINE
Payer: MEDICARE

## 2021-02-08 DIAGNOSIS — Z01.812 PRE-OPERATIVE LABORATORY EXAMINATION: ICD-10-CM

## 2021-02-08 LAB — COVID ORDER STATUS COVID19: NORMAL

## 2021-02-08 PROCEDURE — U0005 INFEC AGEN DETEC AMPLI PROBE: HCPCS

## 2021-02-08 PROCEDURE — U0003 INFECTIOUS AGENT DETECTION BY NUCLEIC ACID (DNA OR RNA); SEVERE ACUTE RESPIRATORY SYNDROME CORONAVIRUS 2 (SARS-COV-2) (CORONAVIRUS DISEASE [COVID-19]), AMPLIFIED PROBE TECHNIQUE, MAKING USE OF HIGH THROUGHPUT TECHNOLOGIES AS DESCRIBED BY CMS-2020-01-R: HCPCS

## 2021-02-08 PROCEDURE — C9803 HOPD COVID-19 SPEC COLLECT: HCPCS

## 2021-02-08 RX ORDER — POTASSIUM CHLORIDE 20 MEQ/1
20 TABLET, EXTENDED RELEASE ORAL 2 TIMES DAILY
Qty: 60 TAB | Refills: 0 | Status: SHIPPED | OUTPATIENT
Start: 2021-02-08 | End: 2021-07-21

## 2021-02-08 NOTE — TELEPHONE ENCOUNTER
TT    Pt called stating he needs a new 90 day prescription of olmesartan sent to [facility] pharmacy and a 30 day prescription of olmesartan sent to [facility] on [address] due to Pt being completely out of medication. Pt would also like a 30 day prescription of potassium chloride sent to [facility]. If any questions please call Pt at [phone].

## 2021-02-08 NOTE — TELEPHONE ENCOUNTER
Potassium rx sent. Olmesartan is prescribed by PCP, called pt and asked him to ask his PCP for those refills, he will do so.

## 2021-02-09 ENCOUNTER — PRE-ADMISSION TESTING (OUTPATIENT)
Dept: ADMISSIONS | Facility: MEDICAL CENTER | Age: 76
End: 2021-02-09
Attending: INTERNAL MEDICINE
Payer: MEDICARE

## 2021-02-09 DIAGNOSIS — I10 ESSENTIAL HYPERTENSION: ICD-10-CM

## 2021-02-09 DIAGNOSIS — Z01.810 PRE-OPERATIVE CARDIOVASCULAR EXAMINATION: ICD-10-CM

## 2021-02-09 DIAGNOSIS — Z01.812 PRE-OPERATIVE LABORATORY EXAMINATION: ICD-10-CM

## 2021-02-09 LAB
APTT PPP: 29.6 SEC (ref 24.7–36)
EKG IMPRESSION: NORMAL
ERYTHROCYTE [DISTWIDTH] IN BLOOD BY AUTOMATED COUNT: 57.7 FL (ref 35.9–50)
HCT VFR BLD AUTO: 51.3 % (ref 42–52)
HGB BLD-MCNC: 17.2 G/DL (ref 14–18)
INR PPP: 1.11 (ref 0.87–1.13)
MCH RBC QN AUTO: 33.6 PG (ref 27–33)
MCHC RBC AUTO-ENTMCNC: 33.5 G/DL (ref 33.7–35.3)
MCV RBC AUTO: 100.2 FL (ref 81.4–97.8)
NT-PROBNP SERPL IA-MCNC: 1134 PG/ML (ref 0–125)
PLATELET # BLD AUTO: 241 K/UL (ref 164–446)
PMV BLD AUTO: 10.2 FL (ref 9–12.9)
PROTHROMBIN TIME: 14.7 SEC (ref 12–14.6)
RBC # BLD AUTO: 5.12 M/UL (ref 4.7–6.1)
SARS-COV-2 RNA RESP QL NAA+PROBE: NOTDETECTED
SPECIMEN SOURCE: NORMAL
WBC # BLD AUTO: 8.1 K/UL (ref 4.8–10.8)

## 2021-02-09 PROCEDURE — 83735 ASSAY OF MAGNESIUM: CPT

## 2021-02-09 PROCEDURE — 83880 ASSAY OF NATRIURETIC PEPTIDE: CPT

## 2021-02-09 PROCEDURE — 85610 PROTHROMBIN TIME: CPT

## 2021-02-09 PROCEDURE — 93005 ELECTROCARDIOGRAM TRACING: CPT

## 2021-02-09 PROCEDURE — 85730 THROMBOPLASTIN TIME PARTIAL: CPT

## 2021-02-09 PROCEDURE — 36415 COLL VENOUS BLD VENIPUNCTURE: CPT

## 2021-02-09 PROCEDURE — 80053 COMPREHEN METABOLIC PANEL: CPT

## 2021-02-09 PROCEDURE — 85027 COMPLETE CBC AUTOMATED: CPT

## 2021-02-09 PROCEDURE — 93010 ELECTROCARDIOGRAM REPORT: CPT | Performed by: INTERNAL MEDICINE

## 2021-02-09 RX ORDER — AMLODIPINE BESYLATE 10 MG/1
10 TABLET ORAL DAILY
Qty: 90 TAB | Refills: 1 | Status: SHIPPED | OUTPATIENT
Start: 2021-02-09 | End: 2021-02-12

## 2021-02-09 ASSESSMENT — FIBROSIS 4 INDEX: FIB4 SCORE: 1.43

## 2021-02-10 LAB
ALBUMIN SERPL BCP-MCNC: 4.5 G/DL (ref 3.2–4.9)
ALBUMIN/GLOB SERPL: 1.6 G/DL
ALP SERPL-CCNC: 73 U/L (ref 30–99)
ALT SERPL-CCNC: 47 U/L (ref 2–50)
ANION GAP SERPL CALC-SCNC: 15 MMOL/L (ref 7–16)
AST SERPL-CCNC: 37 U/L (ref 12–45)
BILIRUB SERPL-MCNC: 1.9 MG/DL (ref 0.1–1.5)
BUN SERPL-MCNC: 23 MG/DL (ref 8–22)
CALCIUM SERPL-MCNC: 10 MG/DL (ref 8.5–10.5)
CHLORIDE SERPL-SCNC: 97 MMOL/L (ref 96–112)
CO2 SERPL-SCNC: 24 MMOL/L (ref 20–33)
CREAT SERPL-MCNC: 1.26 MG/DL (ref 0.5–1.4)
GLOBULIN SER CALC-MCNC: 2.8 G/DL (ref 1.9–3.5)
GLUCOSE SERPL-MCNC: 120 MG/DL (ref 65–99)
MAGNESIUM SERPL-MCNC: 2.1 MG/DL (ref 1.5–2.5)
POTASSIUM SERPL-SCNC: 4 MMOL/L (ref 3.6–5.5)
PROT SERPL-MCNC: 7.3 G/DL (ref 6–8.2)
SODIUM SERPL-SCNC: 136 MMOL/L (ref 135–145)

## 2021-02-11 ENCOUNTER — HOSPITAL ENCOUNTER (OUTPATIENT)
Facility: MEDICAL CENTER | Age: 76
End: 2021-02-11
Attending: INTERNAL MEDICINE | Admitting: INTERNAL MEDICINE
Payer: MEDICARE

## 2021-02-11 ENCOUNTER — APPOINTMENT (OUTPATIENT)
Dept: CARDIOLOGY | Facility: MEDICAL CENTER | Age: 76
End: 2021-02-11
Attending: INTERNAL MEDICINE
Payer: MEDICARE

## 2021-02-11 VITALS
HEART RATE: 73 BPM | TEMPERATURE: 96.8 F | HEIGHT: 74 IN | BODY MASS INDEX: 33.19 KG/M2 | RESPIRATION RATE: 18 BRPM | WEIGHT: 258.6 LBS | DIASTOLIC BLOOD PRESSURE: 57 MMHG | SYSTOLIC BLOOD PRESSURE: 98 MMHG | OXYGEN SATURATION: 91 %

## 2021-02-11 DIAGNOSIS — I50.30 ACC/AHA STAGE C HEART FAILURE WITH PRESERVED EJECTION FRACTION (HCC): ICD-10-CM

## 2021-02-11 DIAGNOSIS — I48.19 PERSISTENT ATRIAL FIBRILLATION (HCC): ICD-10-CM

## 2021-02-11 DIAGNOSIS — I51.89 LEFT VENTRICULAR DIASTOLIC DYSFUNCTION, NYHA CLASS 3: ICD-10-CM

## 2021-02-11 LAB
ACTION RANGE TRIGGERED IACRT: YES
BASE EXCESS BLDV CALC-SCNC: -4 MMOL/L (ref -4–3)
BODY TEMPERATURE: ABNORMAL DEGREES
CO2 BLDV-SCNC: 23 MMOL/L (ref 20–33)
HCO3 BLDV-SCNC: 21.8 MMOL/L (ref 24–28)
INST. QUALIFIED PATIENT IIQPT: YES
PCO2 BLDV: 42.3 MMHG (ref 41–51)
PH BLDV: 7.32 [PH] (ref 7.31–7.45)
PO2 BLDV: 34 MMHG (ref 25–40)
SAO2 % BLDV: 60 %
SPECIMEN DRAWN FROM PATIENT: ABNORMAL

## 2021-02-11 PROCEDURE — 160002 HCHG RECOVERY MINUTES (STAT)

## 2021-02-11 PROCEDURE — 82803 BLOOD GASES ANY COMBINATION: CPT

## 2021-02-11 PROCEDURE — 700101 HCHG RX REV CODE 250

## 2021-02-11 PROCEDURE — 33289 TCAT IMPL WRLS P-ART PRS SNR: CPT | Performed by: INTERNAL MEDICINE

## 2021-02-11 PROCEDURE — 700111 HCHG RX REV CODE 636 W/ 250 OVERRIDE (IP)

## 2021-02-11 PROCEDURE — 700117 HCHG RX CONTRAST REV CODE 255: Performed by: INTERNAL MEDICINE

## 2021-02-11 PROCEDURE — 99153 MOD SED SAME PHYS/QHP EA: CPT

## 2021-02-11 PROCEDURE — 700105 HCHG RX REV CODE 258: Performed by: INTERNAL MEDICINE

## 2021-02-11 PROCEDURE — 99152 MOD SED SAME PHYS/QHP 5/>YRS: CPT | Performed by: INTERNAL MEDICINE

## 2021-02-11 RX ORDER — MIDAZOLAM HYDROCHLORIDE 1 MG/ML
INJECTION INTRAMUSCULAR; INTRAVENOUS
Status: COMPLETED
Start: 2021-02-11 | End: 2021-02-11

## 2021-02-11 RX ORDER — HEPARIN SODIUM 1000 [USP'U]/ML
INJECTION, SOLUTION INTRAVENOUS; SUBCUTANEOUS
Status: COMPLETED
Start: 2021-02-11 | End: 2021-02-11

## 2021-02-11 RX ORDER — LIDOCAINE HYDROCHLORIDE 20 MG/ML
INJECTION, SOLUTION INFILTRATION; PERINEURAL
Status: COMPLETED
Start: 2021-02-11 | End: 2021-02-11

## 2021-02-11 RX ORDER — SODIUM CHLORIDE 9 MG/ML
INJECTION, SOLUTION INTRAVENOUS
Status: DISCONTINUED | OUTPATIENT
Start: 2021-02-11 | End: 2021-02-11 | Stop reason: HOSPADM

## 2021-02-11 RX ORDER — HEPARIN SODIUM 200 [USP'U]/100ML
INJECTION, SOLUTION INTRAVENOUS
Status: COMPLETED
Start: 2021-02-11 | End: 2021-02-11

## 2021-02-11 RX ADMIN — IOHEXOL 5 ML: 350 INJECTION, SOLUTION INTRAVENOUS at 09:47

## 2021-02-11 RX ADMIN — SODIUM CHLORIDE: 9 INJECTION, SOLUTION INTRAVENOUS at 07:00

## 2021-02-11 RX ADMIN — HEPARIN SODIUM 2000 UNITS: 200 INJECTION, SOLUTION INTRAVENOUS at 08:47

## 2021-02-11 RX ADMIN — FENTANYL CITRATE 50 MCG: 50 INJECTION, SOLUTION INTRAMUSCULAR; INTRAVENOUS at 09:22

## 2021-02-11 RX ADMIN — MIDAZOLAM HYDROCHLORIDE 2 MG: 1 INJECTION, SOLUTION INTRAMUSCULAR; INTRAVENOUS at 09:02

## 2021-02-11 RX ADMIN — LIDOCAINE HYDROCHLORIDE: 20 INJECTION, SOLUTION INFILTRATION; PERINEURAL at 08:47

## 2021-02-11 RX ADMIN — HEPARIN SODIUM: 1000 INJECTION, SOLUTION INTRAVENOUS; SUBCUTANEOUS at 08:47

## 2021-02-11 ASSESSMENT — FIBROSIS 4 INDEX: FIB4 SCORE: 1.72

## 2021-02-11 NOTE — PROCEDURES
Cardiomems implantation documentation    Indication for procedure:    This is a 75 years old patient with prior history of NYHA class 3 along with elevated NT pro BNP and worsening symptom of exertional dyspnea. Patient is indicated to undergo Cardiomems implantation to reduce repeated heart failure hospitalization and also to improve overall quality of life. Patient meets criteria to undergo such procedure. Patient has been managed in our heart failure clinic.    Procedures performed:  1) Ultrasound guided femoral venous access.  2) Right heart catheterization.  3) Pulmonary angiogram.  4) Wiring of the pulmonary arterial system.  5) Implantation of Cardiomems device.  6) Calibration between intracardiac pressures via pulmonary arterial catheter along with Cardiomems readings.      Procedures:  Patient's right femoral venous area was prepped per protocol. It was sterilized per protocol. Based on patient's body habitus, venous assess through conventional means was difficult to perform safely without significant risk of retroperitoneal bleeding. Ultrasound was used to successfully obtain right femoral venous access after 2% lidocaine was given for local anesthesia. The right femoral vein was then dilated with sequential dilator to target goal of 12 [place]. Through that, a 12 [place] sheath was then inserted and locked in place. The pulmonary arterial [place] catheter was then advanced into the 12 [place] sheath, inferior vena cava, right atrium, right ventricle and main pulmonary artery. The pulmonary arterial pressure was then obtained. Cardiac index and output was obtained via thermodilution and Caden's method. Pulmonary arterial wedge pressure was also successfully obtained. After that, the [place] catheter was then placed within the left pulmonary arterial system. Through that, a pulmonary angiogram was successfully obtained. This was done to delineate the appropriate palmar arterial branch for successful  deployment of Cardiomems device. Through the [place] catheter, a steel core wire was advanced into the appropriate branch of the left pulmonary arterial tree. The [place] catheter was then removed over the wire fashion. After which, the Cardiomems system was advanced over the wire into the appropriate branch of the pulmonary arterial tree. Measurement was made and the device was placed successfully without complications. After which, the catheter was removed and the steel core wire was left in place within the main pulmonary arterial system. The [place] catheter was then reintroduced back into the main pulmonary artery. The wire was then removed. Calibration between the main pulmonary artery readings and the device readings were made to ensure consistency. After that was done successfully, the [place] catheter was then pulled back into the right ventricle and then the right atrium for which appropriate measures of pressures were obtained. The [place] catheter was then completely removed from the body and the sheath was removed with hemostasis obtained through direct pressure.    Hemodynamics:  1) Pulmonary arterial pressure: Systolic of 33 mm Hg, diastolic of 17 mm Hg, mean of 25 mm Hg.  2) Pulmonary arterial wedge pressure with mean of 8 mm Hg.  3) Cardiac output of 4.6 and Cardiac index of 1.9 through thermodilution method.  4) Cardiac output of 3.28 and Cardiac index of 1.4 through Caden's method.  5) Right ventricular pressure: Systolic of 31 mm Hg, end diastolic pressure of 11 mm Hg.  6) Right atrial pressure: A wave of 11 mm Hg, V wave of 12 mm Hg, mean of 10 mm Hg.  7) Pulmonary vascular resistance of 5 Wood Units.    Conclusions:  1) Successful implantation of Cardiomems device for remote monitor of intracardiac pressures.  2) Patient will be monitored as part of our protocol in our heart failure program to further reduce repeated heart failure hospitalization and also to improve overall quality of  denise Easton M.D.

## 2021-02-11 NOTE — INFECTION CONTROL
This patient is considered COVID RECOVERED.    Patient initially tested positive for COVID on 11/23/2020.  Patient is greater than or equal to 21 days from symptom onset and/or positive test, with symptom improvement.  Per the CDC guidance, this patient no longer requires transmission based precautions.  Patient may be placed on any unit per the bed assignment policy (except CNU), including placement in a semi-private room with a roommate.

## 2021-02-11 NOTE — PROCEDURES
Moderate sedation was used by me (Kimmy Easton MD).    Agents: Fentanyl and Versed via intravenous injection (please see media tab for details of dosage).    Start time: 08:45 AM.    Stop time: 09:50 AM.    Complications: none.    Kimmy Easton M.D.

## 2021-02-11 NOTE — PROGRESS NOTES
1126 Patient arrived to unit post cardiomems device, arouses to voice.  Right clean, dry and soft.  Patient denies pain at this time.  Updated on plan of care, verbalized understanding.  Cardiomems reps at bedside.  1140 Wife called by cardiomems reps for update on device.  1215 Access site clean, dry and soft.  Patient tolerating oral intake.  13[place] [facility], right groin clean, dry and soft.  Denies any discomfort at this time.  1330 Patient head of bed elevated, right groin is clean, dry and soft.  1350 Patient ambulated to bathroom, back to [facility].  Right groin is clean, dry and soft.  All lines and monitors discontinued.  1425 Patient taken down via wheelchair to meet wife Shaniqua.  Reviewed discharge paperwork with pt and Shaniqua. Discussed diet, activity, medications, follow up care and worsening symptoms. No questions at this time.    1434 Pt discharged home with Shaniqua.   Lab Results   Component Value Date    WBC 5.73 10/09/2019    HGB 15.8 10/09/2019    HCT 47.9 10/09/2019    .4 (H) 10/09/2019     10/09/2019   Ferritin pending.  Last ferritin over 900.  No complaints verbalized.

## 2021-02-11 NOTE — DISCHARGE INSTRUCTIONS
"  ACTIVITY: Rest and take it easy for the first 24 hours.  A responsible adult is recommended to remain with you during that time.  It is normal to feel sleepy.  We encourage you to not do anything that requires balance, judgment or coordination.    MILD FLU-LIKE SYMPTOMS ARE NORMAL. YOU MAY EXPERIENCE GENERALIZED MUSCLE ACHES, THROAT IRRITATION, HEADACHE AND/OR SOME NAUSEA.    FOR 24 HOURS DO NOT:  Drive, operate machinery or run household appliances.  Drink beer or alcoholic beverages.   Make important decisions or sign legal documents.    SPECIAL INSTRUCTIONS:     Groin Care Instructions     INSTRUCTIONS  1. Examine (look and feel) the site of your incision site TODAY so you can recognize changes that should be called to your doctor (see below).  2. Avoid straining either by lifting or pulling objects for 5 days. Avoid lifting over 5 pounds.   3. For at least 72 hours, if you should sneeze or cough, please hold pressure over your groin area.  4. If you should begin to have oozing from the catheterization site, please hold firm pressure and call your doctor's office immediately.  5. If profuse bleeding occurs from the catheterization site, hold firm pressure and call \"911\" immediately for assistance.  6. Remove bandage after 24 hours.     ACTIVITY  1. Limit activity as instructed by your doctor.  2. No driving or very limited driving with frequent stops for one week.   3. If you must take a long car ride, stop every hour and walk around the car.   4. Warm showers are permitted after the bandage is removed. Avoid baths, hot tubs, and swimming for one week.    PLEASE CALL YOUR DOCTOR IF:  1. Temperature elevation occurs.  2. Catheterization site becomes reddened or begins to drain.   3. Bruising appears to be new or not resolving. The bruise may move down your leg. This is normal.  4. The small round lump in the groin increases in size.  5. Any leg numbness, aching, or discomfort (immediately).  6. Increasing " discomfort in the leg at the insertion site.  7. Chest pains, even if relieved by Nitroglycerin.    MISCELLANEOUS INSTRUCTIONS  1. Bruising may occur as a result of heart catheterization. Some of the discoloration may travel down the leg, going from blue to green in color.  2. A small round lump under the catheterization site will remain for up to six weeks.  3. If any questions arise call your physician's office. You can also call the HEALTH HOTLINE open 24 hours/day, 7 days/week and speak to a nurse at [phone], or toll free at [phone].   4. You should call 911 if you develop problems with breathing or chest pain.    FOR PROBLEMS CALL LINN Easton AT: [phone]    Ambulatory Pulmonary Artery Pressure Monitoring    Ambulatory pulmonary artery pressure monitoring is a way to help your health care provider treat your heart failure. It involves having a procedure to place (implant) a permanent pressure sensor inside your pulmonary artery. Your pulmonary artery carries blood from the right side of your heart to your lungs. Increased pressure in this artery is an early sign that your heart failure has gotten worse.  You may need ambulatory pulmonary artery pressure monitoring if you have shortness of breath after activity and have been hospitalized for heart failure in the past year. The sensor sends pressure readings to your health care provider every day. Your health care provider will use these readings to adjust your medicines or change your treatment plan as needed. This monitoring may improve your treatment and reduce the need to be hospitalized.  Tell a health care provider about:  · Any allergies you have.  · All medicines you are taking, including vitamins, herbs, eye drops, creams, and over-the-counter medicines.  · Any problems you or family members have had with anesthetic medicines.  · Any blood disorders you have.  · Any surgeries you have.  · Any medical conditions you have.  · Whether you are  pregnant or may be pregnant.  What are the risks?  Generally, this is a safe procedure and treatment. However, problems may occur, including:  · Infection.  · Bleeding.  · Allergic reactions to medicines or dyes.  · Damage to other structures or organs.  · Abnormal heart rhythm.  · Problems with the sensor or movement of the sensor.  · A blood clot that forms in a vein and travels to your lungs or brain.  What happens before the procedure?  · Ask your health care provider about:  ? Changing or stopping your regular medicines. This is especially important if you are taking diabetes medicines or blood thinners.  ? Taking medicines such as aspirin and ibuprofen. These medicines can thin your blood. Check with your health care provider to see if you should take them.  ? Taking over-the-counter medicines, vitamins, herbs, and supplements.  · Follow instructions from your health care provider about eating or drinking restrictions.  · Do not use any products that contain nicotine or tobacco, such as cigarettes and e-cigarettes. If you need help quitting, ask your health care provider.  · Ask your health care provider what steps will be taken to help prevent infection. These may include:  ? Removing hair at the procedure site.  ? Washing skin with a germ-killing soap.  · Plan to have someone take you home from the hospital or clinic.  What happens during the procedure?    · An IV will be inserted into one of your veins.  · You will be given:  ? A medicine to help you relax (sedative).  ? A medicine to numb an area in your groin or neck (local anesthetic).  · A small incision will be made over a blood vessel that leads back to your heart (vein).  · A long, flexible tube (catheter) with the sensor attached at the end will be inserted into the vein. Next, it will be advanced into the right side of your heart and then to the pulmonary artery.  · Imaging studies will be done to check the position of the sensor.  · The sensor  will be attached inside your pulmonary artery.  · The catheter will be removed, and pressure will be placed over the incision to prevent bleeding and bruising.  · A dressing (bandage) will be placed over the incision.  The procedure may vary among health care providers and hospitals.  What can I expect after the procedure?  · Your blood pressure, heart rate, breathing rate, and blood oxygen level will be monitored until you leave the hospital or clinic.  · You will also be monitored to make sure you do not have any abnormal heart rhythms.  Follow these instructions at home:  Sensor information  · Follow instructions from your health care provider about sending information from the sensor. You may be instructed to send stored information about your pulmonary artery pressure every night using your patient's electronic system.  ? The electronic system is stored inside a padded pillow. Each night when you lie down to sleep, you will lie on the pillow. The electronic system will get information from the sensor and send it to your health care provider.  ? If you need a change in your medicine, your health care provider's office will contact you.  · If you need to have an MRI, be sure to tell your health care providers that you have an ambulatory pulmonary artery sensor.  · Your sensor will not set off metal detectors at airports or stores.  Incision care  · Follow instructions from your health care provider about how to take care of your incision. Make sure you:  ? Wash your hands with soap and water before you change your bandage (dressing). If soap and water are not available, use hand .  ? Change your dressing as told by your health care provider.  · Do not take baths, swim, or use a hot tub until your health care provider approves. Ask your health care provider if you may take showers.  · Check your incision area every day for signs of infection. Check for:  ? Redness, swelling, or pain.  ? Fluid or  blood.  ? Warmth.  ? Pus or a bad smell.  General instructions  · Take over-the-counter and prescription medicines only as told by your health care provider.  · Return to your normal activities as told by your health care provider. Ask your health care provider what activities are safe for you.  · Do not use any products that contain nicotine or tobacco, such as cigarettes and e-cigarettes. If you need help quitting, ask your health care provider.  · Keep all follow-up visits as told by your health care provider. This is important.  Contact a health care provider if:  · You have:  ? A fever or chills.  ? Pain near your incision.  ? Any bleeding or signs of infection.  · You feel:  ? Like your heart is beating too fast or skipping beats.  ? Short of breath or dizzy.  Get help right away if:  · You have chest pain or difficulty breathing.  Summary  · Ambulatory pulmonary artery pressure monitoring is a way to help your health care provider treat your heart failure. It involves having a procedure to place (implant) a permanent pressure sensor inside your pulmonary artery.  · To place the sensor in your artery, your health care provider will use a catheter that moves the sensor through a vein that goes to your heart.  · This monitoring may improve your treatment and reduce the need to be hospitalized for your heart condition.  · You will learn how to use an electronic device to send pressure readings to your health care provider.    DIET: To avoid nausea, slowly advance diet as tolerated, avoiding spicy or greasy foods for the first day.  Add more substantial food to your diet according to your physician's instructions.  Babies can be fed formula or breast milk as soon as they are hungry.  INCREASE FLUIDS AND FIBER TO AVOID CONSTIPATION.    SURGICAL DRESSING/BATHING: Keep dressing and incision dry and intact for 24 hours, may remove dressing and shower after 2 PM on 2/12, do not need to replace.  Do not submerge site in  water for 7 days.    FOLLOW-UP APPOINTMENT:  A follow-up appointment should be arranged with your Dr. To [phone]; call to schedule.    You should CALL YOUR PHYSICIAN if you develop:  Fever greater than 101 degrees F.  Pain not relieved by medication, or persistent nausea or vomiting.  Excessive bleeding (blood soaking through dressing) or unexpected drainage from the wound.  Extreme redness or swelling around the incision site, drainage of pus or foul smelling drainage.  Inability to urinate or empty your bladder within 8 hours.  Problems with breathing or chest pain.    You should call 911 if you develop problems with breathing or chest pain.  If you are unable to contact your doctor or surgical center, you should go to the nearest emergency room or urgent care center.  Physician's telephone #: [phone]    If any questions arise, call your doctor.  If your doctor is not available, please feel free to call the Surgical Center at [phone]. The Contact Center is open Monday through Friday 7AM to 5PM and may speak to a nurse at [phone], or toll free at [phone].     A registered nurse may call you a few days after your surgery to see how you are doing after your procedure.    MEDICATIONS: Resume taking daily medication.  Take prescribed pain medication with food.  If no medication is prescribed, you may take non-aspirin pain medication if needed.  PAIN MEDICATION CAN BE VERY CONSTIPATING.  Take a stool softener or laxative such as senokot, pericolace, or milk of magnesia if needed.    If your physician has prescribed pain medication that includes Acetaminophen (Tylenol), do not take additional Acetaminophen (Tylenol) while taking the prescribed medication.    Depression / Suicide Risk    As you are discharged from this [facility] facility, it is important to learn how to keep safe from harming yourself.    Recognize the warning signs:  · Abrupt changes in personality, positive or negative-  including increase in energy   · Giving away possessions  · Change in eating patterns- significant weight changes-  positive or negative  · Change in sleeping patterns- unable to sleep or sleeping all the time   · Unwillingness or inability to communicate  · Depression  · Unusual sadness, discouragement and loneliness  · Talk of wanting to die  · Neglect of personal appearance   · Rebelliousness- reckless behavior  · Withdrawal from people/activities they love  · Confusion- inability to concentrate     If you or a loved one observes any of these behaviors or has concerns about self-harm, here's what you can do:  · Talk about it- your feelings and reasons for harming yourself  · Remove any means that you might use to hurt yourself (examples: pills, rope, extension cords, firearm)  · Get professional help from the community (Mental Health, Substance Abuse, psychological counseling)  · Do not be alone:Call your Safe Contact- someone whom you trust who will be there for you.  · Call your local CRISIS HOTLINE 414-7825 or 172-672-9458  · Call your local Children's Mobile Crisis Response Team [place] (193) 219-7421 or www.LibraryThing  · Call the toll free National Suicide Prevention Hotlines   · National Suicide Prevention Lifeline 334-090-QMOM (4080)  · National Hope Line Network 800-SUICIDE (917-3416)

## 2021-02-12 DIAGNOSIS — I10 ESSENTIAL HYPERTENSION: ICD-10-CM

## 2021-02-12 RX ORDER — AMLODIPINE BESYLATE 10 MG/1
10 TABLET ORAL DAILY
Qty: 90 TABLET | Refills: 1 | Status: SHIPPED | OUTPATIENT
Start: 2021-02-12 | End: 2021-02-22 | Stop reason: SDUPTHER
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
## 2021-02-19 NOTE — PROGRESS NOTES
Wife called, to get an update for a bill that pt received from [facility] last yr and for a referral/Prior Auth. that pt needs, to be able to go 2 times a yr to [facility]  / I alissa Mcgovern, that Sarahi, SCP  still working on this and she will contact her with the update./ Sarahi was informed.

## 2021-02-22 ENCOUNTER — OFFICE VISIT (OUTPATIENT)
Dept: CARDIOLOGY | Facility: MEDICAL CENTER | Age: 76
End: 2021-02-22
Payer: MEDICARE

## 2021-02-22 ENCOUNTER — RESEARCH ENCOUNTER (OUTPATIENT)
Dept: CARDIOLOGY | Facility: MEDICAL CENTER | Age: 76
End: 2021-02-22

## 2021-02-22 ENCOUNTER — HOSPITAL ENCOUNTER (OUTPATIENT)
Dept: CARDIOLOGY | Facility: MEDICAL CENTER | Age: 76
End: 2021-02-22
Attending: NURSE PRACTITIONER
Payer: MEDICARE

## 2021-02-22 VITALS
BODY MASS INDEX: 34.19 KG/M2 | SYSTOLIC BLOOD PRESSURE: 122 MMHG | HEIGHT: 73 IN | OXYGEN SATURATION: 97 % | HEART RATE: 76 BPM | RESPIRATION RATE: 17 BRPM | DIASTOLIC BLOOD PRESSURE: 74 MMHG | WEIGHT: 258 LBS

## 2021-02-22 DIAGNOSIS — I50.30 ACC/AHA STAGE C HEART FAILURE WITH PRESERVED EJECTION FRACTION (HCC): ICD-10-CM

## 2021-02-22 DIAGNOSIS — I48.19 PERSISTENT ATRIAL FIBRILLATION (HCC): ICD-10-CM

## 2021-02-22 DIAGNOSIS — I10 HTN (HYPERTENSION), MALIGNANT: ICD-10-CM

## 2021-02-22 DIAGNOSIS — Z79.899 HIGH RISK MEDICATION USE: ICD-10-CM

## 2021-02-22 DIAGNOSIS — M79.89 LEG SWELLING: ICD-10-CM

## 2021-02-22 DIAGNOSIS — R06.09 DYSPNEA ON EXERTION: ICD-10-CM

## 2021-02-22 DIAGNOSIS — E78.2 MIXED HYPERLIPIDEMIA: ICD-10-CM

## 2021-02-22 PROCEDURE — 93321 DOPPLER ECHO F-UP/LMTD STD: CPT | Performed by: INTERNAL MEDICINE

## 2021-02-22 PROCEDURE — 93325 DOPPLER ECHO COLOR FLOW MAPG: CPT

## 2021-02-22 PROCEDURE — 93308 TTE F-UP OR LMTD: CPT | Performed by: INTERNAL MEDICINE

## 2021-02-22 PROCEDURE — 93325 DOPPLER ECHO COLOR FLOW MAPG: CPT | Performed by: INTERNAL MEDICINE

## 2021-02-22 RX ORDER — POTASSIUM CHLORIDE 20 MEQ/1
20 TABLET, EXTENDED RELEASE ORAL 2 TIMES DAILY
Qty: 180 TABLET | Refills: 4 | Status: SHIPPED
Start: 2021-02-22 | End: 2021-04-13

## 2021-02-22 RX ORDER — FUROSEMIDE 40 MG/1
40 TABLET ORAL
Qty: 180 TABLET | Refills: 4 | Status: SHIPPED | OUTPATIENT
Start: 2021-02-22 | End: 2021-03-01 | Stop reason: SDUPTHER

## 2021-02-22 RX ORDER — METOPROLOL TARTRATE 50 MG/1
50 TABLET, FILM COATED ORAL 2 TIMES DAILY
Qty: 180 TABLET | Refills: 4 | Status: SHIPPED | OUTPATIENT
Start: 2021-02-22 | End: 2022-01-05 | Stop reason: SDUPTHER

## 2021-02-22 RX ORDER — OLMESARTAN MEDOXOMIL 40 MG/1
40 TABLET ORAL DAILY
Qty: 90 TABLET | Refills: 4 | Status: SHIPPED | OUTPATIENT
Start: 2021-02-22 | End: 2021-03-10

## 2021-02-22 RX ORDER — ATORVASTATIN CALCIUM 40 MG/1
40 TABLET, FILM COATED ORAL DAILY
Qty: 90 TABLET | Refills: 4 | Status: SHIPPED | OUTPATIENT
Start: 2021-02-22 | End: 2021-02-25 | Stop reason: SDUPTHER

## 2021-02-22 RX ORDER — AMLODIPINE BESYLATE 10 MG/1
10 TABLET ORAL DAILY
Qty: 90 TABLET | Refills: 4 | Status: SHIPPED | OUTPATIENT
Start: 2021-02-22 | End: 2022-01-13 | Stop reason: SDUPTHER

## 2021-02-22 ASSESSMENT — ENCOUNTER SYMPTOMS
BRUISES/BLEEDS EASILY: 0
COUGH: 0
FEVER: 0
SHORTNESS OF BREATH: 1
DIAPHORESIS: 0
FALLS: 0
DEPRESSION: 0
DIZZINESS: 0
BLURRED VISION: 0
ABDOMINAL PAIN: 0
HEADACHES: 0
PALPITATIONS: 0
MEMORY LOSS: 0
MYALGIAS: 0
DOUBLE VISION: 0
SENSORY CHANGE: 0

## 2021-02-22 ASSESSMENT — FIBROSIS 4 INDEX: FIB4 SCORE: 1.72
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
## 2021-02-22 NOTE — PROGRESS NOTES
Limited Echo for CardioMEMs.  Need right heart pressure calculation from TR jet for CardioMEMS calibration

## 2021-02-24 ENCOUNTER — APPOINTMENT (OUTPATIENT)
Dept: CARDIOLOGY | Facility: MEDICAL CENTER | Age: 76
End: 2021-02-24
Payer: MEDICARE

## 2021-02-25 DIAGNOSIS — E78.2 MIXED HYPERLIPIDEMIA: ICD-10-CM

## 2021-02-25 RX ORDER — ATORVASTATIN CALCIUM 40 MG/1
40 TABLET, FILM COATED ORAL DAILY
Qty: 100 TABLET | Refills: 3 | Status: SHIPPED | OUTPATIENT
Start: 2021-02-25 | End: 2021-08-20

## 2021-03-01 RX ORDER — FUROSEMIDE 40 MG/1
40 TABLET ORAL
Qty: 180 TABLET | Refills: 1 | Status: SHIPPED | OUTPATIENT
Start: 2021-03-01 | End: 2021-05-24

## 2021-03-01 NOTE — TELEPHONE ENCOUNTER
Received request via: Pharmacy    Was the patient seen in the last year in this department? Yes    Does the patient have an active prescription (recently filled or refills available) for medication(s) requested? No    Requested Prescriptions     Pending Prescriptions Disp Refills   • furosemide (LASIX) 40 MG Tab 180 tablet 4     Sig: Take 1 tablet by mouth 2 (two) times a day.

## 2021-03-02 NOTE — RESEARCH NOTE
GUIDE HF Single Arm   BRUNILDA 3600  Unscheduled/Recalibration Visit    Sina here today to recalibrate his CardioMEMs implant using Echocardiogram readings. Unable to calibrate day of implant procedure due to ALEXIS corrupting the signal. Again had interference when attempting to take a reading in the Cardiology office. Pt instructed to take reading when returned home. Pt and his wife instructed to call our office and/or remote care if encountering any problems at home now or in the future.    Limited echo performed today showed RVSP 30mmHg.     CardioMEMs Pulmonary artery pressure readings prior to calibration on 2/22/21:   PA Systolic 17; PA Mean 1; PA diastolic -6mmHg    CardioMEMs Pulmonary artery pressure readings post calibration on 2/23/21:  PA Systolic 38; PA Mean 23; PA diastolic 12mmHg

## 2021-03-09 DIAGNOSIS — I10 ESSENTIAL HYPERTENSION: ICD-10-CM

## 2021-03-10 RX ORDER — OLMESARTAN MEDOXOMIL 40 MG/1
40 TABLET ORAL DAILY
Qty: 100 TABLET | Refills: 3 | Status: SHIPPED | OUTPATIENT
Start: 2021-03-10 | End: 2021-10-06

## 2021-03-12 RX ORDER — TAMSULOSIN HYDROCHLORIDE 0.4 MG/1
0.4 CAPSULE ORAL DAILY
Qty: 100 CAPSULE | Refills: 3 | Status: SHIPPED | OUTPATIENT
Start: 2021-03-12 | End: 2022-01-05 | Stop reason: SDUPTHER

## 2021-03-12 RX ORDER — CYCLOBENZAPRINE HCL 10 MG
10 TABLET ORAL 2 TIMES DAILY PRN
Qty: 180 TABLET | Refills: 0 | Status: SHIPPED | OUTPATIENT
Start: 2021-03-12 | End: 2022-01-05 | Stop reason: SDUPTHER

## 2021-03-29 PROCEDURE — RXMED WILLOW AMBULATORY MEDICATION CHARGE: Performed by: PHYSICIAN ASSISTANT

## 2021-03-30 ENCOUNTER — TELEPHONE (OUTPATIENT)
Dept: CARDIOLOGY | Facility: MEDICAL CENTER | Age: 76
End: 2021-03-30

## 2021-03-30 ENCOUNTER — PHARMACY VISIT (OUTPATIENT)
Dept: PHARMACY | Facility: MEDICAL CENTER | Age: 76
End: 2021-03-30
Payer: COMMERCIAL

## 2021-03-30 NOTE — TELEPHONE ENCOUNTER
Pt and his wife walked into the clinic today requesting results for sleep study. We do not have records of sleep study. Pt states he was seen at [facility]. I have sent a request for the results to them at [phone], receipt confirmed.

## 2021-03-31 NOTE — TELEPHONE ENCOUNTER
Tiarra from [facility] called asking if we have received the results for pts sleep study. Lizz states she faxed it yesterday afternoon around 3:30p to [phone]. Tiarra states you can call back if you have any questions or have not received at [phone] ext [phone]    Thank you

## 2021-04-02 NOTE — TELEPHONE ENCOUNTER
Pts wife Shaniqua called asking about the results of Pts sleep study. Please call Shaniqua back at [phone].    Thank you

## 2021-04-07 NOTE — TELEPHONE ENCOUNTER
Returned call and informed Shaniqua that TT is out of the office this week, but I have asked him to review and advise on the results. Pt has an appt w/ TT on 4/13 too. Informed Shaniqua that d/t HIPAA I cannot mail the result to her and asked her to contact the sleep study office and request the results.

## 2021-04-07 NOTE — TELEPHONE ENCOUNTER
Pts wife Shaniqua called asking about the results of Pts sleep study. Shaniqua would also like a copy of the results mailed to the home address on file. Please call Shaniqua back at [phone].    Thank you

## 2021-04-08 NOTE — PROGRESS NOTES
Outcome: Left Message Birthday Voicemail     Please transfer to Patient Outreach Team at [phone] when patient returns call.    Attempt # 1

## 2021-04-12 ENCOUNTER — HOSPITAL ENCOUNTER (OUTPATIENT)
Dept: RADIOLOGY | Facility: MEDICAL CENTER | Age: 76
End: 2021-04-12
Attending: INTERNAL MEDICINE | Admitting: INTERNAL MEDICINE
Payer: MEDICARE

## 2021-04-12 DIAGNOSIS — I48.91 ATRIAL FIBRILLATION, UNSPECIFIED TYPE (HCC): ICD-10-CM

## 2021-04-12 DIAGNOSIS — C73 THYROID CANCER (HCC): ICD-10-CM

## 2021-04-12 DIAGNOSIS — E04.2 NONTOXIC MULTINODULAR GOITER: ICD-10-CM

## 2021-04-12 PROCEDURE — 76536 US EXAM OF HEAD AND NECK: CPT

## 2021-04-13 ENCOUNTER — TELEPHONE (OUTPATIENT)
Dept: CARDIOLOGY | Facility: MEDICAL CENTER | Age: 76
End: 2021-04-13

## 2021-04-13 ENCOUNTER — OFFICE VISIT (OUTPATIENT)
Dept: CARDIOLOGY | Facility: MEDICAL CENTER | Age: 76
End: 2021-04-13
Payer: MEDICARE

## 2021-04-13 VITALS
SYSTOLIC BLOOD PRESSURE: 118 MMHG | RESPIRATION RATE: 16 BRPM | DIASTOLIC BLOOD PRESSURE: 64 MMHG | HEIGHT: 73 IN | HEART RATE: 71 BPM | OXYGEN SATURATION: 95 % | BODY MASS INDEX: 33.64 KG/M2 | WEIGHT: 253.8 LBS

## 2021-04-13 DIAGNOSIS — M79.89 LEG SWELLING: ICD-10-CM

## 2021-04-13 DIAGNOSIS — Z79.899 HIGH RISK MEDICATION USE: ICD-10-CM

## 2021-04-13 DIAGNOSIS — I10 HTN (HYPERTENSION), MALIGNANT: ICD-10-CM

## 2021-04-13 DIAGNOSIS — I51.89 LEFT VENTRICULAR DIASTOLIC DYSFUNCTION, NYHA CLASS 3: ICD-10-CM

## 2021-04-13 DIAGNOSIS — R06.09 DYSPNEA ON EXERTION: ICD-10-CM

## 2021-04-13 DIAGNOSIS — I50.30 ACC/AHA STAGE C HEART FAILURE WITH PRESERVED EJECTION FRACTION (HCC): ICD-10-CM

## 2021-04-13 DIAGNOSIS — E78.2 MIXED HYPERLIPIDEMIA: ICD-10-CM

## 2021-04-13 PROCEDURE — 99214 OFFICE O/P EST MOD 30 MIN: CPT | Performed by: INTERNAL MEDICINE

## 2021-04-13 RX ORDER — HYDROCHLOROTHIAZIDE 25 MG/1
25 TABLET ORAL DAILY
COMMUNITY
Start: 2021-02-15 | End: 2021-05-03

## 2021-04-13 RX ORDER — OLMESARTAN MEDOXOMIL 20 MG/1
20 TABLET ORAL
COMMUNITY
Start: 2021-02-15 | End: 2021-04-13

## 2021-04-13 ASSESSMENT — MINNESOTA LIVING WITH HEART FAILURE QUESTIONNAIRE (MLHF)
MAKING YOU FEEL DEPRESSED: 0
WORKING AROUND THE HOUSE OR YARD DIFFICULT: 3
HAVING TO SIT OR LIE DOWN DURING THE DAY: 3
DIFFICULTY GOING AWAY FROM HOME: 1
DIFFICULTY SLEEPING WELL AT NIGHT: 0
DIFFICULTY WITH SEXUAL ACTIVITIES: 0
DIFFICULTY WITH RECREATIONAL PASTIMES, SPORTS, HOBBIES: 5
LOSS OF SELF CONTROL IN YOUR LIFE: 5
WALKING ABOUT OR CLIMBING STAIRS DIFFICULT: 3
DIFFICULTY SOCIALIZING WITH FAMILY OR FRIENDS: 1
MAKING YOU SHORT OF BREATH: 5
COSTING YOU MONEY FOR MEDICAL CARE: 5
DIFFICULTY WORKING TO EARN A LIVING: 5
TOTAL_SCORE: 57
DIFFICULTY TO CONCENTRATE OR REMEMBERING THINGS: 1
GIVING YOU SIDE EFFECTS FROM TREATMENTS: 5
EATING LESS FOODS YOU LIKE: 3
FEELING LIKE A BURDEN TO FAMILY AND FRIENDS: 0
MAKING YOU STAY IN A HOSPITAL: 0
SWELLING IN ANKLES OR LEGS: 5
TIRED, FATIGUED OR LOW ON ENERGY: 5
MAKING YOU WORRY: 2

## 2021-04-13 ASSESSMENT — FIBROSIS 4 INDEX: FIB4 SCORE: 1.75

## 2021-04-13 ASSESSMENT — ENCOUNTER SYMPTOMS
DOUBLE VISION: 0
DIZZINESS: 0
FEVER: 0
BLURRED VISION: 0
MYALGIAS: 0
DIAPHORESIS: 0
SENSORY CHANGE: 0
BRUISES/BLEEDS EASILY: 0
FALLS: 0
SHORTNESS OF BREATH: 1
COUGH: 0
ABDOMINAL PAIN: 0
PALPITATIONS: 0
DEPRESSION: 0
HEADACHES: 0
MEMORY LOSS: 0

## 2021-04-13 NOTE — TELEPHONE ENCOUNTER
TT    Pts wife Shaniqua called stating [facility] is faxing over a CPAP order form. Esperanza states per Medicare TT needs sign the form. Please call Shaniqua back when the form has been signed at [phone].    Thank you

## 2021-04-13 NOTE — TELEPHONE ENCOUNTER
Kimmy Easton M.D.  You 2 minutes ago (4:31 PM)     yes    Message text    You  Kimmy Easton M.D. 19 minutes ago (4:14 PM)     Ok to defer to pulm/sleep medicine?     Message text
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
## 2021-04-19 NOTE — TELEPHONE ENCOUNTER
Pts wife Shaniqua called back regarding Pts CPAP order form. Please call Shaniqua back at [phone].    Thank you

## 2021-04-20 DIAGNOSIS — I48.0 PAROXYSMAL ATRIAL FIBRILLATION (HCC): ICD-10-CM

## 2021-04-20 DIAGNOSIS — G47.33 OSA (OBSTRUCTIVE SLEEP APNEA): ICD-10-CM

## 2021-04-20 DIAGNOSIS — E66.9 OBESITY (BMI 30-39.9): ICD-10-CM

## 2021-04-20 NOTE — TELEPHONE ENCOUNTER
Referral to pulmonary/sleep medicine placed for ZELALEM/CPAP management. Sleep study results and CPAP order faxed to them at [phone], receipt confirmed.

## 2021-04-21 NOTE — TELEPHONE ENCOUNTER
Pt wife, Shaniqua is requesting a call back to f/v on the request for CPAP Rx and wants to make sure it got sent to [facility]. Please call Shaniqua at [phone].    Thank you

## 2021-04-21 NOTE — TELEPHONE ENCOUNTER
Returned call. S/w pt and informed him that TT placed a pulm/sleep medicine referral to manage ZELALEM and CPAP equipment and that we faxed over the sleep study results and CPAP order to them as well. Pt should receive a call from them soon to further discuss. Referral is currently pending insurance approval.

## 2021-04-22 NOTE — TELEPHONE ENCOUNTER
Patient wife Shaniqua called and was very rude regarding the CPAP machine. She is demanding that TT sign for the machine. She is very mad that the Nurse called and spoke with the patient. She wants a call back from the nurse at [phone]. I did explain that the referral was placed and that the Sleep provider would sign for the machine. She refuses that information.    Thank you,    Vidhya MILLIGAN

## 2021-04-27 ENCOUNTER — TELEPHONE (OUTPATIENT)
Dept: MEDICAL GROUP | Facility: MEDICAL CENTER | Age: 76
End: 2021-04-27

## 2021-04-27 NOTE — TELEPHONE ENCOUNTER
ESTABLISHED PATIENT PRE-VISIT PLANNING     Patient was NOT contacted to complete PVP.     Note: Patient will not be contacted if there is no indication to call.     1.  Reviewed notes from the last few office visits within the medical group: Yes    2.  If any orders were placed at last visit or intended to be done for this visit (i.e. 6 mos follow-up), do we have Results/Consult Notes?         •  Labs - Labs were not ordered at last office visit.  Note: If patient appointment is for lab review and patient did not complete labs, check with provider if OK to reschedule patient until labs completed.       •  Imaging - Imaging was not ordered at last office visit.       •  Referrals - Referral ordered, patient was seen and consult notes are in chart. Care Teams updated  YES.    3. Is this appointment scheduled as a Hospital Follow-Up? No    4.  Immunizations were updated in Epic using Reconcile Outside Information activity? Yes    5.  Patient is due for the following Health Maintenance Topics:   Health Maintenance Due   Topic Date Due   • COVID-19 Vaccine (1) Never done   • Annual Wellness Visit  05/26/2019         6.  AHA (Pulse8) form printed for Provider? Yes

## 2021-05-03 ENCOUNTER — OFFICE VISIT (OUTPATIENT)
Dept: MEDICAL GROUP | Facility: MEDICAL CENTER | Age: 76
End: 2021-05-03
Payer: MEDICARE

## 2021-05-03 VITALS
BODY MASS INDEX: 33.66 KG/M2 | WEIGHT: 254 LBS | OXYGEN SATURATION: 94 % | HEART RATE: 74 BPM | TEMPERATURE: 97.6 F | SYSTOLIC BLOOD PRESSURE: 126 MMHG | RESPIRATION RATE: 16 BRPM | DIASTOLIC BLOOD PRESSURE: 78 MMHG | HEIGHT: 73 IN

## 2021-05-03 DIAGNOSIS — I48.0 PAROXYSMAL ATRIAL FIBRILLATION (HCC): ICD-10-CM

## 2021-05-03 DIAGNOSIS — I50.30 ACC/AHA STAGE C HEART FAILURE WITH PRESERVED EJECTION FRACTION (HCC): ICD-10-CM

## 2021-05-03 DIAGNOSIS — G47.33 OSA ON CPAP: ICD-10-CM

## 2021-05-03 DIAGNOSIS — R53.83 FATIGUE, UNSPECIFIED TYPE: ICD-10-CM

## 2021-05-03 DIAGNOSIS — C73 PAPILLARY THYROID CARCINOMA (HCC): ICD-10-CM

## 2021-05-03 DIAGNOSIS — Z85.820 HX OF MELANOMA OF SKIN: ICD-10-CM

## 2021-05-03 DIAGNOSIS — I10 ESSENTIAL HYPERTENSION: ICD-10-CM

## 2021-05-03 PROBLEM — I50.9 CHF (CONGESTIVE HEART FAILURE) (HCC): Status: RESOLVED | Noted: 2020-11-07 | Resolved: 2021-05-03

## 2021-05-03 PROBLEM — R73.01 IMPAIRED FASTING GLUCOSE: Status: RESOLVED | Noted: 2020-10-19 | Resolved: 2021-05-03

## 2021-05-03 PROCEDURE — 99214 OFFICE O/P EST MOD 30 MIN: CPT | Performed by: FAMILY MEDICINE

## 2021-05-03 ASSESSMENT — FIBROSIS 4 INDEX: FIB4 SCORE: 1.75

## 2021-05-03 ASSESSMENT — PATIENT HEALTH QUESTIONNAIRE - PHQ9: CLINICAL INTERPRETATION OF PHQ2 SCORE: 0

## 2021-05-03 NOTE — PROGRESS NOTES
Subjective:     Sina Oliver is a 76 y.o. male presenting with his wife for a follow-up.  He continues to report daytime somnolence, fatigue, shortness of breath.  He recently had a sleep study done, will be getting a CPAP machine soon.  He also has an appointment with pulmonology/sleep medicine in November.  Is also wondering if any of his medications can be stopped.    He continues to see cardiology for his hypertension, heart failure, dyslipidemia.  He continues on his medications regularly.    Thyroid cancer remains untreated.  He is still hesitant about surgery.  He has a follow-up appointment with endocrinology.  Is still hopeful for a nonsurgical alternative for his thyroid cancer.    He also needs a referral to a dermatologist, has a history of melanoma.  His regular dermatologist is not in his insurance network        Current Outpatient Medications:   •  tamsulosin (FLOMAX) 0.4 MG capsule, Take 1 capsule by mouth every day., Disp: 100 capsule, Rfl: 3  •  cyclobenzaprine (FLEXERIL) 10 mg Tab, Take 1 tablet by mouth 2 times a day as needed for Muscle Spasms., Disp: 180 tablet, Rfl: 0  •  olmesartan (BENICAR) 40 MG Tab, Take 1 tablet by mouth every day., Disp: 100 tablet, Rfl: 3  •  furosemide (LASIX) 40 MG Tab, Take 1 tablet by mouth 2 (two) times a day., Disp: 180 tablet, Rfl: 1  •  atorvastatin (LIPITOR) 40 MG Tab, Take 1 tablet by mouth every day., Disp: 100 tablet, Rfl: 3  •  amLODIPine (NORVASC) 10 MG Tab, Take 1 tablet by mouth every day., Disp: 90 tablet, Rfl: 4  •  metoprolol tartrate (LOPRESSOR) 50 MG Tab, Take 1 tablet by mouth 2 times a day., Disp: 180 tablet, Rfl: 4  •  apixaban (ELIQUIS) 2.5mg Tab, Take 1 tablet by mouth 2 Times a Day., Disp: 180 tablet, Rfl: 4  •  potassium chloride SA (KDUR) 20 MEQ Tab CR, Take 1 Tab by mouth 2 times a day., Disp: 60 Tab, Rfl: 0  •  testosterone cypionate (DEPO-TESTOSTERONE) 200 MG/ML Solution injection, Inject 1 mL every 2 weeks intramuscularly for  "140 days., Disp: 10 mL, Rfl: 3    Objective:     Vitals: /78   Pulse 74   Temp 36.4 °C (97.6 °F)   Resp 16   Ht 1.854 m (6' 1\")   Wt 115 kg (254 lb)   SpO2 94%   BMI 33.51 kg/m²   General: Alert  HEENT: Normocephalic.   Heart: Regular rate and rhythm.  S1 and S2 normal.  No murmurs appreciated.  Respiratory: Normal respiratory effort.  Clear to auscultation bilaterally.  Abdomen: Non-distended, soft    Assessment/Plan:     Sina was seen today for follow-up.    Diagnoses and all orders for this visit:    ZELALEM on CPAP  Fatigue, unspecified type  Chronic, stable.  Discussed that his fatigue may improve once he starts the CPAP machine.  We reviewed his medications in detail, would advise against stopping any of them currently    Paroxysmal atrial fibrillation (HCC)  Essential hypertension  ACC/AHA stage C heart failure with preserved ejection fraction (HCC)  Chronic, stable, managed by cardiology    Papillary thyroid carcinoma (HCC)  Chronic, stable.  Encouraged patient to rethink surgery.  He would like to follow-up with endocrinology again.    Hx of melanoma of skin  Chronic, stable, new referral to dermatology placed.  Patient plans to get records.  -     REFERRAL TO DERMATOLOGY        Return in about 3 months (around 8/3/2021) for routine follow up.    "

## 2021-05-23 RX ORDER — OLMESARTAN MEDOXOMIL 40 MG/1
40 TABLET ORAL DAILY
Qty: 100 TABLET | Refills: 1 | Status: SHIPPED | OUTPATIENT
Start: 2021-05-23 | End: 2022-01-04

## 2021-05-24 ENCOUNTER — TELEPHONE (OUTPATIENT)
Dept: CARDIOLOGY | Facility: MEDICAL CENTER | Age: 76
End: 2021-05-24

## 2021-05-24 RX ORDER — FUROSEMIDE 40 MG/1
40 TABLET ORAL DAILY
Qty: 90 TABLET | Refills: 3 | Status: SHIPPED
Start: 2021-05-24 | End: 2021-07-21

## 2021-05-24 NOTE — TELEPHONE ENCOUNTER
Called patient regarding low end of normal cardiomems pressures, per SHIRA, patient instructed to decrease diuretic to 40mg once daily and monitor symptoms, keep fluid intake to around 64 ounces daily and call [phone] for any concerns or questions. Patient had many questions regarding his CPAP and settings, instructed to call pulmonary doctor for these questions and not to adjust his settings without their instructions.

## 2021-05-24 NOTE — TELEPHONE ENCOUNTER
Guide HF Single Arm Study Patient:    His CardioMEMs pressures are PaD 8 mmHg today.  Please check on patient and see how he is feeling and doing.  Has he been drinking enough fluids and eating?    Please have him decrease his furosemide to 40 mg daily.  Continue CardioMEMS readings.  We will further adjust medications based on readings.

## 2021-05-25 RX ORDER — HYDROCHLOROTHIAZIDE 25 MG/1
TABLET ORAL
Qty: 100 TABLET | OUTPATIENT
Start: 2021-05-25

## 2021-05-28 ENCOUNTER — HOSPITAL ENCOUNTER (OUTPATIENT)
Dept: LAB | Facility: MEDICAL CENTER | Age: 76
End: 2021-05-28
Attending: INTERNAL MEDICINE
Payer: MEDICARE

## 2021-05-28 LAB — TSH SERPL DL<=0.005 MIU/L-ACNC: 1.63 UIU/ML (ref 0.38–5.33)

## 2021-05-28 PROCEDURE — 36415 COLL VENOUS BLD VENIPUNCTURE: CPT

## 2021-05-28 PROCEDURE — 84443 ASSAY THYROID STIM HORMONE: CPT

## 2021-06-15 DIAGNOSIS — E29.1 TESTICULAR HYPOFUNCTION: ICD-10-CM

## 2021-06-15 RX ORDER — TESTOSTERONE CYPIONATE 200 MG/ML
200 INJECTION, SOLUTION INTRAMUSCULAR
Qty: 2 ML | Refills: 5 | Status: SHIPPED | OUTPATIENT
Start: 2021-06-15 | End: 2022-01-05 | Stop reason: SDUPTHER

## 2021-06-15 RX ORDER — TESTOSTERONE CYPIONATE 200 MG/ML
200 INJECTION, SOLUTION INTRAMUSCULAR
Qty: 10 ML | Refills: 3 | Status: SHIPPED | OUTPATIENT
Start: 2021-06-15 | End: 2021-06-15 | Stop reason: SDUPTHER

## 2021-06-17 ENCOUNTER — APPOINTMENT (RX ONLY)
Dept: URBAN - METROPOLITAN AREA CLINIC 4 | Facility: CLINIC | Age: 76
Setting detail: DERMATOLOGY
End: 2021-06-17

## 2021-06-17 DIAGNOSIS — Z00.8 ENCOUNTER FOR OTHER GENERAL EXAMINATION: ICD-10-CM

## 2021-06-17 DIAGNOSIS — L57.0 ACTINIC KERATOSIS: ICD-10-CM

## 2021-06-17 DIAGNOSIS — L82.1 OTHER SEBORRHEIC KERATOSIS: ICD-10-CM

## 2021-06-17 DIAGNOSIS — D22 MELANOCYTIC NEVI: ICD-10-CM

## 2021-06-17 DIAGNOSIS — L72.8 OTHER FOLLICULAR CYSTS OF THE SKIN AND SUBCUTANEOUS TISSUE: ICD-10-CM

## 2021-06-17 DIAGNOSIS — D18.0 HEMANGIOMA: ICD-10-CM

## 2021-06-17 DIAGNOSIS — Z85.820 PERSONAL HISTORY OF MALIGNANT MELANOMA OF SKIN: ICD-10-CM | Status: STABLE

## 2021-06-17 DIAGNOSIS — Z85.828 PERSONAL HISTORY OF OTHER MALIGNANT NEOPLASM OF SKIN: ICD-10-CM | Status: STABLE

## 2021-06-17 DIAGNOSIS — L81.4 OTHER MELANIN HYPERPIGMENTATION: ICD-10-CM

## 2021-06-17 PROBLEM — D40.8 NEOPLASM OF UNCERTAIN BEHAVIOR OF OTHER SPECIFIED MALE GENITAL ORGANS: Status: ACTIVE | Noted: 2021-06-17

## 2021-06-17 PROBLEM — D48.5 NEOPLASM OF UNCERTAIN BEHAVIOR OF SKIN: Status: ACTIVE | Noted: 2021-06-17

## 2021-06-17 PROBLEM — D18.01 HEMANGIOMA OF SKIN AND SUBCUTANEOUS TISSUE: Status: ACTIVE | Noted: 2021-06-17

## 2021-06-17 PROBLEM — D22.5 MELANOCYTIC NEVI OF TRUNK: Status: ACTIVE | Noted: 2021-06-17

## 2021-06-17 PROCEDURE — 17000 DESTRUCT PREMALG LESION: CPT

## 2021-06-17 PROCEDURE — ? COUNSELING

## 2021-06-17 PROCEDURE — ? DEFER

## 2021-06-17 PROCEDURE — ? ADDITIONAL NOTES

## 2021-06-17 PROCEDURE — 99203 OFFICE O/P NEW LOW 30 MIN: CPT | Mod: 25

## 2021-06-17 PROCEDURE — 17003 DESTRUCT PREMALG LES 2-14: CPT

## 2021-06-17 PROCEDURE — ? REFERRAL CORRESPONDENCE

## 2021-06-17 PROCEDURE — ? LIQUID NITROGEN

## 2021-06-17 ASSESSMENT — LOCATION ZONE DERM
LOCATION ZONE: TRUNK
LOCATION ZONE: SCALP
LOCATION ZONE: LEG
LOCATION ZONE: HAND
LOCATION ZONE: FACE
LOCATION ZONE: PENIS

## 2021-06-17 ASSESSMENT — LOCATION SIMPLE DESCRIPTION DERM
LOCATION SIMPLE: SCALP
LOCATION SIMPLE: RIGHT ZYGOMA
LOCATION SIMPLE: CHEST
LOCATION SIMPLE: RIGHT POSTERIOR THIGH
LOCATION SIMPLE: LEFT UPPER BACK
LOCATION SIMPLE: RIGHT HAND
LOCATION SIMPLE: PENIS
LOCATION SIMPLE: LEFT HAND
LOCATION SIMPLE: UPPER BACK
LOCATION SIMPLE: RIGHT CHEEK
LOCATION SIMPLE: RIGHT FOREHEAD
LOCATION SIMPLE: LEFT CHEEK
LOCATION SIMPLE: LEFT TEMPLE
LOCATION SIMPLE: RIGHT UPPER BACK

## 2021-06-17 ASSESSMENT — LOCATION DETAILED DESCRIPTION DERM
LOCATION DETAILED: LEFT MEDIAL INFERIOR CHEST
LOCATION DETAILED: RIGHT FOREHEAD
LOCATION DETAILED: RIGHT RADIAL DORSAL HAND
LOCATION DETAILED: RIGHT MEDIAL ZYGOMA
LOCATION DETAILED: STERNUM
LOCATION DETAILED: LEFT CENTRAL TEMPLE
LOCATION DETAILED: RIGHT MID-UPPER BACK
LOCATION DETAILED: LEFT RADIAL DORSAL HAND
LOCATION DETAILED: RIGHT SUPERIOR PARIETAL SCALP
LOCATION DETAILED: LEFT INFERIOR CENTRAL MALAR CHEEK
LOCATION DETAILED: RIGHT SUPERIOR FOREHEAD
LOCATION DETAILED: RIGHT MEDIAL MALAR CHEEK
LOCATION DETAILED: LEFT DORSAL SHAFT OF PENIS
LOCATION DETAILED: RIGHT PROXIMAL POSTERIOR THIGH
LOCATION DETAILED: SUPERIOR THORACIC SPINE
LOCATION DETAILED: LEFT MID-UPPER BACK
LOCATION DETAILED: LEFT SUPERIOR LATERAL UPPER BACK
LOCATION DETAILED: LEFT SUPERIOR CENTRAL MALAR CHEEK

## 2021-06-17 NOTE — PROCEDURE: DEFER
Detail Level: Detailed
Introduction Text (Please End With A Colon): defers treatment to day - will address at f/u visit in 1 month (did not want any additional biopsies today)

## 2021-06-17 NOTE — PROCEDURE: ADDITIONAL NOTES
Render Risk Assessment In Note?: no
Detail Level: Simple
Additional Notes: Will obtain labs from Dr. Mejia

## 2021-06-22 ENCOUNTER — TELEPHONE (OUTPATIENT)
Dept: MEDICAL GROUP | Facility: MEDICAL CENTER | Age: 76
End: 2021-06-22

## 2021-06-22 DIAGNOSIS — G47.33 OSA ON CPAP: ICD-10-CM

## 2021-06-22 DIAGNOSIS — I48.0 PAROXYSMAL ATRIAL FIBRILLATION (HCC): ICD-10-CM

## 2021-06-22 DIAGNOSIS — R35.1 BENIGN PROSTATIC HYPERPLASIA WITH NOCTURIA: ICD-10-CM

## 2021-06-22 DIAGNOSIS — C73 PAPILLARY THYROID CARCINOMA (HCC): ICD-10-CM

## 2021-06-22 DIAGNOSIS — R97.20 ELEVATED PSA: ICD-10-CM

## 2021-06-22 DIAGNOSIS — I10 ESSENTIAL HYPERTENSION: ICD-10-CM

## 2021-06-22 DIAGNOSIS — R73.03 PRE-DIABETES: ICD-10-CM

## 2021-06-22 DIAGNOSIS — I50.30 ACC/AHA STAGE C HEART FAILURE WITH PRESERVED EJECTION FRACTION (HCC): ICD-10-CM

## 2021-06-22 DIAGNOSIS — N52.9 ERECTILE DYSFUNCTION, UNSPECIFIED ERECTILE DYSFUNCTION TYPE: ICD-10-CM

## 2021-06-22 DIAGNOSIS — R79.89 LOW TESTOSTERONE LEVEL IN MALE: ICD-10-CM

## 2021-06-22 DIAGNOSIS — E78.5 HYPERLIPIDEMIA, UNSPECIFIED HYPERLIPIDEMIA TYPE: ICD-10-CM

## 2021-06-22 DIAGNOSIS — N40.1 BENIGN PROSTATIC HYPERPLASIA WITH NOCTURIA: ICD-10-CM

## 2021-06-22 NOTE — TELEPHONE ENCOUNTER
Pts wife called requesting two referrals to be put in for providers in [place].  The first:  Urology:   Cezar Mortensen   [address] [place], [place] [address]   Phone: [phone]  Fax: [phone]  NPI [id number]    And the second:  Internal Medicine:  Aniceto Silva   [address] [place] [address]  ph. [phone]  Fax: [phone]  NPI: [id number]  Tax ID: [id number]

## 2021-07-15 ENCOUNTER — TELEPHONE (OUTPATIENT)
Dept: CARDIOLOGY | Facility: MEDICAL CENTER | Age: 76
End: 2021-07-15

## 2021-07-15 NOTE — TELEPHONE ENCOUNTER
Called patient for cardiomems increase, per Dr. Easton increased furosemide to 40mg BID and double potassium to 40meq BID during this time to  for 3 days, will re-evaluate tomorrow and Monday

## 2021-07-16 ENCOUNTER — TELEPHONE (OUTPATIENT)
Dept: CARDIOLOGY | Facility: MEDICAL CENTER | Age: 76
End: 2021-07-16

## 2021-07-16 NOTE — TELEPHONE ENCOUNTER
Called patient to check symptoms, patient states he is feeling better today but did not realize how much sodium he was actually taking in. Discussed specific foods that he likes and alternatives that will help him reduce sodium intake. Patient will continue his double dose of diuretic and potassium through the weekend and I will follow up Monday, Asked patient to write down his weights over the weekend and we will discuss Monday as well.

## 2021-07-19 ENCOUNTER — TELEPHONE (OUTPATIENT)
Dept: TELEMETRY | Facility: MEDICAL CENTER | Age: 76
End: 2021-07-19

## 2021-07-19 NOTE — TELEPHONE ENCOUNTER
Called patient to check on how he is doing since complaining of increasing SOB on Thursday, he had been instructed per  To to double up on his furosemide and potassium through the weekend. When I questioned patient regarding his weight, he said last he knew he was around 260, emphasized again with patient the importance of weighing himself DAILY to help monitor for fluid retention. Patient cardiomems is not currently caliberated to his current elevation however has decreased over the weekend. Patient is currently at a chinese buffet. Counseled patient on the sodium content of many chinese foods and buffets. Patient appears to need a lot more counseling on monitoring sodium intake. When I asked patient if he stopped his increased diuretic he stated he didn't know but would check when he gets home. I updated the patient that I will call tomorrow and expect him to weigh himself, check swelling and SOB and tell me what dose of diuretic he is taking. Patient saw a primary doctor today and he said the doctor ordered testosterone shots for a low PSA but couldn't give me further details.

## 2021-07-19 NOTE — TELEPHONE ENCOUNTER
Called patient to check on symptoms and see how his doctors appointment went today, LVM for patient to call back to update us.

## 2021-07-20 ENCOUNTER — TELEPHONE (OUTPATIENT)
Dept: CARDIOLOGY | Facility: MEDICAL CENTER | Age: 76
End: 2021-07-20

## 2021-07-21 ENCOUNTER — TELEPHONE (OUTPATIENT)
Dept: CARDIOLOGY | Facility: MEDICAL CENTER | Age: 76
End: 2021-07-21

## 2021-07-21 DIAGNOSIS — I50.30 ACC/AHA STAGE C HEART FAILURE WITH PRESERVED EJECTION FRACTION (HCC): Primary | ICD-10-CM

## 2021-07-21 RX ORDER — POTASSIUM CHLORIDE 20 MEQ/1
40 TABLET, EXTENDED RELEASE ORAL 2 TIMES DAILY
Qty: 60 TABLET | Refills: 11 | Status: SHIPPED | OUTPATIENT
Start: 2021-07-21 | End: 2021-09-07 | Stop reason: SDUPTHER

## 2021-07-21 RX ORDER — FUROSEMIDE 40 MG/1
40 TABLET ORAL 2 TIMES DAILY
Qty: 180 TABLET | Refills: 2 | Status: SHIPPED | OUTPATIENT
Start: 2021-07-21 | End: 2021-09-07 | Stop reason: SDUPTHER

## 2021-07-21 NOTE — TELEPHONE ENCOUNTER
Per SHIRA, patient has had stable kidney function recently and is benefitting from the increased diuretic and potassium, patient is followed by Aniceto Silva currently and will ask him to order a BMP while there to monitor kidney function and will contact us with the results. Prescription sent to pharmacy for patient  
Please discussed with patient to continue double dose of furosemide for now.  Sounds like his dry weight is around 250 pounds.  Also, remind him to get his labs (BMP) done next week when he follows up with his doctor to assess his kidney function.
Sina reports his weight today is 259lbs, down from 262 yesterday. No swelling or increased SOB. Still taking increased furosemide dose of 40mg BID.     Outside labs drawn 7/9/21 report of:  Sodium 134  Potassium 4.4  Creatinine 1.01  BUN 16  GFR >60  
no discharge, no irritation, no pain, no redness, and no visual changes.

## 2021-07-26 ENCOUNTER — TELEPHONE (OUTPATIENT)
Dept: CARDIOLOGY | Facility: MEDICAL CENTER | Age: 76
End: 2021-07-26

## 2021-07-26 NOTE — TELEPHONE ENCOUNTER
Called patient regarding cardiomems and fluid retention, patient is up to 261 lbs today, was at 259 on Friday, patient says he feels great, BP is systolic in the 90s

## 2021-07-27 PROBLEM — D68.318 HEMORRHAGIC DISORDER DUE TO CIRCULATING ANTICOAGULANTS (HCC): Status: ACTIVE | Noted: 2021-07-27

## 2021-07-29 ENCOUNTER — TELEPHONE (OUTPATIENT)
Dept: CARDIOLOGY | Facility: MEDICAL CENTER | Age: 76
End: 2021-07-29

## 2021-07-29 NOTE — TELEPHONE ENCOUNTER
Called pt regarding GUIDE CardioMEMS reading increase of 5mmHG since last reading. He states his weight is up to 264lbs  today, was 261 Monday and 259 last Friday. He is unable to walk any distance due to increased shortness of breath. Is still continuing to take Furosemide 40mg BID but has noticed a decrease in urination. He has not gotten the lab work requested due to miscommunication between [facility] doctor and his [facility] team.

## 2021-07-29 NOTE — TELEPHONE ENCOUNTER
Called patient regarding increasing weights and cardiomems pressures, patient is unable to make an appointment with Geriatric doctor for some reason, will reach out to their office and check what is needed.

## 2021-08-02 ENCOUNTER — TELEPHONE (OUTPATIENT)
Dept: CARDIOLOGY | Facility: MEDICAL CENTER | Age: 76
End: 2021-08-02

## 2021-08-02 NOTE — TELEPHONE ENCOUNTER
Guide HF Single Arm Study Patient:    Please call patient and see if he had his lab work completed.  No labs received at this time.  Could you also see how he is feeling    He did not send a CardioMEMS reading today.

## 2021-08-09 ENCOUNTER — TELEPHONE (OUTPATIENT)
Dept: CARDIOLOGY | Facility: MEDICAL CENTER | Age: 76
End: 2021-08-09

## 2021-08-09 NOTE — TELEPHONE ENCOUNTER
Planning to come back to [place] in 2 weeks, feels like swelling is under control, breathing getting better, says he is closely looking at his sodium intake, going to see urologist this week and will ask for BMP to be ordered.

## 2021-08-19 DIAGNOSIS — E78.2 MIXED HYPERLIPIDEMIA: ICD-10-CM

## 2021-08-20 RX ORDER — ATORVASTATIN CALCIUM 40 MG/1
40 TABLET, FILM COATED ORAL DAILY
Qty: 100 TABLET | Refills: 3 | Status: SHIPPED | OUTPATIENT
Start: 2021-08-20 | End: 2022-01-05 | Stop reason: SDUPTHER

## 2021-08-24 ENCOUNTER — TELEPHONE (OUTPATIENT)
Dept: MEDICAL GROUP | Age: 76
End: 2021-08-24

## 2021-08-24 NOTE — LETTER
[employer] [facility]  Hugo Chase M.D.  [address]  [place] [address]  Fax: [phone]   Authorization for Release/Disclosure of   Protected Health Information   Name: JAZMIN DIEHL : 1945 SSN: [id number]   [address] [address] Phone:    [phone] (home)    I authorize the entity listed below to release/disclose the PHI below to:   [facility]/Hugo Chase M.D. and Hugo Chase M.D.   Provider or Entity Name:  Dr. Aniceto Silva   [facility]  [address] [place] [address] Phone:  [phone]    Fax:  [phone]   Reason for request: continuity of care   Information to be released:    [  ] LAST COLONOSCOPY,  including any PATH REPORT and follow-up  [  ] LAST FIT/COLOGUARD RESULT [  ] LAST DEXA  [  ] LAST MAMMOGRAM  [  ] LAST PAP  [  ] LAST LABS [  ] RETINA EXAM REPORT  [  ] IMMUNIZATION RECORDS  [ XXXX ] Release all info      [  ] Check here and initial the line next to each item to release ALL health information INCLUDING  _____ Care and treatment for drug and / or alcohol abuse  _____ HIV testing, infection status, or AIDS  _____ Genetic Testing    DATES OF SERVICE OR TIME PERIOD TO BE DISCLOSED: _____________  I understand and acknowledge that:  * This Authorization may be revoked at any time by you in writing, except if your health information has already been used or disclosed.  * Your health information that will be used or disclosed as a result of you signing this authorization could be re-disclosed by the recipient. If this occurs, your re-disclosed health information may no longer be protected by State or Federal laws.  * You may refuse to sign this Authorization. Your refusal will not affect your ability to obtain treatment.  * This Authorization becomes effective upon signing and will  on (date) __________.      If no date is indicated, this Authorization will  one (1) year from the  signature date.    Name: Sina Oliver    Signature: Continuity of Care   Date:     8/24/2021       PLEASE FAX REQUESTED RECORDS BACK TO: [phone]

## 2021-08-24 NOTE — TELEPHONE ENCOUNTER
"ESTABLISHED PATIENT PRE-VISIT PLANNING   Tuesday, 08/24/2021@10:39AM:    Annual Wellness Visit Over Due    Patient was NOT contacted to complete PVP.     Note: Patient will not be contacted if there is no indication to call.     1.  Reviewed notes from the last few office visits within the medical group: Yes    2.  If any orders were placed at last visit or intended to be done for this visit (i.e. 6 mos follow-up), do we have Results/Consult Notes?         •  Labs - Labs were not ordered at last office visit.  Note: If patient appointment is for lab review and patient did not complete labs, check with provider if OK to reschedule patient until labs completed.       •  Imaging - Imaging was not ordered at last office visit.          •  Referrals - Referral ordered, patient has NOT been seen.      -Pulmonary & Sleep: (NOT SEEN) Patient has an upcoming scheduled appointment as a New patient with Dr. Beth Benavides M.D. on 08/26/2021 at 1:20PM.     -Internal Medicine: Placed call to Dr. Aniceto Silva's office in [facility], to verify if patient scheduled/seen in regards of referral. Received voice recording: \"office closed\". Left voice mail requesting call back to [phone]. Medical records requested at fax number: [phone].      -Urology: Called & spoke to Dr. Cezar Mortensen's office in [place], [place]. Per their office, patient was seen on 8/6/2021 with Nurse Practiongaetano Morel and has future upcoming appointment scheduled 9/8/2021. Per their office, Medical records requested at their given fax number: [phone].     -Dermatology: Patient was seen by [facility]: Dr. Alicia Rodriguez M.D. Consult notes are in patient's media. Care Teams updated.    3. Is this appointment scheduled as a Hospital Follow-Up? No    4.  Immunizations were updated in Epic using Reconcile Outside Information activity? Yes    5.  Patient is due for the following Health " Maintenance Topics:   Health Maintenance Due   Topic Date Due   • COVID-19 Vaccine (1) Never done   • Annual Wellness Visit  05/26/2019       6.  AHA (Pulse8) form printed for Provider? No, patient does not have any open alerts

## 2021-08-24 NOTE — LETTER
[facility]  Hugo Chase M.D.  [address]  [place] [address]  Fax: [phone]   Authorization for Release/Disclosure of   Protected Health Information   Name: JAZMIN DIEHL : 1945 SSN: [id number]   Address: [address]  [facility] [address] Phone:    [phone] (home)    I authorize the entity listed below to release/disclose the PHI below to:   [facility] [employer]/Hugo Chase M.D. and Hugo Chase M.D.   Provider or Entity Name:  Dr. Cezar Jerez D.O.   Address   [facility] [place]  [address] , [place] [address] Phone:  [phone]    Fax:  [phone]   Reason for request: continuity of care   Information to be released:    [  ] LAST COLONOSCOPY,  including any PATH REPORT and follow-up  [  ] LAST FIT/COLOGUARD RESULT [  ] LAST DEXA  [  ] LAST MAMMOGRAM  [  ] LAST PAP  [  ] LAST LABS [  ] RETINA EXAM REPORT  [  ] IMMUNIZATION RECORDS  [ XXXX ] Release all info      [  ] Check here and initial the line next to each item to release ALL health information INCLUDING  _____ Care and treatment for drug and / or alcohol abuse  _____ HIV testing, infection status, or AIDS  _____ Genetic Testing    DATES OF SERVICE OR TIME PERIOD TO BE DISCLOSED: _____________  I understand and acknowledge that:  * This Authorization may be revoked at any time by you in writing, except if your health information has already been used or disclosed.  * Your health information that will be used or disclosed as a result of you signing this authorization could be re-disclosed by the recipient. If this occurs, your re-disclosed health information may no longer be protected by State or Federal laws.  * You may refuse to sign this Authorization. Your refusal will not affect your ability to obtain treatment.  * This Authorization becomes effective upon signing and will  on (date) __________.      If no date is indicated, this Authorization will  one (1) year from  the signature date.    Name: Sina Oliver    Signature: Continuity of Care   Date:     8/24/2021       PLEASE FAX REQUESTED RECORDS BACK TO: [phone]

## 2021-08-26 ENCOUNTER — TELEPHONE (OUTPATIENT)
Dept: CARDIOLOGY | Facility: MEDICAL CENTER | Age: 76
End: 2021-08-26

## 2021-08-26 ENCOUNTER — APPOINTMENT (OUTPATIENT)
Dept: SLEEP MEDICINE | Facility: MEDICAL CENTER | Age: 76
End: 2021-08-26
Payer: MEDICARE

## 2021-08-26 NOTE — TELEPHONE ENCOUNTER
Please have patient increase his furosemide to 60 mg twice a day until Sunday.  Please discussed trying to maintain a lower sodium diet while he is on the road back to [place].

## 2021-08-26 NOTE — TELEPHONE ENCOUNTER
Returned call to patient to instruct to increase lasix to 60mg BID until he leaves to come back to [place] on Sunday, reinforced the importance of closely limiting sodium intake while on the road back to [place]

## 2021-08-26 NOTE — TELEPHONE ENCOUNTER
Guide HF Single Arm Study Patient:    His CardioMEMs pressures are PaD 9 mmHg today. [place] is [place] on [place] home next week. He was reporting increased SOB and his weights are 262 lbs. Could you follow up with him and his symptoms. Is he taking his medications appropriately?

## 2021-08-26 NOTE — TELEPHONE ENCOUNTER
Patient should have lab results no later than Monday, weight is 262, trending up a little, avoiding sodium, swelling is minor at the end of the day, takes breaks when needed to avoid shortness of breath but no obvious increase, still taking lasix 40mg BID, will begin to head back to [place] on Sunday

## 2021-09-02 DIAGNOSIS — Z00.6 RESEARCH STUDY PATIENT: ICD-10-CM

## 2021-09-02 DIAGNOSIS — Z79.899 HIGH RISK MEDICATION USE: ICD-10-CM

## 2021-09-02 DIAGNOSIS — I50.30 ACC/AHA STAGE C HEART FAILURE WITH PRESERVED EJECTION FRACTION (HCC): ICD-10-CM

## 2021-09-07 ENCOUNTER — OFFICE VISIT (OUTPATIENT)
Dept: CARDIOLOGY | Facility: MEDICAL CENTER | Age: 76
End: 2021-09-07
Payer: MEDICARE

## 2021-09-07 ENCOUNTER — HOSPITAL ENCOUNTER (OUTPATIENT)
Dept: LAB | Facility: MEDICAL CENTER | Age: 76
End: 2021-09-07
Attending: NURSE PRACTITIONER
Payer: MEDICARE

## 2021-09-07 ENCOUNTER — HOSPITAL ENCOUNTER (OUTPATIENT)
Dept: LAB | Facility: MEDICAL CENTER | Age: 76
End: 2021-09-07
Attending: INTERNAL MEDICINE
Payer: MEDICARE

## 2021-09-07 ENCOUNTER — RESEARCH ENCOUNTER (OUTPATIENT)
Dept: CARDIOLOGY | Facility: MEDICAL CENTER | Age: 76
End: 2021-09-07
Payer: MEDICARE

## 2021-09-07 VITALS
WEIGHT: 269 LBS | BODY MASS INDEX: 35.65 KG/M2 | HEIGHT: 73 IN | HEART RATE: 56 BPM | DIASTOLIC BLOOD PRESSURE: 58 MMHG | SYSTOLIC BLOOD PRESSURE: 100 MMHG | OXYGEN SATURATION: 93 %

## 2021-09-07 DIAGNOSIS — Z79.899 HIGH RISK MEDICATION USE: ICD-10-CM

## 2021-09-07 DIAGNOSIS — G47.33 OSA (OBSTRUCTIVE SLEEP APNEA): ICD-10-CM

## 2021-09-07 DIAGNOSIS — I48.19 PERSISTENT ATRIAL FIBRILLATION (HCC): ICD-10-CM

## 2021-09-07 DIAGNOSIS — E66.9 OBESITY (BMI 30-39.9): ICD-10-CM

## 2021-09-07 DIAGNOSIS — Z01.812 PRE-OPERATIVE LABORATORY EXAMINATION: ICD-10-CM

## 2021-09-07 DIAGNOSIS — I10 HTN (HYPERTENSION), MALIGNANT: ICD-10-CM

## 2021-09-07 DIAGNOSIS — I51.89 LEFT VENTRICULAR DIASTOLIC DYSFUNCTION, NYHA CLASS 3: ICD-10-CM

## 2021-09-07 DIAGNOSIS — Z00.6 RESEARCH STUDY PATIENT: ICD-10-CM

## 2021-09-07 DIAGNOSIS — I50.30 ACC/AHA STAGE C HEART FAILURE WITH PRESERVED EJECTION FRACTION (HCC): ICD-10-CM

## 2021-09-07 DIAGNOSIS — I48.0 PAROXYSMAL ATRIAL FIBRILLATION (HCC): ICD-10-CM

## 2021-09-07 DIAGNOSIS — E78.2 MIXED HYPERLIPIDEMIA: ICD-10-CM

## 2021-09-07 LAB
ANION GAP SERPL CALC-SCNC: 13 MMOL/L (ref 7–16)
BUN SERPL-MCNC: 22 MG/DL (ref 8–22)
CALCIUM SERPL-MCNC: 9.8 MG/DL (ref 8.5–10.5)
CHLORIDE SERPL-SCNC: 101 MMOL/L (ref 96–112)
CO2 SERPL-SCNC: 22 MMOL/L (ref 20–33)
CREAT SERPL-MCNC: 0.98 MG/DL (ref 0.5–1.4)
FASTING STATUS PATIENT QL REPORTED: NORMAL
GLUCOSE SERPL-MCNC: 85 MG/DL (ref 65–99)
MAGNESIUM SERPL-MCNC: 2.3 MG/DL (ref 1.5–2.5)
NT-PROBNP SERPL IA-MCNC: 828 PG/ML (ref 0–125)
POTASSIUM SERPL-SCNC: 4.2 MMOL/L (ref 3.6–5.5)
SODIUM SERPL-SCNC: 136 MMOL/L (ref 135–145)

## 2021-09-07 PROCEDURE — 83880 ASSAY OF NATRIURETIC PEPTIDE: CPT

## 2021-09-07 PROCEDURE — 83735 ASSAY OF MAGNESIUM: CPT

## 2021-09-07 PROCEDURE — 80048 BASIC METABOLIC PNL TOTAL CA: CPT

## 2021-09-07 PROCEDURE — 99214 OFFICE O/P EST MOD 30 MIN: CPT | Performed by: NURSE PRACTITIONER

## 2021-09-07 PROCEDURE — 36415 COLL VENOUS BLD VENIPUNCTURE: CPT

## 2021-09-07 RX ORDER — TESTOSTERONE CYPIONATE 200 MG/ML
INJECTION, SOLUTION INTRAMUSCULAR
COMMUNITY
Start: 2021-08-01 | End: 2021-10-06

## 2021-09-07 RX ORDER — FUROSEMIDE 40 MG/1
60 TABLET ORAL 2 TIMES DAILY
Qty: 180 TABLET | Refills: 3 | Status: SHIPPED | OUTPATIENT
Start: 2021-09-07 | End: 2021-09-10 | Stop reason: SDUPTHER

## 2021-09-07 RX ORDER — POTASSIUM CHLORIDE 20 MEQ/1
40 TABLET, EXTENDED RELEASE ORAL 2 TIMES DAILY
Qty: 120 TABLET | Refills: 11 | Status: SHIPPED | OUTPATIENT
Start: 2021-09-07 | End: 2021-09-13 | Stop reason: SDUPTHER

## 2021-09-07 ASSESSMENT — MINNESOTA LIVING WITH HEART FAILURE QUESTIONNAIRE (MLHF)
WALKING ABOUT OR CLIMBING STAIRS DIFFICULT: 5
WORKING AROUND THE HOUSE OR YARD DIFFICULT: 5
MAKING YOU WORRY: 1
MAKING YOU STAY IN A HOSPITAL: 0
SWELLING IN ANKLES OR LEGS: 5
EATING LESS FOODS YOU LIKE: 5
MAKING YOU SHORT OF BREATH: 5
COSTING YOU MONEY FOR MEDICAL CARE: 1
DIFFICULTY WORKING TO EARN A LIVING: 5
DIFFICULTY WITH SEXUAL ACTIVITIES: 0
GIVING YOU SIDE EFFECTS FROM TREATMENTS: 0
DIFFICULTY SOCIALIZING WITH FAMILY OR FRIENDS: 2
MAKING YOU FEEL DEPRESSED: 0
TIRED, FATIGUED OR LOW ON ENERGY: 5
LOSS OF SELF CONTROL IN YOUR LIFE: 5
DIFFICULTY WITH RECREATIONAL PASTIMES, SPORTS, HOBBIES: 5
DIFFICULTY GOING AWAY FROM HOME: 1
DIFFICULTY TO CONCENTRATE OR REMEMBERING THINGS: 5
TOTAL_SCORE: 65
FEELING LIKE A BURDEN TO FAMILY AND FRIENDS: 3
DIFFICULTY SLEEPING WELL AT NIGHT: 2
HAVING TO SIT OR LIE DOWN DURING THE DAY: 5

## 2021-09-07 ASSESSMENT — ENCOUNTER SYMPTOMS
FEVER: 0
COUGH: 0
PND: 0
ABDOMINAL PAIN: 0
VOMITING: 0
TINGLING: 0
WEIGHT LOSS: 0
CLAUDICATION: 0
BLOOD IN STOOL: 0
MYALGIAS: 0
PALPITATIONS: 0
BRUISES/BLEEDS EASILY: 0
ORTHOPNEA: 0
BLURRED VISION: 0
NAUSEA: 0
FALLS: 0
LOSS OF CONSCIOUSNESS: 0
DIZZINESS: 0
SHORTNESS OF BREATH: 0

## 2021-09-07 ASSESSMENT — FIBROSIS 4 INDEX: FIB4 SCORE: 1.75
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
## 2021-09-07 NOTE — RESEARCH NOTE
Name: Sina Oliver   MRN: [id number]  Participant ID:  [id number]  : 1945  Visit Date/Time: 2021 3:00 PM  Who is present: Melissa Mas; ASHLEY Macdonald    Study:    [id number] - GUIDE HF / [id number]   Status Consented/Enrolled   Start Date 21   Participant ID [id number]   Coordinator , Melissa TOUSSAINT; Nuris Zapata   IRB [id number]   NCT [id number]    Kimmy Easton M.D.       Study Specific Assessment:   Sina and his wife Shaniqua here today for 6 month study visit. Appointment is out of window due to Sina being out of town.  All assessments conducted per protocol. See provider note for HF assessment and medication changes.    Vitals:  /58  HR 56   kg    6MWT; 340 feet, stopped twice due to SOB, leg cramps    Labs:    Drawn today    Meds:  Medication Changes: Yes    Adverse Events: No    Questionarres/QOL/Index’s Completed: KCCQ-12, EQ-5D-5L         Follow-up: 12 month visit due 22

## 2021-09-07 NOTE — PATIENT INSTRUCTIONS
Continue furosemide 60 mg in the morning (1.5 tablet) and 40 mg in the afternoon (1 tablet) until Josiane gets your lab results and her nurse calls you with updated directions

## 2021-09-09 ENCOUNTER — TELEPHONE (OUTPATIENT)
Dept: CARDIOLOGY | Facility: MEDICAL CENTER | Age: 76
End: 2021-09-09

## 2021-09-09 NOTE — TELEPHONE ENCOUNTER
YOLI    Pt called stating his lab results are in and is asking if he needs to make any medication changes. Please call Pt back at [phone].    Thank you

## 2021-09-10 DIAGNOSIS — I50.30 ACC/AHA STAGE C HEART FAILURE WITH PRESERVED EJECTION FRACTION (HCC): ICD-10-CM

## 2021-09-10 RX ORDER — FUROSEMIDE 40 MG/1
60 TABLET ORAL 2 TIMES DAILY
Qty: 300 TABLET | Refills: 3 | Status: SHIPPED | OUTPATIENT
Start: 2021-09-10 | End: 2021-09-23

## 2021-09-13 ENCOUNTER — TELEPHONE (OUTPATIENT)
Dept: CARDIOLOGY | Facility: MEDICAL CENTER | Age: 76
End: 2021-09-13

## 2021-09-13 DIAGNOSIS — I50.30 ACC/AHA STAGE C HEART FAILURE WITH PRESERVED EJECTION FRACTION (HCC): ICD-10-CM

## 2021-09-13 RX ORDER — POTASSIUM CHLORIDE 20 MEQ/1
40 TABLET, EXTENDED RELEASE ORAL 2 TIMES DAILY
Qty: 120 TABLET | Refills: 0 | Status: SHIPPED | OUTPATIENT
Start: 2021-09-13 | End: 2021-10-06

## 2021-09-13 RX ORDER — POTASSIUM CHLORIDE 20 MEQ/1
40 TABLET, EXTENDED RELEASE ORAL 2 TIMES DAILY
Qty: 360 TABLET | Refills: 3 | Status: SHIPPED | OUTPATIENT
Start: 2021-09-13 | End: 2022-06-30 | Stop reason: SDUPTHER

## 2021-09-13 NOTE — TELEPHONE ENCOUNTER
FLOYDG    [facility] called and they need clarification on the Furosemide. Please call them at [phone].    Thank you,    Vidhya MILLIGAN

## 2021-09-13 NOTE — TELEPHONE ENCOUNTER
Vidhya Espinal, Med Ass't at 9/13/2021  8:32 AM    Status: Signed      JG     [facility] called and they need clarification on the Furosemide. Please call them at [phone].     Thank you,     Vidhya MILLIGAN        S/W [facility], corrected RX instructions from OV note 60mg AM and 40mg PM. Confirmed receipt of potassium RX as well.

## 2021-09-13 NOTE — TELEPHONE ENCOUNTER
JG    Patient called and is out of his potassium. He needs a new RX sent with the changed dosage. Please send to the [facility]'s on [address]     Thank you,    Vidhya MILLIGAN

## 2021-09-14 ENCOUNTER — TELEPHONE (OUTPATIENT)
Dept: CARDIOLOGY | Facility: MEDICAL CENTER | Age: 76
End: 2021-09-14

## 2021-09-16 PROCEDURE — RXMED WILLOW AMBULATORY MEDICATION CHARGE: Performed by: FAMILY MEDICINE

## 2021-09-17 ENCOUNTER — PHARMACY VISIT (OUTPATIENT)
Dept: PHARMACY | Facility: MEDICAL CENTER | Age: 76
End: 2021-09-17
Payer: COMMERCIAL

## 2021-09-23 ENCOUNTER — TELEPHONE (OUTPATIENT)
Dept: CARDIOLOGY | Facility: MEDICAL CENTER | Age: 76
End: 2021-09-23

## 2021-09-23 DIAGNOSIS — I50.30 ACC/AHA STAGE C HEART FAILURE WITH PRESERVED EJECTION FRACTION (HCC): ICD-10-CM

## 2021-09-23 RX ORDER — FUROSEMIDE 40 MG/1
40 TABLET ORAL 2 TIMES DAILY
Qty: 180 TABLET | Refills: 3 | Status: SHIPPED | OUTPATIENT
Start: 2021-09-23 | End: 2022-02-15

## 2021-09-23 NOTE — TELEPHONE ENCOUNTER
Guide HF Single Arm Study Patient:    His CardioMEMs pressures are PaD 5 mmHg today. He is low. He reported dizziness to research staff. Please follow-up on his symptoms. Please make sure he is adequately hydrating. Have him reduce his diuretic, furosemide to 40 mg twice a day.

## 2021-09-23 NOTE — TELEPHONE ENCOUNTER
Called patient to relay message, he states he has not been dizzy but his blood pressure has been consistently lower than normal, SBP around 100, per SHIRA instructed patient to decrease his furosemide to 40mg BID from 60/40. Patient verbalized understanding and will increase hydration.

## 2021-09-23 NOTE — TELEPHONE ENCOUNTER
JG    Pt and wife called stating that pt is going to have a procedure for Thyroid Removal by Dr. Tita Mendoza with [facility]. Pt states that he will need approval from Cardiology for him to proceed and also to hold medication. Pt's appt was this morning, 9/14, with Dr. Mendoza and believes they will be sending over the clearance request. Procedure is not scheduled.   [facility] Ph: [phone]  Please call pt back if you have any further questions at [phone].    Thank you       
Other (Clearance )     BIMAL Mccormick.  You; Catarina Ervin R.N. 21 minutes ago (10:39 AM)     Yes to noncardiac surgery clearance with moderate risk.  Yes to Eliquis request.  Please let surgeon know most recent PAD readings from his CardioMEMS is 9 mm HQ on 9/20/2021.  Surgeon can request most recent CardioMEMS readings from our office to ensure patient is euvolemic prior to surgery.  If patient is overloaded with PAD greater than 20, patient is considered fluid volume overloaded and would be upgraded to high risk.      Faxed note from YOLI as clearance to [facility] [phone], transmission complete  
Pt to have thyroidectomy from [facility] fax [phone]    To YOLI ok for patient to proceed and hold Eliquis 48hrs prior?      
YOLI Mathis with [facility] is calling to check status of clearance. They had faxed over a referral to get clearance for patient. It states it was completed. Dr Mendoza would like clearance sent today as needs to be scheduled ASAP. Please fax to [phone] or call [phone].    Thank you,    Vidhya MILLIGAN  
No

## 2021-09-28 ENCOUNTER — TELEPHONE (OUTPATIENT)
Dept: MEDICAL GROUP | Facility: MEDICAL CENTER | Age: 76
End: 2021-09-28

## 2021-10-06 ENCOUNTER — OFFICE VISIT (OUTPATIENT)
Dept: MEDICAL GROUP | Facility: MEDICAL CENTER | Age: 76
End: 2021-10-06
Payer: MEDICARE

## 2021-10-06 VITALS
DIASTOLIC BLOOD PRESSURE: 58 MMHG | WEIGHT: 270 LBS | HEART RATE: 54 BPM | BODY MASS INDEX: 35.78 KG/M2 | HEIGHT: 73 IN | TEMPERATURE: 96.8 F | OXYGEN SATURATION: 93 % | SYSTOLIC BLOOD PRESSURE: 128 MMHG

## 2021-10-06 DIAGNOSIS — I50.30 ACC/AHA STAGE C HEART FAILURE WITH PRESERVED EJECTION FRACTION (HCC): ICD-10-CM

## 2021-10-06 DIAGNOSIS — C73 PAPILLARY THYROID CARCINOMA (HCC): ICD-10-CM

## 2021-10-06 DIAGNOSIS — I73.9 CLAUDICATION (HCC): ICD-10-CM

## 2021-10-06 DIAGNOSIS — D68.318 HEMORRHAGIC DISORDER DUE TO CIRCULATING ANTICOAGULANTS (HCC): ICD-10-CM

## 2021-10-06 DIAGNOSIS — I48.0 PAROXYSMAL ATRIAL FIBRILLATION (HCC): ICD-10-CM

## 2021-10-06 DIAGNOSIS — E78.5 HYPERLIPIDEMIA, UNSPECIFIED HYPERLIPIDEMIA TYPE: ICD-10-CM

## 2021-10-06 DIAGNOSIS — I10 ESSENTIAL HYPERTENSION: ICD-10-CM

## 2021-10-06 PROCEDURE — 99214 OFFICE O/P EST MOD 30 MIN: CPT | Performed by: FAMILY MEDICINE

## 2021-10-06 ASSESSMENT — FIBROSIS 4 INDEX: FIB4 SCORE: 1.75

## 2021-10-06 NOTE — PROGRESS NOTES
"  Subjective:     Sina Oliver is a 76 y.o. male presenting for a follow up with his wife.  Has a thyroidectomy planned for later this month.  He also reports that he has been having bilateral leg numbness and weakness when standing for too long.  Is also unable to walk longer distances due to pain and  Burning in his lower extremities.  He continues to describe fatigue, unable to exert himself like before.  He sees cardiology, reports heart has been stable.          Current Outpatient Medications:   •  furosemide (LASIX) 40 MG Tab, Take 1 Tablet by mouth 2 times a day., Disp: 180 Tablet, Rfl: 3  •  potassium chloride SA (KDUR) 20 MEQ Tab CR, Take 2 Tablets by mouth 2 times a day., Disp: 360 Tablet, Rfl: 3  •  atorvastatin (LIPITOR) 40 MG Tab, Take 1 Tablet by mouth every day., Disp: 100 Tablet, Rfl: 3  •  testosterone cypionate (DEPO-TESTOSTERONE) 200 MG/ML Solution injection, Inject 1 mL into the shoulder, thigh, or buttocks every 14 days for 30 days., Disp: 2 mL, Rfl: 5  •  olmesartan (BENICAR) 40 MG Tab, Take 1 tablet by mouth every day., Disp: 100 tablet, Rfl: 1  •  tamsulosin (FLOMAX) 0.4 MG capsule, Take 1 capsule by mouth every day., Disp: 100 capsule, Rfl: 3  •  cyclobenzaprine (FLEXERIL) 10 mg Tab, Take 1 tablet by mouth 2 times a day as needed for Muscle Spasms., Disp: 180 tablet, Rfl: 0  •  amLODIPine (NORVASC) 10 MG Tab, Take 1 tablet by mouth every day., Disp: 90 tablet, Rfl: 4  •  metoprolol tartrate (LOPRESSOR) 50 MG Tab, Take 1 tablet by mouth 2 times a day., Disp: 180 tablet, Rfl: 4  •  apixaban (ELIQUIS) 2.5mg Tab, Take 1 tablet by mouth 2 Times a Day., Disp: 180 tablet, Rfl: 4    Objective:     Vitals: /58 (BP Location: Right arm, Patient Position: Sitting, BP Cuff Size: Adult)   Pulse (!) 54   Temp 36 °C (96.8 °F) (Temporal)   Ht 1.854 m (6' 1\")   Wt 122 kg (270 lb)   SpO2 93%   BMI 35.62 kg/m²   General: Alert  HEENT: Normocephalic.  Heart: irregularly irregular.  S1 and S2 " normal.  No murmurs appreciated.  Respiratory: Normal respiratory effort.  Clear to auscultation bilaterally.  Abdomen: Non-distended, soft  Extremities: No leg edema.    Assessment/Plan:     Diagnoses and all orders for this visit:    Papillary thyroid carcinoma (HCC)  Chronic, possibly stable. Encouraged pt to follow through with the surgery    Claudication (HCC)  Undiagnosed new problem with uncertain prognosis. Will check the following  -     US-AORTA/ILIACS DUPLEX COMPLETE; Future  -     US-EXTREMITY ARTERY LOWER BILAT W/SHANTANU (COMBO); Future    Paroxysmal atrial fibrillation (HCC)  Hemorrhagic disorder due to circulating anticoagulants (HCC)  ACC/AHA stage C heart failure with preserved ejection fraction (HCC)  Essential hypertension  Hyperlipidemia, unspecified hyperlipidemia type  Chronic, stable, managed by cardiology        Return in about 6 months (around 4/6/2022) for routine follow up.

## 2021-10-07 ENCOUNTER — PRE-ADMISSION TESTING (OUTPATIENT)
Dept: ADMISSIONS | Facility: MEDICAL CENTER | Age: 76
End: 2021-10-07
Attending: SURGERY
Payer: MEDICARE

## 2021-10-07 DIAGNOSIS — Z01.810 PRE-OPERATIVE CARDIOVASCULAR EXAMINATION: ICD-10-CM

## 2021-10-07 DIAGNOSIS — Z01.812 PRE-OPERATIVE LABORATORY EXAMINATION: ICD-10-CM

## 2021-10-07 LAB
ANION GAP SERPL CALC-SCNC: 12 MMOL/L (ref 7–16)
BUN SERPL-MCNC: 22 MG/DL (ref 8–22)
CALCIUM SERPL-MCNC: 9.8 MG/DL (ref 8.5–10.5)
CHLORIDE SERPL-SCNC: 106 MMOL/L (ref 96–112)
CO2 SERPL-SCNC: 20 MMOL/L (ref 20–33)
CREAT SERPL-MCNC: 1.13 MG/DL (ref 0.5–1.4)
EKG IMPRESSION: NORMAL
ERYTHROCYTE [DISTWIDTH] IN BLOOD BY AUTOMATED COUNT: 45.9 FL (ref 35.9–50)
GLUCOSE SERPL-MCNC: 118 MG/DL (ref 65–99)
HCT VFR BLD AUTO: 47.3 % (ref 42–52)
HGB BLD-MCNC: 15.9 G/DL (ref 14–18)
MCH RBC QN AUTO: 33.4 PG (ref 27–33)
MCHC RBC AUTO-ENTMCNC: 33.6 G/DL (ref 33.7–35.3)
MCV RBC AUTO: 99.4 FL (ref 81.4–97.8)
PLATELET # BLD AUTO: 219 K/UL (ref 164–446)
PMV BLD AUTO: 10.4 FL (ref 9–12.9)
POTASSIUM SERPL-SCNC: 5 MMOL/L (ref 3.6–5.5)
RBC # BLD AUTO: 4.76 M/UL (ref 4.7–6.1)
SODIUM SERPL-SCNC: 138 MMOL/L (ref 135–145)
WBC # BLD AUTO: 9.2 K/UL (ref 4.8–10.8)

## 2021-10-07 PROCEDURE — U0005 INFEC AGEN DETEC AMPLI PROBE: HCPCS

## 2021-10-07 PROCEDURE — 80048 BASIC METABOLIC PNL TOTAL CA: CPT

## 2021-10-07 PROCEDURE — U0003 INFECTIOUS AGENT DETECTION BY NUCLEIC ACID (DNA OR RNA); SEVERE ACUTE RESPIRATORY SYNDROME CORONAVIRUS 2 (SARS-COV-2) (CORONAVIRUS DISEASE [COVID-19]), AMPLIFIED PROBE TECHNIQUE, MAKING USE OF HIGH THROUGHPUT TECHNOLOGIES AS DESCRIBED BY CMS-2020-01-R: HCPCS

## 2021-10-07 PROCEDURE — 93010 ELECTROCARDIOGRAM REPORT: CPT | Performed by: INTERNAL MEDICINE

## 2021-10-07 PROCEDURE — 93005 ELECTROCARDIOGRAM TRACING: CPT

## 2021-10-07 PROCEDURE — C9803 HOPD COVID-19 SPEC COLLECT: HCPCS

## 2021-10-07 PROCEDURE — 85027 COMPLETE CBC AUTOMATED: CPT

## 2021-10-07 PROCEDURE — 36415 COLL VENOUS BLD VENIPUNCTURE: CPT

## 2021-10-07 RX ORDER — MULTIVIT WITH MINERALS/LUTEIN
1000 TABLET ORAL DAILY
COMMUNITY

## 2021-10-07 ASSESSMENT — FIBROSIS 4 INDEX: FIB4 SCORE: 1.75

## 2021-10-08 LAB
SARS-COV-2 RNA RESP QL NAA+PROBE: NOTDETECTED
SPECIMEN SOURCE: NORMAL

## 2021-10-12 ENCOUNTER — ANESTHESIA EVENT (OUTPATIENT)
Dept: SURGERY | Facility: MEDICAL CENTER | Age: 76
End: 2021-10-12
Payer: MEDICARE

## 2021-10-13 ENCOUNTER — ANESTHESIA (OUTPATIENT)
Dept: SURGERY | Facility: MEDICAL CENTER | Age: 76
End: 2021-10-13
Payer: MEDICARE

## 2021-10-13 ENCOUNTER — HOSPITAL ENCOUNTER (OUTPATIENT)
Facility: MEDICAL CENTER | Age: 76
End: 2021-10-14
Attending: SURGERY | Admitting: SURGERY
Payer: MEDICARE

## 2021-10-13 DIAGNOSIS — G89.18 POSTOPERATIVE PAIN: ICD-10-CM

## 2021-10-13 LAB
ALBUMIN SERPL BCP-MCNC: 4.3 G/DL (ref 3.2–4.9)
CALCIUM SERPL-MCNC: 8.9 MG/DL (ref 8.5–10.5)
PATHOLOGY CONSULT NOTE: NORMAL

## 2021-10-13 PROCEDURE — 700111 HCHG RX REV CODE 636 W/ 250 OVERRIDE (IP): Performed by: ANESTHESIOLOGY

## 2021-10-13 PROCEDURE — 502594 HCHG SCISSOR HANDLE, HARMONIC ACE: Performed by: SURGERY

## 2021-10-13 PROCEDURE — A9270 NON-COVERED ITEM OR SERVICE: HCPCS | Performed by: SURGERY

## 2021-10-13 PROCEDURE — 160036 HCHG PACU - EA ADDL 30 MINS PHASE I: Performed by: SURGERY

## 2021-10-13 PROCEDURE — 160002 HCHG RECOVERY MINUTES (STAT): Performed by: SURGERY

## 2021-10-13 PROCEDURE — 88342 IMHCHEM/IMCYTCHM 1ST ANTB: CPT

## 2021-10-13 PROCEDURE — 160041 HCHG SURGERY MINUTES - EA ADDL 1 MIN LEVEL 4: Performed by: SURGERY

## 2021-10-13 PROCEDURE — 51798 US URINE CAPACITY MEASURE: CPT

## 2021-10-13 PROCEDURE — 82310 ASSAY OF CALCIUM: CPT

## 2021-10-13 PROCEDURE — 88341 IMHCHEM/IMCYTCHM EA ADD ANTB: CPT | Mod: 91

## 2021-10-13 PROCEDURE — 700105 HCHG RX REV CODE 258: Performed by: SURGERY

## 2021-10-13 PROCEDURE — 82040 ASSAY OF SERUM ALBUMIN: CPT

## 2021-10-13 PROCEDURE — 160035 HCHG PACU - 1ST 60 MINS PHASE I: Performed by: SURGERY

## 2021-10-13 PROCEDURE — 700102 HCHG RX REV CODE 250 W/ 637 OVERRIDE(OP): Performed by: ANESTHESIOLOGY

## 2021-10-13 PROCEDURE — 88307 TISSUE EXAM BY PATHOLOGIST: CPT | Mod: 59

## 2021-10-13 PROCEDURE — 160029 HCHG SURGERY MINUTES - 1ST 30 MINS LEVEL 4: Performed by: SURGERY

## 2021-10-13 PROCEDURE — 700101 HCHG RX REV CODE 250: Performed by: ANESTHESIOLOGY

## 2021-10-13 PROCEDURE — 501838 HCHG SUTURE GENERAL: Performed by: SURGERY

## 2021-10-13 PROCEDURE — 700102 HCHG RX REV CODE 250 W/ 637 OVERRIDE(OP): Performed by: SURGERY

## 2021-10-13 PROCEDURE — G0378 HOSPITAL OBSERVATION PER HR: HCPCS

## 2021-10-13 PROCEDURE — 160048 HCHG OR STATISTICAL LEVEL 1-5: Performed by: SURGERY

## 2021-10-13 PROCEDURE — 88360 TUMOR IMMUNOHISTOCHEM/MANUAL: CPT

## 2021-10-13 PROCEDURE — A9270 NON-COVERED ITEM OR SERVICE: HCPCS | Performed by: ANESTHESIOLOGY

## 2021-10-13 PROCEDURE — 160009 HCHG ANES TIME/MIN: Performed by: SURGERY

## 2021-10-13 RX ORDER — MEPERIDINE HYDROCHLORIDE 25 MG/ML
12.5 INJECTION INTRAMUSCULAR; INTRAVENOUS; SUBCUTANEOUS
Status: DISCONTINUED | OUTPATIENT
Start: 2021-10-13 | End: 2021-10-13 | Stop reason: HOSPADM

## 2021-10-13 RX ORDER — DEXMEDETOMIDINE HYDROCHLORIDE 100 UG/ML
INJECTION, SOLUTION INTRAVENOUS PRN
Status: DISCONTINUED | OUTPATIENT
Start: 2021-10-13 | End: 2021-10-13 | Stop reason: SURG

## 2021-10-13 RX ORDER — DEXAMETHASONE SODIUM PHOSPHATE 4 MG/ML
INJECTION, SOLUTION INTRA-ARTICULAR; INTRALESIONAL; INTRAMUSCULAR; INTRAVENOUS; SOFT TISSUE PRN
Status: DISCONTINUED | OUTPATIENT
Start: 2021-10-13 | End: 2021-10-13 | Stop reason: SURG

## 2021-10-13 RX ORDER — HYDROMORPHONE HYDROCHLORIDE 1 MG/ML
0.1 INJECTION, SOLUTION INTRAMUSCULAR; INTRAVENOUS; SUBCUTANEOUS
Status: DISCONTINUED | OUTPATIENT
Start: 2021-10-13 | End: 2021-10-13 | Stop reason: HOSPADM

## 2021-10-13 RX ORDER — POTASSIUM CHLORIDE 20 MEQ/1
40 TABLET, EXTENDED RELEASE ORAL 2 TIMES DAILY
Status: DISCONTINUED | OUTPATIENT
Start: 2021-10-14 | End: 2021-10-14 | Stop reason: HOSPADM

## 2021-10-13 RX ORDER — DEXAMETHASONE SODIUM PHOSPHATE 4 MG/ML
4 INJECTION, SOLUTION INTRA-ARTICULAR; INTRALESIONAL; INTRAMUSCULAR; INTRAVENOUS; SOFT TISSUE
Status: DISCONTINUED | OUTPATIENT
Start: 2021-10-13 | End: 2021-10-14 | Stop reason: HOSPADM

## 2021-10-13 RX ORDER — LABETALOL HYDROCHLORIDE 5 MG/ML
5 INJECTION, SOLUTION INTRAVENOUS
Status: DISCONTINUED | OUTPATIENT
Start: 2021-10-13 | End: 2021-10-13 | Stop reason: HOSPADM

## 2021-10-13 RX ORDER — ONDANSETRON 2 MG/ML
INJECTION INTRAMUSCULAR; INTRAVENOUS PRN
Status: DISCONTINUED | OUTPATIENT
Start: 2021-10-13 | End: 2021-10-13 | Stop reason: SURG

## 2021-10-13 RX ORDER — PHENYLEPHRINE HCL IN 0.9% NACL 0.5 MG/5ML
SYRINGE (ML) INTRAVENOUS PRN
Status: DISCONTINUED | OUTPATIENT
Start: 2021-10-13 | End: 2021-10-13 | Stop reason: SURG

## 2021-10-13 RX ORDER — AMLODIPINE BESYLATE 10 MG/1
10 TABLET ORAL DAILY
Status: DISCONTINUED | OUTPATIENT
Start: 2021-10-14 | End: 2021-10-14 | Stop reason: HOSPADM

## 2021-10-13 RX ORDER — IPRATROPIUM BROMIDE AND ALBUTEROL SULFATE 2.5; .5 MG/3ML; MG/3ML
3 SOLUTION RESPIRATORY (INHALATION)
Status: DISCONTINUED | OUTPATIENT
Start: 2021-10-13 | End: 2021-10-13 | Stop reason: HOSPADM

## 2021-10-13 RX ORDER — HYDROMORPHONE HYDROCHLORIDE 1 MG/ML
0.4 INJECTION, SOLUTION INTRAMUSCULAR; INTRAVENOUS; SUBCUTANEOUS
Status: DISCONTINUED | OUTPATIENT
Start: 2021-10-13 | End: 2021-10-13 | Stop reason: HOSPADM

## 2021-10-13 RX ORDER — METOPROLOL TARTRATE 50 MG/1
50 TABLET, FILM COATED ORAL 2 TIMES DAILY
Status: DISCONTINUED | OUTPATIENT
Start: 2021-10-13 | End: 2021-10-14 | Stop reason: HOSPADM

## 2021-10-13 RX ORDER — HALOPERIDOL 5 MG/ML
1 INJECTION INTRAMUSCULAR
Status: DISCONTINUED | OUTPATIENT
Start: 2021-10-13 | End: 2021-10-13 | Stop reason: HOSPADM

## 2021-10-13 RX ORDER — ONDANSETRON 2 MG/ML
4 INJECTION INTRAMUSCULAR; INTRAVENOUS EVERY 4 HOURS PRN
Status: DISCONTINUED | OUTPATIENT
Start: 2021-10-13 | End: 2021-10-14 | Stop reason: HOSPADM

## 2021-10-13 RX ORDER — DIPHENHYDRAMINE HYDROCHLORIDE 50 MG/ML
25 INJECTION INTRAMUSCULAR; INTRAVENOUS EVERY 6 HOURS PRN
Status: DISCONTINUED | OUTPATIENT
Start: 2021-10-13 | End: 2021-10-14 | Stop reason: HOSPADM

## 2021-10-13 RX ORDER — ATORVASTATIN CALCIUM 40 MG/1
40 TABLET, FILM COATED ORAL DAILY
Status: DISCONTINUED | OUTPATIENT
Start: 2021-10-14 | End: 2021-10-14 | Stop reason: HOSPADM

## 2021-10-13 RX ORDER — LEVOTHYROXINE SODIUM 0.12 MG/1
125 TABLET ORAL
Status: DISCONTINUED | OUTPATIENT
Start: 2021-10-13 | End: 2021-10-14 | Stop reason: HOSPADM

## 2021-10-13 RX ORDER — TAMSULOSIN HYDROCHLORIDE 0.4 MG/1
0.4 CAPSULE ORAL DAILY
Status: DISCONTINUED | OUTPATIENT
Start: 2021-10-13 | End: 2021-10-14 | Stop reason: HOSPADM

## 2021-10-13 RX ORDER — DIPHENHYDRAMINE HYDROCHLORIDE 50 MG/ML
12.5 INJECTION INTRAMUSCULAR; INTRAVENOUS
Status: DISCONTINUED | OUTPATIENT
Start: 2021-10-13 | End: 2021-10-13 | Stop reason: HOSPADM

## 2021-10-13 RX ORDER — SODIUM CHLORIDE, SODIUM LACTATE, POTASSIUM CHLORIDE, CALCIUM CHLORIDE 600; 310; 30; 20 MG/100ML; MG/100ML; MG/100ML; MG/100ML
INJECTION, SOLUTION INTRAVENOUS CONTINUOUS
Status: DISCONTINUED | OUTPATIENT
Start: 2021-10-13 | End: 2021-10-13 | Stop reason: HOSPADM

## 2021-10-13 RX ORDER — CYCLOBENZAPRINE HCL 10 MG
10 TABLET ORAL 2 TIMES DAILY PRN
Status: DISCONTINUED | OUTPATIENT
Start: 2021-10-13 | End: 2021-10-14 | Stop reason: HOSPADM

## 2021-10-13 RX ORDER — OLMESARTAN MEDOXOMIL 20 MG/1
40 TABLET ORAL DAILY
Status: DISCONTINUED | OUTPATIENT
Start: 2021-10-13 | End: 2021-10-14 | Stop reason: HOSPADM

## 2021-10-13 RX ORDER — FUROSEMIDE 40 MG/1
40 TABLET ORAL 2 TIMES DAILY
Status: DISCONTINUED | OUTPATIENT
Start: 2021-10-13 | End: 2021-10-14 | Stop reason: HOSPADM

## 2021-10-13 RX ORDER — OXYCODONE HCL 5 MG/5 ML
10 SOLUTION, ORAL ORAL
Status: COMPLETED | OUTPATIENT
Start: 2021-10-13 | End: 2021-10-13

## 2021-10-13 RX ORDER — LIDOCAINE HYDROCHLORIDE 20 MG/ML
INJECTION, SOLUTION EPIDURAL; INFILTRATION; INTRACAUDAL; PERINEURAL PRN
Status: DISCONTINUED | OUTPATIENT
Start: 2021-10-13 | End: 2021-10-13 | Stop reason: SURG

## 2021-10-13 RX ORDER — SODIUM CHLORIDE, SODIUM LACTATE, POTASSIUM CHLORIDE, CALCIUM CHLORIDE 600; 310; 30; 20 MG/100ML; MG/100ML; MG/100ML; MG/100ML
INJECTION, SOLUTION INTRAVENOUS CONTINUOUS
Status: ACTIVE | OUTPATIENT
Start: 2021-10-13 | End: 2021-10-13

## 2021-10-13 RX ORDER — SODIUM CHLORIDE, SODIUM LACTATE, POTASSIUM CHLORIDE, CALCIUM CHLORIDE 600; 310; 30; 20 MG/100ML; MG/100ML; MG/100ML; MG/100ML
INJECTION, SOLUTION INTRAVENOUS CONTINUOUS
Status: DISCONTINUED | OUTPATIENT
Start: 2021-10-13 | End: 2021-10-14 | Stop reason: HOSPADM

## 2021-10-13 RX ORDER — OXYCODONE HCL 5 MG/5 ML
5 SOLUTION, ORAL ORAL
Status: COMPLETED | OUTPATIENT
Start: 2021-10-13 | End: 2021-10-13

## 2021-10-13 RX ORDER — GLYCOPYRROLATE 0.2 MG/ML
INJECTION INTRAMUSCULAR; INTRAVENOUS PRN
Status: DISCONTINUED | OUTPATIENT
Start: 2021-10-13 | End: 2021-10-13 | Stop reason: SURG

## 2021-10-13 RX ORDER — CEFAZOLIN SODIUM 1 G/3ML
INJECTION, POWDER, FOR SOLUTION INTRAMUSCULAR; INTRAVENOUS PRN
Status: DISCONTINUED | OUTPATIENT
Start: 2021-10-13 | End: 2021-10-13 | Stop reason: SURG

## 2021-10-13 RX ORDER — HYDROCODONE BITARTRATE AND ACETAMINOPHEN 5; 325 MG/1; MG/1
1-2 TABLET ORAL EVERY 6 HOURS PRN
Status: DISCONTINUED | OUTPATIENT
Start: 2021-10-13 | End: 2021-10-14 | Stop reason: HOSPADM

## 2021-10-13 RX ORDER — ONDANSETRON 2 MG/ML
4 INJECTION INTRAMUSCULAR; INTRAVENOUS
Status: DISCONTINUED | OUTPATIENT
Start: 2021-10-13 | End: 2021-10-13 | Stop reason: HOSPADM

## 2021-10-13 RX ORDER — HYDROMORPHONE HYDROCHLORIDE 1 MG/ML
0.2 INJECTION, SOLUTION INTRAMUSCULAR; INTRAVENOUS; SUBCUTANEOUS
Status: DISCONTINUED | OUTPATIENT
Start: 2021-10-13 | End: 2021-10-13 | Stop reason: HOSPADM

## 2021-10-13 RX ORDER — HYDRALAZINE HYDROCHLORIDE 20 MG/ML
5 INJECTION INTRAMUSCULAR; INTRAVENOUS
Status: DISCONTINUED | OUTPATIENT
Start: 2021-10-13 | End: 2021-10-13 | Stop reason: HOSPADM

## 2021-10-13 RX ORDER — SCOLOPAMINE TRANSDERMAL SYSTEM 1 MG/1
1 PATCH, EXTENDED RELEASE TRANSDERMAL
Status: DISCONTINUED | OUTPATIENT
Start: 2021-10-13 | End: 2021-10-14 | Stop reason: HOSPADM

## 2021-10-13 RX ORDER — ACETAMINOPHEN 500 MG
1000 TABLET ORAL ONCE
Status: COMPLETED | OUTPATIENT
Start: 2021-10-13 | End: 2021-10-13

## 2021-10-13 RX ORDER — HALOPERIDOL 5 MG/ML
1 INJECTION INTRAMUSCULAR EVERY 6 HOURS PRN
Status: DISCONTINUED | OUTPATIENT
Start: 2021-10-13 | End: 2021-10-14 | Stop reason: HOSPADM

## 2021-10-13 RX ADMIN — DEXAMETHASONE SODIUM PHOSPHATE 8 MG: 4 INJECTION, SOLUTION INTRA-ARTICULAR; INTRALESIONAL; INTRAMUSCULAR; INTRAVENOUS; SOFT TISSUE at 10:30

## 2021-10-13 RX ADMIN — METOPROLOL TARTRATE 50 MG: 50 TABLET, FILM COATED ORAL at 18:21

## 2021-10-13 RX ADMIN — ACETAMINOPHEN 1000 MG: 500 TABLET ORAL at 08:52

## 2021-10-13 RX ADMIN — Medication 200 MCG: at 11:23

## 2021-10-13 RX ADMIN — DEXMEDETOMIDINE 25 MCG: 200 INJECTION, SOLUTION INTRAVENOUS at 10:31

## 2021-10-13 RX ADMIN — EPHEDRINE SULFATE 5 MG: 50 INJECTION, SOLUTION INTRAVENOUS at 10:53

## 2021-10-13 RX ADMIN — LEVOTHYROXINE SODIUM 125 MCG: 0.12 TABLET ORAL at 16:20

## 2021-10-13 RX ADMIN — Medication 200 MCG: at 11:28

## 2021-10-13 RX ADMIN — SODIUM CHLORIDE, POTASSIUM CHLORIDE, SODIUM LACTATE AND CALCIUM CHLORIDE: 600; 310; 30; 20 INJECTION, SOLUTION INTRAVENOUS at 08:51

## 2021-10-13 RX ADMIN — TAMSULOSIN HYDROCHLORIDE 0.4 MG: 0.4 CAPSULE ORAL at 16:20

## 2021-10-13 RX ADMIN — LIDOCAINE HYDROCHLORIDE 80 MG: 20 INJECTION, SOLUTION EPIDURAL; INFILTRATION; INTRACAUDAL at 10:21

## 2021-10-13 RX ADMIN — SODIUM CHLORIDE, POTASSIUM CHLORIDE, SODIUM LACTATE AND CALCIUM CHLORIDE: 600; 310; 30; 20 INJECTION, SOLUTION INTRAVENOUS at 16:20

## 2021-10-13 RX ADMIN — FUROSEMIDE 40 MG: 40 TABLET ORAL at 18:21

## 2021-10-13 RX ADMIN — Medication 100 MCG: at 10:57

## 2021-10-13 RX ADMIN — Medication 200 MCG: at 11:46

## 2021-10-13 RX ADMIN — Medication 200 MCG: at 11:51

## 2021-10-13 RX ADMIN — Medication 200 MCG: at 11:08

## 2021-10-13 RX ADMIN — DEXMEDETOMIDINE 25 MCG: 200 INJECTION, SOLUTION INTRAVENOUS at 10:24

## 2021-10-13 RX ADMIN — EPHEDRINE SULFATE 10 MG: 50 INJECTION, SOLUTION INTRAVENOUS at 11:03

## 2021-10-13 RX ADMIN — FENTANYL CITRATE 25 MCG: 50 INJECTION INTRAMUSCULAR; INTRAVENOUS at 14:07

## 2021-10-13 RX ADMIN — Medication 200 MCG: at 11:01

## 2021-10-13 RX ADMIN — PROPOFOL 200 MG: 10 INJECTION, EMULSION INTRAVENOUS at 10:21

## 2021-10-13 RX ADMIN — FENTANYL CITRATE 50 MCG: 50 INJECTION, SOLUTION INTRAMUSCULAR; INTRAVENOUS at 10:44

## 2021-10-13 RX ADMIN — SODIUM CHLORIDE, POTASSIUM CHLORIDE, SODIUM LACTATE AND CALCIUM CHLORIDE: 600; 310; 30; 20 INJECTION, SOLUTION INTRAVENOUS at 11:48

## 2021-10-13 RX ADMIN — Medication 200 MCG: at 11:38

## 2021-10-13 RX ADMIN — ONDANSETRON 4 MG: 2 INJECTION INTRAMUSCULAR; INTRAVENOUS at 12:05

## 2021-10-13 RX ADMIN — CEFAZOLIN 3 G: 330 INJECTION, POWDER, FOR SOLUTION INTRAMUSCULAR; INTRAVENOUS at 10:21

## 2021-10-13 RX ADMIN — SUGAMMADEX 200 MG: 100 INJECTION, SOLUTION INTRAVENOUS at 10:28

## 2021-10-13 RX ADMIN — SODIUM CHLORIDE, POTASSIUM CHLORIDE, SODIUM LACTATE AND CALCIUM CHLORIDE: 600; 310; 30; 20 INJECTION, SOLUTION INTRAVENOUS at 10:43

## 2021-10-13 RX ADMIN — Medication 300 MCG: at 11:13

## 2021-10-13 RX ADMIN — FENTANYL CITRATE 50 MCG: 50 INJECTION, SOLUTION INTRAMUSCULAR; INTRAVENOUS at 12:06

## 2021-10-13 RX ADMIN — EPHEDRINE SULFATE 10 MG: 50 INJECTION, SOLUTION INTRAVENOUS at 11:47

## 2021-10-13 RX ADMIN — GLYCOPYRROLATE 0.2 MG: 0.2 INJECTION INTRAMUSCULAR; INTRAVENOUS at 10:42

## 2021-10-13 RX ADMIN — EPHEDRINE SULFATE 10 MG: 50 INJECTION, SOLUTION INTRAVENOUS at 11:23

## 2021-10-13 RX ADMIN — EPHEDRINE SULFATE 15 MG: 50 INJECTION, SOLUTION INTRAVENOUS at 11:30

## 2021-10-13 RX ADMIN — OXYCODONE HYDROCHLORIDE 5 MG: 5 SOLUTION ORAL at 14:07

## 2021-10-13 RX ADMIN — FENTANYL CITRATE 50 MCG: 50 INJECTION, SOLUTION INTRAMUSCULAR; INTRAVENOUS at 10:15

## 2021-10-13 RX ADMIN — FENTANYL CITRATE 50 MCG: 50 INJECTION, SOLUTION INTRAMUSCULAR; INTRAVENOUS at 12:05

## 2021-10-13 ASSESSMENT — LIFESTYLE VARIABLES
AVERAGE NUMBER OF DAYS PER WEEK YOU HAVE A DRINK CONTAINING ALCOHOL: 0
TOTAL SCORE: 0
TOTAL SCORE: 0
EVER FELT BAD OR GUILTY ABOUT YOUR DRINKING: NO
ON A TYPICAL DAY WHEN YOU DRINK ALCOHOL HOW MANY DRINKS DO YOU HAVE: 0
HAVE YOU EVER FELT YOU SHOULD CUT DOWN ON YOUR DRINKING: NO
HAVE PEOPLE ANNOYED YOU BY CRITICIZING YOUR DRINKING: NO
CONSUMPTION TOTAL: NEGATIVE
TOTAL SCORE: 0
ALCOHOL_USE: NO
EVER HAD A DRINK FIRST THING IN THE MORNING TO STEADY YOUR NERVES TO GET RID OF A HANGOVER: NO
HOW MANY TIMES IN THE PAST YEAR HAVE YOU HAD 5 OR MORE DRINKS IN A DAY: 0

## 2021-10-13 ASSESSMENT — FIBROSIS 4 INDEX: FIB4 SCORE: 1.92

## 2021-10-13 ASSESSMENT — COGNITIVE AND FUNCTIONAL STATUS - GENERAL
CLIMB 3 TO 5 STEPS WITH RAILING: A LITTLE
DRESSING REGULAR UPPER BODY CLOTHING: A LITTLE
MOVING FROM LYING ON BACK TO SITTING ON SIDE OF FLAT BED: A LITTLE
DAILY ACTIVITIY SCORE: 20
TURNING FROM BACK TO SIDE WHILE IN FLAT BAD: A LITTLE
MOVING TO AND FROM BED TO CHAIR: A LITTLE
WALKING IN HOSPITAL ROOM: A LITTLE
STANDING UP FROM CHAIR USING ARMS: A LITTLE
MOBILITY SCORE: 18
SUGGESTED CMS G CODE MODIFIER DAILY ACTIVITY: CJ
DRESSING REGULAR LOWER BODY CLOTHING: A LITTLE
SUGGESTED CMS G CODE MODIFIER MOBILITY: CK
TOILETING: A LITTLE
HELP NEEDED FOR BATHING: A LITTLE

## 2021-10-13 ASSESSMENT — PAIN SCALES - GENERAL: PAIN_LEVEL: 1

## 2021-10-13 ASSESSMENT — PATIENT HEALTH QUESTIONNAIRE - PHQ9
1. LITTLE INTEREST OR PLEASURE IN DOING THINGS: NOT AT ALL
2. FEELING DOWN, DEPRESSED, IRRITABLE, OR HOPELESS: NOT AT ALL
SUM OF ALL RESPONSES TO PHQ9 QUESTIONS 1 AND 2: 0

## 2021-10-13 ASSESSMENT — PAIN DESCRIPTION - PAIN TYPE
TYPE: SURGICAL PAIN
TYPE: SURGICAL PAIN

## 2021-10-13 NOTE — ANESTHESIA PREPROCEDURE EVALUATION
CAD  Sciatica    Relevant Problems   ANESTHESIA   (positive) ZELALEM on CPAP      NEURO   (positive) Hx of melanoma of skin      CARDIAC   (positive) A-fib (HCC)   (positive) Essential hypertension       Physical Exam    Airway   Mallampati: III  TM distance: >3 FB  Neck ROM: full       Cardiovascular - normal exam  Rhythm: regular  Rate: normal  (-) murmur     Dental - normal exam           Pulmonary - normal exam  Breath sounds clear to auscultation     Abdominal    Neurological - normal exam               Anesthesia Plan    ASA 3   ASA physical status 3 criteria: CAD/stents (> 3 months)    Plan - general       Airway plan will be ETT          Induction: intravenous    Postoperative Plan: Postoperative administration of opioids is intended.    Pertinent diagnostic labs and testing reviewed    Informed Consent:    Anesthetic plan and risks discussed with patient.    Use of blood products discussed with: patient whom consented to blood products.

## 2021-10-13 NOTE — ANESTHESIA POSTPROCEDURE EVALUATION
Patient: Sina Oliver    Procedure Summary     Date: 10/13/21 Room / Location: [facility] ROOM 23 / SURGERY SAME DAY [facility]    Anesthesia Start: 1013 Anesthesia Stop: 1226    Procedure: THYROIDECTOMY - FOR MALIGNANCY (Neck) Diagnosis: (MULTINODULAR GOITER)    Surgeons: Tita Mendoza M.D. Responsible Provider: Rosy Miller M.D.    Anesthesia Type: general ASA Status: 3          Final Anesthesia Type: general  Last vitals  BP   Blood Pressure : 114/57    Temp   36.1 °C (97 °F)    Pulse   60   Resp   14    SpO2   95 %      Anesthesia Post Evaluation    Patient location during evaluation: PACU  Patient participation: complete - patient participated  Level of consciousness: awake and alert  Pain score: 1    Airway patency: patent  Anesthetic complications: no  Cardiovascular status: hemodynamically stable  Respiratory status: acceptable  Hydration status: euvolemic    PONV: none          No complications documented.     Nurse Pain Score: 1 (NPRS)

## 2021-10-13 NOTE — ANESTHESIA PROCEDURE NOTES
Airway    Date/Time: 10/13/2021 10:22 AM  Performed by: Rosy Miller M.D.  Authorized by: Rosy Miller M.D.     Location:  OR  Urgency:  Elective  Difficult Airway: No    Indications for Airway Management:  Anesthesia      Spontaneous Ventilation: absent    Sedation Level:  Deep  Preoxygenated: Yes    Patient Position:  Sniffing  Mask Difficulty Assessment:  1 - vent by mask  Final Airway Type:  Endotracheal airway  Final Endotracheal Airway:  ETT  Cuffed: Yes    Technique Used for Successful ETT Placement:  Direct laryngoscopy    Insertion Site:  Oral  Blade Type:  Glide  Laryngoscope Blade/Videolaryngoscope Blade Size:  4  ETT Size (mm):  7.0  Measured from:  Teeth  ETT to Teeth (cm):  21  Placement Verified by: auscultation and capnometry    Cormack-Lehane Classification:  Grade IIa - partial view of glottis  Number of Attempts at Approach:  1

## 2021-10-13 NOTE — ANESTHESIA TIME REPORT
Anesthesia Start and Stop Event Times     Date Time Event    10/13/2021 1001 Ready for Procedure     1013 Anesthesia Start     1226 Anesthesia Stop        Responsible Staff  10/13/21    Name Role Begin End    Rosy Miller M.D. Anesth 1013 1226        Preop Diagnosis (Free Text):  Pre-op Diagnosis     MULTINODULAR GOITER        Preop Diagnosis (Codes):    Premium Reason  Non-Premium    Comments:

## 2021-10-13 NOTE — OP REPORT
Operative Report    Date: 10/13/2021    Surgeon: Tita Mendoza M.D.    Assistant: ASHLEY Martínez    My assistant, Jose Francisco Bowman, was medically necessary for this procedure. He placed the precordial electrodes of the NIMS and monitored the recurrent laryngeal nerves bilaterally throughout the procedure. He also by retracting tissues allowed me the surgical field visualization necessary for a safe surgery. Finally, he also assisted with hemostasis and wound closure.      Anesthesiologist: Angela OROSCO    Pre-operative Diagnosis: E04.9 malignant neoplasm of thyroid gland     Post-operative Diagnosis: same     Procedure: 70839 Thyroidectomy, total or complete , bilateral recurrent laryngeal nerve monitoring    Findings: large firm right sided nodules x2, adherent to the trachea. Large hypervascular left thyroid lobe with soft nodules. Did not visualize or get appropriate stimulation of the left recurrent laryngeal nerve.    Procedure in detail: The patient was identified in the pre-operative holding area and brought to the operating room. Correct side and site were identified.  GETA was smoothly induced. The patient was prepped and draped in the usual sterile fashion.    With the patient in the supine position, the head of the bed slightly elevated, the neck slightly extended, and the patient intubated with a NIM endotracheal tube, the precordial electrodes were placed by the surgical assistant, who then monitored the recurrent laryngeal nerves bilaterally throughout the procedure.     The neck was prepared with betadiene and draped in the usual fashion. The neck was entered through a lower transverse cervical incision and carried down through the platysma. Subplatysmal flaps were dissected superiorly and inferiorly down to the sternal notch. The midline cervical fascia was incised down to the capsule of the thyroid. The strap muscles were mobilized off the right thyroid lobe, and the superior pole vessels progressively  divided with the Harmonic. The right thyroid lobe had a 3 cm white firm nodule as well as a more inferior firm nodule.The thyroid veins were divided with the Harmonic. The recurrent laryngeal nerve and both parathyroids were identified and protected. Branches of the inferior thyroid artery were divided on the capsule of the gland with the Harmonic. Smaller vessels were either divided with the Harmonic or, when near to the recurrent nerve, divided between clamps and suture ligated with 3-0 Vicryl suture as the gland was dissected free from the nerve and off the trachea. We noted that the nodule to be densely adherent to the trachea, and I have concern that there will be a positive margin here.The gland was transected at the medial border of the left thyroid lobe and the tright thyroid lobe and the isthmus were sent for permanent pathologic exam.    The strap muscles were then mobilized off of the left thyroid lobe, and the superior pole vessels progressively divided with the Harmonic. The left thyroid lobe was very enlarged and hypervascular but soft.The middle and inferior thyroid veins were divided with the Harmonic.  Both parathyroids were identified and protected. I was not able to visualize the right recurrent laryngeal nerve. Branches of the inferior thyroid artery were divided on the capsule of the gland with the Harminic. Smaller vessels were either divided with the Harmonic or, when close to the nerve, divided between clamps and suture ligated with 3-0 Vicryl as the gland was dissected free of the trachea. The left  thyroid lobe was sent for permanent pathology. Good hemostasis was assured.     The integrity of the right recurrent laryngeal nerve was documented. I attempted to dissect and identify the left RLN but the tissues were very friable and with bleeding visualiztion was very difficult. After hemostasis was achieved, Hemoblast was placed in both sides of the neck. I left a drain under the incision.  The midline cervical fascia was reapproximated with running 3-0 Vicryl. Platysma was reapproximated with interrupted 3-0 Vicryl. The skin was closed with monocryl.     The patient was awakened from general anesthetic, and was taken to the recovery room in stable condition.    Sponge and needle counts were correct at the end of the case.     Specimen:   1. right thyroid lobe and isthmus  2. left thyroid lobe    EBL: 300 mL    Dispo: stable, extubated, to PACU    Tita Mendoza M.D.  [facility]  [phone]

## 2021-10-14 ENCOUNTER — PATIENT OUTREACH (OUTPATIENT)
Dept: HEALTH INFORMATION MANAGEMENT | Facility: OTHER | Age: 76
End: 2021-10-14

## 2021-10-14 VITALS
TEMPERATURE: 97.7 F | BODY MASS INDEX: 34.69 KG/M2 | HEIGHT: 74 IN | DIASTOLIC BLOOD PRESSURE: 62 MMHG | HEART RATE: 57 BPM | RESPIRATION RATE: 18 BRPM | SYSTOLIC BLOOD PRESSURE: 118 MMHG | WEIGHT: 270.28 LBS | OXYGEN SATURATION: 96 %

## 2021-10-14 LAB
ALBUMIN SERPL BCP-MCNC: 4 G/DL (ref 3.2–4.9)
ALBUMIN SERPL BCP-MCNC: 4.1 G/DL (ref 3.2–4.9)
ALBUMIN SERPL BCP-MCNC: 4.1 G/DL (ref 3.2–4.9)
CALCIUM SERPL-MCNC: 8.7 MG/DL (ref 8.5–10.5)
CALCIUM SERPL-MCNC: 8.8 MG/DL (ref 8.5–10.5)
CALCIUM SERPL-MCNC: 9 MG/DL (ref 8.5–10.5)

## 2021-10-14 PROCEDURE — 82040 ASSAY OF SERUM ALBUMIN: CPT | Mod: 91

## 2021-10-14 PROCEDURE — 700102 HCHG RX REV CODE 250 W/ 637 OVERRIDE(OP): Performed by: SURGERY

## 2021-10-14 PROCEDURE — A9270 NON-COVERED ITEM OR SERVICE: HCPCS | Performed by: SURGERY

## 2021-10-14 PROCEDURE — G0378 HOSPITAL OBSERVATION PER HR: HCPCS

## 2021-10-14 PROCEDURE — 82310 ASSAY OF CALCIUM: CPT

## 2021-10-14 RX ORDER — HYDROCODONE BITARTRATE AND ACETAMINOPHEN 5; 325 MG/1; MG/1
1 TABLET ORAL EVERY 4 HOURS PRN
Qty: 20 TABLET | Refills: 0 | Status: SHIPPED | OUTPATIENT
Start: 2021-10-14 | End: 2021-10-21

## 2021-10-14 RX ORDER — LEVOTHYROXINE SODIUM 0.12 MG/1
125 TABLET ORAL
Qty: 30 TABLET | Refills: 3 | Status: SHIPPED | OUTPATIENT
Start: 2021-10-15 | End: 2023-02-14

## 2021-10-14 RX ADMIN — POTASSIUM CHLORIDE 40 MEQ: 1500 TABLET, EXTENDED RELEASE ORAL at 05:37

## 2021-10-14 RX ADMIN — FUROSEMIDE 40 MG: 40 TABLET ORAL at 05:37

## 2021-10-14 RX ADMIN — HYDROCODONE BITARTRATE AND ACETAMINOPHEN 1 TABLET: 5; 325 TABLET ORAL at 05:42

## 2021-10-14 RX ADMIN — LEVOTHYROXINE SODIUM 125 MCG: 0.12 TABLET ORAL at 05:38

## 2021-10-14 RX ADMIN — TAMSULOSIN HYDROCHLORIDE 0.4 MG: 0.4 CAPSULE ORAL at 05:53

## 2021-10-14 RX ADMIN — OLMESARTAN MEDOXOMIL 40 MG: 20 TABLET, FILM COATED ORAL at 05:37

## 2021-10-14 RX ADMIN — ATORVASTATIN CALCIUM 40 MG: 40 TABLET, FILM COATED ORAL at 05:37

## 2021-10-14 RX ADMIN — AMLODIPINE BESYLATE 10 MG: 10 TABLET ORAL at 08:07

## 2021-10-14 ASSESSMENT — PATIENT HEALTH QUESTIONNAIRE - PHQ9
SUM OF ALL RESPONSES TO PHQ9 QUESTIONS 1 AND 2: 0
1. LITTLE INTEREST OR PLEASURE IN DOING THINGS: NOT AT ALL

## 2021-10-14 ASSESSMENT — PAIN DESCRIPTION - PAIN TYPE
TYPE: SURGICAL PAIN

## 2021-10-14 NOTE — PROGRESS NOTES
Patient A+Ox4, on room air, VSS. Sitting up at edge of bed eating breakfast. Denies pain. IGGY drain and gauze dressing intact to mid neck. Voiding without issue. Tolerating diet. Eager to go home today. Calls approp for needs, patient updated on plan of care

## 2021-10-14 NOTE — PROGRESS NOTES
Patient arrived to the floor at this time via gurney with transport. Report taken from Orlando MOSQUEDA in PACU. Patient on 10L oxymask per ERAS protocol. No N/V, denies pain at this time. Has not voided yet since surgery. Ambulated from gurney to bed with 1 assist. Dressing to anterior neck C/D/I, IGGY drain in place with sanguinous drainage. No distress at this time, resting comfortably in bed, sipping water. Wife at bedside. Oriented to floor, bed locked, call bell in hand

## 2021-10-14 NOTE — DISCHARGE INSTRUCTIONS
Discharge Instructions    Discharged to home by car with relative. Discharged via wheelchair, hospital escort: Yes.  Special equipment needed: Not Applicable    Be sure to schedule a follow-up appointment with your primary care doctor or any specialists as instructed.     Discharge Plan:   Diet Plan: Discussed  Activity Level: Discussed  Confirmed Follow up Appointment: Patient to Call and Schedule Appointment  Confirmed Symptoms Management: Discussed  Medication Reconciliation Updated: Yes  Influenza Vaccine Indication: Not indicated: Previously immunized this influenza season and > 8 years of age    I understand that a diet low in cholesterol, fat, and sodium is recommended for good health. Unless I have been given specific instructions below for another diet, I accept this instruction as my diet prescription.   Other diet: regular, as tolerated     Special Instructions: None    · Is patient discharged on Warfarin / Coumadin?   No       Thyroidectomy, Care After  This sheet gives you information about how to care for yourself after your procedure. Your health care provider may also give you more specific instructions. If you have problems or questions, contact your health care provider.  What can I expect after the procedure?  After the procedure, it is common to have:  · Mild pain in the neck or upper body, especially when swallowing.  · A swollen neck.  · A sore throat.  · A weak or hoarse voice.  · Slight tingling or numbness around your mouth, or in your fingers or toes. This may last for a day or two after surgery. This condition is caused by low levels of calcium. You may be given calcium supplements to treat it.  Follow these instructions at home:    Medicines  · Take over-the-counter and prescription medicines only as told by your health care provider.  · Do not drive or use heavy machinery while taking prescription pain medicine.  · Do not take medicines that contain aspirin and ibuprofen until your  health care provider says that you can. These medicines can increase your risk of bleeding.  · Take a thyroid hormone medicine as recommended by your health care provider. You will have to take this medicine for the rest of your life if your entire thyroid was removed.  Eating and drinking  · Start slowly with eating. You may need to have only liquids and soft foods for a few days or as directed by your health care provider.  · To prevent or treat constipation while you are taking prescription pain medicine, your health care provider may recommend that you:  ? Drink enough fluid to keep your urine pale yellow.  ? Take over-the-counter or prescription medicines.  ? Eat foods that are high in fiber, such as fresh fruits and vegetables, whole grains, and beans.  ? Limit foods that are high in fat and processed sugars, such as fried and sweet foods.  Incision care  · Follow instructions from your health care provider about how to take care of your incision. Make sure you:  ? Wash your hands with soap and water before you change your bandage (dressing). If soap and water are not available, use hand .  ? Change your dressing as told by your health care provider.  ? Leave stitches (sutures), skin glue, or adhesive strips in place. These skin closures may need to stay in place for 2 weeks or longer. If adhesive strip edges start to loosen and curl up, you may trim the loose edges. Do not remove adhesive strips completely unless your health care provider tells you to do that.  · Check your incision area every day for signs of infection. Check for:  ? Redness, swelling, or pain.  ? Fluid or blood.  ? Warmth.  ? Pus or a bad smell.  · Do not take baths, swim, or use a hot tub until your health care provider approves.  Activity  · For the first 10 days after the procedure or as instructed by your health care provider:  ? Do not lift anything that is heavier than 10 lb (4.5 kg).  ? Do not jog, swim, or do other  strenuous exercises.  ? Do not play contact sports.  · Avoid sitting for a long time without moving. Get up to take short walks every 1-2 hours. This is needed to improve blood flow and breathing. Ask for help if you feel weak or unsteady.  · Return to your normal activities as told by your health care provider. Ask your health care provider what activities are safe for you.  General instructions  · Do not use any products that contain nicotine or tobacco, such as cigarettes and e-cigarettes. These can delay healing after surgery. If you need help quitting, ask your health care provider.  · Keep all follow-up visits as told by your health care provider. This is important. Your health care provider needs to monitor the calcium level in your blood to make sure that it does not become low.  Contact a health care provider if you:  · Have a fever.  · Have more redness, swelling, or pain around your incision area.  · Have fluid or blood coming from your incision area.  · Notice that your incision area feels warm to the touch.  · Have pus or a bad smell coming from your incision area.  · Have trouble talking.  · Have nausea or vomiting for more than 2 days.  Get help right away if you:  · Have trouble breathing.  · Have trouble swallowing.  · Develop a rash.  · Develop a cough that gets worse.  · Notice that your speech changes, or you have hoarseness that gets worse.  · Develop numbness, tingling, or muscle spasms in the arms, hands, feet, or face.  Summary  · After the procedure, it is common to feel mild pain in the neck or upper body, especially when swallowing.  · Take medicines as told by your health care provider. These include pain medicines and thyroid hormones, if required.  · Follow instructions from your health care provider about how to take care of your incision. Watch for signs of infection.  · Keep all follow-up visits as told by your health care provider. This is important. Your health care provider needs  to monitor the calcium level in your blood to make sure that it does not become low.  · Get help right away if you develop difficulty breathing, or numbness, tingling, or muscle spasms in the arms, hands, feet, or face.  This information is not intended to replace advice given to you by your health care provider. Make sure you discuss any questions you have with your health care provider.  Document Released: 07/07/2006 Document Revised: 02/13/2020 Document Reviewed: 10/23/2018  Elsevier Patient Education © 2020 [employer]    Acetaminophen; Hydrocodone tablets or capsules  What is this medicine?  ACETAMINOPHEN; HYDROCODONE (a set a GUILLE filemon fen; stacey droe KOE done) is a pain reliever. It is used to treat moderate to severe pain.  This medicine may be used for other purposes; ask your health care provider or pharmacist if you have questions.  COMMON BRAND NAME(S): Anexsia, Bancap HC, Ceta-Plus, Co-Gesic, Comfortpak, Dolagesic, Dolorex Forte, DuoCet, Hydrocet, Hydrogesic, Lorcet, Lorcet HD, Lorcet Plus, Lortab, Margesic H, Maxidone, Norco, Polygesic, Stagesic, Vanacet, Verdrocet, Vicodin, Vicodin ES, Vicodin HP, Xodol, Zydone  What should I tell my health care provider before I take this medicine?  They need to know if you have any of these conditions:  · brain tumor  · Crohn's disease, inflammatory bowel disease, or ulcerative colitis  · drug abuse or addiction  · head injury  · heart or circulation problems  · if you often drink alcohol  · kidney disease or problems going to the bathroom  · liver disease  · lung disease, asthma, or breathing problems  · an unusual or allergic reaction to acetaminophen, hydrocodone, other opioid analgesics, other medicines, foods, dyes, or preservatives  · pregnant or trying to get pregnant  · breast-feeding  How should I use this medicine?  Take this medicine by mouth with a glass of water. Follow the directions on the prescription label. You can take it with or without food. If it  upsets your stomach, take it with food. Do not take your medicine more often than directed.  A special MedGuide will be given to you by the pharmacist with each prescription and refill. Be sure to read this information carefully each time.  Talk to your pediatrician regarding the use of this medicine in children. Special care may be needed.  Overdosage: If you think you have taken too much of this medicine contact a poison control center or emergency room at once.  NOTE: This medicine is only for you. Do not share this medicine with others.  What if I miss a dose?  If you miss a dose, take it as soon as you can. If it is almost time for your next dose, take only that dose. Do not take double or extra doses.  What may interact with this medicine?  This medicine may interact with the following medications:  · alcohol  · antiviral medicines for HIV or AIDS  · atropine  · antihistamines for allergy, cough and cold  · certain antibiotics like erythromycin, clarithromycin  · certain medicines for anxiety or sleep  · certain medicines for bladder problems like oxybutynin, tolterodine  · certain medicines for depression like amitriptyline, fluoxetine, sertraline  · certain medicines for fungal infections like ketoconazole and itraconazole  · certain medicines for Parkinson's disease like benztropine, trihexyphenidyl  · certain medicines for seizures like carbamazepine, phenobarbital, phenytoin, primidone  · certain medicines for stomach problems like dicyclomine, hyoscyamine  · certain medicines for travel sickness like scopolamine  · general anesthetics like halothane, isoflurane, methoxyflurane, propofol  · ipratropium  · local anesthetics like lidocaine, pramoxine, tetracaine  · MAOIs like Carbex, Eldepryl, Marplan, Nardil, and Parnate  · medicines that relax muscles for surgery  · other medicines with acetaminophen  · other narcotic medicines for pain or cough  · phenothiazines like chlorpromazine, mesoridazine,  prochlorperazine, thioridazine  · rifampin  This list may not describe all possible interactions. Give your health care provider a list of all the medicines, herbs, non-prescription drugs, or dietary supplements you use. Also tell them if you smoke, drink alcohol, or use illegal drugs. Some items may interact with your medicine.  What should I watch for while using this medicine?  Tell your doctor or health care professional if your pain does not go away, if it gets worse, or if you have new or a different type of pain. You may develop tolerance to the medicine. Tolerance means that you will need a higher dose of the medicine for pain relief. Tolerance is normal and is expected if you take the medicine for a long time.  Do not suddenly stop taking your medicine because you may develop a severe reaction. Your body becomes used to the medicine. This does NOT mean you are addicted. Addiction is a behavior related to getting and using a drug for a non-medical reason. If you have pain, you have a medical reason to take pain medicine. Your doctor will tell you how much medicine to take. If your doctor wants you to stop the medicine, the dose will be slowly lowered over time to avoid any side effects.  There are different types of narcotic medicines (opiates). If you take more than one type at the same time or if you are taking another medicine that also causes drowsiness, you may have more side effects. Give your health care provider a list of all medicines you use. Your doctor will tell you how much medicine to take. Do not take more medicine than directed. Call emergency for help if you have problems breathing or unusual sleepiness.  Do not take other medicines that contain acetaminophen with this medicine. Always read labels carefully. If you have questions, ask your doctor or pharmacist.  If you take too much acetaminophen get medical help right away. Too much acetaminophen can be very dangerous and cause liver  damage. Even if you do not have symptoms, it is important to get help right away.  You may get drowsy or dizzy. Do not drive, use machinery, or do anything that needs mental alertness until you know how this medicine affects you. Do not stand or sit up quickly, especially if you are an older patient. This reduces the risk of dizzy or fainting spells. Alcohol may interfere with the effect of this medicine. Avoid alcoholic drinks.  The medicine will cause constipation. Try to have a bowel movement at least every 2 to 3 days. If you do not have a bowel movement for 3 days, call your doctor or health care professional.  Your mouth may get dry. Chewing sugarless gum or sucking hard candy, and drinking plenty of water may help. Contact your doctor if the problem does not go away or is severe.  What side effects may I notice from receiving this medicine?  Side effects that you should report to your doctor or health care professional as soon as possible:  · allergic reactions like skin rash, itching or hives, swelling of the face, lips, or tongue  · breathing problems  · confusion  · redness, blistering, peeling or loosening of the skin, including inside the mouth  · signs and symptoms of low blood pressure like dizziness; feeling faint or lightheaded, falls; unusually weak or tired  · trouble passing urine or change in the amount of urine  · yellowing of the eyes or skin  Side effects that usually do not require medical attention (report to your doctor or health care professional if they continue or are bothersome):  · constipation  · dry mouth  · nausea, vomiting  · tiredness  This list may not describe all possible side effects. Call your doctor for medical advice about side effects. You may report side effects to FDA at 4-589-FDA-4102.  Where should I keep my medicine?  Keep out of the reach of children. This medicine can be abused. Keep your medicine in a safe place to protect it from theft. Do not share this medicine  with anyone. Selling or giving away this medicine is dangerous and against the law.  Store at room temperature between 15 and 30 degrees C (59 and 86 degrees F).  This medicine may cause harm and death if it is taken by other adults, children, or pets. Return medicine that has not been used to an official disposal site. Contact the [employer] at [phone] or your [facility] government to find a site. If you cannot return the medicine, flush it down the toilet. Do not use the medicine after the expiration date.  NOTE: This sheet is a summary. It may not cover all possible information. If you have questions about this medicine, talk to your doctor, pharmacist, or health care provider.  © 2020 [employer] (2018-04-24 12:44:49)    Levothyroxine tablets  What is this medicine?  LEVOTHYROXINE (toy voe thye AYANA een) is a thyroid hormone. This medicine can improve symptoms of thyroid deficiency such as slow speech, lack of energy, weight gain, hair loss, dry skin, and feeling cold. It also helps to treat goiter (an enlarged thyroid gland). It is also used to treat some kinds of thyroid cancer along with surgery and other medicines.  This medicine may be used for other purposes; ask your health care provider or pharmacist if you have questions.  COMMON BRAND NAME(S): Estre, Euthyrox, Levo-T, Levothroid, Levoxyl, Synthroid, Thyro-Tabs, Unithroid  What should I tell my health care provider before I take this medicine?  They need to know if you have any of these conditions:  · Brilliant's disease or other adrenal gland problem  · angina  · bone problems  · diabetes  · dieting or on a weight loss program  · fertility problems  · heart disease  · pituitary gland problem  · take medicines that treat or prevent blood clots  · an unusual or allergic reaction to levothyroxine, thyroid hormones, other medicines, foods, dyes, or preservatives  · pregnant or trying to get pregnant  · breast-feeding  How should I use this  medicine?  Take this medicine by mouth with plenty of water. It is best to take on an empty stomach, at least 30 minutes before or 2 hours after food. Follow the directions on the prescription label. Take at the same time each day. Do not take your medicine more often than directed.  Contact your pediatrician regarding the use of this medicine in children. While this drug may be prescribed for children and infants as young as a few days of age for selected conditions, precautions do apply. For infants, you may crush the tablet and place in a small amount of (5-10 ml or 1 to 2 teaspoonfuls) of water, breast milk, or non-soy based infant formula. Do not mix with soy-based infant formula. Give as directed.  Overdosage: If you think you have taken too much of this medicine contact a poison control center or emergency room at once.  NOTE: This medicine is only for you. Do not share this medicine with others.  What if I miss a dose?  If you miss a dose, take it as soon as you can. If it is almost time for your next dose, take only that dose. Do not take double or extra doses.  What may interact with this medicine?  · amiodarone  · antacids  · anti-thyroid medicines  · calcium supplements  · carbamazepine  · certain medicines for depression  · certain medicines to treat cancer  · cholestyramine  · clofibrate  · colesevelam  · colestipol  · digoxin  · female hormones, like estrogens or progestins and birth control pills, patches, rings, or injections  · iron supplements  · kayexylate  · ketamine  · liquid nutrition products like Ensure  · lithium  · medicines for colds and breathing difficulties  · medicines for diabetes  · medicines or dietary supplements for weight loss  · methadone  · niacin  · orlistat  · oxandrolone  · phenobarbital or other barbiturates  · phenytoin  · rifampin  · sevelamer  · simethicone  · soy isoflavones  · steroid medicines like prednisone or  cortisone  · sucralfate  · testosterone  · theophylline  · warfarin  This list may not describe all possible interactions. Give your health care provider a list of all the medicines, herbs, non-prescription drugs, or dietary supplements you use. Also tell them if you smoke, drink alcohol, or use illegal drugs. Some items may interact with your medicine.  What should I watch for while using this medicine?  Be sure to take this medicine with plenty of fluids. Some tablets may cause choking, gagging, or difficulty swallowing from the tablet getting stuck in your throat. Most of these problems disappear if the medicine is taken with the right amount of water or other fluids.  Do not switch brands of this medicine unless your health care professional agrees with the change. Ask questions if you are uncertain.  You will need regular exams and occasional blood tests to check the response to treatment. If you are receiving this medicine for an underactive thyroid, it may be several weeks before you notice an improvement. Check with your doctor or health care professional if your symptoms do not improve.  It may be necessary for you to take this medicine for the rest of your life. Do not stop using this medicine unless your doctor or health care professional advises you to.  This medicine can affect blood sugar levels. If you have diabetes, check your blood sugar as directed.  You may lose some of your hair when you first start treatment. With time, this usually corrects itself.  If you are going to have surgery, tell your doctor or health care professional that you are taking this medicine.  What side effects may I notice from receiving this medicine?  Side effects that you should report to your doctor or health care professional as soon as possible:  · allergic reactions like skin rash, itching or hives, swelling of the face, lips, or tongue  · anxious  · breathing problems  · changes in menstrual periods  · chest  pain  · diarrhea  · excessive sweating or intolerance to heat  · fast or irregular heartbeat  · leg cramps  · nervousness  · swelling of ankles, feet, or legs  · tremors  · trouble sleeping  · vomiting  Side effects that usually do not require medical attention (report to your doctor or health care professional if they continue or are bothersome):  · changes in appetite  · headache  · irritable  · nausea  · weight loss  This list may not describe all possible side effects. Call your doctor for medical advice about side effects. You may report side effects to FDA at 5-605-GTG-7491.  Where should I keep my medicine?  Keep out of the reach of children.  Store at room temperature between 15 and 30 degrees C (59 and 86 degrees F). Protect from light and moisture. Keep container tightly closed. Throw away any unused medicine after the expiration date.  NOTE: This sheet is a summary. It may not cover all possible information. If you have questions about this medicine, talk to your doctor, pharmacist, or health care provider.  © 2020 [employer] (2018-01-29 15:39:30)        Depression / Suicide Risk    As you are discharged from this [facility] facility, it is important to learn how to keep safe from harming yourself.    Recognize the warning signs:  · Abrupt changes in personality, positive or negative- including increase in energy   · Giving away possessions  · Change in eating patterns- significant weight changes-  positive or negative  · Change in sleeping patterns- unable to sleep or sleeping all the time   · Unwillingness or inability to communicate  · Depression  · Unusual sadness, discouragement and loneliness  · Talk of wanting to die  · Neglect of personal appearance   · Rebelliousness- reckless behavior  · Withdrawal from people/activities they love  · Confusion- inability to concentrate     If you or a loved one observes any of these behaviors or has concerns about self-harm, here's what you can  do:  · Talk about it- your feelings and reasons for harming yourself  · Remove any means that you might use to hurt yourself (examples: pills, rope, extension cords, firearm)  · Get professional help from the community (Mental Health, Substance Abuse, psychological counseling)  · Do not be alone:Call your Safe Contact- someone whom you trust who will be there for you.  · Call your local CRISIS HOTLINE 046-7705 or 351-008-2199  · Call your local Children's Mobile Crisis Response Team [place] (044) 885-1317 or wwwOneRecruit  · Call the toll free National Suicide Prevention Hotlines   · National Suicide Prevention Lifeline 698-246-OLYN (8532)  · National Hope Line Network 800-SUICIDE (765-9886)    Discharge instructions after thyroidectomy:     You had surgery called thyroidectomy. This means that part or all of your thyroid gland was removed. The main job of the thyroid gland is to make thyroid hormone. This hormone controls your body’s metabolism. This is the way your body creates and uses energy. Removing the thyroid gland removes your body’s source of thyroid hormone. So after the surgery, you will need to take thyroid hormone pills daily. This helps keep the level of thyroid hormone in your body steady. This sheet tells you more about how to care for yourself after surgery.     Recovering After Surgery:     Get plenty of rest.     Care for your incision as directed.     Avoid heavy lifting and strenuous activity for 3-5 weeks.     Walk a few times daily. But don’t push yourself too hard. Slowly increase your pace and distance, as you feel able.     Return to work when your doctor says it’s okay.     Taking Your Thyroid Medication:     Take your medication as directed.     Use a pillbox labeled with the days of the week. This will help you remember whether you’ve taken your medication each day.     Take your medication with a liquid. But avoid taking it with soy milk. Soy milk can affect how well your  body absorbs the medication. The pill needs to reach your stomach and not dissolve in your throat.     Try to take your medication with the same types and amounts of food and liquid each day. This helps control the amount of thyroid hormone in your system.     After taking your medication:     Wait 4 hours before eating or drinking anything that contains soy.     Wait 4 hours before taking certain medications. These include:                 Iron supplements                 Calcium supplements                 Antacids that contain either calcium or aluminum hydroxide                 Medications that lower your cholesterol     Do not stop taking your medication even if you become pregnant.     Never stop taking medications on your own.     Keeping Your Doctor’s Appointments:     See your doctor for regular visits. These are needed to monitor your health.     Have routine blood tests done. These check the level of thyroid hormone in your body. This helps your doctor know whether to adjust the dosage of your medication if needed.     Tell your doctor about any signs of further thyroid problems.     Signs that you may have too much thyroid hormone in your body include:                 Restlessness                 Rapid weight loss                 Sweating                 Signs that you may have too little thyroid hormone in your body include:                 Fatigue or sluggishness                 Puffy hands, face, or feet                 Hoarseness                 Muscle pain                 Slow pulse (less than 60 beats per minute)        You have been provided a lab order at your lab of choice to get your calcium checked before you see Dr. Mendoza in follow up - please have this checked 1-2days prior to your follow up appt.     When to Call Your Doctor:  Call your doctor right away if you have any of the following:  Fever above 100.4°F  Swelling or bleeding at the incision site  Choking  Trouble breathing  A sore  throat that lasts longer than 7 days  Tingling or cramps in your hands, feet, or lips     FOLLOW-UP:  · Please call my office at [phone] to make an appointment in 1 week     Office address:   [address]  [place] [address]     Tita Mendoza M.D.  [facility]  [phone]

## 2021-10-14 NOTE — PROGRESS NOTES
"Surgical Progress Note:    POD# 1  S/P total thyroidectomy     No acute events.    PE:  /62   Pulse (!) 57   Temp 36.5 °C (97.7 °F) (Temporal)   Resp 18   Ht 1.88 m (6' 2\")   Wt 123 kg (270 lb 4.5 oz)   SpO2 96%   BMI 34.70 kg/m²     I/O:   Intake/Output Summary (Last 24 hours) at 10/14/2021 0805  Last data filed at 10/14/2021 0739  Gross per 24 hour   Intake 2016.25 ml   Output 1610 ml   Net 406.25 ml     IGGY 110    NAD  Breathing comfortably  Voice strong  Incision c/d/i with sutures, no hematoma    Labs:  Calcium:   6p: 8.9   12a: 8.8   6a:8.7      A/P: POD# 1  S/P total thyroidectomy   - doing well  - d/c home  - restart eliquis tomorrow      Tita Mendoza M.D.  [facility]  [phone]          "

## 2021-10-14 NOTE — PROGRESS NOTES
Patient left for discharge lounge at this time via wheelchair with discharge RN. Left with all his personal belongings, bandaid CDI to mid thyroid area, ambulating without issue

## 2021-10-14 NOTE — DISCHARGE SUMMARY
Discharge Summary      DATE OF ADMISSION: 10/13/2021    DATE OF DISCHARGE: 10/14/2021    DISCHARGE DIAGNOSIS:  ? Thyroid cancer    DISCHARGE CONDITION:  ? good    CONSULTATIONS:  ? none    PROCEDURES:  ? 10/13/21: total thyroidectomy    BRIEF HPI and HOSPITAL COURSE:  ? Admitted with above, see admission history and physical. Underwent procedure as above. On day of discharge, the patient has stable vital signs, tolerating a regular diet, and pain is well controlled with PO meds. The patient is stable for discharge to home.    DISCHARGE MEDICATIONS     Medication List      START taking these medications      Instructions   HYDROcodone-acetaminophen 5-325 MG Tabs per tablet  Commonly known as: Norco   Take 1 Tablet by mouth every four hours as needed for up to 7 days.  Dose: 1 Tablet     levothyroxine 125 MCG Tabs  Start taking on: October 15, 2021  Commonly known as: SYNTHROID   Doctor's comments: Synthroid non generic  Take 1 Tablet by mouth every morning on an empty stomach.  Dose: 125 mcg        CONTINUE taking these medications      Instructions   amLODIPine 10 MG Tabs  Commonly known as: NORVASC   Take 1 tablet by mouth every day.  Dose: 10 mg     apixaban 2.5mg Tabs  Commonly known as: ELIQUIS   Take 1 tablet by mouth 2 Times a Day.  Dose: 2.5 mg     atorvastatin 40 MG Tabs  Commonly known as: LIPITOR   Take 1 Tablet by mouth every day.  Dose: 40 mg     coenzyme Q-10 30 MG capsule   Take 60 mg by mouth every day.  Dose: 60 mg     cyclobenzaprine 10 mg Tabs  Commonly known as: Flexeril   Take 1 tablet by mouth 2 times a day as needed for Muscle Spasms.  Dose: 10 mg     furosemide 40 MG Tabs  Commonly known as: LASIX   Take 1 Tablet by mouth 2 times a day.  Dose: 40 mg     metoprolol tartrate 50 MG Tabs  Commonly known as: Lopressor   Take 1 tablet by mouth 2 times a day.  Dose: 50 mg     olmesartan 40 MG Tabs  Commonly known as: BENICAR   Take 1 tablet by mouth every day.  Dose: 40 mg     potassium chloride SA  20 MEQ Tbcr  Commonly known as: Kdur   Take 2 Tablets by mouth 2 times a day.  Dose: 40 mEq     tamsulosin 0.4 MG capsule  Commonly known as: FLOMAX   Take 1 capsule by mouth every day.  Dose: 0.4 mg     testosterone cypionate 200 MG/ML Soln injection  Commonly known as: DEPO-TESTOSTERONE   Doctor's comments: ICD10 E29.1  Inject 1 mL into the shoulder, thigh, or buttocks every 14 days for 30 days.  Dose: 200 mg     VITAMIN B COMPLEX PO   Take  by mouth every day.     Vitamin C 1000 MG Tabs   Take  by mouth every day.     VITAMIN D PO   Take  by mouth every day.                Discharge instructions after thyroidectomy:    You had surgery called thyroidectomy. This means that part or all of your thyroid gland was removed. The main job of the thyroid gland is to make thyroid hormone. This hormone controls your body’s metabolism. This is the way your body creates and uses energy. Removing the thyroid gland removes your body’s source of thyroid hormone. So after the surgery, you will need to take thyroid hormone pills daily. This helps keep the level of thyroid hormone in your body steady. This sheet tells you more about how to care for yourself after surgery.    Recovering After Surgery:    Get plenty of rest.    Care for your incision as directed.    Avoid heavy lifting and strenuous activity for 3-5 weeks.    Walk a few times daily. But don’t push yourself too hard. Slowly increase your pace and distance, as you feel able.    Return to work when your doctor says it’s okay.    Taking Your Thyroid Medication:    Take your medication as directed.    Use a pillbox labeled with the days of the week. This will help you remember whether you’ve taken your medication each day.    Take your medication with a liquid. But avoid taking it with soy milk. Soy milk can affect how well your body absorbs the medication. The pill needs to reach your stomach and not dissolve in your throat.    Try to take your medication with the same  types and amounts of food and liquid each day. This helps control the amount of thyroid hormone in your system.    After taking your medication:    Wait 4 hours before eating or drinking anything that contains soy.    Wait 4 hours before taking certain medications. These include:     Iron supplements     Calcium supplements     Antacids that contain either calcium or aluminum hydroxide     Medications that lower your cholesterol    Do not stop taking your medication even if you become pregnant.    Never stop taking medications on your own.    Keeping Your Doctor’s Appointments:    See your doctor for regular visits. These are needed to monitor your health.    Have routine blood tests done. These check the level of thyroid hormone in your body. This helps your doctor know whether to adjust the dosage of your medication if needed.    Tell your doctor about any signs of further thyroid problems.    Signs that you may have too much thyroid hormone in your body include:     Restlessness     Rapid weight loss     Sweating     Signs that you may have too little thyroid hormone in your body include:     Fatigue or sluggishness     Puffy hands, face, or feet     Hoarseness     Muscle pain     Slow pulse (less than 60 beats per minute)      You have been provided a lab order at your lab of choice to get your calcium checked before you see Dr. Mendoza in follow up - please have this checked 1-2days prior to your follow up appt.    When to Call Your Doctor:  Call your doctor right away if you have any of the following:  Fever above 100.4°F  Swelling or bleeding at the incision site  Choking  Trouble breathing  A sore throat that lasts longer than 7 days  Tingling or cramps in your hands, feet, or lips    FOLLOW-UP:  ? Please call my office at [phone] to make an appointment in 1 week    Office address:  [address] [place] [address] [id number]  [place] [address]    Tita Mendoza M.D.  [facility]  [phone]

## 2021-10-14 NOTE — CARE PLAN
The patient is Stable - Low risk of patient condition declining or worsening    Shift Goals  Clinical Goals: pain control  Patient Goals: pain control    Progress made toward(s) clinical / shift goals:  tolerating diet, pain control is adequate, surgical site C/D/I, no N/V    Patient is not progressing towards the following goals: due to void by 7pm       Problem: Knowledge Deficit - Standard  Goal: Patient and family/care givers will demonstrate understanding of plan of care, disease process/condition, diagnostic tests and medications  Outcome: Progressing     Problem: Pain - Standard  Goal: Alleviation of pain or a reduction in pain to the patient’s comfort goal  Outcome: Progressing     Problem: Early Mobilization - Post Surgery  Goal: Early mobilization post surgery  Outcome: Progressing     Problem: Pain - Post Surgery  Goal: Alleviation or reduction of pain post surgery  Outcome: Progressing     Problem: Wound/ / Incision Healing  Goal: Patient's wound/surgical incision will decrease in size and heals properly  Outcome: Progressing     Problem: Surgical Drain Management  Goal: Proper management/care of surgical drains will be maintained  Outcome: Progressing     Problem: Bowel Elimination - Post Surgical  Goal: Patient will resume regular bowel sounds and function with no discomfort or distention  Outcome: Progressing

## 2021-10-15 ENCOUNTER — PHARMACY VISIT (OUTPATIENT)
Dept: PHARMACY | Facility: MEDICAL CENTER | Age: 76
End: 2021-10-15
Payer: COMMERCIAL

## 2021-10-15 PROCEDURE — RXMED WILLOW AMBULATORY MEDICATION CHARGE: Performed by: FAMILY MEDICINE

## 2021-10-18 ENCOUNTER — TELEPHONE (OUTPATIENT)
Dept: HEALTH INFORMATION MANAGEMENT | Facility: OTHER | Age: 76
End: 2021-10-18

## 2021-10-18 NOTE — TELEPHONE ENCOUNTER
HIPAA verified - Spouse: Shaniqua called back to see if lab order was received yet. Advised nothing new in media scans. Transferred to Main Lab x: [phone] to see if it has been received.     -aep

## 2021-10-18 NOTE — TELEPHONE ENCOUNTER
HIPAA verified - Member's spouse: Shaniqua called to see if there was a lab order on file from [facility] for Calcium from 10/14/2021. Advised nothing reflecting this order in media or labs.     Advised on the completed labs.     Referred spouse to reach out to requesting doctor once more, and fax lab order to [phone].    -aep

## 2021-10-19 ENCOUNTER — TELEPHONE (OUTPATIENT)
Dept: HEALTH INFORMATION MANAGEMENT | Facility: OTHER | Age: 76
End: 2021-10-19

## 2021-10-19 ENCOUNTER — APPOINTMENT (RX ONLY)
Dept: URBAN - METROPOLITAN AREA CLINIC 4 | Facility: CLINIC | Age: 76
Setting detail: DERMATOLOGY
End: 2021-10-19

## 2021-10-19 DIAGNOSIS — Z85.828 PERSONAL HISTORY OF OTHER MALIGNANT NEOPLASM OF SKIN: ICD-10-CM | Status: STABLE

## 2021-10-19 DIAGNOSIS — Z00.8 ENCOUNTER FOR OTHER GENERAL EXAMINATION: ICD-10-CM

## 2021-10-19 DIAGNOSIS — D485 NEOPLASM OF UNCERTAIN BEHAVIOR OF SKIN: ICD-10-CM

## 2021-10-19 DIAGNOSIS — L57.0 ACTINIC KERATOSIS: ICD-10-CM

## 2021-10-19 DIAGNOSIS — Z85.820 PERSONAL HISTORY OF MALIGNANT MELANOMA OF SKIN: ICD-10-CM | Status: STABLE

## 2021-10-19 DIAGNOSIS — D22 MELANOCYTIC NEVI: ICD-10-CM

## 2021-10-19 DIAGNOSIS — D18.0 HEMANGIOMA: ICD-10-CM

## 2021-10-19 DIAGNOSIS — L81.4 OTHER MELANIN HYPERPIGMENTATION: ICD-10-CM

## 2021-10-19 DIAGNOSIS — L82.1 OTHER SEBORRHEIC KERATOSIS: ICD-10-CM

## 2021-10-19 PROBLEM — D48.5 NEOPLASM OF UNCERTAIN BEHAVIOR OF SKIN: Status: ACTIVE | Noted: 2021-10-19

## 2021-10-19 PROBLEM — D22.5 MELANOCYTIC NEVI OF TRUNK: Status: ACTIVE | Noted: 2021-10-19

## 2021-10-19 PROBLEM — D18.01 HEMANGIOMA OF SKIN AND SUBCUTANEOUS TISSUE: Status: ACTIVE | Noted: 2021-10-19

## 2021-10-19 PROCEDURE — ? REFERRAL CORRESPONDENCE

## 2021-10-19 PROCEDURE — 17003 DESTRUCT PREMALG LES 2-14: CPT

## 2021-10-19 PROCEDURE — 99213 OFFICE O/P EST LOW 20 MIN: CPT | Mod: 25

## 2021-10-19 PROCEDURE — 17000 DESTRUCT PREMALG LESION: CPT | Mod: 59

## 2021-10-19 PROCEDURE — ? BIOPSY BY SHAVE METHOD

## 2021-10-19 PROCEDURE — ? COUNSELING

## 2021-10-19 PROCEDURE — ? LIQUID NITROGEN

## 2021-10-19 PROCEDURE — 11102 TANGNTL BX SKIN SINGLE LES: CPT

## 2021-10-19 ASSESSMENT — LOCATION SIMPLE DESCRIPTION DERM
LOCATION SIMPLE: RIGHT TEMPLE
LOCATION SIMPLE: UPPER BACK
LOCATION SIMPLE: RIGHT CHEEK
LOCATION SIMPLE: LEFT FOREARM
LOCATION SIMPLE: RIGHT UPPER BACK
LOCATION SIMPLE: LEFT OCCIPITAL SCALP
LOCATION SIMPLE: LEFT HAND
LOCATION SIMPLE: LEFT EAR
LOCATION SIMPLE: ABDOMEN
LOCATION SIMPLE: RIGHT HAND
LOCATION SIMPLE: CHEST
LOCATION SIMPLE: LEFT CHEEK
LOCATION SIMPLE: RIGHT FOREHEAD

## 2021-10-19 ASSESSMENT — LOCATION DETAILED DESCRIPTION DERM
LOCATION DETAILED: RIGHT RADIAL DORSAL HAND
LOCATION DETAILED: RIGHT CENTRAL TEMPLE
LOCATION DETAILED: LEFT PROXIMAL ULNAR DORSAL FOREARM
LOCATION DETAILED: SUPERIOR THORACIC SPINE
LOCATION DETAILED: LEFT DISTAL RADIAL DORSAL FOREARM
LOCATION DETAILED: LEFT MEDIAL SUPERIOR CHEST
LOCATION DETAILED: LEFT INFERIOR CENTRAL MALAR CHEEK
LOCATION DETAILED: LEFT RADIAL DORSAL HAND
LOCATION DETAILED: EPIGASTRIC SKIN
LOCATION DETAILED: RIGHT MID-UPPER BACK
LOCATION DETAILED: LEFT SUPERIOR OCCIPITAL SCALP
LOCATION DETAILED: LEFT SUPERIOR HELIX
LOCATION DETAILED: RIGHT FOREHEAD
LOCATION DETAILED: LEFT MEDIAL INFERIOR CHEST
LOCATION DETAILED: RIGHT SUPERIOR LATERAL MALAR CHEEK

## 2021-10-19 ASSESSMENT — LOCATION ZONE DERM
LOCATION ZONE: TRUNK
LOCATION ZONE: SCALP
LOCATION ZONE: FACE
LOCATION ZONE: HAND
LOCATION ZONE: ARM
LOCATION ZONE: EAR

## 2021-10-19 NOTE — PROCEDURE: MIPS QUALITY
Quality 137: Melanoma: Continuity Of Care - Recall System: Patient information entered into a recall system that includes: target date for the next exam specified AND a process to follow up with patients regarding missed or unscheduled appointments
Detail Level: Detailed
Quality 138: Melanoma: Coordination Of Care: A treatment plan was communicated to the physicians providing continuing care within one month of diagnosis outlining: diagnosis, tumor thickness and a plan for surgery or alternate care.
Quality 226: Preventive Care And Screening: Tobacco Use: Screening And Cessation Intervention: Patient screened for tobacco use and is an ex/non-smoker
Quality 130: Documentation Of Current Medications In The Medical Record: Current Medications Documented
Quality 111:Pneumonia Vaccination Status For Older Adults: Pneumococcal Vaccination Previously Received

## 2021-10-19 NOTE — TELEPHONE ENCOUNTER
Outreach call to mbr to follow up with hospital discharge, discuss hospital follow up and [facility]SC benefits. Mbr reported reported he is doing well and denied any barriers related to discharge. He reported he currently doesn't want to schedule hospital follow up visit or [facility] services at this time as he has a trip planned and is leaving town 10/24/21. Discussed appt with Dr. Easton on 10/25/21 and encouraged mbr to call into office to cancel appt if he is out of town. Mbr voiced agreement. No other needs identified. LSW provided contact information to mbr.

## 2021-10-20 ENCOUNTER — HOSPITAL ENCOUNTER (OUTPATIENT)
Dept: LAB | Facility: MEDICAL CENTER | Age: 76
End: 2021-10-20
Attending: SURGERY
Payer: MEDICARE

## 2021-10-20 DIAGNOSIS — Z01.812 PRE-OPERATIVE LABORATORY EXAMINATION: ICD-10-CM

## 2021-10-20 LAB — CA-I SERPL-SCNC: 1.2 MMOL/L (ref 1.1–1.3)

## 2021-10-20 PROCEDURE — 82330 ASSAY OF CALCIUM: CPT

## 2021-10-21 ENCOUNTER — APPOINTMENT (RX ONLY)
Dept: URBAN - METROPOLITAN AREA CLINIC 4 | Facility: CLINIC | Age: 76
Setting detail: DERMATOLOGY
End: 2021-10-21

## 2021-10-21 DIAGNOSIS — L72.8 OTHER FOLLICULAR CYSTS OF THE SKIN AND SUBCUTANEOUS TISSUE: ICD-10-CM

## 2021-10-21 PROCEDURE — ? EXCISION

## 2021-10-21 PROCEDURE — 11422 EXC H-F-NK-SP B9+MARG 1.1-2: CPT | Mod: 79

## 2021-10-21 ASSESSMENT — LOCATION ZONE DERM: LOCATION ZONE: PENIS

## 2021-10-21 ASSESSMENT — LOCATION SIMPLE DESCRIPTION DERM: LOCATION SIMPLE: PENIS

## 2021-10-21 ASSESSMENT — LOCATION DETAILED DESCRIPTION DERM: LOCATION DETAILED: DORSAL PENILE SHAFT

## 2021-10-21 NOTE — PROCEDURE: EXCISION
Medical Necessity Information: It is in your best interest to select a reason for this procedure from the list below. All of these items fulfill various CMS LCD requirements except lesion extends to a margin.
Include Z78.9 (Other Specified Conditions Influencing Health Status) As An Associated Diagnosis?: No
Medical Necessity Clause: This procedure was medically necessary because the lesion that was treated was:
Lab: [id number]
Lab Facility: 
Surgeon (Optional): Dr. Christensen
Size Of Lesion In Cm: 1.5
X Size Of Lesion In Cm (Optional): 0
Size Of Margin In Cm: 0.1
Anesthesia Volume In Cc: 1
Eye Clamp Note Details: An eye clamp was used during the procedure.
Excision Method: Round
Repair Type: None (Simple)
Suturegard Retention Suture: 2-0 Nylon
Retention Suture Bite Size: 3 mm
Length To Time In Minutes Device Was In Place: 10
Intermediate / Complex Repair - Final Wound Length In Cm: 1.8
Undermining Type: Entire Wound
Debridement Text: The wound edges were debrided prior to proceeding with the closure to facilitate wound healing.
Helical Rim Text: The closure involved the helical rim.
Vermilion Border Text: The closure involved the vermilion border.
Nostril Rim Text: The closure involved the nostril rim.
Retention Suture Text: Retention sutures were placed to support the closure and prevent dehiscence.
Epidermal Closure Graft Donor Site (Optional): simple interrupted
Graft Donor Site Bandage (Optional-Leave Blank If You Don't Want In Note): Steri-strips and a pressure bandage were applied to the donor site.
Detail Level: Detailed
Excision Depth: adipose tissue
Scalpel Size: 15 blade
Anesthesia Type: 1% lidocaine with epinephrine and a 1:10 solution of 8.4% sodium bicarbonate
Additional Anesthesia Volume In Cc: 6
Hemostasis: Electrodesiccation
Estimated Blood Loss (Cc): minimal
Deep Sutures: 4-0 Maxon
Dermal Closure: buried vertical mattress
Epidermal Sutures: 4-0 Caprosyn
Epidermal Closure: running subcuticular
Wound Care: Vaseline
Dressing: pressure dressing
Suturegard Intro: Intraoperative tissue expansion was performed, utilizing the SUTUREGARD device, in order to reduce wound tension.
Suturegard Body: The suture ends were repeatedly re-tightened and re-clamped to achieve the desired tissue expansion.
Hemigard Intro: Due to skin fragility and wound tension, it was decided to use HEMIGARD adhesive retention suture devices to permit a linear closure. The skin was cleaned and dried for a 6cm distance away from the wound. Excessive hair, if present, was removed to allow for adhesion.
Hemigard Postcare Instructions: The HEMIGARD strips are to remain completely dry for at least 5-7 days.
Positioning (Leave Blank If You Do Not Want): The patient was placed in a comfortable position exposing the surgical site.
Complex Repair Preamble Text (Leave Blank If You Do Not Want): Extensive wide undermining was performed.
Intermediate Repair Preamble Text (Leave Blank If You Do Not Want): Undermining was performed with blunt dissection.
Fusiform Excision Additional Text (Leave Blank If You Do Not Want): The margin was drawn around the clinically apparent lesion.  A fusiform shape was then drawn on the skin incorporating the lesion and margins.  Incisions were then made along these lines to the appropriate tissue plane and the lesion was extirpated.
Eliptical Excision Additional Text (Leave Blank If You Do Not Want): The margin was drawn around the clinically apparent lesion.  An elliptical shape was then drawn on the skin incorporating the lesion and margins.  Incisions were then made along these lines to the appropriate tissue plane and the lesion was extirpated.
Saucerization Excision Additional Text (Leave Blank If You Do Not Want): The margin was drawn around the clinically apparent lesion.  Incisions were then made along these lines, in a tangential fashion, to the appropriate tissue plane and the lesion was extirpated.
Slit Excision Additional Text (Leave Blank If You Do Not Want): A linear line was drawn on the skin overlying the lesion. An incision was made slowly until the lesion was visualized.  Once visualized, the lesion was removed with blunt dissection.
Excisional Biopsy Additional Text (Leave Blank If You Do Not Want): The margin was drawn around the clinically apparent lesion. An elliptical shape was then drawn on the skin incorporating the lesion and margins.  Incisions were then made along these lines to the appropriate tissue plane and the lesion was extirpated.
Perilesional Excision Additional Text (Leave Blank If You Do Not Want): The margin was drawn around the clinically apparent lesion. Incisions were then made along these lines to the appropriate tissue plane and the lesion was extirpated.
Repair Performed By Another Provider Text (Leave Blank If You Do Not Want): After the tissue was excised the defect was repaired by another provider.
No Repair - Repaired With Adjacent Surgical Defect Text (Leave Blank If You Do Not Want): After the excision the defect was repaired concurrently with another surgical defect which was in close approximation.
Advancement Flap (Single) Text: The defect edges were debeveled with a #15 scalpel blade.  Given the location of the defect and the proximity to free margins a single advancement flap was deemed most appropriate.  Using a sterile surgical marker, an appropriate advancement flap was drawn incorporating the defect and placing the expected incisions within the relaxed skin tension lines where possible.    The area thus outlined was incised deep to adipose tissue with a #15 scalpel blade.  The skin margins were undermined to an appropriate distance in all directions utilizing iris scissors.
Advancement Flap (Double) Text: The defect edges were debeveled with a #15 scalpel blade.  Given the location of the defect and the proximity to free margins a double advancement flap was deemed most appropriate.  Using a sterile surgical marker, the appropriate advancement flaps were drawn incorporating the defect and placing the expected incisions within the relaxed skin tension lines where possible.    The area thus outlined was incised deep to adipose tissue with a #15 scalpel blade.  The skin margins were undermined to an appropriate distance in all directions utilizing iris scissors.
Burow's Advancement Flap Text: The defect edges were debeveled with a #15 scalpel blade.  Given the location of the defect and the proximity to free margins a Burow's advancement flap was deemed most appropriate.  Using a sterile surgical marker, the appropriate advancement flap was drawn incorporating the defect and placing the expected incisions within the relaxed skin tension lines where possible.    The area thus outlined was incised deep to adipose tissue with a #15 scalpel blade.  The skin margins were undermined to an appropriate distance in all directions utilizing iris scissors.
Chonodrocutaneous Helical Advancement Flap Text: The defect edges were debeveled with a #15 scalpel blade.  Given the location of the defect and the proximity to free margins a chondrocutaneous helical advancement flap was deemed most appropriate.  Using a sterile surgical marker, the appropriate advancement flap was drawn incorporating the defect and placing the expected incisions within the relaxed skin tension lines where possible.    The area thus outlined was incised deep to adipose tissue with a #15 scalpel blade.  The skin margins were undermined to an appropriate distance in all directions utilizing iris scissors.
Crescentic Advancement Flap Text: The defect edges were debeveled with a #15 scalpel blade.  Given the location of the defect and the proximity to free margins a crescentic advancement flap was deemed most appropriate.  Using a sterile surgical marker, the appropriate advancement flap was drawn incorporating the defect and placing the expected incisions within the relaxed skin tension lines where possible.    The area thus outlined was incised deep to adipose tissue with a #15 scalpel blade.  The skin margins were undermined to an appropriate distance in all directions utilizing iris scissors.
A-T Advancement Flap Text: The defect edges were debeveled with a #15 scalpel blade.  Given the location of the defect, shape of the defect and the proximity to free margins an A-T advancement flap was deemed most appropriate.  Using a sterile surgical marker, an appropriate advancement flap was drawn incorporating the defect and placing the expected incisions within the relaxed skin tension lines where possible.    The area thus outlined was incised deep to adipose tissue with a #15 scalpel blade.  The skin margins were undermined to an appropriate distance in all directions utilizing iris scissors.
O-T Advancement Flap Text: The defect edges were debeveled with a #15 scalpel blade.  Given the location of the defect, shape of the defect and the proximity to free margins an O-T advancement flap was deemed most appropriate.  Using a sterile surgical marker, an appropriate advancement flap was drawn incorporating the defect and placing the expected incisions within the relaxed skin tension lines where possible.    The area thus outlined was incised deep to adipose tissue with a #15 scalpel blade.  The skin margins were undermined to an appropriate distance in all directions utilizing iris scissors.
O-L Flap Text: The defect edges were debeveled with a #15 scalpel blade.  Given the location of the defect, shape of the defect and the proximity to free margins an O-L flap was deemed most appropriate.  Using a sterile surgical marker, an appropriate advancement flap was drawn incorporating the defect and placing the expected incisions within the relaxed skin tension lines where possible.    The area thus outlined was incised deep to adipose tissue with a #15 scalpel blade.  The skin margins were undermined to an appropriate distance in all directions utilizing iris scissors.
O-Z Flap Text: The defect edges were debeveled with a #15 scalpel blade.  Given the location of the defect, shape of the defect and the proximity to free margins an O-Z flap was deemed most appropriate.  Using a sterile surgical marker, an appropriate transposition flap was drawn incorporating the defect and placing the expected incisions within the relaxed skin tension lines where possible. The area thus outlined was incised deep to adipose tissue with a #15 scalpel blade.  The skin margins were undermined to an appropriate distance in all directions utilizing iris scissors.
Double O-Z Flap Text: The defect edges were debeveled with a #15 scalpel blade.  Given the location of the defect, shape of the defect and the proximity to free margins a Double O-Z flap was deemed most appropriate.  Using a sterile surgical marker, an appropriate transposition flap was drawn incorporating the defect and placing the expected incisions within the relaxed skin tension lines where possible. The area thus outlined was incised deep to adipose tissue with a #15 scalpel blade.  The skin margins were undermined to an appropriate distance in all directions utilizing iris scissors.
V-Y Flap Text: The defect edges were debeveled with a #15 scalpel blade.  Given the location of the defect, shape of the defect and the proximity to free margins a V-Y flap was deemed most appropriate.  Using a sterile surgical marker, an appropriate advancement flap was drawn incorporating the defect and placing the expected incisions within the relaxed skin tension lines where possible.    The area thus outlined was incised deep to adipose tissue with a #15 scalpel blade.  The skin margins were undermined to an appropriate distance in all directions utilizing iris scissors.
Advancement-Rotation Flap Text: The defect edges were debeveled with a #15 scalpel blade.  Given the location of the defect, shape of the defect and the proximity to free margins an advancement-rotation flap was deemed most appropriate.  Using a sterile surgical marker, an appropriate flap was drawn incorporating the defect and placing the expected incisions within the relaxed skin tension lines where possible. The area thus outlined was incised deep to adipose tissue with a #15 scalpel blade.  The skin margins were undermined to an appropriate distance in all directions utilizing iris scissors.
Mercedes Flap Text: The defect edges were debeveled with a #15 scalpel blade.  Given the location of the defect, shape of the defect and the proximity to free margins a Mercedes flap was deemed most appropriate.  Using a sterile surgical marker, an appropriate advancement flap was drawn incorporating the defect and placing the expected incisions within the relaxed skin tension lines where possible. The area thus outlined was incised deep to adipose tissue with a #15 scalpel blade.  The skin margins were undermined to an appropriate distance in all directions utilizing iris scissors.
Modified Advancement Flap Text: The defect edges were debeveled with a #15 scalpel blade.  Given the location of the defect, shape of the defect and the proximity to free margins a modified advancement flap was deemed most appropriate.  Using a sterile surgical marker, an appropriate advancement flap was drawn incorporating the defect and placing the expected incisions within the relaxed skin tension lines where possible.    The area thus outlined was incised deep to adipose tissue with a #15 scalpel blade.  The skin margins were undermined to an appropriate distance in all directions utilizing iris scissors.
Mucosal Advancement Flap Text: Given the location of the defect, shape of the defect and the proximity to free margins a mucosal advancement flap was deemed most appropriate. Incisions were made with a 15 blade scalpel in the appropriate fashion along the cutaneous vermillion border and the mucosal lip. The remaining actinically damaged mucosal tissue was excised.  The mucosal advancement flap was then elevated to the gingival sulcus with care taken to preserve the neurovascular structures and advanced into the primary defect. Care was taken to ensure that precise realignment of the vermilion border was achieved.
Peng Advancement Flap Text: The defect edges were debeveled with a #15 scalpel blade.  Given the location of the defect, shape of the defect and the proximity to free margins a Peng advancement flap was deemed most appropriate.  Using a sterile surgical marker, an appropriate advancement flap was drawn incorporating the defect and placing the expected incisions within the relaxed skin tension lines where possible. The area thus outlined was incised deep to adipose tissue with a #15 scalpel blade.  The skin margins were undermined to an appropriate distance in all directions utilizing iris scissors.
Hatchet Flap Text: The defect edges were debeveled with a #15 scalpel blade.  Given the location of the defect, shape of the defect and the proximity to free margins a hatchet flap was deemed most appropriate.  Using a sterile surgical marker, an appropriate hatchet flap was drawn incorporating the defect and placing the expected incisions within the relaxed skin tension lines where possible.    The area thus outlined was incised deep to adipose tissue with a #15 scalpel blade.  The skin margins were undermined to an appropriate distance in all directions utilizing iris scissors.
Rotation Flap Text: The defect edges were debeveled with a #15 scalpel blade.  Given the location of the defect, shape of the defect and the proximity to free margins a rotation flap was deemed most appropriate.  Using a sterile surgical marker, an appropriate rotation flap was drawn incorporating the defect and placing the expected incisions within the relaxed skin tension lines where possible.    The area thus outlined was incised deep to adipose tissue with a #15 scalpel blade.  The skin margins were undermined to an appropriate distance in all directions utilizing iris scissors.
Spiral Flap Text: The defect edges were debeveled with a #15 scalpel blade.  Given the location of the defect, shape of the defect and the proximity to free margins a spiral flap was deemed most appropriate.  Using a sterile surgical marker, an appropriate rotation flap was drawn incorporating the defect and placing the expected incisions within the relaxed skin tension lines where possible. The area thus outlined was incised deep to adipose tissue with a #15 scalpel blade.  The skin margins were undermined to an appropriate distance in all directions utilizing iris scissors.
Staged Advancement Flap Text: The defect edges were debeveled with a #15 scalpel blade.  Given the location of the defect, shape of the defect and the proximity to free margins a staged advancement flap was deemed most appropriate.  Using a sterile surgical marker, an appropriate advancement flap was drawn incorporating the defect and placing the expected incisions within the relaxed skin tension lines where possible. The area thus outlined was incised deep to adipose tissue with a #15 scalpel blade.  The skin margins were undermined to an appropriate distance in all directions utilizing iris scissors.
Star Wedge Flap Text: The defect edges were debeveled with a #15 scalpel blade.  Given the location of the defect, shape of the defect and the proximity to free margins a star wedge flap was deemed most appropriate.  Using a sterile surgical marker, an appropriate rotation flap was drawn incorporating the defect and placing the expected incisions within the relaxed skin tension lines where possible. The area thus outlined was incised deep to adipose tissue with a #15 scalpel blade.  The skin margins were undermined to an appropriate distance in all directions utilizing iris scissors.
Transposition Flap Text: The defect edges were debeveled with a #15 scalpel blade.  Given the location of the defect and the proximity to free margins a transposition flap was deemed most appropriate.  Using a sterile surgical marker, an appropriate transposition flap was drawn incorporating the defect.    The area thus outlined was incised deep to adipose tissue with a #15 scalpel blade.  The skin margins were undermined to an appropriate distance in all directions utilizing iris scissors.
Muscle Hinge Flap Text: The defect edges were debeveled with a #15 scalpel blade.  Given the size, depth and location of the defect and the proximity to free margins a muscle hinge flap was deemed most appropriate.  Using a sterile surgical marker, an appropriate hinge flap was drawn incorporating the defect. The area thus outlined was incised with a #15 scalpel blade.  The skin margins were undermined to an appropriate distance in all directions utilizing iris scissors.
Mustarde Flap Text: The defect edges were debeveled with a #15 scalpel blade.  Given the size, depth and location of the defect and the proximity to free margins a Mustarde flap was deemed most appropriate.  Using a sterile surgical marker, an appropriate flap was drawn incorporating the defect. The area thus outlined was incised with a #15 scalpel blade.  The skin margins were undermined to an appropriate distance in all directions utilizing iris scissors.
Nasal Turnover Hinge Flap Text: The defect edges were debeveled with a #15 scalpel blade.  Given the size, depth, location of the defect and the defect being full thickness a nasal turnover hinge flap was deemed most appropriate.  Using a sterile surgical marker, an appropriate hinge flap was drawn incorporating the defect. The area thus outlined was incised with a #15 scalpel blade. The flap was designed to recreate the nasal mucosal lining and the alar rim. The skin margins were undermined to an appropriate distance in all directions utilizing iris scissors.
Nasalis-Muscle-Based Myocutaneous Island Pedicle Flap Text: Using a #15 blade, an incision was made around the donor flap to the level of the nasalis muscle. Wide lateral undermining was then performed in both the subcutaneous plane above the nasalis muscle, and in a submuscular plane just above periosteum. This allowed the formation of a free nasalis muscle axial pedicle (based on the angular artery) which was still attached to the actual cutaneous flap, increasing its mobility and vascular viability. Hemostasis was obtained with pinpoint electrocoagulation. The flap was mobilized into position and the pivotal anchor points positioned and stabilized with buried interrupted sutures. Subcutaneous and dermal tissues were closed in a multilayered fashion with sutures. Tissue redundancies were excised, and the epidermal edges were apposed without significant tension and sutured with sutures.
Orbicularis Oris Muscle Flap Text: The defect edges were debeveled with a #15 scalpel blade.  Given that the defect affected the competency of the oral sphincter an orbicularis oris muscle flap was deemed most appropriate to restore this competency and normal muscle function.  Using a sterile surgical marker, an appropriate flap was drawn incorporating the defect. The area thus outlined was incised with a #15 scalpel blade.
Melolabial Transposition Flap Text: The defect edges were debeveled with a #15 scalpel blade.  Given the location of the defect and the proximity to free margins a melolabial flap was deemed most appropriate.  Using a sterile surgical marker, an appropriate melolabial transposition flap was drawn incorporating the defect.    The area thus outlined was incised deep to adipose tissue with a #15 scalpel blade.  The skin margins were undermined to an appropriate distance in all directions utilizing iris scissors.
Rhombic Flap Text: The defect edges were debeveled with a #15 scalpel blade.  Given the location of the defect and the proximity to free margins a rhombic flap was deemed most appropriate.  Using a sterile surgical marker, an appropriate rhombic flap was drawn incorporating the defect.    The area thus outlined was incised deep to adipose tissue with a #15 scalpel blade.  The skin margins were undermined to an appropriate distance in all directions utilizing iris scissors.
Rhomboid Transposition Flap Text: The defect edges were debeveled with a #15 scalpel blade.  Given the location of the defect and the proximity to free margins a rhomboid transposition flap was deemed most appropriate.  Using a sterile surgical marker, an appropriate rhomboid flap was drawn incorporating the defect.    The area thus outlined was incised deep to adipose tissue with a #15 scalpel blade.  The skin margins were undermined to an appropriate distance in all directions utilizing iris scissors.
Bi-Rhombic Flap Text: The defect edges were debeveled with a #15 scalpel blade.  Given the location of the defect and the proximity to free margins a bi-rhombic flap was deemed most appropriate.  Using a sterile surgical marker, an appropriate rhombic flap was drawn incorporating the defect. The area thus outlined was incised deep to adipose tissue with a #15 scalpel blade.  The skin margins were undermined to an appropriate distance in all directions utilizing iris scissors.
Helical Rim Advancement Flap Text: The defect edges were debeveled with a #15 blade scalpel.  Given the location of the defect and the proximity to free margins (helical rim) a double helical rim advancement flap was deemed most appropriate.  Using a sterile surgical marker, the appropriate advancement flaps were drawn incorporating the defect and placing the expected incisions between the helical rim and antihelix where possible.  The area thus outlined was incised through and through with a #15 scalpel blade.  With a skin hook and iris scissors, the flaps were gently and sharply undermined and freed up.
Bilateral Helical Rim Advancement Flap Text: The defect edges were debeveled with a #15 blade scalpel.  Given the location of the defect and the proximity to free margins (helical rim) a bilateral helical rim advancement flap was deemed most appropriate.  Using a sterile surgical marker, the appropriate advancement flaps were drawn incorporating the defect and placing the expected incisions between the helical rim and antihelix where possible.  The area thus outlined was incised through and through with a #15 scalpel blade.  With a skin hook and iris scissors, the flaps were gently and sharply undermined and freed up.
Ear Star Wedge Flap Text: The defect edges were debeveled with a #15 blade scalpel.  Given the location of the defect and the proximity to free margins (helical rim) an ear star wedge flap was deemed most appropriate.  Using a sterile surgical marker, the appropriate flap was drawn incorporating the defect and placing the expected incisions between the helical rim and antihelix where possible.  The area thus outlined was incised through and through with a #15 scalpel blade.
Banner Transposition Flap Text: The defect edges were debeveled with a #15 scalpel blade.  Given the location of the defect and the proximity to free margins a Banner transposition flap was deemed most appropriate.  Using a sterile surgical marker, an appropriate flap drawn around the defect. The area thus outlined was incised deep to adipose tissue with a #15 scalpel blade.  The skin margins were undermined to an appropriate distance in all directions utilizing iris scissors.
Bilobed Flap Text: The defect edges were debeveled with a #15 scalpel blade.  Given the location of the defect and the proximity to free margins a bilobe flap was deemed most appropriate.  Using a sterile surgical marker, an appropriate bilobe flap drawn around the defect.    The area thus outlined was incised deep to adipose tissue with a #15 scalpel blade.  The skin margins were undermined to an appropriate distance in all directions utilizing iris scissors.
Bilobed Transposition Flap Text: The defect edges were debeveled with a #15 scalpel blade.  Given the location of the defect and the proximity to free margins a bilobed transposition flap was deemed most appropriate.  Using a sterile surgical marker, an appropriate bilobe flap drawn around the defect.    The area thus outlined was incised deep to adipose tissue with a #15 scalpel blade.  The skin margins were undermined to an appropriate distance in all directions utilizing iris scissors.
Trilobed Flap Text: The defect edges were debeveled with a #15 scalpel blade.  Given the location of the defect and the proximity to free margins a trilobed flap was deemed most appropriate.  Using a sterile surgical marker, an appropriate trilobed flap drawn around the defect.    The area thus outlined was incised deep to adipose tissue with a #15 scalpel blade.  The skin margins were undermined to an appropriate distance in all directions utilizing iris scissors.
Dorsal Nasal Flap Text: The defect edges were debeveled with a #15 scalpel blade.  Given the location of the defect and the proximity to free margins a dorsal nasal flap was deemed most appropriate.  Using a sterile surgical marker, an appropriate dorsal nasal flap was drawn around the defect.    The area thus outlined was incised deep to adipose tissue with a #15 scalpel blade.  The skin margins were undermined to an appropriate distance in all directions utilizing iris scissors.
Island Pedicle Flap Text: The defect edges were debeveled with a #15 scalpel blade.  Given the location of the defect, shape of the defect and the proximity to free margins an island pedicle advancement flap was deemed most appropriate.  Using a sterile surgical marker, an appropriate advancement flap was drawn incorporating the defect, outlining the appropriate donor tissue and placing the expected incisions within the relaxed skin tension lines where possible.    The area thus outlined was incised deep to adipose tissue with a #15 scalpel blade.  The skin margins were undermined to an appropriate distance in all directions around the primary defect and laterally outward around the island pedicle utilizing iris scissors.  There was minimal undermining beneath the pedicle flap.
Island Pedicle Flap With Canthal Suspension Text: The defect edges were debeveled with a #15 scalpel blade.  Given the location of the defect, shape of the defect and the proximity to free margins an island pedicle advancement flap was deemed most appropriate.  Using a sterile surgical marker, an appropriate advancement flap was drawn incorporating the defect, outlining the appropriate donor tissue and placing the expected incisions within the relaxed skin tension lines where possible. The area thus outlined was incised deep to adipose tissue with a #15 scalpel blade.  The skin margins were undermined to an appropriate distance in all directions around the primary defect and laterally outward around the island pedicle utilizing iris scissors.  There was minimal undermining beneath the pedicle flap. A suspension suture was placed in the canthal tendon to prevent tension and prevent ectropion.
Alar Island Pedicle Flap Text: The defect edges were debeveled with a #15 scalpel blade.  Given the location of the defect, shape of the defect and the proximity to the alar rim an island pedicle advancement flap was deemed most appropriate.  Using a sterile surgical marker, an appropriate advancement flap was drawn incorporating the defect, outlining the appropriate donor tissue and placing the expected incisions within the nasal ala running parallel to the alar rim. The area thus outlined was incised with a #15 scalpel blade.  The skin margins were undermined minimally to an appropriate distance in all directions around the primary defect and laterally outward around the island pedicle utilizing iris scissors.  There was minimal undermining beneath the pedicle flap.
Double Island Pedicle Flap Text: The defect edges were debeveled with a #15 scalpel blade.  Given the location of the defect, shape of the defect and the proximity to free margins a double island pedicle advancement flap was deemed most appropriate.  Using a sterile surgical marker, an appropriate advancement flap was drawn incorporating the defect, outlining the appropriate donor tissue and placing the expected incisions within the relaxed skin tension lines where possible.    The area thus outlined was incised deep to adipose tissue with a #15 scalpel blade.  The skin margins were undermined to an appropriate distance in all directions around the primary defect and laterally outward around the island pedicle utilizing iris scissors.  There was minimal undermining beneath the pedicle flap.
Island Pedicle Flap-Requiring Vessel Identification Text: The defect edges were debeveled with a #15 scalpel blade.  Given the location of the defect, shape of the defect and the proximity to free margins an island pedicle advancement flap was deemed most appropriate.  Using a sterile surgical marker, an appropriate advancement flap was drawn, based on the axial vessel mentioned above, incorporating the defect, outlining the appropriate donor tissue and placing the expected incisions within the relaxed skin tension lines where possible.    The area thus outlined was incised deep to adipose tissue with a #15 scalpel blade.  The skin margins were undermined to an appropriate distance in all directions around the primary defect and laterally outward around the island pedicle utilizing iris scissors.  There was minimal undermining beneath the pedicle flap.
Keystone Flap Text: The defect edges were debeveled with a #15 scalpel blade.  Given the location of the defect, shape of the defect a keystone flap was deemed most appropriate.  Using a sterile surgical marker, an appropriate keystone flap was drawn incorporating the defect, outlining the appropriate donor tissue and placing the expected incisions within the relaxed skin tension lines where possible. The area thus outlined was incised deep to adipose tissue with a #15 scalpel blade.  The skin margins were undermined to an appropriate distance in all directions around the primary defect and laterally outward around the flap utilizing iris scissors.
O-T Plasty Text: The defect edges were debeveled with a #15 scalpel blade.  Given the location of the defect, shape of the defect and the proximity to free margins an O-T plasty was deemed most appropriate.  Using a sterile surgical marker, an appropriate O-T plasty was drawn incorporating the defect and placing the expected incisions within the relaxed skin tension lines where possible.    The area thus outlined was incised deep to adipose tissue with a #15 scalpel blade.  The skin margins were undermined to an appropriate distance in all directions utilizing iris scissors.
O-Z Plasty Text: The defect edges were debeveled with a #15 scalpel blade.  Given the location of the defect, shape of the defect and the proximity to free margins an O-Z plasty (double transposition flap) was deemed most appropriate.  Using a sterile surgical marker, the appropriate transposition flaps were drawn incorporating the defect and placing the expected incisions within the relaxed skin tension lines where possible.    The area thus outlined was incised deep to adipose tissue with a #15 scalpel blade.  The skin margins were undermined to an appropriate distance in all directions utilizing iris scissors.  Hemostasis was achieved with electrocautery.  The flaps were then transposed into place, one clockwise and the other counterclockwise, and anchored with interrupted buried subcutaneous sutures.
Double O-Z Plasty Text: The defect edges were debeveled with a #15 scalpel blade.  Given the location of the defect, shape of the defect and the proximity to free margins a Double O-Z plasty (double transposition flap) was deemed most appropriate.  Using a sterile surgical marker, the appropriate transposition flaps were drawn incorporating the defect and placing the expected incisions within the relaxed skin tension lines where possible. The area thus outlined was incised deep to adipose tissue with a #15 scalpel blade.  The skin margins were undermined to an appropriate distance in all directions utilizing iris scissors.  Hemostasis was achieved with electrocautery.  The flaps were then transposed into place, one clockwise and the other counterclockwise, and anchored with interrupted buried subcutaneous sutures.
V-Y Plasty Text: The defect edges were debeveled with a #15 scalpel blade.  Given the location of the defect, shape of the defect and the proximity to free margins an V-Y advancement flap was deemed most appropriate.  Using a sterile surgical marker, an appropriate advancement flap was drawn incorporating the defect and placing the expected incisions within the relaxed skin tension lines where possible.    The area thus outlined was incised deep to adipose tissue with a #15 scalpel blade.  The skin margins were undermined to an appropriate distance in all directions utilizing iris scissors.
H Plasty Text: Given the location of the defect, shape of the defect and the proximity to free margins a H-plasty was deemed most appropriate for repair.  Using a sterile surgical marker, the appropriate advancement arms of the H-plasty were drawn incorporating the defect and placing the expected incisions within the relaxed skin tension lines where possible. The area thus outlined was incised deep to adipose tissue with a #15 scalpel blade. The skin margins were undermined to an appropriate distance in all directions utilizing iris scissors.  The opposing advancement arms were then advanced into place in opposite direction and anchored with interrupted buried subcutaneous sutures.
W Plasty Text: The lesion was extirpated to the level of the fat with a #15 scalpel blade.  Given the location of the defect, shape of the defect and the proximity to free margins a W-plasty was deemed most appropriate for repair.  Using a sterile surgical marker, the appropriate transposition arms of the W-plasty were drawn incorporating the defect and placing the expected incisions within the relaxed skin tension lines where possible.    The area thus outlined was incised deep to adipose tissue with a #15 scalpel blade.  The skin margins were undermined to an appropriate distance in all directions utilizing iris scissors.  The opposing transposition arms were then transposed into place in opposite direction and anchored with interrupted buried subcutaneous sutures.
Z Plasty Text: The lesion was extirpated to the level of the fat with a #15 scalpel blade.  Given the location of the defect, shape of the defect and the proximity to free margins a Z-plasty was deemed most appropriate for repair.  Using a sterile surgical marker, the appropriate transposition arms of the Z-plasty were drawn incorporating the defect and placing the expected incisions within the relaxed skin tension lines where possible.    The area thus outlined was incised deep to adipose tissue with a #15 scalpel blade.  The skin margins were undermined to an appropriate distance in all directions utilizing iris scissors.  The opposing transposition arms were then transposed into place in opposite direction and anchored with interrupted buried subcutaneous sutures.
Zygomaticofacial Flap Text: Given the location of the defect, shape of the defect and the proximity to free margins a zygomaticofacial flap was deemed most appropriate for repair.  Using a sterile surgical marker, the appropriate flap was drawn incorporating the defect and placing the expected incisions within the relaxed skin tension lines where possible. The area thus outlined was incised deep to adipose tissue with a #15 scalpel blade with preservation of a vascular pedicle.  The skin margins were undermined to an appropriate distance in all directions utilizing iris scissors.  The flap was then placed into the defect and anchored with interrupted buried subcutaneous sutures.
Cheek Interpolation Flap Text: A decision was made to reconstruct the defect utilizing an interpolation axial flap and a staged reconstruction.  A telfa template was made of the defect.  This telfa template was then used to outline the Cheek Interpolation flap.  The donor area for the pedicle flap was then injected with anesthesia.  The flap was excised through the skin and subcutaneous tissue down to the layer of the underlying musculature.  The interpolation flap was carefully excised within this deep plane to maintain its blood supply.  The edges of the donor site were undermined.   The donor site was closed in a primary fashion.  The pedicle was then rotated into position and sutured.  Once the tube was sutured into place, adequate blood supply was confirmed with blanching and refill.  The pedicle was then wrapped with xeroform gauze and dressed appropriately with a telfa and gauze bandage to ensure continued blood supply and protect the attached pedicle.
Cheek-To-Nose Interpolation Flap Text: A decision was made to reconstruct the defect utilizing an interpolation axial flap and a staged reconstruction.  A telfa template was made of the defect.  This telfa template was then used to outline the Cheek-To-Nose Interpolation flap.  The donor area for the pedicle flap was then injected with anesthesia.  The flap was excised through the skin and subcutaneous tissue down to the layer of the underlying musculature.  The interpolation flap was carefully excised within this deep plane to maintain its blood supply.  The edges of the donor site were undermined.   The donor site was closed in a primary fashion.  The pedicle was then rotated into position and sutured.  Once the tube was sutured into place, adequate blood supply was confirmed with blanching and refill.  The pedicle was then wrapped with xeroform gauze and dressed appropriately with a telfa and gauze bandage to ensure continued blood supply and protect the attached pedicle.
Interpolation Flap Text: A decision was made to reconstruct the defect utilizing an interpolation axial flap and a staged reconstruction.  A telfa template was made of the defect.  This telfa template was then used to outline the interpolation flap.  The donor area for the pedicle flap was then injected with anesthesia.  The flap was excised through the skin and subcutaneous tissue down to the layer of the underlying musculature.  The interpolation flap was carefully excised within this deep plane to maintain its blood supply.  The edges of the donor site were undermined.   The donor site was closed in a primary fashion.  The pedicle was then rotated into position and sutured.  Once the tube was sutured into place, adequate blood supply was confirmed with blanching and refill.  The pedicle was then wrapped with xeroform gauze and dressed appropriately with a telfa and gauze bandage to ensure continued blood supply and protect the attached pedicle.
Melolabial Interpolation Flap Text: A decision was made to reconstruct the defect utilizing an interpolation axial flap and a staged reconstruction.  A telfa template was made of the defect.  This telfa template was then used to outline the melolabial interpolation flap.  The donor area for the pedicle flap was then injected with anesthesia.  The flap was excised through the skin and subcutaneous tissue down to the layer of the underlying musculature.  The pedicle flap was carefully excised within this deep plane to maintain its blood supply.  The edges of the donor site were undermined.   The donor site was closed in a primary fashion.  The pedicle was then rotated into position and sutured.  Once the tube was sutured into place, adequate blood supply was confirmed with blanching and refill.  The pedicle was then wrapped with xeroform gauze and dressed appropriately with a telfa and gauze bandage to ensure continued blood supply and protect the attached pedicle.
Mastoid Interpolation Flap Text: A decision was made to reconstruct the defect utilizing an interpolation axial flap and a staged reconstruction.  A telfa template was made of the defect.  This telfa template was then used to outline the mastoid interpolation flap.  The donor area for the pedicle flap was then injected with anesthesia.  The flap was excised through the skin and subcutaneous tissue down to the layer of the underlying musculature.  The pedicle flap was carefully excised within this deep plane to maintain its blood supply.  The edges of the donor site were undermined.   The donor site was closed in a primary fashion.  The pedicle was then rotated into position and sutured.  Once the tube was sutured into place, adequate blood supply was confirmed with blanching and refill.  The pedicle was then wrapped with xeroform gauze and dressed appropriately with a telfa and gauze bandage to ensure continued blood supply and protect the attached pedicle.
Posterior Auricular Interpolation Flap Text: A decision was made to reconstruct the defect utilizing an interpolation axial flap and a staged reconstruction.  A telfa template was made of the defect.  This telfa template was then used to outline the posterior auricular interpolation flap.  The donor area for the pedicle flap was then injected with anesthesia.  The flap was excised through the skin and subcutaneous tissue down to the layer of the underlying musculature.  The pedicle flap was carefully excised within this deep plane to maintain its blood supply.  The edges of the donor site were undermined.   The donor site was closed in a primary fashion.  The pedicle was then rotated into position and sutured.  Once the tube was sutured into place, adequate blood supply was confirmed with blanching and refill.  The pedicle was then wrapped with xeroform gauze and dressed appropriately with a telfa and gauze bandage to ensure continued blood supply and protect the attached pedicle.
Paramedian Forehead Flap Text: A decision was made to reconstruct the defect utilizing an interpolation axial flap and a staged reconstruction.  A telfa template was made of the defect.  This telfa template was then used to outline the paramedian forehead pedicle flap.  The donor area for the pedicle flap was then injected with anesthesia.  The flap was excised through the skin and subcutaneous tissue down to the layer of the underlying musculature.  The pedicle flap was carefully excised within this deep plane to maintain its blood supply.  The edges of the donor site were undermined.   The donor site was closed in a primary fashion.  The pedicle was then rotated into position and sutured.  Once the tube was sutured into place, adequate blood supply was confirmed with blanching and refill.  The pedicle was then wrapped with xeroform gauze and dressed appropriately with a telfa and gauze bandage to ensure continued blood supply and protect the attached pedicle.
Lip Wedge Excision Repair Text: Given the location of the defect and the proximity to free margins a full thickness wedge repair was deemed most appropriate.  Using a sterile surgical marker, the appropriate repair was drawn incorporating the defect and placing the expected incisions perpendicular to the vermilion border.  The vermilion border was also meticulously outlined to ensure appropriate reapproximation during the repair.  The area thus outlined was incised through and through with a #15 scalpel blade.  The muscularis and dermis were reaproximated with deep sutures following hemostasis. Care was taken to realign the vermilion border before proceeding with the superficial closure.  Once the vermilion was realigned the superfical and mucosal closure was finished.
Ftsg Text: The defect edges were debeveled with a #15 scalpel blade.  Given the location of the defect, shape of the defect and the proximity to free margins a full thickness skin graft was deemed most appropriate.  Using a sterile surgical marker, the primary defect shape was transferred to the donor site. The area thus outlined was incised deep to adipose tissue with a #15 scalpel blade.  The harvested graft was then trimmed of adipose tissue until only dermis and epidermis was left.  The skin margins of the secondary defect were undermined to an appropriate distance in all directions utilizing iris scissors.  The secondary defect was closed with interrupted buried subcutaneous sutures.  The skin edges were then re-apposed with running  sutures.  The skin graft was then placed in the primary defect and oriented appropriately.
Split-Thickness Skin Graft Text: The defect edges were debeveled with a #15 scalpel blade.  Given the location of the defect, shape of the defect and the proximity to free margins a split thickness skin graft was deemed most appropriate.  Using a sterile surgical marker, the primary defect shape was transferred to the donor site. The split thickness graft was then harvested.  The skin graft was then placed in the primary defect and oriented appropriately.
Burow's Graft Text: The defect edges were debeveled with a #15 scalpel blade.  Given the location of the defect, shape of the defect, the proximity to free margins and the presence of a standing cone deformity a Burow's skin graft was deemed most appropriate. The standing cone was removed and this tissue was then trimmed to the shape of the primary defect. The adipose tissue was also removed until only dermis and epidermis were left.  The skin margins of the secondary defect were undermined to an appropriate distance in all directions utilizing iris scissors.  The secondary defect was closed with interrupted buried subcutaneous sutures.  The skin edges were then re-apposed with running  sutures.  The skin graft was then placed in the primary defect and oriented appropriately.
Cartilage Graft Text: The defect edges were debeveled with a #15 scalpel blade.  Given the location of the defect, shape of the defect, the fact the defect involved a full thickness cartilage defect a cartilage graft was deemed most appropriate.  An appropriate donor site was identified, cleansed, and anesthetized. The cartilage graft was then harvested and transferred to the recipient site, oriented appropriately and then sutured into place.  The secondary defect was then repaired using a primary closure.
Composite Graft Text: The defect edges were debeveled with a #15 scalpel blade.  Given the location of the defect, shape of the defect, the proximity to free margins and the fact the defect was full thickness a composite graft was deemed most appropriate.  The defect was outline and then transferred to the donor site.  A full thickness graft was then excised from the donor site. The graft was then placed in the primary defect, oriented appropriately and then sutured into place.  The secondary defect was then repaired using a primary closure.
Epidermal Autograft Text: The defect edges were debeveled with a #15 scalpel blade.  Given the location of the defect, shape of the defect and the proximity to free margins an epidermal autograft was deemed most appropriate.  Using a sterile surgical marker, the primary defect shape was transferred to the donor site. The epidermal graft was then harvested.  The skin graft was then placed in the primary defect and oriented appropriately.
Dermal Autograft Text: The defect edges were debeveled with a #15 scalpel blade.  Given the location of the defect, shape of the defect and the proximity to free margins a dermal autograft was deemed most appropriate.  Using a sterile surgical marker, the primary defect shape was transferred to the donor site. The area thus outlined was incised deep to adipose tissue with a #15 scalpel blade.  The harvested graft was then trimmed of adipose and epidermal tissue until only dermis was left.  The skin graft was then placed in the primary defect and oriented appropriately.
Skin Substitute Text: The defect edges were debeveled with a #15 scalpel blade.  Given the location of the defect, shape of the defect and the proximity to free margins a skin substitute graft was deemed most appropriate.  The graft material was trimmed to fit the size of the defect. The graft was then placed in the primary defect and oriented appropriately.
Tissue Cultured Epidermal Autograft Text: The defect edges were debeveled with a #15 scalpel blade.  Given the location of the defect, shape of the defect and the proximity to free margins a tissue cultured epidermal autograft was deemed most appropriate.  The graft was then trimmed to fit the size of the defect.  The graft was then placed in the primary defect and oriented appropriately.
Xenograft Text: The defect edges were debeveled with a #15 scalpel blade.  Given the location of the defect, shape of the defect and the proximity to free margins a xenograft was deemed most appropriate.  The graft was then trimmed to fit the size of the defect.  The graft was then placed in the primary defect and oriented appropriately.
Purse String (Intermediate) Text: Given the location of the defect and the characteristics of the surrounding skin a purse string intermediate closure was deemed most appropriate.  Undermining was performed circumferentially around the surgical defect.  A purse string suture was then placed and tightened.
Purse String (Simple) Text: Given the location of the defect and the characteristics of the surrounding skin a purse string simple closure was deemed most appropriate.  Undermining was performed circumferentially around the surgical defect.  A purse string suture was then placed and tightened.
Complex Repair And Single Advancement Flap Text: The defect edges were debeveled with a #15 scalpel blade.  The primary defect was closed partially with a complex linear closure.  Given the location of the remaining defect, shape of the defect and the proximity to free margins a single advancement flap was deemed most appropriate for complete closure of the defect.  Using a sterile surgical marker, an appropriate advancement flap was drawn incorporating the defect and placing the expected incisions within the relaxed skin tension lines where possible.    The area thus outlined was incised deep to adipose tissue with a #15 scalpel blade.  The skin margins were undermined to an appropriate distance in all directions utilizing iris scissors.
Complex Repair And Double Advancement Flap Text: The defect edges were debeveled with a #15 scalpel blade.  The primary defect was closed partially with a complex linear closure.  Given the location of the remaining defect, shape of the defect and the proximity to free margins a double advancement flap was deemed most appropriate for complete closure of the defect.  Using a sterile surgical marker, an appropriate advancement flap was drawn incorporating the defect and placing the expected incisions within the relaxed skin tension lines where possible.    The area thus outlined was incised deep to adipose tissue with a #15 scalpel blade.  The skin margins were undermined to an appropriate distance in all directions utilizing iris scissors.
Complex Repair And Modified Advancement Flap Text: The defect edges were debeveled with a #15 scalpel blade.  The primary defect was closed partially with a complex linear closure.  Given the location of the remaining defect, shape of the defect and the proximity to free margins a modified advancement flap was deemed most appropriate for complete closure of the defect.  Using a sterile surgical marker, an appropriate advancement flap was drawn incorporating the defect and placing the expected incisions within the relaxed skin tension lines where possible.    The area thus outlined was incised deep to adipose tissue with a #15 scalpel blade.  The skin margins were undermined to an appropriate distance in all directions utilizing iris scissors.
Complex Repair And A-T Advancement Flap Text: The defect edges were debeveled with a #15 scalpel blade.  The primary defect was closed partially with a complex linear closure.  Given the location of the remaining defect, shape of the defect and the proximity to free margins an A-T advancement flap was deemed most appropriate for complete closure of the defect.  Using a sterile surgical marker, an appropriate advancement flap was drawn incorporating the defect and placing the expected incisions within the relaxed skin tension lines where possible.    The area thus outlined was incised deep to adipose tissue with a #15 scalpel blade.  The skin margins were undermined to an appropriate distance in all directions utilizing iris scissors.
Complex Repair And O-T Advancement Flap Text: The defect edges were debeveled with a #15 scalpel blade.  The primary defect was closed partially with a complex linear closure.  Given the location of the remaining defect, shape of the defect and the proximity to free margins an O-T advancement flap was deemed most appropriate for complete closure of the defect.  Using a sterile surgical marker, an appropriate advancement flap was drawn incorporating the defect and placing the expected incisions within the relaxed skin tension lines where possible.    The area thus outlined was incised deep to adipose tissue with a #15 scalpel blade.  The skin margins were undermined to an appropriate distance in all directions utilizing iris scissors.
Complex Repair And O-L Flap Text: The defect edges were debeveled with a #15 scalpel blade.  The primary defect was closed partially with a complex linear closure.  Given the location of the remaining defect, shape of the defect and the proximity to free margins an O-L flap was deemed most appropriate for complete closure of the defect.  Using a sterile surgical marker, an appropriate flap was drawn incorporating the defect and placing the expected incisions within the relaxed skin tension lines where possible.    The area thus outlined was incised deep to adipose tissue with a #15 scalpel blade.  The skin margins were undermined to an appropriate distance in all directions utilizing iris scissors.
Complex Repair And Bilobe Flap Text: The defect edges were debeveled with a #15 scalpel blade.  The primary defect was closed partially with a complex linear closure.  Given the location of the remaining defect, shape of the defect and the proximity to free margins a bilobe flap was deemed most appropriate for complete closure of the defect.  Using a sterile surgical marker, an appropriate advancement flap was drawn incorporating the defect and placing the expected incisions within the relaxed skin tension lines where possible.    The area thus outlined was incised deep to adipose tissue with a #15 scalpel blade.  The skin margins were undermined to an appropriate distance in all directions utilizing iris scissors.
Complex Repair And Melolabial Flap Text: The defect edges were debeveled with a #15 scalpel blade.  The primary defect was closed partially with a complex linear closure.  Given the location of the remaining defect, shape of the defect and the proximity to free margins a melolabial flap was deemed most appropriate for complete closure of the defect.  Using a sterile surgical marker, an appropriate advancement flap was drawn incorporating the defect and placing the expected incisions within the relaxed skin tension lines where possible.    The area thus outlined was incised deep to adipose tissue with a #15 scalpel blade.  The skin margins were undermined to an appropriate distance in all directions utilizing iris scissors.
Complex Repair And Rotation Flap Text: The defect edges were debeveled with a #15 scalpel blade.  The primary defect was closed partially with a complex linear closure.  Given the location of the remaining defect, shape of the defect and the proximity to free margins a rotation flap was deemed most appropriate for complete closure of the defect.  Using a sterile surgical marker, an appropriate advancement flap was drawn incorporating the defect and placing the expected incisions within the relaxed skin tension lines where possible.    The area thus outlined was incised deep to adipose tissue with a #15 scalpel blade.  The skin margins were undermined to an appropriate distance in all directions utilizing iris scissors.
Complex Repair And Rhombic Flap Text: The defect edges were debeveled with a #15 scalpel blade.  The primary defect was closed partially with a complex linear closure.  Given the location of the remaining defect, shape of the defect and the proximity to free margins a rhombic flap was deemed most appropriate for complete closure of the defect.  Using a sterile surgical marker, an appropriate advancement flap was drawn incorporating the defect and placing the expected incisions within the relaxed skin tension lines where possible.    The area thus outlined was incised deep to adipose tissue with a #15 scalpel blade.  The skin margins were undermined to an appropriate distance in all directions utilizing iris scissors.
Complex Repair And Transposition Flap Text: The defect edges were debeveled with a #15 scalpel blade.  The primary defect was closed partially with a complex linear closure.  Given the location of the remaining defect, shape of the defect and the proximity to free margins a transposition flap was deemed most appropriate for complete closure of the defect.  Using a sterile surgical marker, an appropriate advancement flap was drawn incorporating the defect and placing the expected incisions within the relaxed skin tension lines where possible.    The area thus outlined was incised deep to adipose tissue with a #15 scalpel blade.  The skin margins were undermined to an appropriate distance in all directions utilizing iris scissors.
Complex Repair And V-Y Plasty Text: The defect edges were debeveled with a #15 scalpel blade.  The primary defect was closed partially with a complex linear closure.  Given the location of the remaining defect, shape of the defect and the proximity to free margins a V-Y plasty was deemed most appropriate for complete closure of the defect.  Using a sterile surgical marker, an appropriate advancement flap was drawn incorporating the defect and placing the expected incisions within the relaxed skin tension lines where possible.    The area thus outlined was incised deep to adipose tissue with a #15 scalpel blade.  The skin margins were undermined to an appropriate distance in all directions utilizing iris scissors.
Complex Repair And M Plasty Text: The defect edges were debeveled with a #15 scalpel blade.  The primary defect was closed partially with a complex linear closure.  Given the location of the remaining defect, shape of the defect and the proximity to free margins an M plasty was deemed most appropriate for complete closure of the defect.  Using a sterile surgical marker, an appropriate advancement flap was drawn incorporating the defect and placing the expected incisions within the relaxed skin tension lines where possible.    The area thus outlined was incised deep to adipose tissue with a #15 scalpel blade.  The skin margins were undermined to an appropriate distance in all directions utilizing iris scissors.
Complex Repair And Double M Plasty Text: The defect edges were debeveled with a #15 scalpel blade.  The primary defect was closed partially with a complex linear closure.  Given the location of the remaining defect, shape of the defect and the proximity to free margins a double M plasty was deemed most appropriate for complete closure of the defect.  Using a sterile surgical marker, an appropriate advancement flap was drawn incorporating the defect and placing the expected incisions within the relaxed skin tension lines where possible.    The area thus outlined was incised deep to adipose tissue with a #15 scalpel blade.  The skin margins were undermined to an appropriate distance in all directions utilizing iris scissors.
Complex Repair And W Plasty Text: The defect edges were debeveled with a #15 scalpel blade.  The primary defect was closed partially with a complex linear closure.  Given the location of the remaining defect, shape of the defect and the proximity to free margins a W plasty was deemed most appropriate for complete closure of the defect.  Using a sterile surgical marker, an appropriate advancement flap was drawn incorporating the defect and placing the expected incisions within the relaxed skin tension lines where possible.    The area thus outlined was incised deep to adipose tissue with a #15 scalpel blade.  The skin margins were undermined to an appropriate distance in all directions utilizing iris scissors.
Complex Repair And Z Plasty Text: The defect edges were debeveled with a #15 scalpel blade.  The primary defect was closed partially with a complex linear closure.  Given the location of the remaining defect, shape of the defect and the proximity to free margins a Z plasty was deemed most appropriate for complete closure of the defect.  Using a sterile surgical marker, an appropriate advancement flap was drawn incorporating the defect and placing the expected incisions within the relaxed skin tension lines where possible.    The area thus outlined was incised deep to adipose tissue with a #15 scalpel blade.  The skin margins were undermined to an appropriate distance in all directions utilizing iris scissors.
Complex Repair And Dorsal Nasal Flap Text: The defect edges were debeveled with a #15 scalpel blade.  The primary defect was closed partially with a complex linear closure.  Given the location of the remaining defect, shape of the defect and the proximity to free margins a dorsal nasal flap was deemed most appropriate for complete closure of the defect.  Using a sterile surgical marker, an appropriate flap was drawn incorporating the defect and placing the expected incisions within the relaxed skin tension lines where possible.    The area thus outlined was incised deep to adipose tissue with a #15 scalpel blade.  The skin margins were undermined to an appropriate distance in all directions utilizing iris scissors.
Complex Repair And Ftsg Text: The defect edges were debeveled with a #15 scalpel blade.  The primary defect was closed partially with a complex linear closure.  Given the location of the defect, shape of the defect and the proximity to free margins a full thickness skin graft was deemed most appropriate to repair the remaining defect.  The graft was trimmed to fit the size of the remaining defect.  The graft was then placed in the primary defect, oriented appropriately, and sutured into place.
Complex Repair And Burow's Graft Text: The defect edges were debeveled with a #15 scalpel blade.  The primary defect was closed partially with a complex linear closure.  Given the location of the defect, shape of the defect, the proximity to free margins and the presence of a standing cone deformity a Burow's graft was deemed most appropriate to repair the remaining defect.  The graft was trimmed to fit the size of the remaining defect.  The graft was then placed in the primary defect, oriented appropriately, and sutured into place.
Complex Repair And Split-Thickness Skin Graft Text: The defect edges were debeveled with a #15 scalpel blade.  The primary defect was closed partially with a complex linear closure.  Given the location of the defect, shape of the defect and the proximity to free margins a split thickness skin graft was deemed most appropriate to repair the remaining defect.  The graft was trimmed to fit the size of the remaining defect.  The graft was then placed in the primary defect, oriented appropriately, and sutured into place.
Complex Repair And Epidermal Autograft Text: The defect edges were debeveled with a #15 scalpel blade.  The primary defect was closed partially with a complex linear closure.  Given the location of the defect, shape of the defect and the proximity to free margins an epidermal autograft was deemed most appropriate to repair the remaining defect.  The graft was trimmed to fit the size of the remaining defect.  The graft was then placed in the primary defect, oriented appropriately, and sutured into place.
Complex Repair And Dermal Autograft Text: The defect edges were debeveled with a #15 scalpel blade.  The primary defect was closed partially with a complex linear closure.  Given the location of the defect, shape of the defect and the proximity to free margins an dermal autograft was deemed most appropriate to repair the remaining defect.  The graft was trimmed to fit the size of the remaining defect.  The graft was then placed in the primary defect, oriented appropriately, and sutured into place.
Complex Repair And Tissue Cultured Epidermal Autograft Text: The defect edges were debeveled with a #15 scalpel blade.  The primary defect was closed partially with a complex linear closure.  Given the location of the defect, shape of the defect and the proximity to free margins an tissue cultured epidermal autograft was deemed most appropriate to repair the remaining defect.  The graft was trimmed to fit the size of the remaining defect.  The graft was then placed in the primary defect, oriented appropriately, and sutured into place.
Complex Repair And Xenograft Text: The defect edges were debeveled with a #15 scalpel blade.  The primary defect was closed partially with a complex linear closure.  Given the location of the defect, shape of the defect and the proximity to free margins a xenograft was deemed most appropriate to repair the remaining defect.  The graft was trimmed to fit the size of the remaining defect.  The graft was then placed in the primary defect, oriented appropriately, and sutured into place.
Complex Repair And Skin Substitute Graft Text: The defect edges were debeveled with a #15 scalpel blade.  The primary defect was closed partially with a complex linear closure.  Given the location of the remaining defect, shape of the defect and the proximity to free margins a skin substitute graft was deemed most appropriate to repair the remaining defect.  The graft was trimmed to fit the size of the remaining defect.  The graft was then placed in the primary defect, oriented appropriately, and sutured into place.
Path Notes (To The Dermatopathologist): Please check margins.
Consent was obtained from the patient. The risks and benefits to therapy were discussed in detail. Specifically, the risks of infection, scarring, bleeding, prolonged wound healing, incomplete removal, allergy to anesthesia, nerve injury and recurrence were addressed. Prior to the procedure, the treatment site was clearly identified and confirmed by the patient. All components of Universal Protocol/PAUSE Rule completed.
Render Post-Care Instructions In Note?: yes
Post-Care Instructions: I reviewed with the patient in detail post-care instructions. Patient is not to engage in any heavy lifting, exercise, or swimming for the next 14 days. Should the patient develop any fevers, chills, bleeding, severe pain patient will contact the office immediately.
Home Suture Removal Text: Patient was provided a home suture removal kit and will remove their sutures at home.  If they have any questions or difficulties they will call the office.
Where Do You Want The Question To Include Opioid Counseling Located?: Case Summary Tab
Billing Type: Third-Party Bill
Information: Selecting Yes will display possible errors in your note based on the variables you have selected. This validation is only offered as a suggestion for you. PLEASE NOTE THAT THE VALIDATION TEXT WILL BE REMOVED WHEN YOU FINALIZE YOUR NOTE. IF YOU WANT TO FAX A PRELIMINARY NOTE YOU WILL NEED TO TOGGLE THIS TO 'NO' IF YOU DO NOT WANT IT IN YOUR FAXED NOTE.

## 2021-10-22 ENCOUNTER — HOSPITAL ENCOUNTER (OUTPATIENT)
Dept: RADIOLOGY | Facility: MEDICAL CENTER | Age: 76
End: 2021-10-22
Attending: FAMILY MEDICINE
Payer: MEDICARE

## 2021-10-22 DIAGNOSIS — I73.9 CLAUDICATION (HCC): ICD-10-CM

## 2021-10-22 PROCEDURE — 93978 VASCULAR STUDY: CPT

## 2021-10-22 PROCEDURE — 93922 UPR/L XTREMITY ART 2 LEVELS: CPT

## 2021-10-25 ENCOUNTER — TELEPHONE (OUTPATIENT)
Dept: MEDICAL GROUP | Facility: MEDICAL CENTER | Age: 76
End: 2021-10-25

## 2021-10-25 DIAGNOSIS — R26.89 OTHER ABNORMALITIES OF GAIT AND MOBILITY: ICD-10-CM

## 2021-10-25 DIAGNOSIS — R53.1 WEAKNESS: ICD-10-CM

## 2021-10-25 DIAGNOSIS — G62.9 NEUROPATHY: ICD-10-CM

## 2021-10-25 NOTE — TELEPHONE ENCOUNTER
Phone Number Called: [phone]    Call outcome: Spoke to patient regarding message below.    Message: Called to inform patient of message below. Patient is wondering where to go from here.         ----- Message from Hugo Chase M.D. sent at 10/22/2021  9:15 PM PDT -----  Please let pt know that his circulation/vessel studies are normal.

## 2021-10-26 NOTE — TELEPHONE ENCOUNTER
"Phone Number Called: [phone]    Call outcome: Spoke to patient regarding message below.    Message: Called to inform patient that per Dr. Chase, \"We can image his back if symptoms persist. I have ordered an MRI.\"     "

## 2021-11-01 NOTE — PROGRESS NOTES
Pt S/W Melissa Mas who communicated with me. Pt is in [place] for three months and out of [facility]. 90 day courtesy supply sent to [facility].

## 2021-11-12 ENCOUNTER — TELEPHONE (OUTPATIENT)
Dept: ENDOCRINOLOGY | Facility: MEDICAL CENTER | Age: 76
End: 2021-11-12

## 2021-11-12 NOTE — TELEPHONE ENCOUNTER
VOICEMAIL  1. Caller Name: Delfina                      Call Back Number: [phone]    2. Message: Delfina called and left a message asking the status of patient's referral. I have called her back and left her a message stating patient does have an appointment on 03/14/2021 and to call us back if she has any questions.     3. Patient approves office to leave a detailed voicemail/Seaters[facility] message: yes

## 2021-12-02 DIAGNOSIS — Z79.899 HIGH RISK MEDICATION USE: ICD-10-CM

## 2021-12-02 DIAGNOSIS — Z00.6 RESEARCH STUDY PATIENT: ICD-10-CM

## 2021-12-02 DIAGNOSIS — I50.30 ACC/AHA STAGE C HEART FAILURE WITH PRESERVED EJECTION FRACTION (HCC): ICD-10-CM

## 2021-12-06 ENCOUNTER — TELEPHONE (OUTPATIENT)
Dept: CARDIOLOGY | Facility: MEDICAL CENTER | Age: 76
End: 2021-12-06

## 2021-12-06 DIAGNOSIS — Z79.899 HIGH RISK MEDICATION USE: ICD-10-CM

## 2021-12-06 DIAGNOSIS — E07.9 THYROID DISORDER: ICD-10-CM

## 2021-12-06 DIAGNOSIS — R06.09 DYSPNEA ON EXERTION: ICD-10-CM

## 2021-12-06 DIAGNOSIS — I51.89 LEFT VENTRICULAR DIASTOLIC DYSFUNCTION, NYHA CLASS 3: ICD-10-CM

## 2021-12-06 DIAGNOSIS — I48.0 PAROXYSMAL ATRIAL FIBRILLATION (HCC): ICD-10-CM

## 2021-12-07 NOTE — TELEPHONE ENCOUNTER
S/W Hour ago  systolic, having numbness with finger and hands, is having some breathing issues when walking too briskly, although he said it may be a little better than before. Patient overall seems to be good at monitoring salt intake.     Denies any swelling but has not been weighing himself. Advised patient to start weighing himself in the AM.     Taking medication appropriately, potassium salt substitute, while also on potassium po    Passing blood through urine, has happened before not too long ago. Two days of heavy flow, two days of light flow-Patient has hx of kidney stones- had no other symptoms except bleeding in urine    Patient is also fatigued, recent thyroidectomy. Patient overall is borderline, ER precautions provided. He is in [facility] [place], driving back to [place] on 12/19/21 and likely to hit [place] 12/25/21.     Ordering labs to check for abnormalities given list of CC    CBC, CMP, BNP, and TSH levels ordered  Fax [phone]- [facility] lab services. Phone [phone], per patient they are associated with [facility] orders faxed, transmission complete    Will follow up with weight tomorrow.

## 2021-12-07 NOTE — TELEPHONE ENCOUNTER
LVM for patient to call back- per Melissa Mas, patient is huffing and puffing, passing blood, cardiomems readings are elevated

## 2021-12-08 NOTE — TELEPHONE ENCOUNTER
S/W pt, states his weight is up 10 pounds compared to home scale. Now 170 at home he is 160-     Has been much better at reading salt labels- stopped eating fried chicken last couple of days. Has fresh vegetables. States he had his labs completed yesterday. Calling for results.     LVM for lab facility. Pt to continue to monitor weight and salt intake. Will update once labs are received.     To Catarina JESUS for cardiomems trends.

## 2021-12-08 NOTE — TELEPHONE ENCOUNTER
Returned call to patient he had gone to buffet, weight is down to 267 from 270, he agreed to not go to the buffet and call back if his weight gain returns, cardiomems pressures have been stable although he is currently in [place] which is at an elevation of about 500ft versus here at 4500ft. Patient educated again on sodium intake and that buffets are generally a bad idea.

## 2021-12-10 DIAGNOSIS — Z79.899 HIGH RISK MEDICATION USE: ICD-10-CM

## 2021-12-10 NOTE — TELEPHONE ENCOUNTER
Labs are in from [place].     Most notable is BNP 1048    TSH 6.37    See outside labs in media.     To [facility]/ Dr. Tita Mendoza

## 2021-12-15 ENCOUNTER — TELEPHONE (OUTPATIENT)
Dept: CARDIOLOGY | Facility: MEDICAL CENTER | Age: 76
End: 2021-12-15

## 2021-12-15 NOTE — TELEPHONE ENCOUNTER
Gerri Love, Med Ass't 2 hours ago (10:46 AM)     TR       [facility]        Pt's wife Shaniqua called and wanted to check to see if [facility] received the lab work results for pt that were completed on December 10 2021 at [facility] in [place], [place].        If the results were received, pt's wife Shaniqua would like a call to discuss the results for pt please.        Best contact for pt's wife Shaniqua:  PH:[phone]           Thank you,  Gerri CARTER/DOUGLAS Mcgovern, asked that lab work results be routed to Dr. Mickey Bailey

## 2021-12-15 NOTE — TELEPHONE ENCOUNTER
YOLI      Pt's wife Shaniqua called and wanted to check to see if [facility] received the lab work results for pt that were completed on December 10 2021 at [facility] in [place], [place].      If the results were received, pt's wife Shaniqua would like a call to discuss the results for pt please.      Best contact for pt's wife Shaniqua:  PH:[phone]        Thank you,  Gerri CASE

## 2021-12-27 ENCOUNTER — TELEPHONE (OUTPATIENT)
Dept: CARDIOLOGY | Facility: MEDICAL CENTER | Age: 76
End: 2021-12-27

## 2021-12-27 NOTE — TELEPHONE ENCOUNTER
Guide HF Single Arm Study Patient:    His CardioMEMs pressures are PaD 44 mmHg today. Look lie reading does not have a good waveform.     Called patient and asked him to send another reading.    Patient reports doing okay.  He does report generally feeling weaker and continues to have shortness of breath.  He also mentions having cramping and pain in his hands to his elbow that wakes him up with sleep.    Is he states his weights are elevated up to 263 pounds, states his prior baseline was around 252 pounds.  He states he is not eating unhealthy necessarily, but states he is probably eating too much.    He denies any swelling.    He continues to work on his thyroid and is seeing an endocrinologist.    He will be watching his sodium intake more carefully now that he is home from [place].

## 2021-12-28 ENCOUNTER — APPOINTMENT (RX ONLY)
Dept: URBAN - METROPOLITAN AREA CLINIC 6 | Facility: CLINIC | Age: 76
Setting detail: DERMATOLOGY
End: 2021-12-28

## 2021-12-28 DIAGNOSIS — D18.0 HEMANGIOMA: ICD-10-CM

## 2021-12-28 DIAGNOSIS — L57.0 ACTINIC KERATOSIS: ICD-10-CM

## 2021-12-28 DIAGNOSIS — Z00.8 ENCOUNTER FOR OTHER GENERAL EXAMINATION: ICD-10-CM

## 2021-12-28 DIAGNOSIS — L82.1 OTHER SEBORRHEIC KERATOSIS: ICD-10-CM

## 2021-12-28 DIAGNOSIS — D22 MELANOCYTIC NEVI: ICD-10-CM

## 2021-12-28 DIAGNOSIS — Z71.89 OTHER SPECIFIED COUNSELING: ICD-10-CM

## 2021-12-28 DIAGNOSIS — L81.4 OTHER MELANIN HYPERPIGMENTATION: ICD-10-CM

## 2021-12-28 DIAGNOSIS — Z85.820 PERSONAL HISTORY OF MALIGNANT MELANOMA OF SKIN: ICD-10-CM

## 2021-12-28 DIAGNOSIS — Z85.828 PERSONAL HISTORY OF OTHER MALIGNANT NEOPLASM OF SKIN: ICD-10-CM

## 2021-12-28 PROBLEM — D18.01 HEMANGIOMA OF SKIN AND SUBCUTANEOUS TISSUE: Status: ACTIVE | Noted: 2021-12-28

## 2021-12-28 PROBLEM — D22.5 MELANOCYTIC NEVI OF TRUNK: Status: ACTIVE | Noted: 2021-12-28

## 2021-12-28 PROCEDURE — ? SUNSCREEN TREATMENT REGIMEN

## 2021-12-28 PROCEDURE — ? REFERRAL CORRESPONDENCE

## 2021-12-28 PROCEDURE — 17000 DESTRUCT PREMALG LESION: CPT

## 2021-12-28 PROCEDURE — ? COUNSELING

## 2021-12-28 PROCEDURE — 17003 DESTRUCT PREMALG LES 2-14: CPT

## 2021-12-28 PROCEDURE — ? LIQUID NITROGEN

## 2021-12-28 PROCEDURE — ? SUNSCREEN RECOMMENDATIONS

## 2021-12-28 PROCEDURE — 99213 OFFICE O/P EST LOW 20 MIN: CPT | Mod: 25

## 2021-12-28 ASSESSMENT — LOCATION DETAILED DESCRIPTION DERM
LOCATION DETAILED: LEFT SUPERIOR HELIX
LOCATION DETAILED: STERNUM
LOCATION DETAILED: RIGHT MID-UPPER BACK
LOCATION DETAILED: RIGHT ULNAR DORSAL HAND
LOCATION DETAILED: RIGHT RADIAL DORSAL HAND
LOCATION DETAILED: SUPERIOR THORACIC SPINE
LOCATION DETAILED: RIGHT SUPERIOR FOREHEAD
LOCATION DETAILED: RIGHT SUPERIOR OCCIPITAL SCALP
LOCATION DETAILED: MID-OCCIPITAL SCALP
LOCATION DETAILED: RIGHT INFERIOR HELIX
LOCATION DETAILED: LEFT SUPERIOR PARIETAL SCALP
LOCATION DETAILED: LEFT RADIAL DORSAL HAND
LOCATION DETAILED: RIGHT SUPERIOR HELIX
LOCATION DETAILED: LEFT ULNAR DORSAL HAND
LOCATION DETAILED: RIGHT SUPERIOR MEDIAL FOREHEAD
LOCATION DETAILED: RIGHT FOREHEAD
LOCATION DETAILED: LEFT MEDIAL INFERIOR CHEST
LOCATION DETAILED: LEFT INFERIOR CENTRAL MALAR CHEEK
LOCATION DETAILED: LEFT MID-UPPER BACK

## 2021-12-28 ASSESSMENT — LOCATION ZONE DERM
LOCATION ZONE: FACE
LOCATION ZONE: HAND
LOCATION ZONE: SCALP
LOCATION ZONE: TRUNK
LOCATION ZONE: EAR

## 2021-12-28 ASSESSMENT — LOCATION SIMPLE DESCRIPTION DERM
LOCATION SIMPLE: POSTERIOR SCALP
LOCATION SIMPLE: RIGHT HAND
LOCATION SIMPLE: RIGHT FOREHEAD
LOCATION SIMPLE: LEFT EAR
LOCATION SIMPLE: LEFT UPPER BACK
LOCATION SIMPLE: CHEST
LOCATION SIMPLE: RIGHT UPPER BACK
LOCATION SIMPLE: LEFT CHEEK
LOCATION SIMPLE: RIGHT EAR
LOCATION SIMPLE: LEFT HAND
LOCATION SIMPLE: SCALP
LOCATION SIMPLE: UPPER BACK

## 2021-12-28 NOTE — PROCEDURE: LIQUID NITROGEN
Consent: The patient's consent was obtained including but not limited to risks of crusting, scabbing, blistering, scarring, darker or lighter pigmentary change, recurrence, incomplete removal and infection.
Detail Level: Detailed
Render Note In Bullet Format When Appropriate: No
Show Applicator Variable?: Yes
Post-Care Instructions: I reviewed with the patient in detail post-care instructions. Patient is to wear sunprotection, and avoid picking at any of the treated lesions. Pt may apply Vaseline to crusted or scabbing areas.
Number Of Freeze-Thaw Cycles: 2 freeze-thaw cycles
Duration Of Freeze Thaw-Cycle (Seconds): 5

## 2021-12-28 NOTE — PROCEDURE: MIPS QUALITY
Quality 130: Documentation Of Current Medications In The Medical Record: Current Medications Documented
Quality 137: Melanoma: Continuity Of Care - Recall System: Patient information entered into a recall system that includes: target date for the next exam specified AND a process to follow up with patients regarding missed or unscheduled appointments
Quality 138: Melanoma: Coordination Of Care: A treatment plan was communicated to the physicians providing continuing care within one month of diagnosis outlining: diagnosis, tumor thickness and a plan for surgery or alternate care.
Quality 111:Pneumonia Vaccination Status For Older Adults: Pneumococcal Vaccination Previously Received
Quality 226: Preventive Care And Screening: Tobacco Use: Screening And Cessation Intervention: Patient screened for tobacco use and is an ex/non-smoker
Detail Level: Detailed
Quality 431: Preventive Care And Screening: Unhealthy Alcohol Use - Screening: Patient not identified as an unhealthy alcohol user when screened for unhealthy alcohol use using a systematic screening method

## 2022-01-03 ENCOUNTER — HOSPITAL ENCOUNTER (OUTPATIENT)
Dept: LAB | Facility: MEDICAL CENTER | Age: 77
End: 2022-01-03
Attending: INTERNAL MEDICINE
Payer: MEDICARE

## 2022-01-03 LAB
T4 FREE SERPL-MCNC: 1.44 NG/DL (ref 0.93–1.7)
TSH SERPL DL<=0.005 MIU/L-ACNC: 2.99 UIU/ML (ref 0.38–5.33)

## 2022-01-03 PROCEDURE — 86800 THYROGLOBULIN ANTIBODY: CPT

## 2022-01-03 PROCEDURE — 84432 ASSAY OF THYROGLOBULIN: CPT

## 2022-01-03 PROCEDURE — 84439 ASSAY OF FREE THYROXINE: CPT

## 2022-01-03 PROCEDURE — 36415 COLL VENOUS BLD VENIPUNCTURE: CPT

## 2022-01-03 PROCEDURE — 84443 ASSAY THYROID STIM HORMONE: CPT

## 2022-01-05 DIAGNOSIS — E78.2 MIXED HYPERLIPIDEMIA: ICD-10-CM

## 2022-01-05 DIAGNOSIS — E29.1 TESTICULAR HYPOFUNCTION: ICD-10-CM

## 2022-01-05 LAB
THYROGLOB AB SERPL-ACNC: <0.9 IU/ML (ref 0–4)
THYROGLOB SERPL-MCNC: 63.2 NG/ML (ref 1.3–31.8)
THYROGLOB SERPL-MCNC: ABNORMAL NG/ML (ref 1.3–31.8)

## 2022-01-05 RX ORDER — METOPROLOL TARTRATE 50 MG/1
50 TABLET, FILM COATED ORAL 2 TIMES DAILY
Qty: 180 TABLET | Refills: 3 | Status: SHIPPED | OUTPATIENT
Start: 2022-01-05 | End: 2022-01-13 | Stop reason: SDUPTHER

## 2022-01-05 RX ORDER — TAMSULOSIN HYDROCHLORIDE 0.4 MG/1
0.4 CAPSULE ORAL DAILY
Qty: 100 CAPSULE | Refills: 3 | Status: SHIPPED | OUTPATIENT
Start: 2022-01-05 | End: 2022-01-20 | Stop reason: SDUPTHER

## 2022-01-05 RX ORDER — ATORVASTATIN CALCIUM 40 MG/1
40 TABLET, FILM COATED ORAL DAILY
Qty: 100 TABLET | Refills: 3 | Status: SHIPPED | OUTPATIENT
Start: 2022-01-05 | End: 2022-02-25

## 2022-01-05 RX ORDER — TESTOSTERONE CYPIONATE 200 MG/ML
200 INJECTION, SOLUTION INTRAMUSCULAR
Qty: 2 ML | Refills: 5 | Status: SHIPPED | OUTPATIENT
Start: 2022-01-05 | End: 2022-02-04

## 2022-01-05 RX ORDER — CYCLOBENZAPRINE HCL 10 MG
10 TABLET ORAL 2 TIMES DAILY PRN
Qty: 180 TABLET | Refills: 0 | Status: SHIPPED | OUTPATIENT
Start: 2022-01-05 | End: 2022-08-12 | Stop reason: SDUPTHER

## 2022-01-06 ENCOUNTER — TELEPHONE (OUTPATIENT)
Dept: CARDIOLOGY | Facility: MEDICAL CENTER | Age: 77
End: 2022-01-06

## 2022-01-06 NOTE — TELEPHONE ENCOUNTER
S/W pt, he seems a little thirsty. Advised him to be sure to drink 64oz. Will cut his furosemide down to 40mg once daily

## 2022-01-06 NOTE — TELEPHONE ENCOUNTER
Guide HF Single Arm Study Patient:    His CardioMEMs pressures are PaD 4 mmHg today. Can you check on him and then have him decrease furosemide to 40 mg daily.     Continue CardioMEMs readings.

## 2022-01-12 ENCOUNTER — HOSPITAL ENCOUNTER (OUTPATIENT)
Dept: LAB | Facility: MEDICAL CENTER | Age: 77
End: 2022-01-12
Attending: NURSE PRACTITIONER
Payer: MEDICARE

## 2022-01-12 DIAGNOSIS — I50.30 ACC/AHA STAGE C HEART FAILURE WITH PRESERVED EJECTION FRACTION (HCC): ICD-10-CM

## 2022-01-12 DIAGNOSIS — I48.0 PAROXYSMAL ATRIAL FIBRILLATION (HCC): ICD-10-CM

## 2022-01-12 DIAGNOSIS — E07.9 THYROID DISORDER: ICD-10-CM

## 2022-01-12 DIAGNOSIS — Z79.899 HIGH RISK MEDICATION USE: ICD-10-CM

## 2022-01-12 DIAGNOSIS — I51.89 LEFT VENTRICULAR DIASTOLIC DYSFUNCTION, NYHA CLASS 3: ICD-10-CM

## 2022-01-12 DIAGNOSIS — Z00.6 RESEARCH STUDY PATIENT: ICD-10-CM

## 2022-01-12 DIAGNOSIS — R06.09 DYSPNEA ON EXERTION: ICD-10-CM

## 2022-01-12 LAB
ANION GAP SERPL CALC-SCNC: 13 MMOL/L (ref 7–16)
BASOPHILS # BLD AUTO: 0.4 % (ref 0–1.8)
BASOPHILS # BLD: 0.03 K/UL (ref 0–0.12)
BUN SERPL-MCNC: 18 MG/DL (ref 8–22)
CALCIUM SERPL-MCNC: 9.5 MG/DL (ref 8.5–10.5)
CHLORIDE SERPL-SCNC: 104 MMOL/L (ref 96–112)
CO2 SERPL-SCNC: 22 MMOL/L (ref 20–33)
CREAT SERPL-MCNC: 0.99 MG/DL (ref 0.5–1.4)
EOSINOPHIL # BLD AUTO: 0.24 K/UL (ref 0–0.51)
EOSINOPHIL NFR BLD: 2.9 % (ref 0–6.9)
ERYTHROCYTE [DISTWIDTH] IN BLOOD BY AUTOMATED COUNT: 45.1 FL (ref 35.9–50)
GLUCOSE SERPL-MCNC: 118 MG/DL (ref 65–99)
HCT VFR BLD AUTO: 45.5 % (ref 42–52)
HGB BLD-MCNC: 15.1 G/DL (ref 14–18)
IMM GRANULOCYTES # BLD AUTO: 0.04 K/UL (ref 0–0.11)
IMM GRANULOCYTES NFR BLD AUTO: 0.5 % (ref 0–0.9)
LYMPHOCYTES # BLD AUTO: 1.1 K/UL (ref 1–4.8)
LYMPHOCYTES NFR BLD: 13.2 % (ref 22–41)
MCH RBC QN AUTO: 32.2 PG (ref 27–33)
MCHC RBC AUTO-ENTMCNC: 33.2 G/DL (ref 33.7–35.3)
MCV RBC AUTO: 97 FL (ref 81.4–97.8)
MONOCYTES # BLD AUTO: 0.76 K/UL (ref 0–0.85)
MONOCYTES NFR BLD AUTO: 9.1 % (ref 0–13.4)
NEUTROPHILS # BLD AUTO: 6.19 K/UL (ref 1.82–7.42)
NEUTROPHILS NFR BLD: 73.9 % (ref 44–72)
NRBC # BLD AUTO: 0 K/UL
NRBC BLD-RTO: 0 /100 WBC
NT-PROBNP SERPL IA-MCNC: 774 PG/ML (ref 0–125)
NT-PROBNP SERPL IA-MCNC: 786 PG/ML (ref 0–125)
PLATELET # BLD AUTO: 220 K/UL (ref 164–446)
PMV BLD AUTO: 10.4 FL (ref 9–12.9)
POTASSIUM SERPL-SCNC: 4.4 MMOL/L (ref 3.6–5.5)
RBC # BLD AUTO: 4.69 M/UL (ref 4.7–6.1)
SODIUM SERPL-SCNC: 139 MMOL/L (ref 135–145)
TSH SERPL DL<=0.005 MIU/L-ACNC: 1.84 UIU/ML (ref 0.38–5.33)
WBC # BLD AUTO: 8.4 K/UL (ref 4.8–10.8)

## 2022-01-12 PROCEDURE — 36415 COLL VENOUS BLD VENIPUNCTURE: CPT

## 2022-01-12 PROCEDURE — 83880 ASSAY OF NATRIURETIC PEPTIDE: CPT

## 2022-01-12 PROCEDURE — 80048 BASIC METABOLIC PNL TOTAL CA: CPT

## 2022-01-12 PROCEDURE — 84443 ASSAY THYROID STIM HORMONE: CPT

## 2022-01-12 PROCEDURE — 85025 COMPLETE CBC W/AUTO DIFF WBC: CPT

## 2022-01-12 PROCEDURE — 83880 ASSAY OF NATRIURETIC PEPTIDE: CPT | Mod: 91

## 2022-01-13 ENCOUNTER — OFFICE VISIT (OUTPATIENT)
Dept: CARDIOLOGY | Facility: MEDICAL CENTER | Age: 77
End: 2022-01-13
Payer: MEDICARE

## 2022-01-13 VITALS
HEART RATE: 67 BPM | HEIGHT: 73 IN | OXYGEN SATURATION: 93 % | SYSTOLIC BLOOD PRESSURE: 112 MMHG | WEIGHT: 275.6 LBS | RESPIRATION RATE: 16 BRPM | DIASTOLIC BLOOD PRESSURE: 60 MMHG | BODY MASS INDEX: 36.53 KG/M2

## 2022-01-13 DIAGNOSIS — G47.33 OSA (OBSTRUCTIVE SLEEP APNEA): ICD-10-CM

## 2022-01-13 DIAGNOSIS — I48.11 LONGSTANDING PERSISTENT ATRIAL FIBRILLATION (HCC): ICD-10-CM

## 2022-01-13 DIAGNOSIS — I51.89 LEFT VENTRICULAR DIASTOLIC DYSFUNCTION, NYHA CLASS 3: ICD-10-CM

## 2022-01-13 DIAGNOSIS — I10 HTN (HYPERTENSION), MALIGNANT: ICD-10-CM

## 2022-01-13 DIAGNOSIS — E07.9 THYROID DISORDER: ICD-10-CM

## 2022-01-13 DIAGNOSIS — I50.30 ACC/AHA STAGE C HEART FAILURE WITH PRESERVED EJECTION FRACTION (HCC): ICD-10-CM

## 2022-01-13 DIAGNOSIS — Z00.6 RESEARCH STUDY PATIENT: ICD-10-CM

## 2022-01-13 DIAGNOSIS — R06.09 DYSPNEA ON EXERTION: ICD-10-CM

## 2022-01-13 PROCEDURE — 99214 OFFICE O/P EST MOD 30 MIN: CPT | Performed by: INTERNAL MEDICINE

## 2022-01-13 RX ORDER — METOPROLOL TARTRATE 50 MG/1
50 TABLET, FILM COATED ORAL 2 TIMES DAILY
Qty: 180 TABLET | Refills: 3 | Status: SHIPPED | OUTPATIENT
Start: 2022-01-13 | End: 2022-01-20 | Stop reason: SDUPTHER

## 2022-01-13 RX ORDER — AMLODIPINE BESYLATE 10 MG/1
10 TABLET ORAL DAILY
Qty: 90 TABLET | Refills: 4 | Status: SHIPPED | OUTPATIENT
Start: 2022-01-13 | End: 2022-01-20 | Stop reason: SDUPTHER

## 2022-01-13 RX ORDER — OLMESARTAN MEDOXOMIL 40 MG/1
40 TABLET ORAL DAILY
Qty: 100 TABLET | Refills: 3 | Status: SHIPPED | OUTPATIENT
Start: 2022-01-13 | End: 2022-06-02

## 2022-01-13 ASSESSMENT — ENCOUNTER SYMPTOMS
ABDOMINAL PAIN: 0
FEVER: 0
FALLS: 0
DIAPHORESIS: 0
PALPITATIONS: 0
SHORTNESS OF BREATH: 1
MYALGIAS: 0
HEADACHES: 0
COUGH: 0
DEPRESSION: 0
BLURRED VISION: 0
BRUISES/BLEEDS EASILY: 0
SENSORY CHANGE: 0
MEMORY LOSS: 0
DIZZINESS: 0
DOUBLE VISION: 0

## 2022-01-13 ASSESSMENT — FIBROSIS 4 INDEX: FIB4 SCORE: 1.91

## 2022-01-14 ENCOUNTER — TELEPHONE (OUTPATIENT)
Dept: HEALTH INFORMATION MANAGEMENT | Facility: OTHER | Age: 77
End: 2022-01-14

## 2022-01-14 ENCOUNTER — TELEPHONE (OUTPATIENT)
Dept: CARDIOLOGY | Facility: MEDICAL CENTER | Age: 77
End: 2022-01-14

## 2022-01-14 NOTE — TELEPHONE ENCOUNTER
TT    Patient was given lab orders and is wondering if this is for the follow up appt or does he need to do them now? He states he just had his labs done a few days ok.  Please reach out    Thank You,    Valeria BARRIENTOS

## 2022-01-14 NOTE — TELEPHONE ENCOUNTER
Comprehensive Health Assessment. Transfer call from [place], verified HIPAA. Spoke with ShaniquaSina's wife who requested a Comprehensive Health Assessment. Scheduled on 02/07/22 at 2 pm with Yumi Davis. Verified HIPAA.       Attempt #1
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
## 2022-01-18 ENCOUNTER — HOSPITAL ENCOUNTER (OUTPATIENT)
Dept: LAB | Facility: MEDICAL CENTER | Age: 77
End: 2022-01-18
Attending: PHYSICIAN ASSISTANT
Payer: MEDICARE

## 2022-01-18 ENCOUNTER — HOSPITAL ENCOUNTER (OUTPATIENT)
Dept: RADIOLOGY | Facility: MEDICAL CENTER | Age: 77
End: 2022-01-18
Attending: INTERNAL MEDICINE
Payer: MEDICARE

## 2022-01-18 DIAGNOSIS — E89.0 POSTSURGICAL HYPOTHYROIDISM: ICD-10-CM

## 2022-01-18 DIAGNOSIS — I50.20: ICD-10-CM

## 2022-01-18 DIAGNOSIS — C73 THYROID CANCER (HCC): ICD-10-CM

## 2022-01-18 LAB — PSA SERPL-MCNC: 4.41 NG/ML (ref 0–4)

## 2022-01-18 PROCEDURE — 84153 ASSAY OF PSA TOTAL: CPT

## 2022-01-18 PROCEDURE — 36415 COLL VENOUS BLD VENIPUNCTURE: CPT

## 2022-01-18 PROCEDURE — 76536 US EXAM OF HEAD AND NECK: CPT

## 2022-01-20 ENCOUNTER — HOSPITAL ENCOUNTER (OUTPATIENT)
Facility: MEDICAL CENTER | Age: 77
End: 2022-01-20
Attending: PHYSICIAN ASSISTANT
Payer: MEDICARE

## 2022-01-20 DIAGNOSIS — I48.11 LONGSTANDING PERSISTENT ATRIAL FIBRILLATION (HCC): ICD-10-CM

## 2022-01-20 PROCEDURE — 84403 ASSAY OF TOTAL TESTOSTERONE: CPT

## 2022-01-20 RX ORDER — AMLODIPINE BESYLATE 10 MG/1
10 TABLET ORAL DAILY
Qty: 100 TABLET | Refills: 3 | Status: SHIPPED | OUTPATIENT
Start: 2022-01-20 | End: 2022-04-22 | Stop reason: SDUPTHER

## 2022-01-20 RX ORDER — TAMSULOSIN HYDROCHLORIDE 0.4 MG/1
0.4 CAPSULE ORAL DAILY
Qty: 100 CAPSULE | Refills: 3 | Status: SHIPPED | OUTPATIENT
Start: 2022-01-20

## 2022-01-20 RX ORDER — METOPROLOL TARTRATE 50 MG/1
50 TABLET, FILM COATED ORAL 2 TIMES DAILY
Qty: 200 TABLET | Refills: 3 | Status: SHIPPED | OUTPATIENT
Start: 2022-01-20 | End: 2023-02-14

## 2022-01-21 LAB — TESTOST SERPL-MCNC: 248 NG/DL (ref 175–781)

## 2022-01-31 ENCOUNTER — PHARMACY VISIT (OUTPATIENT)
Dept: PHARMACY | Facility: MEDICAL CENTER | Age: 77
End: 2022-01-31
Payer: COMMERCIAL

## 2022-01-31 ENCOUNTER — OFFICE VISIT (OUTPATIENT)
Dept: SLEEP MEDICINE | Facility: MEDICAL CENTER | Age: 77
End: 2022-01-31
Payer: MEDICARE

## 2022-01-31 VITALS
DIASTOLIC BLOOD PRESSURE: 50 MMHG | SYSTOLIC BLOOD PRESSURE: 100 MMHG | HEIGHT: 74 IN | OXYGEN SATURATION: 93 % | HEART RATE: 54 BPM | RESPIRATION RATE: 16 BRPM | WEIGHT: 268 LBS | BODY MASS INDEX: 34.39 KG/M2

## 2022-01-31 DIAGNOSIS — G47.34 NOCTURNAL OXYGEN DESATURATION: ICD-10-CM

## 2022-01-31 DIAGNOSIS — I48.19 PERSISTENT ATRIAL FIBRILLATION (HCC): ICD-10-CM

## 2022-01-31 DIAGNOSIS — G47.33 OSA ON CPAP: ICD-10-CM

## 2022-01-31 PROCEDURE — 99204 OFFICE O/P NEW MOD 45 MIN: CPT | Performed by: PREVENTIVE MEDICINE

## 2022-01-31 ASSESSMENT — FIBROSIS 4 INDEX: FIB4 SCORE: 1.91
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
## 2022-02-07 PROBLEM — I70.0 AORTIC ATHEROSCLEROSIS (HCC): Status: ACTIVE | Noted: 2022-02-07

## 2022-02-07 PROBLEM — I70.8 AORTO-ILIAC ATHEROSCLEROSIS (HCC): Status: ACTIVE | Noted: 2022-02-07

## 2022-02-07 PROBLEM — I77.9 DISORDER OF ARTERIES AND ARTERIOLES (HCC): Status: ACTIVE | Noted: 2022-02-07

## 2022-02-07 PROBLEM — D68.69 SECONDARY HYPERCOAGULABLE STATE (HCC): Status: ACTIVE | Noted: 2022-02-07

## 2022-02-07 PROBLEM — E66.01 MORBID OBESITY (HCC): Status: ACTIVE | Noted: 2022-02-07

## 2022-02-15 ENCOUNTER — TELEPHONE (OUTPATIENT)
Dept: CARDIOLOGY | Facility: MEDICAL CENTER | Age: 77
End: 2022-02-15

## 2022-02-15 ENCOUNTER — OFFICE VISIT (OUTPATIENT)
Dept: CARDIOLOGY | Facility: MEDICAL CENTER | Age: 77
End: 2022-02-15
Payer: MEDICARE

## 2022-02-15 ENCOUNTER — TELEPHONE (OUTPATIENT)
Dept: MEDICAL GROUP | Facility: MEDICAL CENTER | Age: 77
End: 2022-02-15

## 2022-02-15 VITALS
SYSTOLIC BLOOD PRESSURE: 132 MMHG | HEIGHT: 73 IN | HEART RATE: 71 BPM | RESPIRATION RATE: 16 BRPM | DIASTOLIC BLOOD PRESSURE: 74 MMHG | OXYGEN SATURATION: 92 % | BODY MASS INDEX: 36.58 KG/M2 | WEIGHT: 276 LBS

## 2022-02-15 DIAGNOSIS — I50.30 ACC/AHA STAGE C HEART FAILURE WITH PRESERVED EJECTION FRACTION (HCC): ICD-10-CM

## 2022-02-15 DIAGNOSIS — Z79.899 HIGH RISK MEDICATION USE: ICD-10-CM

## 2022-02-15 DIAGNOSIS — I48.21 PERMANENT ATRIAL FIBRILLATION (HCC): ICD-10-CM

## 2022-02-15 DIAGNOSIS — E07.9 THYROID DISORDER: ICD-10-CM

## 2022-02-15 DIAGNOSIS — G47.33 OSA (OBSTRUCTIVE SLEEP APNEA): ICD-10-CM

## 2022-02-15 DIAGNOSIS — I51.89 LEFT VENTRICULAR DIASTOLIC DYSFUNCTION, NYHA CLASS 3: ICD-10-CM

## 2022-02-15 DIAGNOSIS — Z00.6 RESEARCH STUDY PATIENT: ICD-10-CM

## 2022-02-15 DIAGNOSIS — I10 HTN (HYPERTENSION), MALIGNANT: ICD-10-CM

## 2022-02-15 DIAGNOSIS — Z91.89: ICD-10-CM

## 2022-02-15 PROCEDURE — 99214 OFFICE O/P EST MOD 30 MIN: CPT | Mod: 25 | Performed by: INTERNAL MEDICINE

## 2022-02-15 RX ORDER — TORSEMIDE 100 MG/1
100 TABLET ORAL DAILY
Qty: 30 TABLET | Refills: 3 | Status: SHIPPED | OUTPATIENT
Start: 2022-02-15 | End: 2022-05-10 | Stop reason: SDUPTHER

## 2022-02-15 ASSESSMENT — ENCOUNTER SYMPTOMS
FEVER: 0
DIAPHORESIS: 0
ABDOMINAL PAIN: 0
BLURRED VISION: 0
SENSORY CHANGE: 0
MEMORY LOSS: 0
DIZZINESS: 0
MYALGIAS: 0
PALPITATIONS: 0
DEPRESSION: 0
BRUISES/BLEEDS EASILY: 0
COUGH: 0
SHORTNESS OF BREATH: 1
DOUBLE VISION: 0
FALLS: 0
HEADACHES: 0

## 2022-02-15 ASSESSMENT — FIBROSIS 4 INDEX: FIB4 SCORE: 1.91
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
## 2022-02-15 NOTE — TELEPHONE ENCOUNTER
ESTABLISHED PATIENT PRE-VISIT PLANNING     Phone Number Called: [phone] (home)   Call outcome: Spoke to Patient's Spouse Shaniqua who is listed as a Patient Contact, under Demographic Tab, with permissions to discuss both billing and treatment.  Message: Appointment scheduled with , Wednesday, 02/16/2022 at 2:00 PM, [address], [id number]. Arrive 10-15 minutest early for check-in.     Patient was contacted to complete PVP.     Note: Patient will not be contacted if there is no indication to call.     1.  Reviewed notes from the last few office visits within the medical group: Yes    2.  If any orders were placed at last visit or intended to be done for this visit (i.e. 6 mos follow-up), do we have Results/Consult Notes?         •  Labs - Labs were not ordered at last office visit.   Last office visit with PCP Provider  was on 10/06/2021.     Note: If patient appointment is for lab review and patient did not complete labs, check with provider if OK to reschedule patient until labs completed.       •  Imaging - Imaging ordered, NOT completed. Patient advised to complete prior to next appointment.    -MR-LUMBAR SPINE-W/O ordered by  on 1025/2021: Not Done         •  Referrals - Referral ordered, patient has NOT been seen.      -Out of Network Provider Cezar Mortensen, Referral Status: Authorized. During Pre-Visit Planning call, Patient's spouse Shaniqua states patient has not been seen.     -Out of Network Provider Aniceto Silva: Referral Status: Authorized. During Pre-Visit Planning call, Patient's spouse Shaniqua states patient has not been seen.     -[facility]: Called and spoke to [facility]. Per their office, patient has not been seen since 10/21/2022, visit with Dr.Sharon Mendoza for a surgical procedure.  Referral Status: Authorized    Consultation Notes by [facility], Study date 03/17/2021 by Provider Dr. Cruz Spear  is in patient's media. Care Teams updated.     Consultation Notes by [facility], Encounter Date 01/20/2022 by Provider Cole Palomares PA-C is in patient's media.    3. Is this appointment scheduled as a Hospital Follow-Up? No    4.  Immunizations were updated in Epic using Reconcile Outside Information activity? Yes    5.  Patient is due for the following Health Maintenance Topics:   Health Maintenance Due   Topic Date Due   • COVID-19 Vaccine (1) Never done   • IMM ZOSTER VACCINES (1 of 2) Never done       6.  AHA (Pulse[employer]) form printed for Provider? Email sent to [facility] requesting form

## 2022-02-15 NOTE — TELEPHONE ENCOUNTER
TT    Patient is retaining water and said he is not eating much and would like to discuss making a change with his lasix.  He also needs to discuss his Eloqis and he is passing blood. Pt states he is not having any other symptoms    Please reach out to him  PH: [phone]    Thank You  Valeria BARRIENTOS

## 2022-02-15 NOTE — TELEPHONE ENCOUNTER
Returned call. Pt reports he has swelling from his ankles to his knees, worsens late in the day. His weight is 260 baseline, in one day he has jumped from 261 to 268. He has RODRIGUEZ. No SOB at rest. Has some burning sensation on his feet. BP over the last several days: 114/60, 110/62, 132/68, 117/63, 117/53, 110/65. HR: 50, 58 53, 52, 55, 55, 63, 53.     Pt is taking Lasix 40 mg BID. He drinks 64 ounces of water a day. Tries to keep sodium intake to < 2 g. Examples of what he eats Breakfast: One egg, one Citizen of the [place] toast slice, no lunch. Dinner: Two slices of pizza. Next day: One tuna sandwich, small bowl of soup. Two tacos, one bowl of cereal and half a banana. Third day: Two eggs one english muffin, cottage cheese, taco filling.     Pt reports having blood in his urine for the last 3-4 days. Large amount of blood last night, but no blood today. He believes it he has stopped bleeding now. He has had an issue with urinary bleeding previously, but it stopped. He has not taken Eliquis for the last two days d/t bleeding and assumes this is why the bleeding has stopped. He states he usually stops the Eliquis for 2 days when this occurs. No other s/s.    Advised pt to check sodium content as things like pizza and soup tend to have high sodium contents. Asked him to monitor for further bleeding closely. Pt is wanting to know if his Lasix can be increased to pull fluid off.     TT has some cancellations today, pt will come in to see TT at 1 pm for further evaluation.     To TT, DELVIS

## 2022-02-16 ENCOUNTER — SLEEP STUDY (OUTPATIENT)
Dept: SLEEP MEDICINE | Facility: MEDICAL CENTER | Age: 77
End: 2022-02-16
Attending: PREVENTIVE MEDICINE
Payer: MEDICARE

## 2022-02-16 ENCOUNTER — OFFICE VISIT (OUTPATIENT)
Dept: MEDICAL GROUP | Facility: MEDICAL CENTER | Age: 77
End: 2022-02-16
Payer: MEDICARE

## 2022-02-16 ENCOUNTER — PHARMACY VISIT (OUTPATIENT)
Dept: PHARMACY | Facility: MEDICAL CENTER | Age: 77
End: 2022-02-16
Payer: COMMERCIAL

## 2022-02-16 VITALS
WEIGHT: 270 LBS | BODY MASS INDEX: 35.78 KG/M2 | SYSTOLIC BLOOD PRESSURE: 102 MMHG | DIASTOLIC BLOOD PRESSURE: 66 MMHG | OXYGEN SATURATION: 98 % | HEIGHT: 73 IN | TEMPERATURE: 98.7 F | RESPIRATION RATE: 16 BRPM | HEART RATE: 52 BPM

## 2022-02-16 DIAGNOSIS — G47.33 OSA ON CPAP: ICD-10-CM

## 2022-02-16 DIAGNOSIS — G25.81 RESTLESS LEG SYNDROME: ICD-10-CM

## 2022-02-16 DIAGNOSIS — I50.30 ACC/AHA STAGE C HEART FAILURE WITH PRESERVED EJECTION FRACTION (HCC): ICD-10-CM

## 2022-02-16 DIAGNOSIS — M54.50 CHRONIC LOW BACK PAIN, UNSPECIFIED BACK PAIN LATERALITY, UNSPECIFIED WHETHER SCIATICA PRESENT: ICD-10-CM

## 2022-02-16 DIAGNOSIS — C73 PAPILLARY THYROID CARCINOMA (HCC): ICD-10-CM

## 2022-02-16 DIAGNOSIS — G47.34 NOCTURNAL OXYGEN DESATURATION: ICD-10-CM

## 2022-02-16 DIAGNOSIS — R79.89 LOW TESTOSTERONE LEVEL IN MALE: ICD-10-CM

## 2022-02-16 DIAGNOSIS — R79.9 ABNORMAL FINDING OF BLOOD CHEMISTRY, UNSPECIFIED: ICD-10-CM

## 2022-02-16 DIAGNOSIS — R79.89 OTHER SPECIFIED ABNORMAL FINDINGS OF BLOOD CHEMISTRY: ICD-10-CM

## 2022-02-16 DIAGNOSIS — I48.21 PERMANENT ATRIAL FIBRILLATION (HCC): ICD-10-CM

## 2022-02-16 DIAGNOSIS — G89.29 CHRONIC LOW BACK PAIN, UNSPECIFIED BACK PAIN LATERALITY, UNSPECIFIED WHETHER SCIATICA PRESENT: ICD-10-CM

## 2022-02-16 LAB — EKG IMPRESSION: NORMAL

## 2022-02-16 PROCEDURE — 95811 POLYSOM 6/>YRS CPAP 4/> PARM: CPT | Performed by: PREVENTIVE MEDICINE

## 2022-02-16 PROCEDURE — 93000 ELECTROCARDIOGRAM COMPLETE: CPT | Performed by: INTERNAL MEDICINE

## 2022-02-16 PROCEDURE — RXMED WILLOW AMBULATORY MEDICATION CHARGE: Performed by: PHYSICIAN ASSISTANT

## 2022-02-16 PROCEDURE — 99214 OFFICE O/P EST MOD 30 MIN: CPT | Performed by: STUDENT IN AN ORGANIZED HEALTH CARE EDUCATION/TRAINING PROGRAM

## 2022-02-16 ASSESSMENT — FIBROSIS 4 INDEX: FIB4 SCORE: 1.91

## 2022-02-16 NOTE — PROGRESS NOTES
Subjective:     CC: Burning on foot, restless legs    HPI:   Sina presents today with burning sensation of his left foot that occurs mostly during nighttime.  He has had restless legs, was started on cyclobenzaprine which has helped a little bit, is often awoken with an urge to move his legs and uncomfortable sensation at night.  He now is complaining of burning of a specific area on his left foot that is enough to wake him up at night.  Strength and sensation are otherwise normal, no alarm signs of back pain.    Problem   Acc/Aha Stage C Heart Failure With Preserved Ejection Fraction (Hcc)   Papillary Thyroid Carcinoma (Hcc)    Follows with Dr. Bailey, endocrinology, has upcoming nuclear study     A-Fib (Hcc)    Follows with cardiology, recently saw Dr. Easton, has upcoming lab draw, currently asymptomatic         Current Outpatient Medications Ordered in Epic   Medication Sig Dispense Refill   • torsemide (DEMADEX) 100 MG Tab Take 1 Tablet by mouth every day. 30 Tablet 3   • testosterone cypionate (DEPO-TESTOSTERONE) 200 MG/ML Solution injection Inject 1 mL every 2 weeks by intramuscular route for 140 days. 10 mL 0   • amLODIPine (NORVASC) 10 MG Tab Take 1 Tablet by mouth every day. 100 Tablet 3   • metoprolol tartrate (LOPRESSOR) 50 MG Tab Take 1 Tablet by mouth 2 times a day. 200 Tablet 3   • tamsulosin (FLOMAX) 0.4 MG capsule Take 1 Capsule by mouth every day. 100 Capsule 3   • olmesartan (BENICAR) 40 MG Tab Take 1 Tablet by mouth every day. 100 Tablet 3   • atorvastatin (LIPITOR) 40 MG Tab Take 1 Tablet by mouth every day. 100 Tablet 3   • cyclobenzaprine (FLEXERIL) 10 mg Tab Take 1 Tablet by mouth 2 times a day as needed for Muscle Spasms. 180 Tablet 0   • levothyroxine (SYNTHROID) 125 MCG Tab Take 1 Tablet by mouth every morning on an empty stomach. (Patient taking differently: Take 125 mcg by mouth every morning on an empty stomach. Once a week you take 1 1/2.) 30 Tablet 3   • coenzyme Q-10 30 MG capsule Take  "60 mg by mouth every day.     • B Complex Vitamins (VITAMIN B COMPLEX PO) Take  by mouth every day.     • VITAMIN D PO Take  by mouth every day.     • Ascorbic Acid (VITAMIN C) 1000 MG Tab Take  by mouth every day.     • potassium chloride SA (KDUR) 20 MEQ Tab CR Take 2 Tablets by mouth 2 times a day. 360 Tablet 3     No current Epic-ordered facility-administered medications on file.         ROS:  Gen: no fevers/chills, no changes in weight  Eyes: no changes in vision  ENT: no sore throat, no hearing loss, no bloody nose  Pulm: no sob, no cough  CV: no chest pain, no palpitations  GI: no nausea/vomiting, no diarrhea  : no dysuria  MSk: no myalgias  Skin: no rash  Neuro: no headaches, see HPI  Heme/Lymph: no easy bruising      Objective:     Exam:  /66 (BP Location: Left arm)   Pulse (!) 52   Temp 37.1 °C (98.7 °F)   Resp 16   Ht 1.854 m (6' 1\")   Wt 122 kg (270 lb)   SpO2 98%   BMI 35.62 kg/m²  Body mass index is 35.62 kg/m².    Gen: Alert and oriented, No apparent distress.  Neck: Neck is supple without lymphadenopathy.  Lungs: Normal effort, CTA bilaterally, no wheezes, rhonchi, or rales  CV: Regular rate and rhythm. No murmurs, rubs, or gallops.  Ext: No clubbing, cyanosis, edema.      Assessment & Plan:     76 y.o. male with the following -     1. Restless leg syndrome  Patient is describing symptoms of restless leg syndrome.  Has had some relief with cyclobenzaprine.  May consider ropinirole if not otherwise contraindicated.  We will also check for iron deficiency as this sometimes correlates with RLS.  - IRON/TOTAL IRON BIND; Future  - FERRITIN; Future    2. Chronic low back pain, unspecified back pain laterality, unspecified whether sciatica present  Patient now complaining of burning sensation in the L5/S1 dermatomal areas.  MR was previously ordered however patient never had it done, have placed order for new imaging.  - MR-LUMBAR SPINE-W/O; Future    3. Low testosterone level in " male  Patient receiving testosterone injections from outside physician.  Was wondering if his levels have improved from previous.  He is having fatigue and feels as though he is not losing weight and maintaining muscle mass as much as he would like.  - Testosterone, Free & Total, Adult Male (w/SHBG); Future    Papillary thyroid carcinoma (HCC)  Complete nuclear medicine study, follow with endocrinology    A-fib (HCC)  Continue current management, continue with cardiology    ACC/AHA stage C heart failure with preserved ejection fraction (HCC)  Follow-up with cardiology    Please note that this dictation was created using voice recognition software. I have made every reasonable attempt to correct obvious errors, but I expect that there are errors of grammar and possibly content that I did not discover before finalizing the note.

## 2022-02-18 NOTE — PROCEDURES
MONTAGE: Standard  STUDY TYPE: Diagnostic    RECORDING TECHNIQUE:   After the scalp was prepared, gold plated electrodes were applied to the scalp according to the International 10-20 System. EEG (electroencephalogram) was continuously monitored from the O1-M2, O2-M1, C3-M2, C4-M1, F3-M2, and F4-M1. EOGs (electrooculograms) were monitored by electrodes placed at the left and right outer canthi. Chin EMG (electromyogram) was monitored by electrodes placed on the mentalis and sub-mentalis muscles. Nasal and oral airflow were monitored using a triple port thermocouple as well as oronasal pressure transducer. Respiratory effort was measured by inductive plethysmography technology employing abdominal and thoracic belts. Blood oxygen saturation and pulse were monitored by pulse oximetry. Heart rhythm was monitored by surface electrocardiogram. Leg EMG was studied using surface electrodes placed on left and right anterior tibialis. A microphone was used to monitor tracheal sounds and snoring. Body position was monitored and documented by technician observation.   SCORING CRITERIA:   A modification of the AASM manual for scoring of sleep and associated events was used. Obstructive apneas were scored by cessation of airflow for at least 10 seconds with continuing respiratory effort. Central apneas were scored by cessation of airflow for at least 10 seconds with no respiratory effort. Hypopneas were scored by a 30% or more reduction in airflow for at least 10 seconds accompanied by arterial oxygen desaturation of 3% or an arousal. For CMS (Medicare) patients, per AASM rule 1B, hypopneas are scored by 30% with mild reduction in airflow for at least 10 seconds accompanied by arterial saturation decreased at 4%.  Study start time was 10:26:36 PM. Diagnostic recording time was 7h 26.5m with a total sleep time of 4h 44.0m resulting in a sleep efficiency of 63.61%%. Sleep latency from the start of the study was 17 minutes and the  latency from sleep to REM was 43 minutes. In total,67 arousals were scored for an arousal index of 14.2.    STUDY DATA:  Respiratory:  There were a total of 1 apneas consisting of 0 obstructive apneas, 0 mixed apneas, and 1 central apneas. A total of 46 hypopneas were scored. The apnea index was 0.21 per hour and the hypopnea index was 9.72 per hour resulting in an overall AHI of 9.93. AHI during rem was 8.1 and AHI while supine was 0.00.  Oximetry:  There was a mean oxygen saturation of 90.0%. The minimum oxygen saturation in NREM was 84.0 % and in REM was 83.0. The patient spent 70.7 minutes of TST with SaO2 <88%.  Cardiac:  The highest heart rate seen while awake was 106 BPM while the highest heart rate during sleep was 68 BPM with an average sleeping heart rate of 47 BPM.  Limb Movements:  There were a total of 72 PLMs during sleep which resulted in a PLMS index of 15.2. Of these, 23 were associated with arousals which resulted in a PLMS arousal index of 4.9.  Titration:   CPAP was tired from 8 to 13cm H2O.  This was a fully attended sleep study. This test was technically adequate. This patient was titrated on CPAP starting at 8 cm of water pressure. Patient was titrated up to 13 cm of water pressure. Patient did best at 12 cm of water pressure. Patient spent 283 minutes at that pressure and their AHI was 4.0 which is considered within normal limits.      Assessment:   Mild obstructive Sleep Apnea Hypopnea - AHI 9.93.  Moderate to severe nocturnal desaturation - terry saturation 83.0% - saturations <88% below for 70.7 minutes of TST.    Recommendation:     This patient will do well on a ResMed APAP machine with a minimum pressure of 12 and a maximum pressure of 16.  He will also need oxygen supplementation as a bleed in at 2 L/min. This patient will need to meet all requirements for insurance compliance.      Hypnogram:

## 2022-02-23 ENCOUNTER — HOSPITAL ENCOUNTER (OUTPATIENT)
Dept: RADIOLOGY | Facility: MEDICAL CENTER | Age: 77
End: 2022-02-23
Attending: STUDENT IN AN ORGANIZED HEALTH CARE EDUCATION/TRAINING PROGRAM
Payer: MEDICARE

## 2022-02-23 ENCOUNTER — HOSPITAL ENCOUNTER (OUTPATIENT)
Dept: LAB | Facility: MEDICAL CENTER | Age: 77
End: 2022-02-23
Attending: INTERNAL MEDICINE
Payer: MEDICARE

## 2022-02-23 ENCOUNTER — HOSPITAL ENCOUNTER (OUTPATIENT)
Dept: LAB | Facility: MEDICAL CENTER | Age: 77
End: 2022-02-23
Attending: STUDENT IN AN ORGANIZED HEALTH CARE EDUCATION/TRAINING PROGRAM
Payer: MEDICARE

## 2022-02-23 DIAGNOSIS — M54.50 CHRONIC LOW BACK PAIN, UNSPECIFIED BACK PAIN LATERALITY, UNSPECIFIED WHETHER SCIATICA PRESENT: ICD-10-CM

## 2022-02-23 DIAGNOSIS — R79.89 LOW TESTOSTERONE LEVEL IN MALE: ICD-10-CM

## 2022-02-23 DIAGNOSIS — I51.89 LEFT VENTRICULAR DIASTOLIC DYSFUNCTION, NYHA CLASS 3: ICD-10-CM

## 2022-02-23 DIAGNOSIS — R79.89 OTHER SPECIFIED ABNORMAL FINDINGS OF BLOOD CHEMISTRY: ICD-10-CM

## 2022-02-23 DIAGNOSIS — G25.81 RESTLESS LEG SYNDROME: ICD-10-CM

## 2022-02-23 DIAGNOSIS — G89.29 CHRONIC LOW BACK PAIN, UNSPECIFIED BACK PAIN LATERALITY, UNSPECIFIED WHETHER SCIATICA PRESENT: ICD-10-CM

## 2022-02-23 DIAGNOSIS — I50.30 ACC/AHA STAGE C HEART FAILURE WITH PRESERVED EJECTION FRACTION (HCC): ICD-10-CM

## 2022-02-23 DIAGNOSIS — Z79.899 HIGH RISK MEDICATION USE: ICD-10-CM

## 2022-02-23 DIAGNOSIS — R79.9 ABNORMAL FINDING OF BLOOD CHEMISTRY, UNSPECIFIED: ICD-10-CM

## 2022-02-23 LAB
ANION GAP SERPL CALC-SCNC: 13 MMOL/L (ref 7–16)
BUN SERPL-MCNC: 21 MG/DL (ref 8–22)
CALCIUM SERPL-MCNC: 10 MG/DL (ref 8.5–10.5)
CHLORIDE SERPL-SCNC: 103 MMOL/L (ref 96–112)
CO2 SERPL-SCNC: 24 MMOL/L (ref 20–33)
CREAT SERPL-MCNC: 1.25 MG/DL (ref 0.5–1.4)
FASTING STATUS PATIENT QL REPORTED: NORMAL
FERRITIN SERPL-MCNC: 547 NG/ML (ref 22–322)
GLUCOSE SERPL-MCNC: 117 MG/DL (ref 65–99)
IRON SATN MFR SERPL: 25 % (ref 15–55)
IRON SERPL-MCNC: 66 UG/DL (ref 50–180)
POTASSIUM SERPL-SCNC: 4.1 MMOL/L (ref 3.6–5.5)
SODIUM SERPL-SCNC: 140 MMOL/L (ref 135–145)
TIBC SERPL-MCNC: 263 UG/DL (ref 250–450)
UIBC SERPL-MCNC: 197 UG/DL (ref 110–370)

## 2022-02-23 PROCEDURE — 82728 ASSAY OF FERRITIN: CPT

## 2022-02-23 PROCEDURE — 84402 ASSAY OF FREE TESTOSTERONE: CPT

## 2022-02-23 PROCEDURE — 36415 COLL VENOUS BLD VENIPUNCTURE: CPT

## 2022-02-23 PROCEDURE — 72148 MRI LUMBAR SPINE W/O DYE: CPT | Mod: ME

## 2022-02-23 PROCEDURE — 80048 BASIC METABOLIC PNL TOTAL CA: CPT

## 2022-02-23 PROCEDURE — 84403 ASSAY OF TOTAL TESTOSTERONE: CPT

## 2022-02-23 PROCEDURE — 83540 ASSAY OF IRON: CPT

## 2022-02-23 PROCEDURE — 83550 IRON BINDING TEST: CPT

## 2022-02-23 PROCEDURE — 84270 ASSAY OF SEX HORMONE GLOBUL: CPT

## 2022-02-25 ENCOUNTER — OFFICE VISIT (OUTPATIENT)
Dept: CARDIOLOGY | Facility: MEDICAL CENTER | Age: 77
End: 2022-02-25
Payer: MEDICARE

## 2022-02-25 VITALS
OXYGEN SATURATION: 92 % | HEART RATE: 54 BPM | RESPIRATION RATE: 16 BRPM | BODY MASS INDEX: 37.11 KG/M2 | SYSTOLIC BLOOD PRESSURE: 110 MMHG | HEIGHT: 73 IN | WEIGHT: 280 LBS | DIASTOLIC BLOOD PRESSURE: 70 MMHG

## 2022-02-25 DIAGNOSIS — E78.2 MIXED HYPERLIPIDEMIA: ICD-10-CM

## 2022-02-25 DIAGNOSIS — Z79.899 HIGH RISK MEDICATION USE: ICD-10-CM

## 2022-02-25 DIAGNOSIS — I50.30 ACC/AHA STAGE C HEART FAILURE WITH PRESERVED EJECTION FRACTION (HCC): ICD-10-CM

## 2022-02-25 DIAGNOSIS — E66.9 OBESITY (BMI 30-39.9): ICD-10-CM

## 2022-02-25 DIAGNOSIS — I10 HTN (HYPERTENSION), MALIGNANT: ICD-10-CM

## 2022-02-25 DIAGNOSIS — I51.89 LEFT VENTRICULAR DIASTOLIC DYSFUNCTION, NYHA CLASS 3: ICD-10-CM

## 2022-02-25 DIAGNOSIS — G47.33 OSA (OBSTRUCTIVE SLEEP APNEA): ICD-10-CM

## 2022-02-25 DIAGNOSIS — Z91.89: ICD-10-CM

## 2022-02-25 PROCEDURE — 99214 OFFICE O/P EST MOD 30 MIN: CPT | Performed by: NURSE PRACTITIONER

## 2022-02-25 RX ORDER — ATORVASTATIN CALCIUM 40 MG/1
40 TABLET, FILM COATED ORAL DAILY
Qty: 100 TABLET | Refills: 3 | Status: SHIPPED | OUTPATIENT
Start: 2022-02-25 | End: 2022-09-15

## 2022-02-25 RX ORDER — CLINDAMYCIN HYDROCHLORIDE 150 MG/1
CAPSULE ORAL
COMMUNITY
Start: 2022-01-27 | End: 2023-02-14

## 2022-02-25 ASSESSMENT — ENCOUNTER SYMPTOMS
PND: 0
PALPITATIONS: 0
SHORTNESS OF BREATH: 1
COUGH: 0
CLAUDICATION: 0
FEVER: 0
ORTHOPNEA: 0
MYALGIAS: 0
ABDOMINAL PAIN: 0
DIZZINESS: 0

## 2022-02-25 ASSESSMENT — FIBROSIS 4 INDEX: FIB4 SCORE: 1.91
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
## 2022-02-28 ENCOUNTER — HOSPITAL ENCOUNTER (OUTPATIENT)
Dept: RADIOLOGY | Facility: MEDICAL CENTER | Age: 77
End: 2022-02-28
Attending: INTERNAL MEDICINE
Payer: MEDICARE

## 2022-02-28 DIAGNOSIS — C73 MALIGNANT NEOPLASM OF THYROID GLAND (HCC): ICD-10-CM

## 2022-02-28 LAB
SHBG SERPL-SCNC: 43 NMOL/L (ref 19–76)
TESTOST FREE MFR SERPL: 1.8 % (ref 1.6–2.9)
TESTOST FREE SERPL-MCNC: 180 PG/ML (ref 47–244)
TESTOST SERPL-MCNC: 979 NG/DL (ref 300–720)

## 2022-02-28 PROCEDURE — 700111 HCHG RX REV CODE 636 W/ 250 OVERRIDE (IP)

## 2022-02-28 NOTE — PROGRESS NOTES
Patient arrived for Thyrogen injection.  Radiation safety instructions and Thyrogen education reviewed with the patient.  All questions answered.  Thyrogen 0.9mg/ml administered to Left dorsogluteal IM injection site.  Patient tolerated well.  Pt observed for 15 minutes, no s/s of adverse reaction.

## 2022-03-01 ENCOUNTER — HOSPITAL ENCOUNTER (OUTPATIENT)
Dept: RADIOLOGY | Facility: MEDICAL CENTER | Age: 77
End: 2022-03-01
Attending: INTERNAL MEDICINE
Payer: MEDICARE

## 2022-03-01 ENCOUNTER — TELEPHONE (OUTPATIENT)
Dept: CARDIOLOGY | Facility: MEDICAL CENTER | Age: 77
End: 2022-03-01
Payer: MEDICARE

## 2022-03-01 PROCEDURE — 700111 HCHG RX REV CODE 636 W/ 250 OVERRIDE (IP)

## 2022-03-01 NOTE — PROGRESS NOTES
Pt arrived for 2nd Thyrogen injection. All questions answered. Thyrogen 0.9mg/ml administered to R dorsogluteal IM injection site.  Pt tolerated well.

## 2022-03-01 NOTE — TELEPHONE ENCOUNTER
Patient called in for  Increased leg swelling  2 eggs, oatmeal  Salad, vegetables, broccoli    Not urinating as much at night, 32oz water in AM, weight is slowly increasing by about 1lb per day, SOB is baseline for patient, possibly better... cardiomems pressures are low or normal currently. Taking 100mg torsemide daily    Per SHIRA increase torsemide by 50mg today and tomorrow and we will check in on Thursday

## 2022-03-03 ENCOUNTER — HOSPITAL ENCOUNTER (OUTPATIENT)
Dept: RADIOLOGY | Facility: MEDICAL CENTER | Age: 77
End: 2022-03-03
Attending: INTERNAL MEDICINE
Payer: MEDICARE

## 2022-03-03 DIAGNOSIS — C73 MALIGNANT NEOPLASM OF THYROID GLAND (HCC): ICD-10-CM

## 2022-03-03 PROCEDURE — A9516 IODINE I-123 SOD IODIDE MIC: HCPCS

## 2022-03-09 ENCOUNTER — OFFICE VISIT (OUTPATIENT)
Dept: SLEEP MEDICINE | Facility: MEDICAL CENTER | Age: 77
End: 2022-03-09
Payer: MEDICARE

## 2022-03-09 VITALS
RESPIRATION RATE: 14 BRPM | DIASTOLIC BLOOD PRESSURE: 62 MMHG | OXYGEN SATURATION: 93 % | BODY MASS INDEX: 33.88 KG/M2 | SYSTOLIC BLOOD PRESSURE: 110 MMHG | WEIGHT: 264 LBS | HEIGHT: 74 IN | HEART RATE: 55 BPM

## 2022-03-09 DIAGNOSIS — G47.33 OSA ON CPAP: Primary | ICD-10-CM

## 2022-03-09 DIAGNOSIS — G47.34 NOCTURNAL OXYGEN DESATURATION: ICD-10-CM

## 2022-03-09 PROCEDURE — 99213 OFFICE O/P EST LOW 20 MIN: CPT | Performed by: STUDENT IN AN ORGANIZED HEALTH CARE EDUCATION/TRAINING PROGRAM

## 2022-03-09 ASSESSMENT — FIBROSIS 4 INDEX: FIB4 SCORE: 1.91
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
## 2022-03-09 NOTE — PATIENT INSTRUCTIONS
"CPAP and BPAP Information  CPAP and BPAP are methods of helping a person breathe with the use of air pressure. CPAP stands for \"continuous positive airway pressure.\" BPAP stands for \"bi-level positive airway pressure.\" In both methods, air is blown through your nose or mouth and into your air passages to help you breathe well.  CPAP and BPAP use different amounts of pressure to blow air. With CPAP, the amount of pressure stays the same while you breathe in and out. With BPAP, the amount of pressure is increased when you breathe in (inhale) so that you can take larger breaths. Your health care provider will recommend whether CPAP or BPAP would be more helpful for you.  Why are CPAP and BPAP treatments used?  CPAP or BPAP can be helpful if you have:  · Sleep apnea.  · Chronic obstructive pulmonary disease (COPD).  · Heart failure.  · Medical conditions that weaken the muscles of the chest including muscular dystrophy, or neurological diseases such as amyotrophic lateral sclerosis (ALS).  · Other problems that cause breathing to be weak, abnormal, or difficult.  CPAP is most commonly used for obstructive sleep apnea (ZELALEM) to keep the airways from collapsing when the muscles relax during sleep.  How is CPAP or BPAP administered?  Both CPAP and BPAP are provided by a small machine with a flexible plastic tube that attaches to a plastic mask. You wear the mask. Air is blown through the mask into your nose or mouth. The amount of pressure that is used to blow the air can be adjusted on the machine. Your health care provider will determine the pressure setting that should be used based on your individual needs.  When should CPAP or BPAP be used?  In most cases, the mask only needs to be worn during sleep. Generally, the mask needs to be worn throughout the night and during any daytime naps. People with certain medical conditions may also need to wear the mask at other times when they are awake. Follow instructions from your " health care provider about when to use the machine.  What are some tips for using the mask?    · Because the mask needs to be snug, some people feel trapped or closed-in (claustrophobic) when first using the mask. If you feel this way, you may need to get used to the mask. One way to do this is by holding the mask loosely over your nose or mouth and then gradually applying the mask more snugly. You can also gradually increase the amount of time that you use the mask.  · Masks are available in various types and sizes. Some fit over your mouth and nose while others fit over just your nose. If your mask does not fit well, talk with your health care provider about getting a different one.  · If you are using a mask that fits over your nose and you tend to breathe through your mouth, a chin strap may be applied to help keep your mouth closed.  · The CPAP and BPAP machines have alarms that may sound if the mask comes off or develops a leak.  · If you have trouble with the mask, it is very important that you talk with your health care provider about finding a way to make the mask easier to tolerate. Do not stop using the mask. Stopping the use of the mask could have a negative impact on your health.  What are some tips for using the machine?  · Place your CPAP or BPAP machine on a secure table or stand near an electrical outlet.  · Know where the on/off switch is located on the machine.  · Follow instructions from your health care provider about how to set the pressure on your machine and when you should use it.  · Do not eat or drink while the CPAP or BPAP machine is on. Food or fluids could get pushed into your lungs by the pressure of the CPAP or BPAP.  · Do not smoke. Tobacco smoke residue can damage the machine.  · For home use, CPAP and BPAP machines can be rented or purchased through home health care companies. Many different brands of machines are available. Renting a machine before purchasing may help you find out  which particular machine works well for you.  · Keep the CPAP or BPAP machine and attachments clean. Ask your health care provider for specific instructions.  Get help right away if:  · You have redness or open areas around your nose or mouth where the mask fits.  · You have trouble using the CPAP or BPAP machine.  · You cannot tolerate wearing the CPAP or BPAP mask.  · You have pain, discomfort, and bloating in your abdomen.  Summary  · CPAP and BPAP are methods of helping a person breathe with the use of air pressure.  · Both CPAP and BPAP are provided by a small machine with a flexible plastic tube that attaches to a plastic mask.  · If you have trouble with the mask, it is very important that you talk with your health care provider about finding a way to make the mask easier to tolerate.  This information is not intended to replace advice given to you by your health care provider. Make sure you discuss any questions you have with your health care provider.  Document Released: 09/15/2005 Document Revised: 04/08/2020 Document Reviewed: 11/06/2017  Elsevier Patient Education © 2020 [employer]

## 2022-03-16 ENCOUNTER — TELEPHONE (OUTPATIENT)
Dept: CARDIOLOGY | Facility: MEDICAL CENTER | Age: 77
End: 2022-03-16
Payer: MEDICARE

## 2022-03-16 ENCOUNTER — HOSPITAL ENCOUNTER (OUTPATIENT)
Dept: LAB | Facility: MEDICAL CENTER | Age: 77
End: 2022-03-16
Attending: NURSE PRACTITIONER
Payer: MEDICARE

## 2022-03-16 DIAGNOSIS — Z79.899 HIGH RISK MEDICATION USE: ICD-10-CM

## 2022-03-16 DIAGNOSIS — I51.89 LEFT VENTRICULAR DIASTOLIC DYSFUNCTION, NYHA CLASS 3: ICD-10-CM

## 2022-03-16 DIAGNOSIS — I50.30 ACC/AHA STAGE C HEART FAILURE WITH PRESERVED EJECTION FRACTION (HCC): ICD-10-CM

## 2022-03-16 LAB
ANION GAP SERPL CALC-SCNC: 16 MMOL/L (ref 7–16)
BUN SERPL-MCNC: 80 MG/DL (ref 8–22)
CALCIUM SERPL-MCNC: 9.8 MG/DL (ref 8.5–10.5)
CHLORIDE SERPL-SCNC: 101 MMOL/L (ref 96–112)
CO2 SERPL-SCNC: 21 MMOL/L (ref 20–33)
CREAT SERPL-MCNC: 2.11 MG/DL (ref 0.5–1.4)
GFR SERPLBLD CREATININE-BSD FMLA CKD-EPI: 31 ML/MIN/1.73 M 2
GLUCOSE SERPL-MCNC: 113 MG/DL (ref 65–99)
POTASSIUM SERPL-SCNC: 5 MMOL/L (ref 3.6–5.5)
SODIUM SERPL-SCNC: 138 MMOL/L (ref 135–145)

## 2022-03-16 PROCEDURE — 36415 COLL VENOUS BLD VENIPUNCTURE: CPT

## 2022-03-16 PROCEDURE — 80048 BASIC METABOLIC PNL TOTAL CA: CPT

## 2022-03-16 NOTE — TELEPHONE ENCOUNTER
Called Patient In patient in regards to his non fasting labs. Patient stated that he will have labs done prior to his appointment. Also patient stated that patient Cardiomems is not working I will send message to Catarina JESUS regarding this.

## 2022-03-17 ENCOUNTER — TELEPHONE (OUTPATIENT)
Dept: CARDIOLOGY | Facility: MEDICAL CENTER | Age: 77
End: 2022-03-17
Payer: MEDICARE

## 2022-03-17 DIAGNOSIS — I50.30 ACC/AHA STAGE C HEART FAILURE WITH PRESERVED EJECTION FRACTION (HCC): Primary | ICD-10-CM

## 2022-03-17 NOTE — RESULT ENCOUNTER NOTE
Could you call him and see how he is feeling.  Can you check on fluid intake.  Also, has he been taking torsemide 100 mg daily or has he been taking more.    He has not seen a CardioMEMS mems reading in a while.  His last reading was low.

## 2022-03-17 NOTE — TELEPHONE ENCOUNTER
Patient called back regarding labs, Patient was taking 100/50mg daily of torsemide, per SHIRA patient instructed to stop torsemide for Friday and Saturday this week, restart Sunday at 100mg daily, instructed to increase water intake temporarily. Will follow up tomorrow

## 2022-03-18 ENCOUNTER — HOSPITAL ENCOUNTER (OUTPATIENT)
Dept: CARDIOLOGY | Facility: MEDICAL CENTER | Age: 77
End: 2022-03-18
Attending: INTERNAL MEDICINE
Payer: MEDICARE

## 2022-03-18 ENCOUNTER — PHARMACY VISIT (OUTPATIENT)
Dept: PHARMACY | Facility: MEDICAL CENTER | Age: 77
End: 2022-03-18
Payer: COMMERCIAL

## 2022-03-18 DIAGNOSIS — I50.30 ACC/AHA STAGE C HEART FAILURE WITH PRESERVED EJECTION FRACTION (HCC): ICD-10-CM

## 2022-03-18 DIAGNOSIS — R06.09 DYSPNEA ON EXERTION: ICD-10-CM

## 2022-03-18 LAB
LV EJECT FRACT  99904: 70
LV EJECT FRACT MOD 2C 99903: 61.91
LV EJECT FRACT MOD 4C 99902: 74.81
LV EJECT FRACT MOD BP 99901: 69.4

## 2022-03-18 PROCEDURE — 93306 TTE W/DOPPLER COMPLETE: CPT | Mod: 26 | Performed by: INTERNAL MEDICINE

## 2022-03-18 PROCEDURE — 93306 TTE W/DOPPLER COMPLETE: CPT

## 2022-03-18 PROCEDURE — RXMED WILLOW AMBULATORY MEDICATION CHARGE: Performed by: PHYSICIAN ASSISTANT

## 2022-03-18 NOTE — RESULT ENCOUNTER NOTE
Dear Suni,    Can you please let Sina Oliver know that result is ok and I will see patient as scheduled?    Thank you,  Adriel.

## 2022-03-22 ENCOUNTER — HOSPITAL ENCOUNTER (OUTPATIENT)
Dept: LAB | Facility: MEDICAL CENTER | Age: 77
End: 2022-03-22
Attending: NURSE PRACTITIONER
Payer: MEDICARE

## 2022-03-22 ENCOUNTER — TELEPHONE (OUTPATIENT)
Dept: CARDIOLOGY | Facility: MEDICAL CENTER | Age: 77
End: 2022-03-22
Payer: MEDICARE

## 2022-03-22 DIAGNOSIS — I50.30 ACC/AHA STAGE C HEART FAILURE WITH PRESERVED EJECTION FRACTION (HCC): ICD-10-CM

## 2022-03-22 LAB
ANION GAP SERPL CALC-SCNC: 12 MMOL/L (ref 7–16)
BUN SERPL-MCNC: 37 MG/DL (ref 8–22)
CALCIUM SERPL-MCNC: 9.1 MG/DL (ref 8.5–10.5)
CHLORIDE SERPL-SCNC: 106 MMOL/L (ref 96–112)
CO2 SERPL-SCNC: 19 MMOL/L (ref 20–33)
CREAT SERPL-MCNC: 1.04 MG/DL (ref 0.5–1.4)
FASTING STATUS PATIENT QL REPORTED: NORMAL
GFR SERPLBLD CREATININE-BSD FMLA CKD-EPI: 73 ML/MIN/1.73 M 2
GLUCOSE SERPL-MCNC: 98 MG/DL (ref 65–99)
POTASSIUM SERPL-SCNC: 5.3 MMOL/L (ref 3.6–5.5)
SODIUM SERPL-SCNC: 137 MMOL/L (ref 135–145)

## 2022-03-22 PROCEDURE — 80048 BASIC METABOLIC PNL TOTAL CA: CPT

## 2022-03-22 PROCEDURE — 36415 COLL VENOUS BLD VENIPUNCTURE: CPT

## 2022-03-22 NOTE — TELEPHONE ENCOUNTER
Patient called [facility] line, still unable to send cardiomems readings, patient decreased torsemide as instructed per SHIRA, will complete his labwork today for his follow up with Reyna Tomorrow, patient weight is bouncing between 250 and 255

## 2022-03-23 ENCOUNTER — OFFICE VISIT (OUTPATIENT)
Dept: CARDIOLOGY | Facility: MEDICAL CENTER | Age: 77
End: 2022-03-23
Payer: MEDICARE

## 2022-03-23 VITALS
BODY MASS INDEX: 35.07 KG/M2 | SYSTOLIC BLOOD PRESSURE: 100 MMHG | HEART RATE: 62 BPM | WEIGHT: 264.6 LBS | OXYGEN SATURATION: 96 % | HEIGHT: 73 IN | DIASTOLIC BLOOD PRESSURE: 68 MMHG | RESPIRATION RATE: 14 BRPM

## 2022-03-23 DIAGNOSIS — E66.9 OBESITY (BMI 30-39.9): ICD-10-CM

## 2022-03-23 DIAGNOSIS — I50.30 ACC/AHA STAGE C HEART FAILURE WITH PRESERVED EJECTION FRACTION (HCC): ICD-10-CM

## 2022-03-23 DIAGNOSIS — E78.2 MIXED HYPERLIPIDEMIA: ICD-10-CM

## 2022-03-23 DIAGNOSIS — I48.21 PERMANENT ATRIAL FIBRILLATION (HCC): ICD-10-CM

## 2022-03-23 DIAGNOSIS — Z79.899 HIGH RISK MEDICATION USE: ICD-10-CM

## 2022-03-23 DIAGNOSIS — G47.33 OSA (OBSTRUCTIVE SLEEP APNEA): ICD-10-CM

## 2022-03-23 DIAGNOSIS — I51.89 LEFT VENTRICULAR DIASTOLIC DYSFUNCTION, NYHA CLASS 3: ICD-10-CM

## 2022-03-23 DIAGNOSIS — I10 HTN (HYPERTENSION), MALIGNANT: ICD-10-CM

## 2022-03-23 PROCEDURE — 99214 OFFICE O/P EST MOD 30 MIN: CPT | Performed by: NURSE PRACTITIONER

## 2022-03-23 ASSESSMENT — FIBROSIS 4 INDEX: FIB4 SCORE: 1.91

## 2022-03-23 ASSESSMENT — ENCOUNTER SYMPTOMS
ABDOMINAL PAIN: 0
WEAKNESS: 1
FEVER: 0
CLAUDICATION: 0
MYALGIAS: 0
ORTHOPNEA: 0
SHORTNESS OF BREATH: 1
PALPITATIONS: 0
DIZZINESS: 0
NAUSEA: 1
COUGH: 0
PND: 0
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
## 2022-03-28 ENCOUNTER — APPOINTMENT (RX ONLY)
Dept: URBAN - METROPOLITAN AREA CLINIC 6 | Facility: CLINIC | Age: 77
Setting detail: DERMATOLOGY
End: 2022-03-28

## 2022-03-28 DIAGNOSIS — D22 MELANOCYTIC NEVI: ICD-10-CM

## 2022-03-28 DIAGNOSIS — L82.1 OTHER SEBORRHEIC KERATOSIS: ICD-10-CM

## 2022-03-28 DIAGNOSIS — L81.4 OTHER MELANIN HYPERPIGMENTATION: ICD-10-CM

## 2022-03-28 DIAGNOSIS — Z71.89 OTHER SPECIFIED COUNSELING: ICD-10-CM

## 2022-03-28 DIAGNOSIS — L21.8 OTHER SEBORRHEIC DERMATITIS: ICD-10-CM

## 2022-03-28 DIAGNOSIS — L57.0 ACTINIC KERATOSIS: ICD-10-CM

## 2022-03-28 DIAGNOSIS — Z85.828 PERSONAL HISTORY OF OTHER MALIGNANT NEOPLASM OF SKIN: ICD-10-CM | Status: STABLE

## 2022-03-28 DIAGNOSIS — D18.0 HEMANGIOMA: ICD-10-CM

## 2022-03-28 DIAGNOSIS — Z00.8 ENCOUNTER FOR OTHER GENERAL EXAMINATION: ICD-10-CM

## 2022-03-28 DIAGNOSIS — Z85.820 PERSONAL HISTORY OF MALIGNANT MELANOMA OF SKIN: ICD-10-CM | Status: STABLE

## 2022-03-28 PROBLEM — D18.01 HEMANGIOMA OF SKIN AND SUBCUTANEOUS TISSUE: Status: ACTIVE | Noted: 2022-03-28

## 2022-03-28 PROBLEM — D22.5 MELANOCYTIC NEVI OF TRUNK: Status: ACTIVE | Noted: 2022-03-28

## 2022-03-28 PROCEDURE — ? SUNSCREEN TREATMENT REGIMEN

## 2022-03-28 PROCEDURE — ? REFERRAL CORRESPONDENCE

## 2022-03-28 PROCEDURE — 99213 OFFICE O/P EST LOW 20 MIN: CPT | Mod: 25

## 2022-03-28 PROCEDURE — ? TREATMENT REGIMEN

## 2022-03-28 PROCEDURE — 17000 DESTRUCT PREMALG LESION: CPT

## 2022-03-28 PROCEDURE — ? LIQUID NITROGEN

## 2022-03-28 PROCEDURE — 17003 DESTRUCT PREMALG LES 2-14: CPT

## 2022-03-28 PROCEDURE — ? COUNSELING

## 2022-03-28 ASSESSMENT — LOCATION ZONE DERM
LOCATION ZONE: FACE
LOCATION ZONE: NOSE
LOCATION ZONE: EAR
LOCATION ZONE: TRUNK
LOCATION ZONE: SCALP
LOCATION ZONE: HAND

## 2022-03-28 ASSESSMENT — LOCATION DETAILED DESCRIPTION DERM
LOCATION DETAILED: RIGHT SUPERIOR FOREHEAD
LOCATION DETAILED: LEFT FOREHEAD
LOCATION DETAILED: LEFT RADIAL DORSAL HAND
LOCATION DETAILED: LEFT INFERIOR FOREHEAD
LOCATION DETAILED: LEFT INFERIOR HELIX
LOCATION DETAILED: EPIGASTRIC SKIN
LOCATION DETAILED: STERNUM
LOCATION DETAILED: NASAL DORSUM
LOCATION DETAILED: MID-FRONTAL SCALP
LOCATION DETAILED: RIGHT FOREHEAD
LOCATION DETAILED: RIGHT RADIAL DORSAL HAND
LOCATION DETAILED: LEFT ULNAR DORSAL HAND
LOCATION DETAILED: RIGHT SUPERIOR HELIX
LOCATION DETAILED: SUPERIOR THORACIC SPINE
LOCATION DETAILED: MID-OCCIPITAL SCALP
LOCATION DETAILED: LEFT SUPERIOR HELIX
LOCATION DETAILED: INFERIOR THORACIC SPINE
LOCATION DETAILED: RIGHT MID-UPPER BACK

## 2022-03-28 ASSESSMENT — LOCATION SIMPLE DESCRIPTION DERM
LOCATION SIMPLE: RIGHT HAND
LOCATION SIMPLE: LEFT FOREHEAD
LOCATION SIMPLE: POSTERIOR SCALP
LOCATION SIMPLE: RIGHT UPPER BACK
LOCATION SIMPLE: NOSE
LOCATION SIMPLE: ANTERIOR SCALP
LOCATION SIMPLE: LEFT HAND
LOCATION SIMPLE: LEFT EAR
LOCATION SIMPLE: ABDOMEN
LOCATION SIMPLE: UPPER BACK
LOCATION SIMPLE: RIGHT FOREHEAD
LOCATION SIMPLE: CHEST
LOCATION SIMPLE: RIGHT EAR

## 2022-03-28 NOTE — PROCEDURE: LIQUID NITROGEN
Render Post-Care Instructions In Note?: no
Detail Level: Detailed
Show Applicator Variable?: Yes
Consent: The patient's consent was obtained including but not limited to risks of crusting, scabbing, blistering, scarring, darker or lighter pigmentary change, recurrence, incomplete removal and infection.
Duration Of Freeze Thaw-Cycle (Seconds): 10
Post-Care Instructions: I reviewed with the patient in detail post-care instructions. Patient is to wear sunprotection, and avoid picking at any of the treated lesions. Pt may apply Vaseline to crusted or scabbing areas.
Number Of Freeze-Thaw Cycles: 2 freeze-thaw cycles

## 2022-03-31 ENCOUNTER — HOSPITAL ENCOUNTER (OUTPATIENT)
Dept: LAB | Facility: MEDICAL CENTER | Age: 77
End: 2022-03-31
Attending: INTERNAL MEDICINE
Payer: MEDICARE

## 2022-03-31 ENCOUNTER — TELEPHONE (OUTPATIENT)
Dept: CARDIOLOGY | Facility: MEDICAL CENTER | Age: 77
End: 2022-03-31

## 2022-03-31 LAB — TSH SERPL DL<=0.005 MIU/L-ACNC: 0.63 UIU/ML (ref 0.38–5.33)

## 2022-03-31 PROCEDURE — 86800 THYROGLOBULIN ANTIBODY: CPT

## 2022-03-31 PROCEDURE — 36415 COLL VENOUS BLD VENIPUNCTURE: CPT

## 2022-03-31 PROCEDURE — 84443 ASSAY THYROID STIM HORMONE: CPT

## 2022-03-31 NOTE — TELEPHONE ENCOUNTER
Patient called, concerned his weight is increasing again, torsemide was reduced from 150mg per day to 100mg per day, cardioMEMS pressures remain low, will follow up with his labs tomorrow and check with SHIRA about adjusting diuretics.

## 2022-04-01 ENCOUNTER — TELEPHONE (OUTPATIENT)
Dept: CARDIOLOGY | Facility: MEDICAL CENTER | Age: 77
End: 2022-04-01
Payer: MEDICARE

## 2022-04-01 NOTE — TELEPHONE ENCOUNTER
Weight up to 259 from his low of 250, Per SHIRA patient to increase torsemide to 50mg BID through the weekend and then decrease to 50mg daily with an additional 50mg on MWF, he will get his BMP done either today or this weekend for Reyna. Patient cardioMEMS may need to be recaliberated as cardioMEMS pressures are not matching his symptoms.

## 2022-04-04 ENCOUNTER — HOSPITAL ENCOUNTER (OUTPATIENT)
Dept: RADIOLOGY | Facility: MEDICAL CENTER | Age: 77
End: 2022-04-04
Attending: INTERNAL MEDICINE
Payer: MEDICARE

## 2022-04-04 ENCOUNTER — TELEPHONE (OUTPATIENT)
Dept: CARDIOLOGY | Facility: MEDICAL CENTER | Age: 77
End: 2022-04-04
Payer: MEDICARE

## 2022-04-04 DIAGNOSIS — C73 MALIGNANT NEOPLASM OF THYROID GLAND (HCC): ICD-10-CM

## 2022-04-04 LAB — THYROGLOB AB SERPL-ACNC: 1.5 IU/ML (ref 0–4)

## 2022-04-04 PROCEDURE — 700111 HCHG RX REV CODE 636 W/ 250 OVERRIDE (IP)

## 2022-04-05 ENCOUNTER — HOSPITAL ENCOUNTER (OUTPATIENT)
Dept: RADIOLOGY | Facility: MEDICAL CENTER | Age: 77
End: 2022-04-05
Attending: INTERNAL MEDICINE
Payer: MEDICARE

## 2022-04-05 ENCOUNTER — TELEPHONE (OUTPATIENT)
Dept: HEALTH INFORMATION MANAGEMENT | Facility: OTHER | Age: 77
End: 2022-04-05

## 2022-04-05 PROCEDURE — 700111 HCHG RX REV CODE 636 W/ 250 OVERRIDE (IP)

## 2022-04-05 NOTE — PROGRESS NOTES
Patient arrived for thyrogen injection.  Education provided and questions answered regarding procedure.  Thyrogen 0.9 mg/mL administered to left dorsogluteal site at 1533.  Patient tolerated well.  Patient instructed to return at 1530 on 4/5/22 for 2nd injection.  Received verbal understanding.

## 2022-04-06 ENCOUNTER — HOSPITAL ENCOUNTER (OUTPATIENT)
Dept: RADIOLOGY | Facility: MEDICAL CENTER | Age: 77
End: 2022-04-06
Attending: INTERNAL MEDICINE
Payer: MEDICARE

## 2022-04-06 ENCOUNTER — HOSPITAL ENCOUNTER (OUTPATIENT)
Dept: LAB | Facility: MEDICAL CENTER | Age: 77
End: 2022-04-06
Attending: INTERNAL MEDICINE
Payer: MEDICARE

## 2022-04-06 ENCOUNTER — HOSPITAL ENCOUNTER (OUTPATIENT)
Dept: LAB | Facility: MEDICAL CENTER | Age: 77
End: 2022-04-06
Attending: NURSE PRACTITIONER
Payer: MEDICARE

## 2022-04-06 DIAGNOSIS — I50.30 ACC/AHA STAGE C HEART FAILURE WITH PRESERVED EJECTION FRACTION (HCC): ICD-10-CM

## 2022-04-06 DIAGNOSIS — I51.89 LEFT VENTRICULAR DIASTOLIC DYSFUNCTION, NYHA CLASS 3: ICD-10-CM

## 2022-04-06 LAB
ANION GAP SERPL CALC-SCNC: 13 MMOL/L (ref 7–16)
BUN SERPL-MCNC: 34 MG/DL (ref 8–22)
CALCIUM SERPL-MCNC: 9.5 MG/DL (ref 8.5–10.5)
CHLORIDE SERPL-SCNC: 100 MMOL/L (ref 96–112)
CHOLEST SERPL-MCNC: 111 MG/DL (ref 100–199)
CO2 SERPL-SCNC: 22 MMOL/L (ref 20–33)
CREAT SERPL-MCNC: 1.39 MG/DL (ref 0.5–1.4)
GFR SERPLBLD CREATININE-BSD FMLA CKD-EPI: 52 ML/MIN/1.73 M 2
GLUCOSE SERPL-MCNC: 107 MG/DL (ref 65–99)
HDLC SERPL-MCNC: 30 MG/DL
LDLC SERPL CALC-MCNC: 56 MG/DL
POTASSIUM SERPL-SCNC: 4.2 MMOL/L (ref 3.6–5.5)
SODIUM SERPL-SCNC: 135 MMOL/L (ref 135–145)
TRIGL SERPL-MCNC: 127 MG/DL (ref 0–149)

## 2022-04-06 PROCEDURE — 80048 BASIC METABOLIC PNL TOTAL CA: CPT

## 2022-04-06 PROCEDURE — 36415 COLL VENOUS BLD VENIPUNCTURE: CPT

## 2022-04-06 PROCEDURE — A9517 I131 IODIDE CAP, RX: HCPCS

## 2022-04-06 PROCEDURE — 80061 LIPID PANEL: CPT

## 2022-04-08 ENCOUNTER — TELEPHONE (OUTPATIENT)
Dept: CARDIOLOGY | Facility: MEDICAL CENTER | Age: 77
End: 2022-04-08
Payer: MEDICARE

## 2022-04-08 NOTE — TELEPHONE ENCOUNTER
Patient called weight went from 255 to 260lbs, only taking 50mg daily instead of 50mg one day and 100mg the next as he was instructed to do, instructed patient to begin this alternating dose and we will recheck on Monday

## 2022-04-22 ENCOUNTER — HOSPITAL ENCOUNTER (OUTPATIENT)
Dept: RADIOLOGY | Facility: MEDICAL CENTER | Age: 77
End: 2022-04-22
Attending: INTERNAL MEDICINE
Payer: MEDICARE

## 2022-04-22 DIAGNOSIS — C73 MALIGNANT NEOPLASM OF THYROID GLAND (HCC): ICD-10-CM

## 2022-04-22 PROCEDURE — 78018 THYROID MET IMAGING BODY: CPT

## 2022-04-22 RX ORDER — AMLODIPINE BESYLATE 10 MG/1
10 TABLET ORAL DAILY
Qty: 100 TABLET | Refills: 3 | Status: SHIPPED | OUTPATIENT
Start: 2022-04-22 | End: 2022-04-26 | Stop reason: SDUPTHER

## 2022-04-26 ENCOUNTER — TELEPHONE (OUTPATIENT)
Dept: CARDIOLOGY | Facility: MEDICAL CENTER | Age: 77
End: 2022-04-26
Payer: MEDICARE

## 2022-04-26 NOTE — TELEPHONE ENCOUNTER
Patient called asking about his cardioMEMS pressures, he remains steady around 8-10, he is still taking torsemide 100mg one day and 50mg the next. Patient concerned that he is not dropping weight but hardly eating anything, not feeling swollen, not gaining weight, no increased SOB. He is limiting his calories to around 1000 calories per day and is concerned that he is not losing weight. Patient would like to know if he should get more labs done or have his diuretics adjusted.

## 2022-04-27 RX ORDER — AMLODIPINE BESYLATE 10 MG/1
10 TABLET ORAL DAILY
Qty: 90 TABLET | Refills: 3 | Status: SHIPPED | OUTPATIENT
Start: 2022-04-27 | End: 2023-02-14

## 2022-04-28 ENCOUNTER — HOSPITAL ENCOUNTER (OUTPATIENT)
Dept: LAB | Facility: MEDICAL CENTER | Age: 77
End: 2022-04-28
Attending: INTERNAL MEDICINE
Payer: MEDICARE

## 2022-04-28 PROCEDURE — 36415 COLL VENOUS BLD VENIPUNCTURE: CPT

## 2022-04-28 PROCEDURE — 86800 THYROGLOBULIN ANTIBODY: CPT

## 2022-04-28 PROCEDURE — 84432 ASSAY OF THYROGLOBULIN: CPT

## 2022-04-29 ENCOUNTER — TELEPHONE (OUTPATIENT)
Dept: CARDIOLOGY | Facility: MEDICAL CENTER | Age: 77
End: 2022-04-29
Payer: MEDICARE

## 2022-04-29 DIAGNOSIS — I50.30 ACC/AHA STAGE C HEART FAILURE WITH PRESERVED EJECTION FRACTION (HCC): Primary | ICD-10-CM

## 2022-04-29 NOTE — TELEPHONE ENCOUNTER
Called patient to follow up regarding concern. Per SHIRA no changes to diuretics or labs at the time, ordered BMP and NT proBNP to have done prior to seeing CAROL Lyons with information

## 2022-04-30 LAB — THYROGLOB AB SERPL-ACNC: 1.5 IU/ML (ref 0–4)

## 2022-05-03 LAB
THYROGLOB AB SERPL-ACNC: 1.5 IU/ML (ref 0–4)
THYROGLOB SERPL-MCNC: 31.9 NG/ML (ref 1.3–31.8)
THYROGLOB SERPL-MCNC: ABNORMAL NG/ML (ref 1.3–31.8)

## 2022-05-10 ENCOUNTER — TELEPHONE (OUTPATIENT)
Dept: CARDIOLOGY | Facility: MEDICAL CENTER | Age: 77
End: 2022-05-10

## 2022-05-10 ENCOUNTER — OFFICE VISIT (OUTPATIENT)
Dept: CARDIOLOGY | Facility: MEDICAL CENTER | Age: 77
End: 2022-05-10

## 2022-05-10 ENCOUNTER — HOME STUDY (OUTPATIENT)
Dept: SLEEP MEDICINE | Facility: MEDICAL CENTER | Age: 77
End: 2022-05-10
Attending: STUDENT IN AN ORGANIZED HEALTH CARE EDUCATION/TRAINING PROGRAM
Payer: MEDICARE

## 2022-05-10 VITALS
HEIGHT: 73 IN | BODY MASS INDEX: 34.46 KG/M2 | WEIGHT: 260 LBS | OXYGEN SATURATION: 99 % | HEART RATE: 57 BPM | DIASTOLIC BLOOD PRESSURE: 64 MMHG | RESPIRATION RATE: 12 BRPM | SYSTOLIC BLOOD PRESSURE: 112 MMHG

## 2022-05-10 DIAGNOSIS — I10 ESSENTIAL HYPERTENSION: ICD-10-CM

## 2022-05-10 DIAGNOSIS — E66.01 MORBID OBESITY (HCC): ICD-10-CM

## 2022-05-10 DIAGNOSIS — I51.89 LEFT VENTRICULAR DIASTOLIC DYSFUNCTION, NYHA CLASS 3: ICD-10-CM

## 2022-05-10 DIAGNOSIS — I50.30 ACC/AHA STAGE C HEART FAILURE WITH PRESERVED EJECTION FRACTION (HCC): ICD-10-CM

## 2022-05-10 DIAGNOSIS — I48.21 PERMANENT ATRIAL FIBRILLATION (HCC): ICD-10-CM

## 2022-05-10 DIAGNOSIS — G47.33 OSA ON CPAP: ICD-10-CM

## 2022-05-10 DIAGNOSIS — I48.11 LONGSTANDING PERSISTENT ATRIAL FIBRILLATION (HCC): ICD-10-CM

## 2022-05-10 DIAGNOSIS — R31.0 GROSS HEMATURIA: ICD-10-CM

## 2022-05-10 DIAGNOSIS — G47.34 NOCTURNAL OXYGEN DESATURATION: ICD-10-CM

## 2022-05-10 PROCEDURE — 99215 OFFICE O/P EST HI 40 MIN: CPT | Performed by: INTERNAL MEDICINE

## 2022-05-10 PROCEDURE — 93000 ELECTROCARDIOGRAM COMPLETE: CPT | Performed by: INTERNAL MEDICINE

## 2022-05-10 RX ORDER — TORSEMIDE 100 MG/1
50-100 TABLET ORAL DAILY
Qty: 30 TABLET | Refills: 3 | Status: SHIPPED | OUTPATIENT
Start: 2022-05-10 | End: 2022-08-11 | Stop reason: SDUPTHER

## 2022-05-10 ASSESSMENT — FIBROSIS 4 INDEX: FIB4 SCORE: 1.94

## 2022-05-10 NOTE — TELEPHONE ENCOUNTER
torsemide (DEMADEX) 100 MG Tab 30 Tablet 3/3 2/15/2022     Sig - Route: Take 1 Tablet by mouth every day. - Oral    Patient taking differently: Take 100 mg by mouth every day. 100MG alternating with 150MG        Sent to pharmacy as: Torsemide 100 MG Oral Tablet (DEMADEX)    E-Prescribing Status: Receipt confirmed by pharmacy (2/15/2022  1:38 PM PST)    Pharmacy    [id number] - [place], [place] - [address] AT [facility] [address]     Per Telephone encounter 4/26/22- from Catarina Ervin R.N.3:57 PM  Note  Patient called asking about his cardioMEMS pressures, he remains steady around 8-10, he is still taking torsemide 100mg one day and 50mg the next. Patient concerned that he is not dropping weight but hardly eating anything, not feeling swollen, not gaining weight, no increased SOB. He is limiting his calories to around 1000 calories per day and is concerned that he is not losing weight. Patient would like to know if he should get more labs done or have his diuretics adjusted.

## 2022-05-10 NOTE — TELEPHONE ENCOUNTER
Called patient regarding refill request, patient states he has been taking 100mg then 150mg torsemide every other day and not 50/100mg every other day like he had previously stated. Medication refilled to [facility] and patient updated on proper medication instructions

## 2022-05-10 NOTE — TELEPHONE ENCOUNTER
Called and spoke with patient and his wife. Patient is requesting to schedule this procedure in September. I will call him once I am able to offer him a date. Patient has been added to the watchman list.
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
## 2022-05-10 NOTE — TELEPHONE ENCOUNTER
Called and spoke with patients and his wife. We are looking at August at this point. Patient is requesting to schedule this procedure in September. I have added his to the watchman list. I will call him as soon as I have a date to offer him.

## 2022-05-10 NOTE — TELEPHONE ENCOUNTER
----- Message from Satnam Rivera M.D. sent at 5/10/2022  2:15 PM PDT -----  Let's schedule next available WATCHMAN

## 2022-05-10 NOTE — TELEPHONE ENCOUNTER
Patient stopped by the office and is needing his Torsemide sent to [facility]. Looks like he takes 100mg alternating to 150mg every other day.

## 2022-05-11 PROCEDURE — 94762 N-INVAS EAR/PLS OXIMTRY CONT: CPT | Performed by: STUDENT IN AN ORGANIZED HEALTH CARE EDUCATION/TRAINING PROGRAM

## 2022-05-11 NOTE — PROCEDURES
Overnight Pulse Oximetry    Interpretation:    This overnight oximetry was recorded on 05/11/2022 with the patient on CPAP 5-15cm and supplemental oxygen 2L/min.  The analysis duration was 4hrs 18min. 26 total oximetric events occurred encompassing 13.7 minutes .  The average event duration was  31.7 seconds .  The saturations were less than 90% for 48.1% of the recording.  Basal oxygen saturation of 89.7%. The terry saturation was 79% .  The patient spent 33 minutes with saturations < 88%.  Overall, this continuous nocturnal oximetry demonstrates Nocturnal hypoxia while on CPAP 5-15cm and supplemental oxygen 2L/min.    Recommendation:  Nocturnal hypoxia while on CPAP and supplemental oxygen. May benefit from increasing supplemental oxygen to 3 L/min.   Clinical correlation is needed.

## 2022-05-12 NOTE — TELEPHONE ENCOUNTER
Please let him know that he may need to discuss with his PCP or nutritionist.    He can repeat his BMP prior to his appointment with Dr. Easton in July.

## 2022-05-16 ENCOUNTER — TELEPHONE (OUTPATIENT)
Dept: CARDIOLOGY | Facility: MEDICAL CENTER | Age: 77
End: 2022-05-16
Payer: MEDICARE

## 2022-05-16 ENCOUNTER — PATIENT MESSAGE (OUTPATIENT)
Dept: CARDIOLOGY | Facility: MEDICAL CENTER | Age: 77
End: 2022-05-16
Payer: MEDICARE

## 2022-05-16 DIAGNOSIS — I50.30 ACC/AHA STAGE C HEART FAILURE WITH PRESERVED EJECTION FRACTION (HCC): ICD-10-CM

## 2022-05-16 DIAGNOSIS — Z79.899 HIGH RISK MEDICATION USE: ICD-10-CM

## 2022-05-16 DIAGNOSIS — Z95.818 PRESENCE OF CARDIOMEMS HF SYSTEM: ICD-10-CM

## 2022-05-16 RX ORDER — METOLAZONE 2.5 MG/1
2.5 TABLET ORAL
Qty: 30 TABLET | Refills: 3 | Status: SHIPPED | OUTPATIENT
Start: 2022-05-16 | End: 2023-02-14

## 2022-05-16 NOTE — TELEPHONE ENCOUNTER
"Satnam Rivera M.D.  You 4 minutes ago (2:29 PM)       Yes, please send in order    Message text       You  Satnam Rivera M.D. 29 minutes ago (2:04 PM)         To SS: Order eliquis 2.5 MG BID per your note?     \"Will initiate 2.5 BID of eliquis in interim\"    Message text      "

## 2022-05-16 NOTE — PROGRESS NOTES
Okay to add metolazone 2.5 mg as needed.  He can take a dose today.  Please have him increase his potassium to 40 mEq twice a day with his metolazone    BMP in 1 week    Also, I will order an echocardiogram to see if we can recalibrate his CardioMEMS as it seems off.

## 2022-05-16 NOTE — TELEPHONE ENCOUNTER
Weight has gone up 10lbs in 7 days, legs have swollen up significantly, SOB increasing, has decreased lasix from 100mg/150mg per day alternating to 100mg/50mg alternating. CardioMEMS pressures currently 11

## 2022-05-23 ENCOUNTER — PATIENT MESSAGE (OUTPATIENT)
Dept: MEDICAL GROUP | Facility: MEDICAL CENTER | Age: 77
End: 2022-05-23
Payer: MEDICARE

## 2022-05-23 DIAGNOSIS — I48.11 LONGSTANDING PERSISTENT ATRIAL FIBRILLATION (HCC): ICD-10-CM

## 2022-05-23 DIAGNOSIS — I50.30 ACC/AHA STAGE C HEART FAILURE WITH PRESERVED EJECTION FRACTION (HCC): ICD-10-CM

## 2022-05-23 DIAGNOSIS — E78.5 HYPERLIPIDEMIA, UNSPECIFIED HYPERLIPIDEMIA TYPE: ICD-10-CM

## 2022-05-23 DIAGNOSIS — I10 ESSENTIAL HYPERTENSION: ICD-10-CM

## 2022-05-24 PROBLEM — D68.318 HEMORRHAGIC DISORDER DUE TO CIRCULATING ANTICOAGULANTS (HCC): Status: RESOLVED | Noted: 2021-07-27 | Resolved: 2022-05-24

## 2022-05-24 PROBLEM — I77.9 DISORDER OF ARTERIES AND ARTERIOLES (HCC): Status: RESOLVED | Noted: 2022-02-07 | Resolved: 2022-05-24

## 2022-05-24 PROBLEM — E66.01 MORBID OBESITY (HCC): Status: RESOLVED | Noted: 2022-02-07 | Resolved: 2022-05-24

## 2022-05-24 PROBLEM — E66.9 OBESITY (BMI 30-39.9): Status: RESOLVED | Noted: 2018-03-22 | Resolved: 2022-05-24

## 2022-05-26 ENCOUNTER — HOSPITAL ENCOUNTER (OUTPATIENT)
Dept: LAB | Facility: MEDICAL CENTER | Age: 77
End: 2022-05-26
Attending: NURSE PRACTITIONER
Payer: MEDICARE

## 2022-05-26 DIAGNOSIS — Z79.899 HIGH RISK MEDICATION USE: ICD-10-CM

## 2022-05-26 DIAGNOSIS — I50.30 ACC/AHA STAGE C HEART FAILURE WITH PRESERVED EJECTION FRACTION (HCC): ICD-10-CM

## 2022-05-26 LAB
ANION GAP SERPL CALC-SCNC: 17 MMOL/L (ref 7–16)
BUN SERPL-MCNC: 58 MG/DL (ref 8–22)
CALCIUM SERPL-MCNC: 9.7 MG/DL (ref 8.5–10.5)
CHLORIDE SERPL-SCNC: 99 MMOL/L (ref 96–112)
CO2 SERPL-SCNC: 24 MMOL/L (ref 20–33)
CREAT SERPL-MCNC: 2.2 MG/DL (ref 0.5–1.4)
GFR SERPLBLD CREATININE-BSD FMLA CKD-EPI: 30 ML/MIN/1.73 M 2
GLUCOSE SERPL-MCNC: 166 MG/DL (ref 65–99)
NT-PROBNP SERPL IA-MCNC: 1690 PG/ML (ref 0–125)
POTASSIUM SERPL-SCNC: 4.6 MMOL/L (ref 3.6–5.5)
SODIUM SERPL-SCNC: 140 MMOL/L (ref 135–145)

## 2022-05-26 PROCEDURE — 36415 COLL VENOUS BLD VENIPUNCTURE: CPT

## 2022-05-26 PROCEDURE — 80048 BASIC METABOLIC PNL TOTAL CA: CPT

## 2022-05-26 PROCEDURE — 83880 ASSAY OF NATRIURETIC PEPTIDE: CPT

## 2022-05-27 ENCOUNTER — TELEPHONE (OUTPATIENT)
Dept: CARDIOLOGY | Facility: MEDICAL CENTER | Age: 77
End: 2022-05-27
Payer: MEDICARE

## 2022-05-27 DIAGNOSIS — Z79.899 HIGH RISK MEDICATION USE: ICD-10-CM

## 2022-05-27 DIAGNOSIS — I50.30 ACC/AHA STAGE C HEART FAILURE WITH PRESERVED EJECTION FRACTION (HCC): ICD-10-CM

## 2022-05-28 NOTE — TELEPHONE ENCOUNTER
----- Message from Tammy Lopez R.N. sent at 5/27/2022  9:11 AM PDT -----  To [facility] -please advise

## 2022-05-28 NOTE — TELEPHONE ENCOUNTER
Called patient to discuss lab results. Spoke to patient.    Pt had eaten a couple of salty meals and had weight gain. He has taken metolazone 2.5 mg daily for 7 days and last dose was about 2 days ago.     Clarifications of his diuretics was discussed. He takes Furosemide 100 mg alternating with 150 mg DAILY. He does not take this twice a day.     He has SOB with exertion and Dizziness.     Home weights are down to 248 lbs.     CardioMEMs reading today is 5 mmHg.     Discussed with patient likely the metolazone caused some kidney insufficiency.  Now that patient is back on his regular furosemide, Discussed repeating BMP and NTproBNP in the next 1-2 weeks

## 2022-06-01 ENCOUNTER — TELEPHONE (OUTPATIENT)
Dept: CARDIOLOGY | Facility: MEDICAL CENTER | Age: 77
End: 2022-06-01
Payer: MEDICARE

## 2022-06-01 NOTE — TELEPHONE ENCOUNTER
Reached out to patient to see if Echo has been scheduled for recalibaration of cardioMEMS  Device. No one has called patient and scheduling needs to be coordinated with [employer] Rep. Sent message to Reps to coordinate with Dr. Easton for recaliberation, scheduling of echos is currently booked out to September 6th

## 2022-06-02 RX ORDER — OLMESARTAN MEDOXOMIL 40 MG/1
40 TABLET ORAL DAILY
Qty: 100 TABLET | Refills: 3 | Status: SHIPPED | OUTPATIENT
Start: 2022-06-02 | End: 2023-02-28

## 2022-06-03 ENCOUNTER — HOSPITAL ENCOUNTER (OUTPATIENT)
Dept: LAB | Facility: MEDICAL CENTER | Age: 77
End: 2022-06-03
Attending: NURSE PRACTITIONER
Payer: MEDICARE

## 2022-06-03 ENCOUNTER — TELEPHONE (OUTPATIENT)
Dept: CARDIOLOGY | Facility: MEDICAL CENTER | Age: 77
End: 2022-06-03

## 2022-06-03 DIAGNOSIS — Z79.899 HIGH RISK MEDICATION USE: ICD-10-CM

## 2022-06-03 DIAGNOSIS — I50.30 ACC/AHA STAGE C HEART FAILURE WITH PRESERVED EJECTION FRACTION (HCC): ICD-10-CM

## 2022-06-03 LAB
ANION GAP SERPL CALC-SCNC: 12 MMOL/L (ref 7–16)
BUN SERPL-MCNC: 66 MG/DL (ref 8–22)
CALCIUM SERPL-MCNC: 9.6 MG/DL (ref 8.5–10.5)
CHLORIDE SERPL-SCNC: 101 MMOL/L (ref 96–112)
CO2 SERPL-SCNC: 27 MMOL/L (ref 20–33)
CREAT SERPL-MCNC: 1.59 MG/DL (ref 0.5–1.4)
GFR SERPLBLD CREATININE-BSD FMLA CKD-EPI: 44 ML/MIN/1.73 M 2
GLUCOSE SERPL-MCNC: 99 MG/DL (ref 65–99)
NT-PROBNP SERPL IA-MCNC: 2161 PG/ML (ref 0–125)
POTASSIUM SERPL-SCNC: 3.9 MMOL/L (ref 3.6–5.5)
SODIUM SERPL-SCNC: 140 MMOL/L (ref 135–145)

## 2022-06-03 PROCEDURE — 36415 COLL VENOUS BLD VENIPUNCTURE: CPT

## 2022-06-03 PROCEDURE — 80048 BASIC METABOLIC PNL TOTAL CA: CPT

## 2022-06-03 PROCEDURE — 83880 ASSAY OF NATRIURETIC PEPTIDE: CPT

## 2022-06-06 NOTE — TELEPHONE ENCOUNTER
S/w pt, has been taking furosemide for 50mg dose and torsemide for 100mg dose. Advised he should not be taking furosemide at all. Only torsemide, current prescription should last 45 days at a time since only taking a whole tablet every other day.

## 2022-06-17 LAB — EKG IMPRESSION: NORMAL

## 2022-06-20 ENCOUNTER — TELEPHONE (OUTPATIENT)
Dept: CARDIOLOGY | Facility: MEDICAL CENTER | Age: 77
End: 2022-06-20
Payer: MEDICARE

## 2022-06-21 NOTE — TELEPHONE ENCOUNTER
----- Message from Barbara Morel sent at 6/20/2022  2:23 PM PDT -----  Regarding: LVM on pump line  Hello,this pt Sina Oliver LVM on the PUMP line yesterday stating that he is going on a trip and not taking his Cardio Mems with him. He said he would be gone about 3 weeks and to not be concerned if there isn't a signal from it. It looks like he saw Dr. Nicole bergman.

## 2022-06-21 NOTE — TELEPHONE ENCOUNTER
To MD AUDRA pt of yours is going on vacation for 3 weeks and will NOT be taking his cardiomems with him and to not be concerned if there isn't signal.  Please advise

## 2022-06-21 NOTE — TELEPHONE ENCOUNTER
Patient Call  Kimmy Easton M.D.  You 5 hours ago (10:05 AM)     Nothing we could do with that for now.     Kimmy Easton M.D.    Message text       Noted MD recommendations.

## 2022-06-30 ENCOUNTER — TELEPHONE (OUTPATIENT)
Dept: CARDIOLOGY | Facility: MEDICAL CENTER | Age: 77
End: 2022-06-30
Payer: MEDICARE

## 2022-06-30 DIAGNOSIS — I50.30 ACC/AHA STAGE C HEART FAILURE WITH PRESERVED EJECTION FRACTION (HCC): ICD-10-CM

## 2022-06-30 RX ORDER — POTASSIUM CHLORIDE 20 MEQ/1
40 TABLET, EXTENDED RELEASE ORAL DAILY
Qty: 90 TABLET | Refills: 0 | Status: SHIPPED | OUTPATIENT
Start: 2022-06-30 | End: 2022-10-27

## 2022-06-30 NOTE — TELEPHONE ENCOUNTER
"Phone Number Called: [phone]    Call outcome: Spoke to patient regarding message below.    Message: Patient called the PUMP line stating that he needs an emergency refill on his medication. Potassium pills, he said. He is on a trip and did not pack his medication accidentally. It looks like he saw Dr. Nicole bergman.    Prescription sent to patient requested pharmacy based on last note with SHIRA. 3/23/2022 \"-Decrease potassium to 40 mEq daily\"  "

## 2022-07-06 NOTE — TELEPHONE ENCOUNTER
LM on patient's voicemail requesting a call back to schedule the watchman procedure in September. Time held on 9-8-22

## 2022-07-08 NOTE — TELEPHONE ENCOUNTER
Patient scheduled for watchman w/SUSAN on 9-8-22 with Dr. Rivera. Patient has been instructed to check in at 6:30 for 8:30 case time. Message sent to authorizations. Watchman rep aware of this date.

## 2022-07-12 ENCOUNTER — TELEPHONE (OUTPATIENT)
Dept: MEDICAL GROUP | Facility: MEDICAL CENTER | Age: 77
End: 2022-07-12
Payer: MEDICARE

## 2022-07-12 ENCOUNTER — APPOINTMENT (OUTPATIENT)
Dept: CARDIOLOGY | Facility: MEDICAL CENTER | Age: 77
End: 2022-07-12
Attending: NURSE PRACTITIONER
Payer: MEDICARE

## 2022-07-12 DIAGNOSIS — I48.11 LONGSTANDING PERSISTENT ATRIAL FIBRILLATION (HCC): ICD-10-CM

## 2022-07-12 DIAGNOSIS — D68.69 SECONDARY HYPERCOAGULABLE STATE (HCC): ICD-10-CM

## 2022-07-12 DIAGNOSIS — I10 ESSENTIAL HYPERTENSION: ICD-10-CM

## 2022-07-12 DIAGNOSIS — E78.5 HYPERLIPIDEMIA, UNSPECIFIED HYPERLIPIDEMIA TYPE: ICD-10-CM

## 2022-07-12 DIAGNOSIS — I50.30 ACC/AHA STAGE C HEART FAILURE WITH PRESERVED EJECTION FRACTION (HCC): ICD-10-CM

## 2022-07-14 ENCOUNTER — TELEPHONE (OUTPATIENT)
Dept: CARDIOLOGY | Facility: MEDICAL CENTER | Age: 77
End: 2022-07-14
Payer: MEDICARE

## 2022-07-21 ENCOUNTER — TELEPHONE (OUTPATIENT)
Dept: CARDIOLOGY | Facility: MEDICAL CENTER | Age: 77
End: 2022-07-21
Payer: MEDICARE

## 2022-07-21 NOTE — TELEPHONE ENCOUNTER
Pt called the pump line. He asks what he needs to do to calibrate his cardiomems.     S/w Reyna RAMIREZ in office. She explains that pt needs to schedule echo here at [facility] and then coordinate this with the [employer] cardiomems tech to calibrate. Wife and pt state understanding and will call image scheduling now to arrange.     We will also request recent ER admission records from hospital in [place]. Records request sent to:   [facility] in [place]   Phone [phone]  Fax: [phone]

## 2022-07-22 ENCOUNTER — PATIENT MESSAGE (OUTPATIENT)
Dept: CARDIOLOGY | Facility: MEDICAL CENTER | Age: 77
End: 2022-07-22
Payer: MEDICARE

## 2022-07-22 DIAGNOSIS — Z79.899 HIGH RISK MEDICATION USE: ICD-10-CM

## 2022-07-22 DIAGNOSIS — I50.30 ACC/AHA STAGE C HEART FAILURE WITH PRESERVED EJECTION FRACTION (HCC): ICD-10-CM

## 2022-07-22 DIAGNOSIS — Z95.818 PRESENCE OF CARDIOMEMS HF SYSTEM: ICD-10-CM

## 2022-07-22 NOTE — PATIENT COMMUNICATION
Schedule an Imaging Appointment   BIMAL Ramirez.  You 11 minutes ago (2:49 PM)     Looks like pt was scheduled sooner but then the echo was cancelled. Can't justify using a STAT echo spot at this time. Will look into another option.    Message text       S/w APRN who advised to re-order echo as URGENT.      Task deferred to [facility] PAR to schedule for urgent echo as requested via staff message.  Awaiting response.

## 2022-07-22 NOTE — PATIENT COMMUNICATION
To ASHLEY SILVA soonest echo availability is 10/31, pt currently scheduled. pt states she will need to coordinate with [employer] to calibrate cardiomems.  Please advise if echo needs to be completed sooner.  If so, I can reorder it at STAT or URGENT, thank you

## 2022-07-27 ENCOUNTER — HOSPITAL ENCOUNTER (OUTPATIENT)
Facility: MEDICAL CENTER | Age: 77
End: 2022-07-27
Attending: INTERNAL MEDICINE | Admitting: INTERNAL MEDICINE
Payer: MEDICARE

## 2022-07-27 ENCOUNTER — HOSPITAL ENCOUNTER (OUTPATIENT)
Dept: LAB | Facility: MEDICAL CENTER | Age: 77
End: 2022-07-27
Attending: INTERNAL MEDICINE
Payer: MEDICARE

## 2022-07-27 PROCEDURE — 84432 ASSAY OF THYROGLOBULIN: CPT

## 2022-07-27 PROCEDURE — 36415 COLL VENOUS BLD VENIPUNCTURE: CPT

## 2022-07-27 PROCEDURE — 86800 THYROGLOBULIN ANTIBODY: CPT

## 2022-07-27 PROCEDURE — 84443 ASSAY THYROID STIM HORMONE: CPT

## 2022-07-28 LAB — TSH SERPL DL<=0.005 MIU/L-ACNC: 1.43 UIU/ML (ref 0.38–5.33)

## 2022-07-29 LAB
THYROGLOB AB SERPL-ACNC: 3.9 IU/ML (ref 0–4)
THYROGLOB SERPL-MCNC: 29.7 NG/ML (ref 1.3–31.8)
THYROGLOB SERPL-MCNC: NORMAL NG/ML (ref 1.3–31.8)

## 2022-08-11 DIAGNOSIS — I48.21 PERMANENT ATRIAL FIBRILLATION (HCC): ICD-10-CM

## 2022-08-11 DIAGNOSIS — I50.30 ACC/AHA STAGE C HEART FAILURE WITH PRESERVED EJECTION FRACTION (HCC): ICD-10-CM

## 2022-08-11 DIAGNOSIS — I51.89 LEFT VENTRICULAR DIASTOLIC DYSFUNCTION, NYHA CLASS 3: ICD-10-CM

## 2022-08-11 NOTE — TELEPHONE ENCOUNTER
Is the patient due for a refill? Yes, plan requires a 100 day supply.     Was the patient seen the past year? Yes    Date of last office visit: 05/10/2022    Does the patient have an upcoming appointment?  No      Provider to refill:SS    Does the patients insurance require a 100 day supply?  Yes

## 2022-08-12 RX ORDER — TORSEMIDE 100 MG/1
50-100 TABLET ORAL DAILY
Qty: 100 TABLET | Refills: 3 | Status: SHIPPED | OUTPATIENT
Start: 2022-08-12 | End: 2022-09-14 | Stop reason: SDUPTHER

## 2022-08-12 RX ORDER — CYCLOBENZAPRINE HCL 10 MG
10 TABLET ORAL 2 TIMES DAILY PRN
Qty: 180 TABLET | Refills: 0 | Status: SHIPPED | OUTPATIENT
Start: 2022-08-12 | End: 2023-02-23 | Stop reason: SDUPTHER

## 2022-08-15 DIAGNOSIS — I48.21 PERMANENT ATRIAL FIBRILLATION (HCC): ICD-10-CM

## 2022-08-15 DIAGNOSIS — I51.89 LEFT VENTRICULAR DIASTOLIC DYSFUNCTION, NYHA CLASS 3: ICD-10-CM

## 2022-08-15 DIAGNOSIS — I50.30 ACC/AHA STAGE C HEART FAILURE WITH PRESERVED EJECTION FRACTION (HCC): ICD-10-CM

## 2022-08-15 NOTE — TELEPHONE ENCOUNTER
Is the patient due for a refill? No    Was the patient seen the past year? Yes    Date of last office visit: 1/13/22    Does the patient have an upcoming appointment?  No       Provider to refill:TT    Does the patients insurance require a 100 day supply?  Yes    torsemide (DEMADEX) 100 MG Tab 100 Tablet 3/3 8/12/2022     Sig - Route: Take 0.5-1 Tablets by mouth every day. Alternate 50mg one day and 100mg the next day. - Oral    Sent to pharmacy as: Torsemide 100 MG Oral Tablet (DEMADEX)    Cosign for Ordering: Required by ANA LUISA Vasquez-Prescribing Status: Receipt confirmed by pharmacy (8/12/2022 12:41 PM PDT)

## 2022-08-16 ENCOUNTER — NON-PROVIDER VISIT (OUTPATIENT)
Dept: INTERNAL MEDICINE | Facility: OTHER | Age: 77
End: 2022-08-16
Payer: MEDICARE

## 2022-08-16 ENCOUNTER — OFFICE VISIT (OUTPATIENT)
Dept: SLEEP MEDICINE | Facility: MEDICAL CENTER | Age: 77
End: 2022-08-16
Payer: MEDICARE

## 2022-08-16 VITALS — BODY MASS INDEX: 31.7 KG/M2 | WEIGHT: 247 LBS | HEIGHT: 74 IN

## 2022-08-16 VITALS
RESPIRATION RATE: 16 BRPM | DIASTOLIC BLOOD PRESSURE: 60 MMHG | SYSTOLIC BLOOD PRESSURE: 106 MMHG | WEIGHT: 247 LBS | OXYGEN SATURATION: 93 % | BODY MASS INDEX: 31.7 KG/M2 | HEART RATE: 61 BPM | HEIGHT: 74 IN

## 2022-08-16 DIAGNOSIS — E78.5 HYPERLIPIDEMIA, UNSPECIFIED HYPERLIPIDEMIA TYPE: ICD-10-CM

## 2022-08-16 DIAGNOSIS — I70.0 AORTO-ILIAC ATHEROSCLEROSIS (HCC): ICD-10-CM

## 2022-08-16 DIAGNOSIS — G47.33 OSA ON CPAP: ICD-10-CM

## 2022-08-16 DIAGNOSIS — R73.03 PRE-DIABETES: ICD-10-CM

## 2022-08-16 DIAGNOSIS — I48.11 LONGSTANDING PERSISTENT ATRIAL FIBRILLATION (HCC): ICD-10-CM

## 2022-08-16 DIAGNOSIS — I50.30 ACC/AHA STAGE C HEART FAILURE WITH PRESERVED EJECTION FRACTION (HCC): ICD-10-CM

## 2022-08-16 DIAGNOSIS — I70.8 AORTO-ILIAC ATHEROSCLEROSIS (HCC): ICD-10-CM

## 2022-08-16 DIAGNOSIS — E66.9 CLASS 1 OBESITY WITH SERIOUS COMORBIDITY AND BODY MASS INDEX (BMI) OF 31.0 TO 31.9 IN ADULT, UNSPECIFIED OBESITY TYPE: ICD-10-CM

## 2022-08-16 PROCEDURE — 99214 OFFICE O/P EST MOD 30 MIN: CPT | Performed by: PREVENTIVE MEDICINE

## 2022-08-16 PROCEDURE — 97802 MEDICAL NUTRITION INDIV IN: CPT | Performed by: DIETITIAN, REGISTERED

## 2022-08-16 ASSESSMENT — FIBROSIS 4 INDEX
FIB4 SCORE: 1.94
FIB4 SCORE: 1.94
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
## 2022-08-16 NOTE — PATIENT INSTRUCTIONS
I will begin incorporating foods I have avoided and continue to self-analyze for overall sodium intake for CHF  - aim for 1500 mg sodium/day    Utilize [employer] as needed in analysis    Aim for plant-based proteins with GFR=44

## 2022-08-16 NOTE — PROGRESS NOTES
"Sina Oliver is a 77 y.o. 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- which he could not tolerate; GFR @ 44    Weight History:  in early young adult years; wants to get his muscle back down, but knows other health issues are priority    My Health Profile:    PHYSICAL ASSESSMENT  Current Height:  74\"  Ht Readings from Last 1 Encounters:   08/16/22 1.88 m (6' 2\")     Current Weight:  247#  Wt Readings from Last 1 Encounters:   08/16/22 112 kg (247 lb)     BMI:  31    Goal Weight:  <200#    FLUID INTAKE: monitoring fluids  Typical Beverages: water, tonic+cranberry juice  Servings Alcohol/D/WK/MO:     ACTIVITY REVIEW: limited  Current Exercise:   Hobbies: Body Building    PERTINENT LABS:    Latest Reference Range & Units 5/26/22 15:32 6/3/22 12:48   Anion Gap 7.0 - 16.0  17.0 (H) 12.0   Glucose 65 - 99 mg/dL 166 (H) 99   Bun 8 - 22 mg/dL 58 (H) 66 (H)   Creatinine 0.50 - 1.40 mg/dL 2.20 (H) 1.59 (H)   GFR (CKD-EPI) >60 mL/min/1.73 m 2 30 ! 44 !      Latest Reference Range & Units 5/26/22 15:32 6/3/22 12:48   NT-proBNP 0 - 125 pg/mL 1690 (H) 2161 (H)     Patient Behaviors (indicate frequency)  Meal/Snack Pattern: sleeps 10 hours per day, reads newspaper  B: oatmeal, 2 eggs, fruit  Soda water + quinine  Snack: protein drink +milk and malt, 30 gram protein  Supper: 1700 mg sodium, 600-700 mg, not over 1300 mg  Beef Tacos, black beans, corn, salsa, cheese, LS tortilla chips  No dessert    Monitor body weight to stay within 240-244#; if goes over, Sina reduces diet to 600-700 mg    Dines away from Home: occ   Locations:  [place]  Who lives in home: wife, self  Additional Patient Behavior Information: , vigorous PA    PES: Obesity, prediabetes, CHF r/t poor diet choices prior to CHF with frequent " eating out, high sodium intake, added sugars and limited fiber/vegetable intake as evidenced by EF 50%, BMI 31     NUTRITION COMMENTS:  Sina continues to work as a , but limited in what he can do with current health issues.    Shaniqua-wife is     House in [place], son/daughter live there.Visits frequently.    Went to reunion in [place]; dizzy and rushed to hospital for fluid   Cardiologist in [place] changed meds and timing of meds   Pulmonologist reviewed and CPAP dialed in    Diet was not healthy prior to CHF, currently Sina is diligent in monitoring his heart parameters.    Follow GFR and CKD parameters with protein intake.    RTC x 3 weeks    Time Spent: 60 minutes

## 2022-08-17 RX ORDER — TORSEMIDE 100 MG/1
50-100 TABLET ORAL DAILY
Qty: 100 TABLET | Refills: 1 | OUTPATIENT
Start: 2022-08-17

## 2022-08-24 NOTE — TELEPHONE ENCOUNTER
Recvd call from Shaniqua (patient's wife) this patient would like to cancel this procedure at this time. He will call me back if he decides to reschedule. Cancelled case with Ann in cath lab scheduling.

## 2022-08-29 ENCOUNTER — TELEPHONE (OUTPATIENT)
Dept: CARDIOLOGY | Facility: MEDICAL CENTER | Age: 77
End: 2022-08-29
Payer: MEDICARE

## 2022-08-29 NOTE — TELEPHONE ENCOUNTER
Called patient to discuss cancelled echo.  Left message on patient's mobile number.  No answer on home phone.  Did call patient's wife Shaniqua.  Patient currently in [place].  They canceled echo because he was not going to be in town.    Patient currently is being followed by Dr. Vásquez.    Wife would like to reschedule echocardiogram so we can get patient's CardioMEMS recalibrated.    She reports Thaddeus is doing well.  He has lost some weight.    Discussed with wife to further discuss with Dr. Vásquez's office whether or not he can manage CardioMEMs or if they would like us to continue to manage CardioMEMs.

## 2022-08-31 ENCOUNTER — APPOINTMENT (OUTPATIENT)
Dept: CARDIOLOGY | Facility: MEDICAL CENTER | Age: 77
End: 2022-08-31
Attending: NURSE PRACTITIONER
Payer: MEDICARE

## 2022-09-02 NOTE — PROCEDURE: LIQUID NITROGEN
9/2/2022       RE: Donna Oliveira  [address] [place] [address]     Dear Colleague,    Thank you for referring your patient, Donna Oliveira, to the [facility] [place] [facility]. Please see a copy of my visit note below.    Donna Oliveira comes into clinic today at the request of MEET Griffiths, Ordering Provider for urethral dilation.     Patient's urethra was cleansed with an antiseptic towelette. Urethra was slowly dilated with 24, 26 and a 28 Telugu sound. Patient tolerated procedure well.     This service provided today was under the supervising provider of the day Dr. Gomez, who was available if needed     Charleen Samuel, CARMELITA Gomez MD      
Consent: The patient's consent was obtained including but not limited to risks of crusting, scabbing, blistering, scarring, darker or lighter pigmentary change, recurrence, incomplete removal and infection.
Render Note In Bullet Format When Appropriate: No
Duration Of Freeze Thaw-Cycle (Seconds): 5
Number Of Freeze-Thaw Cycles: 2 freeze-thaw cycles
Post-Care Instructions: I reviewed with the patient in detail post-care instructions. Patient is to wear sunprotection, and avoid picking at any of the treated lesions. Pt may apply Vaseline to crusted or scabbing areas.
Detail Level: Detailed
Show Applicator Variable?: Yes

## 2022-09-07 ENCOUNTER — APPOINTMENT (OUTPATIENT)
Dept: ADMISSIONS | Facility: MEDICAL CENTER | Age: 77
End: 2022-09-07
Payer: MEDICARE

## 2022-09-08 ENCOUNTER — APPOINTMENT (OUTPATIENT)
Dept: CARDIOLOGY | Facility: MEDICAL CENTER | Age: 77
End: 2022-09-08
Attending: INTERNAL MEDICINE
Payer: MEDICARE

## 2022-09-14 DIAGNOSIS — I48.21 PERMANENT ATRIAL FIBRILLATION (HCC): ICD-10-CM

## 2022-09-14 DIAGNOSIS — I51.89 LEFT VENTRICULAR DIASTOLIC DYSFUNCTION, NYHA CLASS 3: ICD-10-CM

## 2022-09-14 DIAGNOSIS — I50.30 ACC/AHA STAGE C HEART FAILURE WITH PRESERVED EJECTION FRACTION (HCC): ICD-10-CM

## 2022-09-14 NOTE — TELEPHONE ENCOUNTER
Patient is out of  town running out of  this medication. He says he only needs 10 to help get him by until his return. Please call him at this number. [facility]  [phone]

## 2022-09-15 DIAGNOSIS — E78.2 MIXED HYPERLIPIDEMIA: ICD-10-CM

## 2022-09-15 RX ORDER — ATORVASTATIN CALCIUM 40 MG/1
40 TABLET, FILM COATED ORAL DAILY
Qty: 100 TABLET | Refills: 3 | Status: SHIPPED | OUTPATIENT
Start: 2022-09-15 | End: 2023-03-15

## 2022-09-15 RX ORDER — TORSEMIDE 100 MG/1
50-100 TABLET ORAL DAILY
Qty: 10 TABLET | Refills: 0 | Status: SHIPPED | OUTPATIENT
Start: 2022-09-15 | End: 2022-09-26

## 2022-09-24 DIAGNOSIS — I50.30 ACC/AHA STAGE C HEART FAILURE WITH PRESERVED EJECTION FRACTION (HCC): ICD-10-CM

## 2022-09-24 DIAGNOSIS — I51.89 LEFT VENTRICULAR DIASTOLIC DYSFUNCTION, NYHA CLASS 3: ICD-10-CM

## 2022-09-24 DIAGNOSIS — I48.21 PERMANENT ATRIAL FIBRILLATION (HCC): ICD-10-CM

## 2022-09-26 ENCOUNTER — HOSPITAL ENCOUNTER (OUTPATIENT)
Dept: LAB | Facility: MEDICAL CENTER | Age: 77
End: 2022-09-26
Attending: INTERNAL MEDICINE
Payer: MEDICARE

## 2022-09-26 LAB
ANION GAP SERPL CALC-SCNC: 8 MMOL/L (ref 7–16)
BUN SERPL-MCNC: 24 MG/DL (ref 8–22)
CALCIUM SERPL-MCNC: 9.9 MG/DL (ref 8.5–10.5)
CHLORIDE SERPL-SCNC: 100 MMOL/L (ref 96–112)
CO2 SERPL-SCNC: 31 MMOL/L (ref 20–33)
CREAT SERPL-MCNC: 0.99 MG/DL (ref 0.5–1.4)
GFR SERPLBLD CREATININE-BSD FMLA CKD-EPI: 77 ML/MIN/1.73 M 2
GLUCOSE SERPL-MCNC: 89 MG/DL (ref 65–99)
POTASSIUM SERPL-SCNC: 3.8 MMOL/L (ref 3.6–5.5)
SODIUM SERPL-SCNC: 139 MMOL/L (ref 135–145)

## 2022-09-26 PROCEDURE — 36415 COLL VENOUS BLD VENIPUNCTURE: CPT

## 2022-09-26 PROCEDURE — 80048 BASIC METABOLIC PNL TOTAL CA: CPT

## 2022-09-26 RX ORDER — TORSEMIDE 100 MG/1
TABLET ORAL
Qty: 10 TABLET | Refills: 0 | Status: SHIPPED | OUTPATIENT
Start: 2022-09-26 | End: 2023-02-28

## 2022-09-29 ENCOUNTER — TELEPHONE (OUTPATIENT)
Dept: CARDIOLOGY | Facility: MEDICAL CENTER | Age: 77
End: 2022-09-29

## 2022-09-29 ENCOUNTER — HOSPITAL ENCOUNTER (OUTPATIENT)
Dept: CARDIOLOGY | Facility: MEDICAL CENTER | Age: 77
End: 2022-09-29
Attending: NURSE PRACTITIONER
Payer: MEDICARE

## 2022-09-29 DIAGNOSIS — Z95.818 PRESENCE OF CARDIOMEMS HF SYSTEM: ICD-10-CM

## 2022-09-29 DIAGNOSIS — I50.30 ACC/AHA STAGE C HEART FAILURE WITH PRESERVED EJECTION FRACTION (HCC): ICD-10-CM

## 2022-09-29 DIAGNOSIS — Z79.899 HIGH RISK MEDICATION USE: ICD-10-CM

## 2022-09-29 LAB
LV EJECT FRACT  99904: 55
LV EJECT FRACT MOD 2C 99903: 46.53
LV EJECT FRACT MOD 4C 99902: 49.06
LV EJECT FRACT MOD BP 99901: 49.06

## 2022-09-29 PROCEDURE — 93306 TTE W/DOPPLER COMPLETE: CPT

## 2022-09-29 PROCEDURE — 93306 TTE W/DOPPLER COMPLETE: CPT | Mod: 26 | Performed by: INTERNAL MEDICINE

## 2022-09-30 NOTE — TELEPHONE ENCOUNTER
Spoke to Luna.    Discussed echo today and unable to calculate RVSP d/t no TR regurg or Jet.  Unable to recalibrate CardioMEMS at this time due to this    Spoke to [email or web address] member and recommends Right heart cath to recalibrate CardioMEMS.     Discussed this with patient.  Patient is hesitant about another procedure at this time.    He sees Dr. Vásquez tomorrow. He he will discuss this with him and let us know.    Patient recommended to have a watchman at some point with Dr. Vásquez.

## 2022-10-03 ENCOUNTER — APPOINTMENT (RX ONLY)
Dept: URBAN - METROPOLITAN AREA CLINIC 6 | Facility: CLINIC | Age: 77
Setting detail: DERMATOLOGY
End: 2022-10-03

## 2022-10-03 DIAGNOSIS — D22 MELANOCYTIC NEVI: ICD-10-CM

## 2022-10-03 DIAGNOSIS — L82.1 OTHER SEBORRHEIC KERATOSIS: ICD-10-CM

## 2022-10-03 DIAGNOSIS — L57.0 ACTINIC KERATOSIS: ICD-10-CM

## 2022-10-03 DIAGNOSIS — Z00.8 ENCOUNTER FOR OTHER GENERAL EXAMINATION: ICD-10-CM

## 2022-10-03 DIAGNOSIS — D18.0 HEMANGIOMA: ICD-10-CM

## 2022-10-03 DIAGNOSIS — D485 NEOPLASM OF UNCERTAIN BEHAVIOR OF SKIN: ICD-10-CM

## 2022-10-03 DIAGNOSIS — Z85.820 PERSONAL HISTORY OF MALIGNANT MELANOMA OF SKIN: ICD-10-CM | Status: STABLE

## 2022-10-03 DIAGNOSIS — Z71.89 OTHER SPECIFIED COUNSELING: ICD-10-CM

## 2022-10-03 DIAGNOSIS — L21.8 OTHER SEBORRHEIC DERMATITIS: ICD-10-CM | Status: IMPROVED

## 2022-10-03 DIAGNOSIS — Z85.828 PERSONAL HISTORY OF OTHER MALIGNANT NEOPLASM OF SKIN: ICD-10-CM | Status: STABLE

## 2022-10-03 DIAGNOSIS — L81.4 OTHER MELANIN HYPERPIGMENTATION: ICD-10-CM

## 2022-10-03 PROBLEM — D22.5 MELANOCYTIC NEVI OF TRUNK: Status: ACTIVE | Noted: 2022-10-03

## 2022-10-03 PROBLEM — D48.5 NEOPLASM OF UNCERTAIN BEHAVIOR OF SKIN: Status: ACTIVE | Noted: 2022-10-03

## 2022-10-03 PROBLEM — D18.01 HEMANGIOMA OF SKIN AND SUBCUTANEOUS TISSUE: Status: ACTIVE | Noted: 2022-10-03

## 2022-10-03 PROCEDURE — ? COUNSELING

## 2022-10-03 PROCEDURE — ? REFERRAL CORRESPONDENCE

## 2022-10-03 PROCEDURE — ? BIOPSY BY SHAVE METHOD

## 2022-10-03 PROCEDURE — 11102 TANGNTL BX SKIN SINGLE LES: CPT | Mod: 59

## 2022-10-03 PROCEDURE — 99213 OFFICE O/P EST LOW 20 MIN: CPT | Mod: 25

## 2022-10-03 PROCEDURE — ? TREATMENT REGIMEN

## 2022-10-03 PROCEDURE — 17004 DESTROY PREMAL LESIONS 15/>: CPT

## 2022-10-03 PROCEDURE — ? LIQUID NITROGEN

## 2022-10-03 PROCEDURE — ? SUNSCREEN TREATMENT REGIMEN

## 2022-10-03 ASSESSMENT — LOCATION DETAILED DESCRIPTION DERM
LOCATION DETAILED: RIGHT FOREHEAD
LOCATION DETAILED: LEFT SUPERIOR OCCIPITAL SCALP
LOCATION DETAILED: RIGHT LATERAL TEMPLE
LOCATION DETAILED: RIGHT CLAVICULAR NECK
LOCATION DETAILED: LEFT CENTRAL TEMPLE
LOCATION DETAILED: LEFT PROXIMAL DORSAL FOREARM
LOCATION DETAILED: LEFT RADIAL DORSAL HAND
LOCATION DETAILED: POSTERIOR MID-PARIETAL SCALP
LOCATION DETAILED: RIGHT SUPERIOR MEDIAL FOREHEAD
LOCATION DETAILED: NASAL DORSUM
LOCATION DETAILED: LEFT ULNAR DORSAL HAND
LOCATION DETAILED: RIGHT MEDIAL FRONTAL SCALP
LOCATION DETAILED: RIGHT SUPERIOR OCCIPITAL SCALP
LOCATION DETAILED: RIGHT CENTRAL ZYGOMA
LOCATION DETAILED: RIGHT DISTAL DORSAL FOREARM
LOCATION DETAILED: LEFT CENTRAL ZYGOMA
LOCATION DETAILED: EPIGASTRIC SKIN
LOCATION DETAILED: SUPERIOR THORACIC SPINE
LOCATION DETAILED: RIGHT RADIAL DORSAL HAND
LOCATION DETAILED: RIGHT SUPERIOR FOREHEAD
LOCATION DETAILED: LEFT FOREHEAD
LOCATION DETAILED: SUPERIOR LUMBAR SPINE
LOCATION DETAILED: STERNUM
LOCATION DETAILED: RIGHT MID-UPPER BACK
LOCATION DETAILED: NASAL ROOT
LOCATION DETAILED: LEFT DISTAL DORSAL FOREARM
LOCATION DETAILED: RIGHT SUPERIOR PARIETAL SCALP

## 2022-10-03 ASSESSMENT — LOCATION ZONE DERM
LOCATION ZONE: HAND
LOCATION ZONE: SCALP
LOCATION ZONE: NOSE
LOCATION ZONE: ARM
LOCATION ZONE: TRUNK
LOCATION ZONE: FACE
LOCATION ZONE: NECK

## 2022-10-03 ASSESSMENT — LOCATION SIMPLE DESCRIPTION DERM
LOCATION SIMPLE: RIGHT UPPER BACK
LOCATION SIMPLE: RIGHT SCALP
LOCATION SIMPLE: POSTERIOR SCALP
LOCATION SIMPLE: ABDOMEN
LOCATION SIMPLE: RIGHT TEMPLE
LOCATION SIMPLE: LOWER BACK
LOCATION SIMPLE: RIGHT OCCIPITAL SCALP
LOCATION SIMPLE: LEFT FOREHEAD
LOCATION SIMPLE: RIGHT ZYGOMA
LOCATION SIMPLE: NOSE
LOCATION SIMPLE: SCALP
LOCATION SIMPLE: CHEST
LOCATION SIMPLE: LEFT TEMPLE
LOCATION SIMPLE: RIGHT HAND
LOCATION SIMPLE: UPPER BACK
LOCATION SIMPLE: RIGHT ANTERIOR NECK
LOCATION SIMPLE: LEFT HAND
LOCATION SIMPLE: LEFT OCCIPITAL SCALP
LOCATION SIMPLE: RIGHT FOREHEAD
LOCATION SIMPLE: LEFT FOREARM
LOCATION SIMPLE: RIGHT FOREARM
LOCATION SIMPLE: LEFT ZYGOMA

## 2022-10-03 NOTE — PROCEDURE: LIQUID NITROGEN
Number Of Freeze-Thaw Cycles: 2 freeze-thaw cycles
Detail Level: Detailed
Show Applicator Variable?: Yes
Post-Care Instructions: I reviewed with the patient in detail post-care instructions. Patient is to wear sunprotection, and avoid picking at any of the treated lesions. Pt may apply Vaseline to crusted or scabbing areas.
Duration Of Freeze Thaw-Cycle (Seconds): 10
Render Note In Bullet Format When Appropriate: No
Consent: The patient's consent was obtained including but not limited to risks of crusting, scabbing, blistering, scarring, darker or lighter pigmentary change, recurrence, incomplete removal and infection.

## 2022-10-07 ENCOUNTER — PATIENT MESSAGE (OUTPATIENT)
Dept: CARDIOLOGY | Facility: MEDICAL CENTER | Age: 77
End: 2022-10-07
Payer: MEDICARE

## 2022-10-26 DIAGNOSIS — I50.30 ACC/AHA STAGE C HEART FAILURE WITH PRESERVED EJECTION FRACTION (HCC): ICD-10-CM

## 2022-10-26 NOTE — TELEPHONE ENCOUNTER
Is the patient due for a refill? Yes    Was the patient seen the past year? Yes    Date of last office visit: 5/10/22    Does the patient have an upcoming appointment?  No   If yes, When?     Provider to refill:SS    Does the patients insurance require a 100 day supply?  Yes

## 2022-10-27 RX ORDER — POTASSIUM CHLORIDE 1500 MG/1
TABLET, EXTENDED RELEASE ORAL
Qty: 360 TABLET | Refills: 2 | Status: SHIPPED
Start: 2022-10-27 | End: 2023-02-14

## 2022-11-07 ENCOUNTER — DOCUMENTATION (OUTPATIENT)
Dept: HEALTH INFORMATION MANAGEMENT | Facility: OTHER | Age: 77
End: 2022-11-07
Payer: MEDICARE

## 2022-12-12 ENCOUNTER — HOSPITAL ENCOUNTER (OUTPATIENT)
Dept: LAB | Facility: MEDICAL CENTER | Age: 77
End: 2022-12-12
Attending: PHYSICIAN ASSISTANT
Payer: MEDICARE

## 2022-12-12 ENCOUNTER — HOSPITAL ENCOUNTER (OUTPATIENT)
Dept: LAB | Facility: MEDICAL CENTER | Age: 77
End: 2022-12-12
Attending: INTERNAL MEDICINE
Payer: MEDICARE

## 2022-12-12 LAB
ANION GAP SERPL CALC-SCNC: 12 MMOL/L (ref 7–16)
BUN SERPL-MCNC: 15 MG/DL (ref 8–22)
CALCIUM SERPL-MCNC: 9.8 MG/DL (ref 8.5–10.5)
CHLORIDE SERPL-SCNC: 101 MMOL/L (ref 96–112)
CO2 SERPL-SCNC: 27 MMOL/L (ref 20–33)
CREAT SERPL-MCNC: 0.84 MG/DL (ref 0.5–1.4)
ERYTHROCYTE [DISTWIDTH] IN BLOOD BY AUTOMATED COUNT: 45.9 FL (ref 35.9–50)
GFR SERPLBLD CREATININE-BSD FMLA CKD-EPI: 88 ML/MIN/1.73 M 2
GLUCOSE SERPL-MCNC: 88 MG/DL (ref 65–99)
HCT VFR BLD AUTO: 44.7 % (ref 42–52)
HGB BLD-MCNC: 15.5 G/DL (ref 14–18)
MCH RBC QN AUTO: 34.1 PG (ref 27–33)
MCHC RBC AUTO-ENTMCNC: 34.7 G/DL (ref 33.7–35.3)
MCV RBC AUTO: 98.2 FL (ref 81.4–97.8)
PLATELET # BLD AUTO: 207 K/UL (ref 164–446)
PMV BLD AUTO: 10.6 FL (ref 9–12.9)
POTASSIUM SERPL-SCNC: 3.4 MMOL/L (ref 3.6–5.5)
RBC # BLD AUTO: 4.55 M/UL (ref 4.7–6.1)
SODIUM SERPL-SCNC: 140 MMOL/L (ref 135–145)
T4 FREE SERPL-MCNC: 1.42 NG/DL (ref 0.93–1.7)
TSH SERPL DL<=0.005 MIU/L-ACNC: 5.47 UIU/ML (ref 0.38–5.33)
WBC # BLD AUTO: 7.6 K/UL (ref 4.8–10.8)

## 2022-12-12 PROCEDURE — 85027 COMPLETE CBC AUTOMATED: CPT

## 2022-12-12 PROCEDURE — 36415 COLL VENOUS BLD VENIPUNCTURE: CPT

## 2022-12-12 PROCEDURE — 84443 ASSAY THYROID STIM HORMONE: CPT

## 2022-12-12 PROCEDURE — 86800 THYROGLOBULIN ANTIBODY: CPT

## 2022-12-12 PROCEDURE — 80048 BASIC METABOLIC PNL TOTAL CA: CPT

## 2022-12-12 PROCEDURE — 84439 ASSAY OF FREE THYROXINE: CPT

## 2022-12-14 LAB — THYROGLOB AB SERPL-ACNC: 10.7 IU/ML (ref 0–4)

## 2022-12-29 ENCOUNTER — PATIENT MESSAGE (OUTPATIENT)
Dept: CARDIOLOGY | Facility: MEDICAL CENTER | Age: 77
End: 2022-12-29
Payer: MEDICARE

## 2023-01-24 ENCOUNTER — HOSPITAL ENCOUNTER (OUTPATIENT)
Dept: LAB | Facility: MEDICAL CENTER | Age: 78
End: 2023-01-24
Attending: PHYSICIAN ASSISTANT
Payer: MEDICARE

## 2023-01-24 LAB
BASOPHILS # BLD AUTO: 0.2 % (ref 0–1.8)
BASOPHILS # BLD: 0.02 K/UL (ref 0–0.12)
EOSINOPHIL # BLD AUTO: 0.09 K/UL (ref 0–0.51)
EOSINOPHIL NFR BLD: 0.7 % (ref 0–6.9)
ERYTHROCYTE [DISTWIDTH] IN BLOOD BY AUTOMATED COUNT: 46.8 FL (ref 35.9–50)
ESTRADIOL SERPL-MCNC: 22.5 PG/ML
HCT VFR BLD AUTO: 48.6 % (ref 42–52)
HGB BLD-MCNC: 16.7 G/DL (ref 14–18)
IMM GRANULOCYTES # BLD AUTO: 0.39 K/UL (ref 0–0.11)
IMM GRANULOCYTES NFR BLD AUTO: 3 % (ref 0–0.9)
LYMPHOCYTES # BLD AUTO: 1.28 K/UL (ref 1–4.8)
LYMPHOCYTES NFR BLD: 9.9 % (ref 22–41)
MCH RBC QN AUTO: 34.2 PG (ref 27–33)
MCHC RBC AUTO-ENTMCNC: 34.4 G/DL (ref 33.7–35.3)
MCV RBC AUTO: 99.6 FL (ref 81.4–97.8)
MONOCYTES # BLD AUTO: 0.89 K/UL (ref 0–0.85)
MONOCYTES NFR BLD AUTO: 6.9 % (ref 0–13.4)
NEUTROPHILS # BLD AUTO: 10.24 K/UL (ref 1.82–7.42)
NEUTROPHILS NFR BLD: 79.3 % (ref 44–72)
NRBC # BLD AUTO: 0 K/UL
NRBC BLD-RTO: 0 /100 WBC
PLATELET # BLD AUTO: 259 K/UL (ref 164–446)
PMV BLD AUTO: 10 FL (ref 9–12.9)
PSA SERPL-MCNC: 12.5 NG/ML (ref 0–4)
RBC # BLD AUTO: 4.88 M/UL (ref 4.7–6.1)
TESTOST SERPL-MCNC: 148 NG/DL (ref 175–781)
WBC # BLD AUTO: 12.9 K/UL (ref 4.8–10.8)

## 2023-01-24 PROCEDURE — 82670 ASSAY OF TOTAL ESTRADIOL: CPT

## 2023-01-24 PROCEDURE — 84153 ASSAY OF PSA TOTAL: CPT

## 2023-01-24 PROCEDURE — 84403 ASSAY OF TOTAL TESTOSTERONE: CPT

## 2023-01-24 PROCEDURE — 36415 COLL VENOUS BLD VENIPUNCTURE: CPT

## 2023-01-24 PROCEDURE — 85025 COMPLETE CBC W/AUTO DIFF WBC: CPT

## 2023-02-01 ENCOUNTER — PATIENT MESSAGE (OUTPATIENT)
Dept: MEDICAL GROUP | Facility: MEDICAL CENTER | Age: 78
End: 2023-02-01
Payer: MEDICARE

## 2023-02-01 DIAGNOSIS — E87.6 HYPOKALEMIA: ICD-10-CM

## 2023-02-02 ENCOUNTER — HOSPITAL ENCOUNTER (OUTPATIENT)
Dept: LAB | Facility: MEDICAL CENTER | Age: 78
End: 2023-02-02
Attending: FAMILY MEDICINE
Payer: MEDICARE

## 2023-02-02 ENCOUNTER — HOSPITAL ENCOUNTER (OUTPATIENT)
Dept: LAB | Facility: MEDICAL CENTER | Age: 78
End: 2023-02-02
Attending: PHYSICIAN ASSISTANT
Payer: MEDICARE

## 2023-02-02 DIAGNOSIS — E87.6 HYPOKALEMIA: ICD-10-CM

## 2023-02-02 LAB — POTASSIUM SERPL-SCNC: 3 MMOL/L (ref 3.6–5.5)

## 2023-02-02 PROCEDURE — 36415 COLL VENOUS BLD VENIPUNCTURE: CPT

## 2023-02-02 PROCEDURE — 84132 ASSAY OF SERUM POTASSIUM: CPT

## 2023-02-02 PROCEDURE — 84153 ASSAY OF PSA TOTAL: CPT

## 2023-02-02 PROCEDURE — 84154 ASSAY OF PSA FREE: CPT

## 2023-02-04 LAB
PSA FREE MFR SERPL: 25 %
PSA FREE SERPL-MCNC: 1.5 NG/ML
PSA SERPL-MCNC: 5.9 NG/ML (ref 0–4)

## 2023-02-14 ENCOUNTER — OFFICE VISIT (OUTPATIENT)
Dept: MEDICAL GROUP | Facility: MEDICAL CENTER | Age: 78
End: 2023-02-14
Payer: MEDICARE

## 2023-02-14 VITALS
HEIGHT: 74 IN | TEMPERATURE: 97.9 F | OXYGEN SATURATION: 96 % | DIASTOLIC BLOOD PRESSURE: 76 MMHG | WEIGHT: 250 LBS | RESPIRATION RATE: 16 BRPM | BODY MASS INDEX: 32.08 KG/M2 | HEART RATE: 68 BPM | SYSTOLIC BLOOD PRESSURE: 122 MMHG

## 2023-02-14 DIAGNOSIS — N40.1 BENIGN PROSTATIC HYPERPLASIA WITH NOCTURIA: ICD-10-CM

## 2023-02-14 DIAGNOSIS — C73 PAPILLARY THYROID CARCINOMA (HCC): ICD-10-CM

## 2023-02-14 DIAGNOSIS — I70.8 AORTO-ILIAC ATHEROSCLEROSIS (HCC): ICD-10-CM

## 2023-02-14 DIAGNOSIS — I10 ESSENTIAL HYPERTENSION: ICD-10-CM

## 2023-02-14 DIAGNOSIS — R97.20 ELEVATED PSA: ICD-10-CM

## 2023-02-14 DIAGNOSIS — E78.5 HYPERLIPIDEMIA, UNSPECIFIED HYPERLIPIDEMIA TYPE: ICD-10-CM

## 2023-02-14 DIAGNOSIS — R35.1 BENIGN PROSTATIC HYPERPLASIA WITH NOCTURIA: ICD-10-CM

## 2023-02-14 DIAGNOSIS — E66.9 OBESITY (BMI 30-39.9): ICD-10-CM

## 2023-02-14 DIAGNOSIS — I70.0 AORTO-ILIAC ATHEROSCLEROSIS (HCC): ICD-10-CM

## 2023-02-14 DIAGNOSIS — E87.6 HYPOKALEMIA: ICD-10-CM

## 2023-02-14 DIAGNOSIS — I50.30 ACC/AHA STAGE C HEART FAILURE WITH PRESERVED EJECTION FRACTION (HCC): ICD-10-CM

## 2023-02-14 DIAGNOSIS — I48.11 LONGSTANDING PERSISTENT ATRIAL FIBRILLATION (HCC): ICD-10-CM

## 2023-02-14 DIAGNOSIS — D68.69 SECONDARY HYPERCOAGULABLE STATE (HCC): ICD-10-CM

## 2023-02-14 DIAGNOSIS — Z11.59 NEED FOR HEPATITIS C SCREENING TEST: ICD-10-CM

## 2023-02-14 PROBLEM — R31.0 GROSS HEMATURIA: Status: RESOLVED | Noted: 2020-01-03 | Resolved: 2023-02-14

## 2023-02-14 PROCEDURE — 99214 OFFICE O/P EST MOD 30 MIN: CPT | Performed by: FAMILY MEDICINE

## 2023-02-14 RX ORDER — METOPROLOL SUCCINATE 50 MG/1
TABLET, EXTENDED RELEASE ORAL
COMMUNITY
Start: 2022-12-05 | End: 2023-02-14

## 2023-02-14 RX ORDER — POTASSIUM CHLORIDE 20 MEQ/1
100 TABLET, EXTENDED RELEASE ORAL DAILY
COMMUNITY
Start: 2023-02-14 | End: 2023-02-28

## 2023-02-14 RX ORDER — APIXABAN 5 MG/1
TABLET, FILM COATED ORAL
COMMUNITY
Start: 2022-12-23 | End: 2023-02-14

## 2023-02-14 RX ORDER — LEVOTHYROXINE SODIUM 137 UG/1
137 TABLET ORAL
COMMUNITY
Start: 2023-02-14 | End: 2024-03-19

## 2023-02-14 RX ORDER — OLMESARTAN MEDOXOMIL 20 MG/1
TABLET ORAL
COMMUNITY
Start: 2022-12-22 | End: 2023-02-14

## 2023-02-14 ASSESSMENT — PATIENT HEALTH QUESTIONNAIRE - PHQ9: CLINICAL INTERPRETATION OF PHQ2 SCORE: 0

## 2023-02-14 NOTE — PROGRESS NOTES
Subjective:     Sina Oliver is a 77 y.o. male presenting with his wife for a follow-up.  Has been having low potassium levels, is currently taking 100 mEq/day.  His most recent potassium level was 3.0.  He continues on his medications regularly, but is not quite sure if our list is accurate.  Is now seeing a new cardiologist, Dr. Vásquez.  Heart failure seems stable, he continues to have shortness of breath with minimal exertion.  His muscle cramps are his most significant symptoms, believes it is due to the low potassium level.  He did have ABIs that were normal in 2021        Current Outpatient Medications:     potassium chloride SA (KLOR-CON M20) 20 MEQ Tab CR, Take 5 Tablets by mouth every day., Disp: , Rfl:     apixaban (ELIQUIS) 5mg Tab, Take 1 Tablet by mouth every day., Disp: , Rfl:     FUROSEMIDE PO, Take  by mouth., Disp: , Rfl:     levothyroxine (SYNTHROID) 125 MCG Tab, Take 1 Tablet by mouth every morning on an empty stomach., Disp: , Rfl:     coenzyme Q-10 30 MG capsule, Take  by mouth., Disp: , Rfl:     torsemide (DEMADEX) 100 MG Tab, TAKE 0.5 TO 1 TABLET BY MOUTH EVERY DAY ALTERNATE 50MG ONE DAY AND 100MG THE NEXT DAY, Disp: 10 Tablet, Rfl: 0    atorvastatin (LIPITOR) 40 MG Tab, Take 1 Tablet by mouth every day., Disp: 100 Tablet, Rfl: 3    metoprolol tartrate (LOPRESSOR) 25 MG Tab, Take 25 mg by mouth every 12 hours., Disp: , Rfl:     cyclobenzaprine (FLEXERIL) 10 mg Tab, Take 1 Tablet by mouth 2 times a day as needed for Muscle Spasms., Disp: 180 Tablet, Rfl: 0    olmesartan (BENICAR) 40 MG Tab, Take 1 Tablet by mouth every day., Disp: 100 Tablet, Rfl: 3    tamsulosin (FLOMAX) 0.4 MG capsule, Take 1 Capsule by mouth every day., Disp: 100 Capsule, Rfl: 3    B Complex Vitamins (VITAMIN B COMPLEX PO), Take  by mouth every day., Disp: , Rfl:     VITAMIN D PO, Take  by mouth every day., Disp: , Rfl:     Ascorbic Acid (VITAMIN C) 1000 MG Tab, Take  by mouth every day., Disp: , Rfl:  "    Objective:     Vitals: /76   Pulse 68   Temp 36.6 °C (97.9 °F)   Resp 16   Ht 1.88 m (6' 2\")   Wt 113 kg (250 lb)   SpO2 96%   BMI 32.10 kg/m²   General: Alert  HEENT: Normocephalic.   Heart: Regular rate and rhythm.  S1 and S2 normal.  No murmurs appreciated.  Respiratory: Normal respiratory effort.  Clear to auscultation bilaterally.  Abdomen: Non-distended, soft    Assessment/Plan:     Sina was seen today for follow-up.    Diagnoses and all orders for this visit:    Hypokalemia  Chronic, uncontrolled.  We discussed rechecking a potassium level.  Continue current medications  -     Basic Metabolic Panel; Future    ACC/AHA stage C heart failure with preserved ejection fraction (HCC)  Essential hypertension  Longstanding persistent atrial fibrillation (HCC)  Secondary hypercoagulable state (HCC)  Chronic, managed by cardiology.  Recommended that he bring in a list of his medications at his next visit    Aorto-iliac atherosclerosis (HCC)  Chronic, stable, mild.    Hyperlipidemia, unspecified hyperlipidemia type  Chronic, stable, continue atorvastatin  -     Lipid Profile; Future    Papillary thyroid carcinoma (HCC)  Chronic, stable, managed by endocrinology    Benign prostatic hyperplasia with nocturia  Elevated PSA  Chronic, stable, managed by urology    Obesity (BMI 30-39.9)  -     Patient identified as having weight management issue.  Appropriate orders and counseling given.    Need for hepatitis C screening test  -     HEP C VIRUS ANTIBODY; Future          Return in about 3 months (around 5/14/2023) for Med check.      "

## 2023-02-16 ENCOUNTER — TELEPHONE (OUTPATIENT)
Dept: MEDICAL GROUP | Facility: MEDICAL CENTER | Age: 78
End: 2023-02-16
Payer: MEDICARE

## 2023-02-16 DIAGNOSIS — M79.672 LEFT FOOT PAIN: ICD-10-CM

## 2023-02-16 NOTE — TELEPHONE ENCOUNTER
Pt called asking for an X-ray for his LT foot. Pt stated it has been swollen for roughly 5days. Pt cant put much weight on it, and would like to rule out a broken bone.   PT stated that it has gotten significantly worse since yesterday.

## 2023-02-17 ENCOUNTER — HOSPITAL ENCOUNTER (OUTPATIENT)
Dept: RADIOLOGY | Facility: MEDICAL CENTER | Age: 78
End: 2023-02-17
Attending: NURSE PRACTITIONER
Payer: MEDICARE

## 2023-02-17 ENCOUNTER — OFFICE VISIT (OUTPATIENT)
Dept: URGENT CARE | Facility: PHYSICIAN GROUP | Age: 78
End: 2023-02-17
Payer: MEDICARE

## 2023-02-17 VITALS
HEIGHT: 73 IN | DIASTOLIC BLOOD PRESSURE: 58 MMHG | RESPIRATION RATE: 18 BRPM | OXYGEN SATURATION: 91 % | SYSTOLIC BLOOD PRESSURE: 100 MMHG | WEIGHT: 250 LBS | HEART RATE: 60 BPM | BODY MASS INDEX: 33.13 KG/M2 | TEMPERATURE: 97.2 F

## 2023-02-17 DIAGNOSIS — M79.89 SWELLING OF LEFT FOOT: ICD-10-CM

## 2023-02-17 DIAGNOSIS — L03.116 CELLULITIS OF LEFT FOOT: ICD-10-CM

## 2023-02-17 PROCEDURE — 73630 X-RAY EXAM OF FOOT: CPT | Mod: LT

## 2023-02-17 PROCEDURE — 99214 OFFICE O/P EST MOD 30 MIN: CPT | Performed by: NURSE PRACTITIONER

## 2023-02-17 RX ORDER — DOXYCYCLINE HYCLATE 100 MG/1
100 CAPSULE ORAL 2 TIMES DAILY
Qty: 14 CAPSULE | Refills: 0 | Status: SHIPPED | OUTPATIENT
Start: 2023-02-17 | End: 2023-02-24

## 2023-02-17 ASSESSMENT — ENCOUNTER SYMPTOMS
FEVER: 0
MYALGIAS: 0
FOCAL WEAKNESS: 0
TINGLING: 0
SHORTNESS OF BREATH: 0
SENSORY CHANGE: 0
CHILLS: 0

## 2023-02-17 NOTE — PROGRESS NOTES
Subjective     Sina Oliver is a 77 y.o. male who presents with Foot Swelling ( X 3 weeks, L foot)            HPI  New problem.  Patient is a 77-year-old male who presents with left-sided foot swelling x3 weeks.  He denies any traumatic injury to this foot.  He denies any ankle pain but states he does have pain in the foot over the dorsum area.  He reached out to his primary care and got an order for an x-ray however he ended up checking into urgent care.  He denies fever, chills, distal paresthesia.  He does have a longstanding history of congestive heart failure.    Azithromycin, Coumadin [warfarin], Gabapentin, Ibuprofen, Nsaids, Other misc, Penicillins, Plavix [clopidogrel], Pseudoephedrine, Sulfa drugs, and Xarelto [rivaroxaban]  Current Outpatient Medications on File Prior to Visit   Medication Sig Dispense Refill    potassium chloride SA (KDUR) 20 MEQ Tab CR Take 5 Tablets by mouth every day.      apixaban (ELIQUIS) 5mg Tab Take 5 mg by mouth 2 times a day.      FUROSEMIDE PO Take  by mouth.      levothyroxine (SYNTHROID) 125 MCG Tab Take 1 Tablet by mouth every morning on an empty stomach.      coenzyme Q-10 30 MG capsule Take  by mouth.      torsemide (DEMADEX) 100 MG Tab TAKE 0.5 TO 1 TABLET BY MOUTH EVERY DAY ALTERNATE 50MG ONE DAY AND 100MG THE NEXT DAY 10 Tablet 0    atorvastatin (LIPITOR) 40 MG Tab Take 1 Tablet by mouth every day. 100 Tablet 3    metoprolol tartrate (LOPRESSOR) 25 MG Tab Take 25 mg by mouth every 12 hours.      cyclobenzaprine (FLEXERIL) 10 mg Tab Take 1 Tablet by mouth 2 times a day as needed for Muscle Spasms. 180 Tablet 0    olmesartan (BENICAR) 40 MG Tab Take 1 Tablet by mouth every day. (Patient taking differently: Take 20 mg by mouth every day.) 100 Tablet 3    tamsulosin (FLOMAX) 0.4 MG capsule Take 1 Capsule by mouth every day. 100 Capsule 3    B Complex Vitamins (VITAMIN B COMPLEX PO) Take  by mouth every day.      VITAMIN D PO Take  by mouth every day.      Ascorbic  "Acid (VITAMIN C) 1000 MG Tab Take  by mouth every day.       No current facility-administered medications on file prior to visit.     Social History     Socioeconomic History    Marital status:      Spouse name: Not on file    Number of children: Not on file    Years of education: Not on file    Highest education level: Not on file   Occupational History    Not on file   Tobacco Use    Smoking status: Never    Smokeless tobacco: Never   Vaping Use    Vaping Use: Never used   Substance and Sexual Activity    Alcohol use: Not Currently     Comment: 6 per year    1-30-20 4/year    Drug use: No    Sexual activity: Yes     Partners: Female   Other Topics Concern    Not on file   Social History Narrative         Social Determinants of Health     Financial Resource Strain: Not on file   Food Insecurity: Not on file   Transportation Needs: Not on file   Physical Activity: Not on file   Stress: Not on file   Social Connections: Not on file   Intimate Partner Violence: Not on file   Housing Stability: Not on file     Breast Cancer-related family history is not on file.      Review of Systems   Constitutional:  Negative for chills and fever.   Respiratory:  Negative for shortness of breath.    Cardiovascular:         +left foot swelling.   Musculoskeletal:  Negative for joint pain and myalgias.   Skin:         +erythema left foot.   Neurological:  Negative for tingling, sensory change and focal weakness.            Objective     /58   Pulse 60   Temp 36.2 °C (97.2 °F) (Temporal)   Resp 18   Ht 1.854 m (6' 1\")   Wt 113 kg (250 lb)   SpO2 91%   BMI 32.98 kg/m²      Physical Exam  Vitals and nursing note reviewed.   Constitutional:       Appearance: Normal appearance. He is not ill-appearing.   Cardiovascular:      Rate and Rhythm: Normal rate and regular rhythm.      Heart sounds: No murmur heard.  Pulmonary:      Effort: Pulmonary effort is normal.      Breath sounds: Normal breath sounds. "   Musculoskeletal:      Left foot: Normal capillary refill. Swelling, tenderness and bony tenderness present. Normal pulse.        Legs:    Skin:     General: Skin is warm and dry.      Findings: Erythema present.   Neurological:      General: No focal deficit present.      Mental Status: He is alert and oriented to person, place, and time.   Psychiatric:         Mood and Affect: Mood normal.                           Assessment & Plan        1. Cellulitis of left foot  doxycycline (VIBRAMYCIN) 100 MG Cap      2. Swelling of left foot  DX-FOOT-COMPLETE 3+ LEFT        Patient with negative imaging of his left foot.  He does have an overlying erythema and tenderness to palpation so we will treat for cellulitis with a 7-day course of doxycycline.  He is advised to elevate and he may use Tylenol for any pain.  If symptoms or not improved after the doxycycline he will need follow-up with his primary care doctor.

## 2023-02-28 ENCOUNTER — OFFICE VISIT (OUTPATIENT)
Dept: MEDICAL GROUP | Facility: MEDICAL CENTER | Age: 78
End: 2023-02-28
Payer: MEDICARE

## 2023-02-28 ENCOUNTER — HOSPITAL ENCOUNTER (OUTPATIENT)
Dept: LAB | Facility: MEDICAL CENTER | Age: 78
DRG: 640 | End: 2023-02-28
Attending: FAMILY MEDICINE
Payer: MEDICARE

## 2023-02-28 ENCOUNTER — HOSPITAL ENCOUNTER (OUTPATIENT)
Dept: RADIOLOGY | Facility: MEDICAL CENTER | Age: 78
DRG: 640 | End: 2023-02-28
Attending: FAMILY MEDICINE
Payer: MEDICARE

## 2023-02-28 ENCOUNTER — HOSPITAL ENCOUNTER (INPATIENT)
Facility: MEDICAL CENTER | Age: 78
LOS: 2 days | DRG: 640 | End: 2023-03-03
Attending: EMERGENCY MEDICINE | Admitting: STUDENT IN AN ORGANIZED HEALTH CARE EDUCATION/TRAINING PROGRAM
Payer: MEDICARE

## 2023-02-28 VITALS
OXYGEN SATURATION: 95 % | RESPIRATION RATE: 16 BRPM | TEMPERATURE: 97.6 F | HEART RATE: 74 BPM | DIASTOLIC BLOOD PRESSURE: 74 MMHG | WEIGHT: 245 LBS | BODY MASS INDEX: 32.47 KG/M2 | HEIGHT: 73 IN | SYSTOLIC BLOOD PRESSURE: 128 MMHG

## 2023-02-28 DIAGNOSIS — R06.02 SHORTNESS OF BREATH: ICD-10-CM

## 2023-02-28 DIAGNOSIS — I50.21 ACUTE SYSTOLIC HEART FAILURE (HCC): ICD-10-CM

## 2023-02-28 DIAGNOSIS — Z11.59 NEED FOR HEPATITIS C SCREENING TEST: ICD-10-CM

## 2023-02-28 DIAGNOSIS — R79.89 ELEVATED BRAIN NATRIURETIC PEPTIDE (BNP) LEVEL: ICD-10-CM

## 2023-02-28 DIAGNOSIS — D68.69 SECONDARY HYPERCOAGULABLE STATE (HCC): ICD-10-CM

## 2023-02-28 DIAGNOSIS — I48.11 LONGSTANDING PERSISTENT ATRIAL FIBRILLATION (HCC): ICD-10-CM

## 2023-02-28 DIAGNOSIS — R73.03 PRE-DIABETES: ICD-10-CM

## 2023-02-28 DIAGNOSIS — R06.00 DYSPNEA, UNSPECIFIED TYPE: ICD-10-CM

## 2023-02-28 DIAGNOSIS — R79.89 ELEVATED TROPONIN: ICD-10-CM

## 2023-02-28 DIAGNOSIS — M10.9 ACUTE GOUT OF RIGHT FOOT, UNSPECIFIED CAUSE: ICD-10-CM

## 2023-02-28 DIAGNOSIS — E87.6 HYPOKALEMIA: ICD-10-CM

## 2023-02-28 DIAGNOSIS — L03.116 CELLULITIS OF LEFT LOWER EXTREMITY: ICD-10-CM

## 2023-02-28 DIAGNOSIS — R91.1 NODULE OF LOWER LOBE OF LEFT LUNG: ICD-10-CM

## 2023-02-28 DIAGNOSIS — I50.9 ACUTE ON CHRONIC CONGESTIVE HEART FAILURE, UNSPECIFIED HEART FAILURE TYPE (HCC): ICD-10-CM

## 2023-02-28 LAB
ALBUMIN SERPL BCP-MCNC: 4.6 G/DL (ref 3.2–4.9)
ALBUMIN/GLOB SERPL: 1.4 G/DL
ALP SERPL-CCNC: 76 U/L (ref 30–99)
ALT SERPL-CCNC: 36 U/L (ref 2–50)
ANION GAP SERPL CALC-SCNC: 16 MMOL/L (ref 7–16)
AST SERPL-CCNC: 54 U/L (ref 12–45)
BILIRUB SERPL-MCNC: 1.4 MG/DL (ref 0.1–1.5)
BUN SERPL-MCNC: 34 MG/DL (ref 8–22)
CALCIUM ALBUM COR SERPL-MCNC: 9.9 MG/DL (ref 8.5–10.5)
CALCIUM SERPL-MCNC: 10.4 MG/DL (ref 8.5–10.5)
CHLORIDE SERPL-SCNC: 87 MMOL/L (ref 96–112)
CO2 SERPL-SCNC: 35 MMOL/L (ref 20–33)
CREAT SERPL-MCNC: 1.21 MG/DL (ref 0.5–1.4)
ERYTHROCYTE [DISTWIDTH] IN BLOOD BY AUTOMATED COUNT: 43.1 FL (ref 35.9–50)
EST. AVERAGE GLUCOSE BLD GHB EST-MCNC: 157 MG/DL
GFR SERPLBLD CREATININE-BSD FMLA CKD-EPI: 61 ML/MIN/1.73 M 2
GLOBULIN SER CALC-MCNC: 3.2 G/DL (ref 1.9–3.5)
GLUCOSE SERPL-MCNC: 128 MG/DL (ref 65–99)
HBA1C MFR BLD: 7.1 % (ref 4–5.6)
HCT VFR BLD AUTO: 50.9 % (ref 42–52)
HCV AB SER QL: NORMAL
HGB BLD-MCNC: 18.2 G/DL (ref 14–18)
MCH RBC QN AUTO: 34.1 PG (ref 27–33)
MCHC RBC AUTO-ENTMCNC: 35.8 G/DL (ref 33.7–35.3)
MCV RBC AUTO: 95.5 FL (ref 81.4–97.8)
NT-PROBNP SERPL IA-MCNC: 2243 PG/ML (ref 0–125)
PLATELET # BLD AUTO: 241 K/UL (ref 164–446)
PMV BLD AUTO: 10.6 FL (ref 9–12.9)
POTASSIUM SERPL-SCNC: 2.6 MMOL/L (ref 3.6–5.5)
PROT SERPL-MCNC: 7.8 G/DL (ref 6–8.2)
RBC # BLD AUTO: 5.33 M/UL (ref 4.7–6.1)
SODIUM SERPL-SCNC: 138 MMOL/L (ref 135–145)
T4 FREE SERPL-MCNC: 1.59 NG/DL (ref 0.93–1.7)
TSH SERPL DL<=0.005 MIU/L-ACNC: 7.15 UIU/ML (ref 0.38–5.33)
WBC # BLD AUTO: 15.1 K/UL (ref 4.8–10.8)

## 2023-02-28 PROCEDURE — 99285 EMERGENCY DEPT VISIT HI MDM: CPT

## 2023-02-28 PROCEDURE — 83880 ASSAY OF NATRIURETIC PEPTIDE: CPT

## 2023-02-28 PROCEDURE — 86803 HEPATITIS C AB TEST: CPT

## 2023-02-28 PROCEDURE — 84443 ASSAY THYROID STIM HORMONE: CPT

## 2023-02-28 PROCEDURE — 71046 X-RAY EXAM CHEST 2 VIEWS: CPT

## 2023-02-28 PROCEDURE — 99214 OFFICE O/P EST MOD 30 MIN: CPT | Mod: 25 | Performed by: FAMILY MEDICINE

## 2023-02-28 PROCEDURE — 83036 HEMOGLOBIN GLYCOSYLATED A1C: CPT

## 2023-02-28 PROCEDURE — 85027 COMPLETE CBC AUTOMATED: CPT

## 2023-02-28 PROCEDURE — 36415 COLL VENOUS BLD VENIPUNCTURE: CPT

## 2023-02-28 PROCEDURE — 84439 ASSAY OF FREE THYROXINE: CPT

## 2023-02-28 PROCEDURE — 93000 ELECTROCARDIOGRAM COMPLETE: CPT | Performed by: FAMILY MEDICINE

## 2023-02-28 PROCEDURE — 80053 COMPREHEN METABOLIC PANEL: CPT

## 2023-02-28 RX ORDER — DOXYCYCLINE HYCLATE 100 MG
100 TABLET ORAL 2 TIMES DAILY
Qty: 14 TABLET | Refills: 0 | Status: SHIPPED | OUTPATIENT
Start: 2023-02-28 | End: 2023-03-07

## 2023-02-28 RX ORDER — TORSEMIDE 100 MG/1
40 TABLET ORAL DAILY
COMMUNITY
Start: 2023-02-28

## 2023-02-28 RX ORDER — POTASSIUM CHLORIDE 20 MEQ/1
20 TABLET, EXTENDED RELEASE ORAL
COMMUNITY
Start: 2023-02-28 | End: 2023-03-26 | Stop reason: SDUPTHER

## 2023-02-28 RX ORDER — OLMESARTAN MEDOXOMIL 20 MG/1
20 TABLET ORAL DAILY
COMMUNITY
Start: 2023-02-28 | End: 2023-03-01

## 2023-02-28 RX ORDER — FUROSEMIDE 20 MG/1
20 TABLET ORAL 2 TIMES DAILY PRN
Status: ON HOLD | COMMUNITY
End: 2023-03-03

## 2023-02-28 ASSESSMENT — FIBROSIS 4 INDEX: FIB4 SCORE: 2.95

## 2023-02-28 NOTE — PROGRESS NOTES
Subjective:     Sina Oliver is a 77 y.o. male presenting with his wife for a follow-up.  He reports having left foot redness and swelling for the past 12 weeks.  He went to urgent care, received a 7-day course of doxycycline and the swelling and redness has improved but he is concerned that the infection persists.  For the past week, he has noted increased fatigue, shortness of breath, balance issues.  His baseline weight is 240, went up to 250 pounds.  Leg swelling seems to be back to baseline.  He is using a wheelchair today due to lack of energy and shortness of breath.  He cannot speak complete sentences without feeling short of breath        Current Outpatient Medications:     torsemide (DEMADEX) 100 MG Tab, Take 1 Tablet by mouth every day., Disp: , Rfl:     olmesartan (BENICAR) 20 MG Tab, Take 1 Tablet by mouth every day., Disp: , Rfl:     potassium chloride SA (KDUR) 20 MEQ Tab CR, Take 1 Tablet by mouth 2 times a day., Disp: , Rfl:     apixaban (ELIQUIS) 5mg Tab, Take 1 Tablet by mouth every day., Disp: , Rfl:     doxycycline (VIBRAMYCIN) 100 MG Tab, Take 1 Tablet by mouth 2 times a day for 7 days., Disp: 14 Tablet, Rfl: 0    furosemide (LASIX) 20 MG Tab, Take 20 mg by mouth 2 times a day., Disp: , Rfl:     cyclobenzaprine (FLEXERIL) 10 mg Tab, Take 1 Tablet by mouth 2 times a day as needed for Muscle Spasms., Disp: 180 Tablet, Rfl: 3    levothyroxine (SYNTHROID) 125 MCG Tab, Take 1 Tablet by mouth every morning on an empty stomach., Disp: , Rfl:     atorvastatin (LIPITOR) 40 MG Tab, Take 1 Tablet by mouth every day., Disp: 100 Tablet, Rfl: 3    metoprolol tartrate (LOPRESSOR) 25 MG Tab, Take 25 mg by mouth every 12 hours., Disp: , Rfl:     tamsulosin (FLOMAX) 0.4 MG capsule, Take 1 Capsule by mouth every day., Disp: 100 Capsule, Rfl: 3    B Complex Vitamins (VITAMIN B COMPLEX PO), Take  by mouth every day., Disp: , Rfl:     VITAMIN D PO, Take  by mouth every day., Disp: , Rfl:     Ascorbic Acid  "(VITAMIN C) 1000 MG Tab, Take  by mouth every day., Disp: , Rfl:     Objective:     Vitals: /74   Pulse 74   Temp 36.4 °C (97.6 °F)   Resp 16   Ht 1.854 m (6' 1\")   Wt 111 kg (245 lb)   SpO2 95%   BMI 32.32 kg/m²   General: Alert  HEENT: Normocephalic.   Heart: irregularly irregular.  S1 and S2 normal.  No murmurs appreciated.  Respiratory: Normal respiratory effort.  Clear to auscultation bilaterally.  Abdomen: Non-distended, soft  Extremities: Minimal leg edema.  Hyperemic skin on the left foot. Distal pulses 2+ symmetric    EKG: Atrial fibrillation, heart rate 71 bpm.  Prolonged QTc at 537    Assessment/Plan:     Diagnoses and all orders for this visit:    Cellulitis of left lower extremity  Acute, uncomplicated issue.  His cellulitis seems to have improved, but will continue another 7-day course of doxycycline as it seemed to help him previously.  -     doxycycline (VIBRAMYCIN) 100 MG Tab; Take 1 Tablet by mouth 2 times a day for 7 days.    Shortness of breath  Acute systolic heart failure (HCC)  Undiagnosed new problem with uncertain prognosis.  He is significantly short of breath today, suspect he may be in acute heart failure.  We discussed checking the following labs, chest x-ray, starting furosemide 20 mg twice a day with potassium.  Encouraged him to follow-up with his cardiologist as soon as possible.  Follow up in 2 days  -     proBrain Natriuretic Peptide, NT; Future  -     DX-CHEST-2 VIEWS; Future  -     EKG  -     CBC WITHOUT DIFFERENTIAL; Future  -     Comp Metabolic Panel; Future  -     TSH WITH REFLEX TO FT4; Future    Longstanding persistent atrial fibrillation (HCC)  Chronic, stable, his EKG today was consistent with A-fib.  Continue metoprolol, Eliquis  -     proBrain Natriuretic Peptide, NT; Future  -     DX-CHEST-2 VIEWS; Future  -     EKG  -     CBC WITHOUT DIFFERENTIAL; Future  -     Comp Metabolic Panel; Future  -     TSH WITH REFLEX TO FT4; Future    Secondary hypercoagulable " state (HCC)  Chronic, stable, is currently on Eliquis once a day due to hematuria if he takes it twice a day    Need for hepatitis C screening test  -     HEP C VIRUS ANTIBODY; Future    Pre-diabetes  -     HEMOGLOBIN A1C; Future        Return in 2 days (on 3/2/2023) for Med check.

## 2023-03-01 ENCOUNTER — HOSPITAL ENCOUNTER (OUTPATIENT)
Dept: RADIOLOGY | Facility: MEDICAL CENTER | Age: 78
End: 2023-03-01
Attending: INTERNAL MEDICINE
Payer: MEDICARE

## 2023-03-01 ENCOUNTER — APPOINTMENT (OUTPATIENT)
Dept: RADIOLOGY | Facility: MEDICAL CENTER | Age: 78
DRG: 640 | End: 2023-03-01
Attending: EMERGENCY MEDICINE
Payer: MEDICARE

## 2023-03-01 PROBLEM — Z71.89 ADVANCED CARE PLANNING/COUNSELING DISCUSSION: Status: ACTIVE | Noted: 2023-03-01

## 2023-03-01 LAB
ALBUMIN SERPL BCP-MCNC: 4.2 G/DL (ref 3.2–4.9)
ALBUMIN/GLOB SERPL: 1.4 G/DL
ALP SERPL-CCNC: 81 U/L (ref 30–99)
ALT SERPL-CCNC: 38 U/L (ref 2–50)
ANION GAP SERPL CALC-SCNC: 10 MMOL/L (ref 7–16)
ANION GAP SERPL CALC-SCNC: 19 MMOL/L (ref 7–16)
AST SERPL-CCNC: 57 U/L (ref 12–45)
BASOPHILS # BLD AUTO: 0.3 % (ref 0–1.8)
BASOPHILS # BLD: 0.04 K/UL (ref 0–0.12)
BILIRUB SERPL-MCNC: 1 MG/DL (ref 0.1–1.5)
BUN SERPL-MCNC: 34 MG/DL (ref 8–22)
BUN SERPL-MCNC: 37 MG/DL (ref 8–22)
CALCIUM ALBUM COR SERPL-MCNC: 9.3 MG/DL (ref 8.5–10.5)
CALCIUM SERPL-MCNC: 9.2 MG/DL (ref 8.5–10.5)
CALCIUM SERPL-MCNC: 9.5 MG/DL (ref 8.5–10.5)
CHLORIDE SERPL-SCNC: 85 MMOL/L (ref 96–112)
CHLORIDE SERPL-SCNC: 88 MMOL/L (ref 96–112)
CO2 SERPL-SCNC: 30 MMOL/L (ref 20–33)
CO2 SERPL-SCNC: 38 MMOL/L (ref 20–33)
CREAT SERPL-MCNC: 1.11 MG/DL (ref 0.5–1.4)
CREAT SERPL-MCNC: 1.32 MG/DL (ref 0.5–1.4)
EKG IMPRESSION: NORMAL
EOSINOPHIL # BLD AUTO: 0.05 K/UL (ref 0–0.51)
EOSINOPHIL NFR BLD: 0.4 % (ref 0–6.9)
ERYTHROCYTE [DISTWIDTH] IN BLOOD BY AUTOMATED COUNT: 42.5 FL (ref 35.9–50)
GFR SERPLBLD CREATININE-BSD FMLA CKD-EPI: 55 ML/MIN/1.73 M 2
GFR SERPLBLD CREATININE-BSD FMLA CKD-EPI: 67 ML/MIN/1.73 M 2
GLOBULIN SER CALC-MCNC: 3.1 G/DL (ref 1.9–3.5)
GLUCOSE SERPL-MCNC: 136 MG/DL (ref 65–99)
GLUCOSE SERPL-MCNC: 141 MG/DL (ref 65–99)
HCT VFR BLD AUTO: 48.3 % (ref 42–52)
HGB BLD-MCNC: 17.7 G/DL (ref 14–18)
IMM GRANULOCYTES # BLD AUTO: 0.12 K/UL (ref 0–0.11)
IMM GRANULOCYTES NFR BLD AUTO: 1 % (ref 0–0.9)
LYMPHOCYTES # BLD AUTO: 1.38 K/UL (ref 1–4.8)
LYMPHOCYTES NFR BLD: 11.1 % (ref 22–41)
MAGNESIUM SERPL-MCNC: 1.9 MG/DL (ref 1.5–2.5)
MCH RBC QN AUTO: 34.6 PG (ref 27–33)
MCHC RBC AUTO-ENTMCNC: 36.6 G/DL (ref 33.7–35.3)
MCV RBC AUTO: 94.5 FL (ref 81.4–97.8)
MONOCYTES # BLD AUTO: 1.13 K/UL (ref 0–0.85)
MONOCYTES NFR BLD AUTO: 9.1 % (ref 0–13.4)
NEUTROPHILS # BLD AUTO: 9.68 K/UL (ref 1.82–7.42)
NEUTROPHILS NFR BLD: 78.1 % (ref 44–72)
NRBC # BLD AUTO: 0 K/UL
NRBC BLD-RTO: 0 /100 WBC
NT-PROBNP SERPL IA-MCNC: 2608 PG/ML (ref 0–125)
PLATELET # BLD AUTO: 223 K/UL (ref 164–446)
PMV BLD AUTO: 10.1 FL (ref 9–12.9)
POTASSIUM SERPL-SCNC: 2.6 MMOL/L (ref 3.6–5.5)
POTASSIUM SERPL-SCNC: 2.7 MMOL/L (ref 3.6–5.5)
PROCALCITONIN SERPL-MCNC: 0.05 NG/ML
PROT SERPL-MCNC: 7.3 G/DL (ref 6–8.2)
RBC # BLD AUTO: 5.11 M/UL (ref 4.7–6.1)
SODIUM SERPL-SCNC: 134 MMOL/L (ref 135–145)
SODIUM SERPL-SCNC: 136 MMOL/L (ref 135–145)
TROPONIN T SERPL-MCNC: 81 NG/L (ref 6–19)
WBC # BLD AUTO: 12.4 K/UL (ref 4.8–10.8)

## 2023-03-01 PROCEDURE — 93005 ELECTROCARDIOGRAM TRACING: CPT | Performed by: EMERGENCY MEDICINE

## 2023-03-01 PROCEDURE — 83880 ASSAY OF NATRIURETIC PEPTIDE: CPT

## 2023-03-01 PROCEDURE — 700102 HCHG RX REV CODE 250 W/ 637 OVERRIDE(OP): Performed by: EMERGENCY MEDICINE

## 2023-03-01 PROCEDURE — 700111 HCHG RX REV CODE 636 W/ 250 OVERRIDE (IP): Performed by: EMERGENCY MEDICINE

## 2023-03-01 PROCEDURE — 84484 ASSAY OF TROPONIN QUANT: CPT

## 2023-03-01 PROCEDURE — 700102 HCHG RX REV CODE 250 W/ 637 OVERRIDE(OP): Performed by: STUDENT IN AN ORGANIZED HEALTH CARE EDUCATION/TRAINING PROGRAM

## 2023-03-01 PROCEDURE — 36415 COLL VENOUS BLD VENIPUNCTURE: CPT

## 2023-03-01 PROCEDURE — A9270 NON-COVERED ITEM OR SERVICE: HCPCS | Performed by: STUDENT IN AN ORGANIZED HEALTH CARE EDUCATION/TRAINING PROGRAM

## 2023-03-01 PROCEDURE — 71045 X-RAY EXAM CHEST 1 VIEW: CPT

## 2023-03-01 PROCEDURE — 80048 BASIC METABOLIC PNL TOTAL CA: CPT

## 2023-03-01 PROCEDURE — 84145 PROCALCITONIN (PCT): CPT

## 2023-03-01 PROCEDURE — 93005 ELECTROCARDIOGRAM TRACING: CPT

## 2023-03-01 PROCEDURE — 96365 THER/PROPH/DIAG IV INF INIT: CPT

## 2023-03-01 PROCEDURE — 770020 HCHG ROOM/CARE - TELE (206)

## 2023-03-01 PROCEDURE — 85025 COMPLETE CBC W/AUTO DIFF WBC: CPT

## 2023-03-01 PROCEDURE — 700102 HCHG RX REV CODE 250 W/ 637 OVERRIDE(OP)

## 2023-03-01 PROCEDURE — 80053 COMPREHEN METABOLIC PANEL: CPT

## 2023-03-01 PROCEDURE — 83735 ASSAY OF MAGNESIUM: CPT

## 2023-03-01 PROCEDURE — 99223 1ST HOSP IP/OBS HIGH 75: CPT | Mod: AI | Performed by: STUDENT IN AN ORGANIZED HEALTH CARE EDUCATION/TRAINING PROGRAM

## 2023-03-01 PROCEDURE — A9270 NON-COVERED ITEM OR SERVICE: HCPCS | Performed by: EMERGENCY MEDICINE

## 2023-03-01 PROCEDURE — A9270 NON-COVERED ITEM OR SERVICE: HCPCS

## 2023-03-01 RX ORDER — OLMESARTAN MEDOXOMIL 20 MG/1
20 TABLET ORAL DAILY
Status: DISCONTINUED | OUTPATIENT
Start: 2023-03-01 | End: 2023-03-01

## 2023-03-01 RX ORDER — LEVOTHYROXINE SODIUM 0.12 MG/1
125 TABLET ORAL
Status: DISCONTINUED | OUTPATIENT
Start: 2023-03-01 | End: 2023-03-03 | Stop reason: HOSPADM

## 2023-03-01 RX ORDER — POTASSIUM CHLORIDE 20 MEQ/1
40 TABLET, EXTENDED RELEASE ORAL ONCE
Status: COMPLETED | OUTPATIENT
Start: 2023-03-01 | End: 2023-03-01

## 2023-03-01 RX ORDER — CYCLOBENZAPRINE HCL 10 MG
10 TABLET ORAL 2 TIMES DAILY PRN
Status: DISCONTINUED | OUTPATIENT
Start: 2023-03-01 | End: 2023-03-03 | Stop reason: HOSPADM

## 2023-03-01 RX ORDER — TORSEMIDE 100 MG/1
100 TABLET ORAL DAILY
Status: DISCONTINUED | OUTPATIENT
Start: 2023-03-01 | End: 2023-03-03 | Stop reason: HOSPADM

## 2023-03-01 RX ORDER — POTASSIUM CHLORIDE 20 MEQ/1
40 TABLET, EXTENDED RELEASE ORAL 3 TIMES DAILY
Status: DISCONTINUED | OUTPATIENT
Start: 2023-03-01 | End: 2023-03-03 | Stop reason: HOSPADM

## 2023-03-01 RX ORDER — ATORVASTATIN CALCIUM 40 MG/1
40 TABLET, FILM COATED ORAL DAILY
Status: DISCONTINUED | OUTPATIENT
Start: 2023-03-01 | End: 2023-03-03 | Stop reason: HOSPADM

## 2023-03-01 RX ORDER — POTASSIUM CHLORIDE 20 MEQ/1
40 TABLET, EXTENDED RELEASE ORAL 2 TIMES DAILY
Status: DISCONTINUED | OUTPATIENT
Start: 2023-03-01 | End: 2023-03-01

## 2023-03-01 RX ORDER — POTASSIUM CHLORIDE 7.45 MG/ML
10 INJECTION INTRAVENOUS ONCE
Status: COMPLETED | OUTPATIENT
Start: 2023-03-01 | End: 2023-03-01

## 2023-03-01 RX ORDER — TAMSULOSIN HYDROCHLORIDE 0.4 MG/1
0.4 CAPSULE ORAL DAILY
Status: DISCONTINUED | OUTPATIENT
Start: 2023-03-01 | End: 2023-03-03 | Stop reason: HOSPADM

## 2023-03-01 RX ADMIN — TORSEMIDE 100 MG: 100 TABLET ORAL at 07:54

## 2023-03-01 RX ADMIN — ATORVASTATIN CALCIUM 40 MG: 40 TABLET, FILM COATED ORAL at 06:44

## 2023-03-01 RX ADMIN — TAMSULOSIN HYDROCHLORIDE 0.4 MG: 0.4 CAPSULE ORAL at 06:43

## 2023-03-01 RX ADMIN — POTASSIUM CHLORIDE 40 MEQ: 1500 TABLET, EXTENDED RELEASE ORAL at 12:30

## 2023-03-01 RX ADMIN — POTASSIUM CHLORIDE 40 MEQ: 1500 TABLET, EXTENDED RELEASE ORAL at 17:11

## 2023-03-01 RX ADMIN — APIXABAN 5 MG: 5 TABLET, FILM COATED ORAL at 07:54

## 2023-03-01 RX ADMIN — POTASSIUM CHLORIDE 40 MEQ: 1500 TABLET, EXTENDED RELEASE ORAL at 09:24

## 2023-03-01 RX ADMIN — METOPROLOL TARTRATE 25 MG: 25 TABLET, FILM COATED ORAL at 06:43

## 2023-03-01 RX ADMIN — POTASSIUM CHLORIDE 40 MEQ: 1500 TABLET, EXTENDED RELEASE ORAL at 01:23

## 2023-03-01 RX ADMIN — LEVOTHYROXINE SODIUM 125 MCG: 0.12 TABLET ORAL at 06:43

## 2023-03-01 RX ADMIN — POTASSIUM CHLORIDE 10 MEQ: 7.46 INJECTION, SOLUTION INTRAVENOUS at 01:28

## 2023-03-01 RX ADMIN — CYCLOBENZAPRINE 10 MG: 10 TABLET, FILM COATED ORAL at 09:35

## 2023-03-01 ASSESSMENT — ENCOUNTER SYMPTOMS
COUGH: 0
WEAKNESS: 1
FEVER: 0
CARDIOVASCULAR NEGATIVE: 1
DIARRHEA: 0
EYES NEGATIVE: 1
PALPITATIONS: 0
GASTROINTESTINAL NEGATIVE: 1
DIZZINESS: 0
PSYCHIATRIC NEGATIVE: 1
NAUSEA: 0
RESPIRATORY NEGATIVE: 1
MUSCULOSKELETAL NEGATIVE: 1
SHORTNESS OF BREATH: 1
CHILLS: 0
HEADACHES: 0
ABDOMINAL PAIN: 0
HEARTBURN: 0
VOMITING: 0

## 2023-03-01 ASSESSMENT — LIFESTYLE VARIABLES
CONSUMPTION TOTAL: NEGATIVE
AVERAGE NUMBER OF DAYS PER WEEK YOU HAVE A DRINK CONTAINING ALCOHOL: 0
TOTAL SCORE: 0
HAVE YOU EVER FELT YOU SHOULD CUT DOWN ON YOUR DRINKING: NO
HOW MANY TIMES IN THE PAST YEAR HAVE YOU HAD 5 OR MORE DRINKS IN A DAY: 0
TOTAL SCORE: 0
EVER HAD A DRINK FIRST THING IN THE MORNING TO STEADY YOUR NERVES TO GET RID OF A HANGOVER: NO
ALCOHOL_USE: NO
EVER FELT BAD OR GUILTY ABOUT YOUR DRINKING: NO
HAVE PEOPLE ANNOYED YOU BY CRITICIZING YOUR DRINKING: NO
ON A TYPICAL DAY WHEN YOU DRINK ALCOHOL HOW MANY DRINKS DO YOU HAVE: 0
TOTAL SCORE: 0

## 2023-03-01 ASSESSMENT — FIBROSIS 4 INDEX
FIB4 SCORE: 3.28
FIB4 SCORE: 3.28

## 2023-03-01 ASSESSMENT — CHA2DS2 SCORE
CHF OR LEFT VENTRICULAR DYSFUNCTION: YES
PRIOR STROKE OR TIA OR THROMBOEMBOLISM: NO
VASCULAR DISEASE: NO
CHA2DS2 VASC SCORE: 4
AGE 65 TO 74: NO
AGE 75 OR GREATER: YES
SEX: FEMALE
DIABETES: NO
HYPERTENSION: NO

## 2023-03-01 ASSESSMENT — PAIN DESCRIPTION - PAIN TYPE
TYPE: ACUTE PAIN
TYPE: ACUTE PAIN

## 2023-03-01 ASSESSMENT — PATIENT HEALTH QUESTIONNAIRE - PHQ9
SUM OF ALL RESPONSES TO PHQ9 QUESTIONS 1 AND 2: 0
1. LITTLE INTEREST OR PLEASURE IN DOING THINGS: NOT AT ALL
2. FEELING DOWN, DEPRESSED, IRRITABLE, OR HOPELESS: NOT AT ALL

## 2023-03-01 NOTE — ED NOTES
Alerted ARPN of low BP, ok'd pushing oral fluids for now. Pt asymptomatic. Alert and eating breakfast.

## 2023-03-01 NOTE — ED NOTES
Lab  from Lab called with critical result of 2.7 at 0848. Critical lab result read back to RN.   Dr. Matias notified of critical lab result at 0850.  Critical lab result read back by

## 2023-03-01 NOTE — DISCHARGE PLANNING
"HTH/SCP TCN chart review completed. Collaborated with Primary CM Clyde prior to meeting with the pt. The most current review of medical record, knowledge of pt's PLOF and social support, LACE+ score of 67, 6 clicks scores of ( not entered) mobility were considered.      Pt seen at bedside, very pleasant, AOx4,  O2 @  2L via NC currenty in use. Introduced TCN program. Provided education regarding post acute levels of car  and [facility] transitional care). Pt verbalizes understanding.   Pt endorses living with Spouse in a 2 brittney house. Pt  describes baseline mobility as modified independent using cane or FWW only if needed. Pt complaints of increasing shortness of breath with exertion and claims \"its hard to climb the stairs\". Pt states he own an Oxygen concentrator at home via [facility]. Pt wishes to remain with current DME Provider ( [facility]). Pt is independent with ADLs, IADLs, diiving. Pt has no acute concerns regarding food, housing and transportation.   Choice proactively obtained for O2 DME and given to MORENO Mendoza. Pt is amenable to [facility] services. Therefore, sending referral to [facility]. TCN will continue to follow and collaborate with discharge planning team as additional post acute needs arise. Thank you.     Completed today:  Choice obtained: O2 DME 3/1/2023  [facility] referral sent on 3/1/2023        "

## 2023-03-01 NOTE — ED NOTES
APRN at bedside. Ok'd diet so brought pt crackers and something to drink, called for late breakfast tray.

## 2023-03-01 NOTE — HOSPITAL COURSE
Sina Oliver is a 77 y.o. male who presented 2/28/2023 with electrolyte disturbances.     Patient has been complaining of left foot redness and swelling for the last 12 weeks.  Went to urgent care and received 7-day course of doxycycline to which redness and swelling has improved.  For the last week, has noted progressively worsening shortness of breath and generalized weakness.  Reports that he gained about 10 pounds of weight.  Went to see his primary care doctor today who ordered labs on him.  Patient was then contacted via phone of low potassium and was recommended to come into the ED for further evaluation.     Patient has a history of heart failure with preserved ejection fraction, moderate concentric left ventricular hypertrophy, atrial fibrillation on Eliquis, hyperlipidemia, hypothyroidism, hypertension.  He usually takes torsemide regularly but takes Lasix on an as-needed basis.  States that at baseline he is unable to ambulate very far before stopping to catch his breath.  For the last few weeks, he noted that he has been using his Lasix more than usual     In the ED, patient found to have normal vital signs.  Found to have potassium 2.6, hyponatremia, hypochloremia, prerenal azotemia, troponin 81, BNP 2608.  Chest x-ray showing prominent interstitial markings.  EKG shows atrial fibrillation with no ischemic changes.

## 2023-03-01 NOTE — PROGRESS NOTES
NOC HOSPITALIST CROSS COVER    Notified by paging regarding critical potassium level of 2.6.  It appears that the patient had outpatient labs drawn today and his potassium result was critical at 2.6. I was notified by paging center.  Patient was called on his cell phone and recommended that he visit the emergency department given his critically low potassium level.  Patient is agreeable and states that he will come to the emergency department tonight.      A/P:  #Hypokalemia  -Resulted outpatient labs  -Called and spoke with patient on the phone, recommended that he go to the emergency department.  Patient is agreeable and states that he will go to the emergency department for management of hypokalemia.        -----------------------------------------------------------------------------------------------------------    Electronically signed by:  ANURAG Alonzo PA-C  Hospitalist [facility]     '

## 2023-03-01 NOTE — ED NOTES
Med rec completed per patient  Allergies reviewed    Patient states he is unsure about some of the allergies on his profile, he doesn't recall taking Plavix or Xarelto. Medications left on his profile as he is unsure.     Patient states he takes Torsemide daily, and Furosemide only when his water retention gets bad. Patient states he usually only takes the medication for a couple days before the swelling goes down.     Patient states currently his potassium has been low so he has been taking one tablet of 20 mEQ about 5 and 6 times daily, was only taking twice daily previously.     Patient completed a 7 day course of Doxycycline approximately 3 days ago.    Patient confirmed he takes Eliquis once daily.

## 2023-03-01 NOTE — PROGRESS NOTES
[facility] Medicine Daily Progress Note    Date of Service  3/1/2023    Chief Complaint  Sina Oliver is a 77 y.o. male admitted 2/28/2023 with electrolyte disturbances.    Hospital Course  Sina Oliver is a 77 y.o. male who presented 2/28/2023 with electrolyte disturbances.     Patient has been complaining of left foot redness and swelling for the last 12 weeks.  Went to urgent care and received 7-day course of doxycycline to which redness and swelling has improved.  For the last week, has noted progressively worsening shortness of breath and generalized weakness.  Reports that he gained about 10 pounds of weight.  Went to see his primary care doctor today who ordered labs on him.  Patient was then contacted via phone of low potassium and was recommended to come into the ED for further evaluation.     Patient has a history of heart failure with preserved ejection fraction, moderate concentric left ventricular hypertrophy, atrial fibrillation on Eliquis, hyperlipidemia, hypothyroidism, hypertension.  He usually takes torsemide regularly but takes Lasix on an as-needed basis.  States that at baseline he is unable to ambulate very far before stopping to catch his breath.  For the last few weeks, he noted that he has been using his Lasix more than usual     In the ED, patient found to have normal vital signs.  Found to have potassium 2.6, hyponatremia, hypochloremia, prerenal azotemia, troponin 81, BNP 2608.  Chest x-ray showing prominent interstitial markings.  EKG shows atrial fibrillation with no ischemic changes.    Interval Problem Update  3/1 afebrile, BP stable but did drop on the 11:00 check to 78/49.  Patient is asymptomatic.  Discussed with nursing who will push fluids orally, unable to give IV fluids due to elevated BNP/failure.  We will continue to monitor closely.  Potassium remains low, ordered 40 mill equivalents 3 times daily.  We will continue to monitor overnight and hope for discharge  in the a.m.    I have discussed this patient's plan of care and discharge plan at IDT rounds today with Case Management, Nursing, Nursing leadership, and other members of the IDT team.    Consultants/Specialty  None    Code Status  DNAR/DNI    Disposition  Patient is not medically cleared for discharge.   Anticipate discharge to to home with close outpatient follow-up.  I have placed the appropriate orders for post-discharge needs.    Review of Systems  Review of Systems   Constitutional:  Negative for chills, fever and malaise/fatigue.   Respiratory:  Positive for shortness of breath. Negative for cough.    Cardiovascular:  Negative for chest pain, palpitations and leg swelling.   Gastrointestinal:  Negative for abdominal pain, diarrhea, heartburn, nausea and vomiting.   Genitourinary:  Negative for dysuria, frequency and urgency.   Neurological:  Negative for dizziness and headaches.   All other systems reviewed and are negative.     Physical Exam  Temp:  [36.4 °C (97.6 °F)-37.1 °C (98.7 °F)] 37.1 °C (98.7 °F)  Pulse:  [] 62  Resp:  [6-101] 17  BP: (106-129)/(60-78) 113/72  SpO2:  [83 %-95 %] 93 %    Physical Exam  Vitals and nursing note reviewed.   Constitutional:       Appearance: Normal appearance. He is not ill-appearing.   HENT:      Head: Normocephalic and atraumatic.      Jaw: There is normal jaw occlusion.      Right Ear: Hearing normal.      Left Ear: Hearing normal.      Nose: Nose normal.      Mouth/Throat:      Lips: Pink.      Mouth: Mucous membranes are moist.   Eyes:      Extraocular Movements: Extraocular movements intact.      Conjunctiva/sclera: Conjunctivae normal.      Pupils: Pupils are equal, round, and reactive to light.   Neck:      Vascular: No carotid bruit.   Cardiovascular:      Rate and Rhythm: Normal rate and regular rhythm.      Pulses: Normal pulses.      Heart sounds: Normal heart sounds, S1 normal and S2 normal.   Pulmonary:      Effort: Pulmonary effort is normal.       Breath sounds: Normal breath sounds and air entry. No stridor.   Musculoskeletal:      Cervical back: Normal range of motion and neck supple.      Right lower leg: No edema.      Left lower leg: No edema.   Skin:     General: Skin is warm and dry.      Capillary Refill: Capillary refill takes less than 2 seconds.   Neurological:      General: No focal deficit present.      Mental Status: He is alert and oriented to person, place, and time. Mental status is at baseline.      Sensory: Sensation is intact.      Motor: Motor function is intact.   Psychiatric:         Attention and Perception: Attention and perception normal.         Mood and Affect: Mood and affect normal.         Speech: Speech normal.         Behavior: Behavior normal. Behavior is cooperative.       Fluids  No intake or output data in the 24 hours ending 03/01/23 1050    Laboratory  Recent Labs     02/28/23  1642 03/01/23  0013   WBC 15.1* 12.4*   RBC 5.33 5.11   HEMOGLOBIN 18.2* 17.7   HEMATOCRIT 50.9 48.3   MCV 95.5 94.5   MCH 34.1* 34.6*   MCHC 35.8* 36.6*   RDW 43.1 42.5   PLATELETCT 241 223   MPV 10.6 10.1     Recent Labs     02/28/23  1642 03/01/23  0013 03/01/23  0800   SODIUM 138 134* 136   POTASSIUM 2.6* 2.6* 2.7*   CHLORIDE 87* 85* 88*   CO2 35* 30 38*   GLUCOSE 128* 141* 136*   BUN 34* 37* 34*   CREATININE 1.21 1.11 1.32   CALCIUM 10.4 9.5 9.2                   Imaging  DX-CHEST-PORTABLE (1 VIEW)   Final Result         1.  No focal infiltrates.   2.  Left lung base nodular density, recommend follow-up chest x-ray in 3 months for evaluation of stability or follow-up CT chest with contrast for further characterization.   3.  Cardiomegaly   4.  Atherosclerosis   5.  Perihilar interstitial prominence and bronchial wall cuffing, appearance suggests changes of underlying bronchial inflammation, consider bronchitis.           Assessment/Plan  * Hypokalemia- (present on admission)  Assessment & Plan  Likely from increased dose of  Lasix  Replace  Telemetry  Serial BMP    HF (heart failure), diastolic (HCC)  Assessment & Plan  - Most recent echo done September 2022  - ejection fraction is visually estimated to be 50-  55%. Moderate concentric left ventricular hypertrophy.  Mild to moderate tricuspid regurgitation    AF (atrial fibrillation) (HCC)  Assessment & Plan  Continue Eliquis  Hold beta-blocker for now until euvolemic    Advanced care planning/counseling discussion  Assessment & Plan  GOC d/w patient. DNR/DNI    Morbid obesity (HCC)  Assessment & Plan  Will need counseling when clinically appropriate    Hyperlipidemia- (present on admission)  Assessment & Plan  Continue lipitor          VTE prophylaxis: therapeutic anticoagulation with Eliquis    I have performed a physical exam and reviewed and updated ROS and Plan today (3/1/2023). In review of yesterday's note (2/28/2023), there are no changes except as documented above.

## 2023-03-01 NOTE — ASSESSMENT & PLAN NOTE
- Most recent echo done September 2022  - ejection fraction is visually estimated to be 50-55%. Moderate concentric left ventricular hypertrophy.  Mild to moderate tricuspid regurgitation
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
## 2023-03-01 NOTE — ED TRIAGE NOTES
"Chief Complaint   Patient presents with    Shortness of Breath     Increasing shortness of breath upon exertion for the past 7 weeks.      BIB by EMS from home. Was seen today by PCP, blood test done. Told potassium was 2.8. Lasix increased from PRN to 20 mg BID (started today).     /65   Pulse 76   Temp 37.1 °C (98.7 °F) (Oral)   Resp 13   Ht 1.854 m (6' 1\")   Wt 109 kg (240 lb)   SpO2 93%   BMI 31.66 kg/m²     "
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
## 2023-03-02 ENCOUNTER — APPOINTMENT (OUTPATIENT)
Dept: MEDICAL GROUP | Facility: MEDICAL CENTER | Age: 78
End: 2023-03-02
Payer: MEDICARE

## 2023-03-02 PROBLEM — N17.9 ACUTE KIDNEY INJURY (HCC): Status: ACTIVE | Noted: 2023-03-02

## 2023-03-02 PROBLEM — R91.1 PULMONARY NODULE: Status: ACTIVE | Noted: 2023-03-02

## 2023-03-02 LAB
ANION GAP SERPL CALC-SCNC: 11 MMOL/L (ref 7–16)
BUN SERPL-MCNC: 31 MG/DL (ref 8–22)
CALCIUM SERPL-MCNC: 9.2 MG/DL (ref 8.5–10.5)
CHLORIDE SERPL-SCNC: 93 MMOL/L (ref 96–112)
CO2 SERPL-SCNC: 34 MMOL/L (ref 20–33)
CREAT SERPL-MCNC: 1.45 MG/DL (ref 0.5–1.4)
GFR SERPLBLD CREATININE-BSD FMLA CKD-EPI: 49 ML/MIN/1.73 M 2
GLUCOSE SERPL-MCNC: 168 MG/DL (ref 65–99)
MAGNESIUM SERPL-MCNC: 2 MG/DL (ref 1.5–2.5)
PHOSPHATE SERPL-MCNC: 3.3 MG/DL (ref 2.5–4.5)
POTASSIUM SERPL-SCNC: 3 MMOL/L (ref 3.6–5.5)
SODIUM SERPL-SCNC: 138 MMOL/L (ref 135–145)

## 2023-03-02 PROCEDURE — 770020 HCHG ROOM/CARE - TELE (206)

## 2023-03-02 PROCEDURE — 51798 US URINE CAPACITY MEASURE: CPT

## 2023-03-02 PROCEDURE — 700102 HCHG RX REV CODE 250 W/ 637 OVERRIDE(OP): Performed by: STUDENT IN AN ORGANIZED HEALTH CARE EDUCATION/TRAINING PROGRAM

## 2023-03-02 PROCEDURE — A9270 NON-COVERED ITEM OR SERVICE: HCPCS | Performed by: STUDENT IN AN ORGANIZED HEALTH CARE EDUCATION/TRAINING PROGRAM

## 2023-03-02 PROCEDURE — 700102 HCHG RX REV CODE 250 W/ 637 OVERRIDE(OP)

## 2023-03-02 PROCEDURE — 84100 ASSAY OF PHOSPHORUS: CPT

## 2023-03-02 PROCEDURE — 80048 BASIC METABOLIC PNL TOTAL CA: CPT

## 2023-03-02 PROCEDURE — 36415 COLL VENOUS BLD VENIPUNCTURE: CPT

## 2023-03-02 PROCEDURE — A9270 NON-COVERED ITEM OR SERVICE: HCPCS

## 2023-03-02 PROCEDURE — 99233 SBSQ HOSP IP/OBS HIGH 50: CPT | Performed by: STUDENT IN AN ORGANIZED HEALTH CARE EDUCATION/TRAINING PROGRAM

## 2023-03-02 PROCEDURE — 83735 ASSAY OF MAGNESIUM: CPT

## 2023-03-02 RX ORDER — TRAMADOL HYDROCHLORIDE 50 MG/1
50 TABLET ORAL EVERY 6 HOURS PRN
Status: DISCONTINUED | OUTPATIENT
Start: 2023-03-02 | End: 2023-03-03 | Stop reason: HOSPADM

## 2023-03-02 RX ORDER — ACETAMINOPHEN 325 MG/1
650 TABLET ORAL EVERY 4 HOURS PRN
Status: DISCONTINUED | OUTPATIENT
Start: 2023-03-02 | End: 2023-03-03 | Stop reason: HOSPADM

## 2023-03-02 RX ADMIN — APIXABAN 5 MG: 5 TABLET, FILM COATED ORAL at 05:07

## 2023-03-02 RX ADMIN — CYCLOBENZAPRINE 10 MG: 10 TABLET, FILM COATED ORAL at 21:18

## 2023-03-02 RX ADMIN — POTASSIUM CHLORIDE 40 MEQ: 1500 TABLET, EXTENDED RELEASE ORAL at 11:43

## 2023-03-02 RX ADMIN — ATORVASTATIN CALCIUM 40 MG: 40 TABLET, FILM COATED ORAL at 05:07

## 2023-03-02 RX ADMIN — METOPROLOL TARTRATE 25 MG: 25 TABLET, FILM COATED ORAL at 16:34

## 2023-03-02 RX ADMIN — TAMSULOSIN HYDROCHLORIDE 0.4 MG: 0.4 CAPSULE ORAL at 05:06

## 2023-03-02 RX ADMIN — ACETAMINOPHEN 650 MG: 325 TABLET, FILM COATED ORAL at 16:47

## 2023-03-02 RX ADMIN — POTASSIUM CHLORIDE 40 MEQ: 1500 TABLET, EXTENDED RELEASE ORAL at 05:05

## 2023-03-02 RX ADMIN — POTASSIUM CHLORIDE 40 MEQ: 1500 TABLET, EXTENDED RELEASE ORAL at 16:34

## 2023-03-02 RX ADMIN — LEVOTHYROXINE SODIUM 125 MCG: 0.12 TABLET ORAL at 05:07

## 2023-03-02 RX ADMIN — TRAMADOL HYDROCHLORIDE 50 MG: 50 TABLET, COATED ORAL at 18:09

## 2023-03-02 ASSESSMENT — ENCOUNTER SYMPTOMS
PALPITATIONS: 0
ABDOMINAL PAIN: 0
SHORTNESS OF BREATH: 1
HEARTBURN: 0
VOMITING: 0
DIARRHEA: 0
DIZZINESS: 0
FEVER: 0
HEADACHES: 0
CHILLS: 0
COUGH: 0
NAUSEA: 0

## 2023-03-02 ASSESSMENT — PAIN DESCRIPTION - PAIN TYPE
TYPE: ACUTE PAIN

## 2023-03-02 ASSESSMENT — COGNITIVE AND FUNCTIONAL STATUS - GENERAL
STANDING UP FROM CHAIR USING ARMS: A LITTLE
DAILY ACTIVITIY SCORE: 24
CLIMB 3 TO 5 STEPS WITH RAILING: A LITTLE
SUGGESTED CMS G CODE MODIFIER MOBILITY: CJ
MOBILITY SCORE: 21
WALKING IN HOSPITAL ROOM: A LITTLE
SUGGESTED CMS G CODE MODIFIER DAILY ACTIVITY: CH

## 2023-03-02 ASSESSMENT — CHA2DS2 SCORE
CHF OR LEFT VENTRICULAR DYSFUNCTION: YES
HYPERTENSION: YES
CHA2DS2 VASC SCORE: 6
AGE 65 TO 74: NO
AGE 75 OR GREATER: YES
PRIOR STROKE OR TIA OR THROMBOEMBOLISM: NO
VASCULAR DISEASE: YES
DIABETES: YES
SEX: MALE

## 2023-03-02 ASSESSMENT — FIBROSIS 4 INDEX: FIB4 SCORE: 3.28

## 2023-03-02 NOTE — DISCHARGE PLANNING
Care Transition Team Assessment  Lsw spoke to pt at bedside. He reports he is fully independent w/ all I/ADLs. He has the following DME at home: FWW-he does not use, and O2/CPAP. Pt uses [facility]. Pt drives his own car for transport. Pt has POA over health decision making, 1-wife Shaniqua, 2-Daisha, 3-Sina. Pt's POA is in media tab in [email or web address].  Pt uses pharmacy [facility] [address].  Pt reports no hx w/ alcohol/drugs or anxiety/depression/mental health.  Pt reports not needing any resources at this time.        Information Source  Orientation Level: Oriented X4  Information Given By: Patient  Informant's Name: Sina  Who is responsible for making decisions for patient? : Patient    Readmission Evaluation  Is this a readmission?: No    Elopement Risk  Legal Hold: No  Ambulatory or Self Mobile in Wheelchair: Yes  Disoriented: No  Psychiatric Symptoms: None  History of Wandering: No  Elopement this Admit: No  Vocalizing Wanting to Leave: No  Displays Behaviors, Body Language Wanting to Leave: No-Not at Risk for Elopement  Elopement Risk: Not at Risk for Elopement    Interdisciplinary Discharge Planning  Primary Care Physician: Dr JUAN Chase  Lives with - Patient's Self Care Capacity: Significant Other  Patient or legal guardian wants to designate a caregiver: No  Support Systems: Friends / Neighbors, Spouse / Significant Other, Children  Housing / Facility: 2 Story House  Able to Return to Previous ADL's: Yes  Mobility Issues: No  Patient Prefers to be Discharged to:: home w/ spouse  Assistance Needed: No  Durable Medical Equipment:  (FWW-does not use, O2 & Cpap)  DME Provider / Phone: [facility]    Discharge Preparedness  What is your plan after discharge?: Home with help  What are your discharge supports?: Spouse  Prior Functional Level: Ambulatory, Drives Self, Independent with Activities of Daily Living, Independent with Medication Management  Difficulity with ADLs: None  Difficulity with IADLs: None    Functional  Assesment  Prior Functional Level: Ambulatory, Drives Self, Independent with Activities of Daily Living, Independent with Medication Management    Finances  Prescription Coverage: Yes ([facility] [address])    Vision / Hearing Impairment  Vision Impairment : No  Hearing Impairment : Yes  Hearing Impairment: Both Ears, Hearing Device Not Available  Does Pt Need Special Equipment for the Hearing Impaired?: No         Advance Directive  Advance Directive?: DPOA for Health Care  Durable Power of  Name and Contact : 1-spouse Shaniqua, 2-Daisha, 3-Sina    Domestic Abuse  Have you ever been the victim of abuse or violence?: No  Physical Abuse or Sexual Abuse: No  Verbal Abuse or Emotional Abuse: No  Possible Abuse/Neglect Reported to:: Not Applicable    Psychological Assessment  History of Substance Abuse: None  History of Psychiatric Problems: No         Anticipated Discharge Information  Discharge Disposition: Discharged to home/self care (01)  Discharge Address: [address] [place] [address]

## 2023-03-02 NOTE — PROGRESS NOTES
4 Eyes Skin Assessment Completed by JAYLIN Almendarez and JAYLIN Iglesias.    Head WDL  Ears WDL  Nose WDL  Mouth WDL  Neck WDL  Breast/Chest WDL  Shoulder Blades WDL  Spine WDL  (R) Arm/Elbow/Hand WDL  (L) Arm/Elbow/Hand WDL  Abdomen WDL  Groin WDL  Scrotum/Coccyx/Buttocks WDL  (R) Leg WDL  (L) Leg WDL  (R) Heel/Foot/Toe WDL  (L) Heel/Foot/Toe WDL          Devices In Places Tele Box and Pulse Ox      Interventions In Place Pillows    Possible Skin Injury No    Pictures Uploaded Into Epic N/A  Wound Consult Placed N/A  RN Wound Prevention Protocol Ordered No

## 2023-03-02 NOTE — PROGRESS NOTES
[facility] Daily [facility] Note    Date of Service  3/2/2023    Chief Complaint  Sina Oliver is a 77 y.o. male admitted 2/28/2023 with electrolyte disturbances.    Hospital Course  Sina Oliver is a 77 y.o. male who presented 2/28/2023 with electrolyte disturbances.     Patient has been complaining of left foot redness and swelling for the last 12 weeks.  Went to urgent care and received 7-day course of doxycycline to which redness and swelling has improved.  For the last week, has noted progressively worsening shortness of breath and generalized weakness.  Reports that he gained about 10 pounds of weight.  Went to see his primary care doctor today who ordered labs on him.  Patient was then contacted via phone of low potassium and was recommended to come into the ED for further evaluation.     Patient has a history of heart failure with preserved ejection fraction, moderate concentric left ventricular hypertrophy, atrial fibrillation on Eliquis, hyperlipidemia, hypothyroidism, hypertension.  He usually takes torsemide regularly but takes Lasix on an as-needed basis.  States that at baseline he is unable to ambulate very far before stopping to catch his breath.  For the last few weeks, he noted that he has been using his Lasix more than usual     In the ED, patient found to have normal vital signs.  Found to have potassium 2.6, hyponatremia, hypochloremia, prerenal azotemia, troponin 81, BNP 2608.  Chest x-ray showing prominent interstitial markings.  EKG shows atrial fibrillation with no ischemic changes.    Interval Problem Update  No acute overnight events  Noted K improving 3 today. On kdur pills  Cr going up 1.11>1.45. Will encourage oral intake   Check phos  Labs am     I have discussed this patient's plan of care and discharge plan at IDT rounds today with Case Management, Nursing, Nursing leadership, and other members of the IDT team.    Consultants/Specialty  None    Code  Status  DNAR/DNI    Disposition  Patient is not medically cleared for discharge.   Anticipate discharge to to home with close outpatient follow-up.  I have placed the appropriate orders for post-discharge needs.    Review of Systems  Review of Systems   Constitutional:  Negative for chills, fever and malaise/fatigue.   Respiratory:  Positive for shortness of breath. Negative for cough.    Cardiovascular:  Negative for chest pain, palpitations and leg swelling.   Gastrointestinal:  Negative for abdominal pain, diarrhea, heartburn, nausea and vomiting.   Genitourinary:  Negative for dysuria, frequency and urgency.   Neurological:  Negative for dizziness and headaches.   All other systems reviewed and are negative.     Physical Exam  Temp:  [36.2 °C (97.2 °F)-36.7 °C (98.1 °F)] 36.2 °C (97.2 °F)  Pulse:  [70-85] 76  Resp:  [18-20] 18  BP: (100-129)/(52-86) 129/86  SpO2:  [91 %-94 %] 91 %    Physical Exam  Vitals and nursing note reviewed.   Constitutional:       Appearance: Normal appearance. He is not ill-appearing.   HENT:      Head: Normocephalic and atraumatic.      Jaw: There is normal jaw occlusion.      Right Ear: Hearing normal.      Left Ear: Hearing normal.      Nose: Nose normal.      Mouth/Throat:      Lips: Pink.      Mouth: Mucous membranes are moist.   Eyes:      Extraocular Movements: Extraocular movements intact.      Conjunctiva/sclera: Conjunctivae normal.      Pupils: Pupils are equal, round, and reactive to light.   Neck:      Vascular: No carotid bruit.   Cardiovascular:      Rate and Rhythm: Normal rate and regular rhythm.      Pulses: Normal pulses.      Heart sounds: Normal heart sounds, S1 normal and S2 normal.   Pulmonary:      Effort: Pulmonary effort is normal.      Breath sounds: Normal breath sounds and air entry. No stridor.   Musculoskeletal:      Cervical back: Normal range of motion and neck supple.      Right lower leg: No edema.      Left lower leg: No edema.   Skin:     General:  Skin is warm and dry.      Capillary Refill: Capillary refill takes less than 2 seconds.   Neurological:      General: No focal deficit present.      Mental Status: He is alert and oriented to person, place, and time. Mental status is at baseline.      Sensory: Sensation is intact.      Motor: Motor function is intact.   Psychiatric:         Attention and Perception: Attention and perception normal.         Mood and Affect: Mood and affect normal.         Speech: Speech normal.         Behavior: Behavior normal. Behavior is cooperative.       Fluids    Intake/Output Summary (Last 24 hours) at 3/2/2023 1204  Last data filed at 3/2/2023 0856  Gross per 24 hour   Intake 600 ml   Output 500 ml   Net 100 ml       Laboratory  Recent Labs     02/28/23  1642 03/01/23  0013   WBC 15.1* 12.4*   RBC 5.33 5.11   HEMOGLOBIN 18.2* 17.7   HEMATOCRIT 50.9 48.3   MCV 95.5 94.5   MCH 34.1* 34.6*   MCHC 35.8* 36.6*   RDW 43.1 42.5   PLATELETCT 241 223   MPV 10.6 10.1       Recent Labs     03/01/23  0013 03/01/23  0800 03/02/23  0351   SODIUM 134* 136 138   POTASSIUM 2.6* 2.7* 3.0*   CHLORIDE 85* 88* 93*   CO2 30 38* 34*   GLUCOSE 141* 136* 168*   BUN 37* 34* 31*   CREATININE 1.11 1.32 1.45*   CALCIUM 9.5 9.2 9.2                     Imaging  DX-CHEST-PORTABLE (1 VIEW)   Final Result         1.  No focal infiltrates.   2.  Left lung base nodular density, recommend follow-up chest x-ray in 3 months for evaluation of stability or follow-up CT chest with contrast for further characterization.   3.  Cardiomegaly   4.  Atherosclerosis   5.  Perihilar interstitial prominence and bronchial wall cuffing, appearance suggests changes of underlying bronchial inflammation, consider bronchitis.             Assessment/Plan  * Hypokalemia- (present on admission)  Assessment & Plan  Likely from increased dose of Lasix  Replace  Telemetry  Check Mag and phos    Pulmonary nodule  Assessment & Plan  Noted on CXR, though this could be early pneumonia or an  area of atelectasis   Outpatient follow up Xray to confirm clearing    Acute kidney injury (HCC)  Assessment & Plan  Baseline Cr around 1.1- 1.2  Avoid nephrotoxins  Renal dosing meds  Cont monitoring    Advanced care planning/counseling discussion  Assessment & Plan  C d/w patient. DNR/DNI    Obesity (BMI 30-39.9)- (present on admission)  Assessment & Plan  BMI 32    Morbid obesity (HCC)  Assessment & Plan  Will need counseling when clinically appropriate    HF (heart failure), diastolic ([facility])  Assessment & Plan  - Most recent echo done September 2022  - ejection fraction is visually estimated to be 50-55%. Moderate concentric left ventricular hypertrophy.  Mild to moderate tricuspid regurgitation    AF (atrial fibrillation) ([facility])  Assessment & Plan  Continue Eliquis  cont beta-blocker     Hyperlipidemia- (present on admission)  Assessment & Plan  Continue lipitor          VTE prophylaxis: therapeutic anticoagulation with Eliquis    I have performed a physical exam and reviewed and updated ROS and Plan today (3/2/2023). In review of yesterday's note (3/1/2023), there are no changes except as documented above.

## 2023-03-02 NOTE — PROGRESS NOTES
Report received from [facility] shift RN, assumed patient care. Patient is calmly resting in bed, no signs of distress, even and unlabored breathing noted. Pt on room air. Tele box on and in place. Patient has call light within reach, fall precautions in place. Will continue to monitor.      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

## 2023-03-02 NOTE — HEART FAILURE PROGRAM
Discussed with Dr. Ferrer. This patient's heart failure is not acutely decompensated at this time. HF f/u appointment and discharge checklist are not indicated as long as this remains the case.    Patient is being treated for hypokalemia.

## 2023-03-02 NOTE — CARE PLAN
Problem: Knowledge Deficit - Standard  Goal: Patient and family/care givers will demonstrate understanding of plan of care, disease process/condition, diagnostic tests and medications  Outcome: Progressing     Problem: Fall Risk  Goal: Patient will remain free from falls  3/2/2023 0111 by Tania Gutierrez R.N.  Outcome: Progressing  3/2/2023 0110 by Tania Gutierrez R.N.  Outcome: Progressing   The patient is Watcher - Medium risk of patient condition declining or worsening    Shift Goals  Clinical Goals: Improvement in potassium level, wean patient off oxygen  Patient Goals: Rest and comfort  Family Goals: ROJAS.  No family present    Progress made toward(s) clinical / shift goals:   Patient has received potassium pills to help improve level.  Patient has been sleeping comfortably throughout the night.      Patient is not progressing towards the following goals: Patient remains on 2 L of oxygen due to de-sating while sleeping.

## 2023-03-02 NOTE — CARE PLAN
The patient is Stable - Low risk of patient condition declining or worsening    Shift Goals  Clinical Goals: wean o2, monitor potassium  Patient Goals: ROJAS  Family Goals: ROJAS.  No family present    Progress made toward(s) clinical / shift goals:    Problem: Discharge Barriers/Planning  Goal: Patient's continuum of care needs are met  Outcome: Progressing     Problem: Hemodynamics  Goal: Patient's hemodynamics, fluid balance and neurologic status will be stable or improve  Outcome: Progressing     Problem: Urinary Elimination  Goal: Establish and maintain regular urinary output  Outcome: Progressing       Patient is not progressing towards the following goals: n/a

## 2023-03-02 NOTE — ASSESSMENT & PLAN NOTE
Noted on CXR, though this could be early pneumonia or an area of atelectasis   Outpatient follow up Xray to confirm clearing

## 2023-03-02 NOTE — DISCHARGE PLANNING
[facility] TCN chart review completed. Collaborated with YESSI Hale. Current discharge considerations are home with DME if needed. No further TCN needs.    TCN will continue to follow and collaborate with discharge planning team as additional post acute needs arise. Thank you.    Completed:  Choice obtained: [facility] D[facility] 3/1/2023  [facility] referral sent on 3/1/2023

## 2023-03-02 NOTE — PROGRESS NOTES
Received Bedside report. Assumed care at 1900. This pt is AOx4, ambulatory with standby assistance, voiding adequately, 0/10 pain. Patient and RN discussed plan of care: questions answered. Labs noted, assessment complete, patient tolerating regular diet. Tele box in place. Pt is on 2 L of O2 via nasal cannula. Call light in place, fall precautions in place, patient educated on importance of calling for assistance. No additional needs at this time. VSS

## 2023-03-02 NOTE — DIETARY
Nutrition Services: Brief Update    Consult received for admit screen with yes to wound or hx of wound. No wound found anywhere in chart - flowsheets, problem list, no wound team c/s. PO intake so far during admit is % x 2 meals.    No other nutrition triggers at this time. RD will monitor per department policy and remains available prn.

## 2023-03-03 ENCOUNTER — APPOINTMENT (OUTPATIENT)
Dept: RADIOLOGY | Facility: MEDICAL CENTER | Age: 78
DRG: 640 | End: 2023-03-03
Attending: STUDENT IN AN ORGANIZED HEALTH CARE EDUCATION/TRAINING PROGRAM
Payer: MEDICARE

## 2023-03-03 ENCOUNTER — PHARMACY VISIT (OUTPATIENT)
Dept: PHARMACY | Facility: MEDICAL CENTER | Age: 78
End: 2023-03-03
Payer: COMMERCIAL

## 2023-03-03 VITALS
WEIGHT: 274.91 LBS | SYSTOLIC BLOOD PRESSURE: 92 MMHG | HEART RATE: 76 BPM | OXYGEN SATURATION: 93 % | RESPIRATION RATE: 17 BRPM | TEMPERATURE: 98.4 F | BODY MASS INDEX: 36.44 KG/M2 | DIASTOLIC BLOOD PRESSURE: 54 MMHG | HEIGHT: 73 IN

## 2023-03-03 LAB
ANION GAP SERPL CALC-SCNC: 12 MMOL/L (ref 7–16)
BUN SERPL-MCNC: 18 MG/DL (ref 8–22)
CALCIUM SERPL-MCNC: 9 MG/DL (ref 8.5–10.5)
CHLORIDE SERPL-SCNC: 97 MMOL/L (ref 96–112)
CO2 SERPL-SCNC: 27 MMOL/L (ref 20–33)
CREAT SERPL-MCNC: 0.95 MG/DL (ref 0.5–1.4)
ERYTHROCYTE [DISTWIDTH] IN BLOOD BY AUTOMATED COUNT: 45.6 FL (ref 35.9–50)
GFR SERPLBLD CREATININE-BSD FMLA CKD-EPI: 81 ML/MIN/1.73 M 2
GLUCOSE SERPL-MCNC: 160 MG/DL (ref 65–99)
HCT VFR BLD AUTO: 43.6 % (ref 42–52)
HGB BLD-MCNC: 15 G/DL (ref 14–18)
MCH RBC QN AUTO: 33.8 PG (ref 27–33)
MCHC RBC AUTO-ENTMCNC: 34.4 G/DL (ref 33.7–35.3)
MCV RBC AUTO: 98.2 FL (ref 81.4–97.8)
PLATELET # BLD AUTO: 201 K/UL (ref 164–446)
PMV BLD AUTO: 10.4 FL (ref 9–12.9)
POTASSIUM SERPL-SCNC: 3.3 MMOL/L (ref 3.6–5.5)
RBC # BLD AUTO: 4.44 M/UL (ref 4.7–6.1)
SODIUM SERPL-SCNC: 136 MMOL/L (ref 135–145)
URATE SERPL-MCNC: 9.7 MG/DL (ref 2.5–8.3)
WBC # BLD AUTO: 10.8 K/UL (ref 4.8–10.8)

## 2023-03-03 PROCEDURE — A9270 NON-COVERED ITEM OR SERVICE: HCPCS | Performed by: STUDENT IN AN ORGANIZED HEALTH CARE EDUCATION/TRAINING PROGRAM

## 2023-03-03 PROCEDURE — 97161 PT EVAL LOW COMPLEX 20 MIN: CPT

## 2023-03-03 PROCEDURE — 99239 HOSP IP/OBS DSCHRG MGMT >30: CPT | Performed by: STUDENT IN AN ORGANIZED HEALTH CARE EDUCATION/TRAINING PROGRAM

## 2023-03-03 PROCEDURE — 700102 HCHG RX REV CODE 250 W/ 637 OVERRIDE(OP)

## 2023-03-03 PROCEDURE — A9270 NON-COVERED ITEM OR SERVICE: HCPCS

## 2023-03-03 PROCEDURE — RXMED WILLOW AMBULATORY MEDICATION CHARGE: Performed by: STUDENT IN AN ORGANIZED HEALTH CARE EDUCATION/TRAINING PROGRAM

## 2023-03-03 PROCEDURE — 700102 HCHG RX REV CODE 250 W/ 637 OVERRIDE(OP): Performed by: STUDENT IN AN ORGANIZED HEALTH CARE EDUCATION/TRAINING PROGRAM

## 2023-03-03 PROCEDURE — 36415 COLL VENOUS BLD VENIPUNCTURE: CPT

## 2023-03-03 PROCEDURE — 84550 ASSAY OF BLOOD/URIC ACID: CPT

## 2023-03-03 PROCEDURE — 85027 COMPLETE CBC AUTOMATED: CPT

## 2023-03-03 PROCEDURE — 80048 BASIC METABOLIC PNL TOTAL CA: CPT

## 2023-03-03 PROCEDURE — 73620 X-RAY EXAM OF FOOT: CPT | Mod: RT

## 2023-03-03 RX ORDER — POTASSIUM CHLORIDE 20 MEQ/1
40 TABLET, EXTENDED RELEASE ORAL 2 TIMES DAILY
Qty: 120 TABLET | Refills: 0 | Status: CANCELLED | OUTPATIENT
Start: 2023-03-03 | End: 2023-04-02

## 2023-03-03 RX ORDER — COLCHICINE 0.6 MG/1
1.2 TABLET ORAL DAILY
Status: DISCONTINUED | OUTPATIENT
Start: 2023-03-03 | End: 2023-03-03 | Stop reason: HOSPADM

## 2023-03-03 RX ORDER — COLCHICINE 0.6 MG/1
0.6 TABLET ORAL DAILY
Qty: 14 TABLET | Refills: 0 | Status: SHIPPED | OUTPATIENT
Start: 2023-03-03 | End: 2023-03-17

## 2023-03-03 RX ADMIN — ATORVASTATIN CALCIUM 40 MG: 40 TABLET, FILM COATED ORAL at 06:02

## 2023-03-03 RX ADMIN — POTASSIUM CHLORIDE 40 MEQ: 1500 TABLET, EXTENDED RELEASE ORAL at 06:02

## 2023-03-03 RX ADMIN — TRAMADOL HYDROCHLORIDE 50 MG: 50 TABLET, COATED ORAL at 03:37

## 2023-03-03 RX ADMIN — TAMSULOSIN HYDROCHLORIDE 0.4 MG: 0.4 CAPSULE ORAL at 06:02

## 2023-03-03 RX ADMIN — ACETAMINOPHEN 650 MG: 325 TABLET, FILM COATED ORAL at 06:07

## 2023-03-03 RX ADMIN — POTASSIUM CHLORIDE 40 MEQ: 1500 TABLET, EXTENDED RELEASE ORAL at 14:35

## 2023-03-03 RX ADMIN — COLCHICINE 1.2 MG: 0.6 TABLET, FILM COATED ORAL at 14:35

## 2023-03-03 RX ADMIN — APIXABAN 5 MG: 5 TABLET, FILM COATED ORAL at 06:02

## 2023-03-03 RX ADMIN — METOPROLOL TARTRATE 25 MG: 25 TABLET, FILM COATED ORAL at 06:02

## 2023-03-03 RX ADMIN — LEVOTHYROXINE SODIUM 125 MCG: 0.12 TABLET ORAL at 06:02

## 2023-03-03 ASSESSMENT — COGNITIVE AND FUNCTIONAL STATUS - GENERAL
MOVING FROM LYING ON BACK TO SITTING ON SIDE OF FLAT BED: A LITTLE
MOVING TO AND FROM BED TO CHAIR: A LITTLE
MOBILITY SCORE: 21
SUGGESTED CMS G CODE MODIFIER MOBILITY: CJ
TURNING FROM BACK TO SIDE WHILE IN FLAT BAD: A LITTLE

## 2023-03-03 ASSESSMENT — PAIN DESCRIPTION - PAIN TYPE
TYPE: ACUTE PAIN
TYPE: ACUTE PAIN

## 2023-03-03 ASSESSMENT — GAIT ASSESSMENTS
ASSISTIVE DEVICE: FRONT WHEEL WALKER
DISTANCE (FEET): 35
DEVIATION: ANTALGIC
GAIT LEVEL OF ASSIST: SUPERVISED

## 2023-03-03 NOTE — THERAPY
Physical Therapy   Initial Evaluation     Patient Name: Sina Oliver  Age:  77 y.o., Sex:  male  Medical Record #: [id number]  Today's Date: 3/3/2023       Assessment  Patient is a 77 y.o. male admitted with hypokalemia. Pt seen for PT evaluation at this time. Pt c/o of R foot pain with decr weight acceptance, reports ongoing for 6 weeks but is improving. Pt able to demo mobility without assist, ambulated with FWW, no LOB, able to negotiate steps with B rails. Pt reports spouse able to assist with IADLs and ADLs and pt reports no concerns with return home. Appears functionally capable from PT standpoint at this time. No further acute PT needs.     Plan  Evaluation only  DC Equipment Recommendations: None  Discharge Recommendations: Anticipate that the patient will have no further physical therapy needs after discharge from the hospital     03/03/23 1241   Prior Living Situation   Prior Services None   Housing / Facility 2 Story House   Steps In Home FOS to bedrooms   Equipment Owned Front-Wheel Walker   Lives with -  Spouse   Comments reports wife able to assist with ADLs and IADLs   Prior Level of Functional Mobility   Bed Mobility Independent   Transfer Status Independent   Ambulation Independent   Ambulation Distance community distances   Assistive Devices Used None   Stairs Independent   Comments reports has walker on first and second floor of home   Cognition    Level of Consciousness Alert   Comments pleasant and cooperative   Balance Assessment   Sitting Balance (Static) Good   Sitting Balance (Dynamic) Good   Standing Balance (Static) Fair +   Standing Balance (Dynamic) Fair   Weight Shift Sitting Good   Weight Shift Standing Fair   Comments standing with FWW   Bed Mobility    Supine to Sit Supervised   Sit to Supine Supervised   Scooting Supervised   Gait Analysis   Gait Level Of Assist Supervised   Assistive Device Front Wheel Walker   Distance (Feet) 35   Deviation Antalgic   # of Stairs Climbed 5    Level of Assist with Stairs Supervised   Weight Bearing Status no restrictions   Comments no LOB, distance limited d/t pain but functional within home, VCs for sequencing stairs   Functional Mobility   Sit to Stand Supervised

## 2023-03-03 NOTE — PROGRESS NOTES
Introduced self to patient, sitting on side of bed and on the pone, pleasant, denied pain and no sob noted, will monitor.

## 2023-03-03 NOTE — DISCHARGE INSTRUCTIONS
Discharge Instructions per Yadira Ferrer M.D.    Please follow-up with PCP and cardiology as outpatient.  Repeat BMP in one week  Follow up with your PCP for R foot if pain is persistent. Recommends MRI for further evaluation. Suspect you have gout, colchicine has been ordered. Please follow up with PCP in one week     Return to ER in the event of new or worsening symptoms. Please note importance of compliance and the patient has agreed to proceed with all medical recommendations and follow up plan indicated above. All medications come with benefits and risks. Risks may include permanent injury or death and these risks can be minimized with close reassessment and monitoring. Please make it to your scheduled follow ups with PCP and cardiology

## 2023-03-03 NOTE — DISCHARGE SUMMARY
Discharge Summary    CHIEF COMPLAINT ON ADMISSION  Chief Complaint   Patient presents with    Shortness of Breath     Increasing shortness of breath upon exertion for the past 7 weeks.        Reason for Admission  ems     Admission Date  2/28/2023    CODE STATUS  DNAR/DNI    HPI & HOSPITAL COURSE  Sina Oliver is a 77 y.o. male with history of heart failure with preserved ejection fraction, moderate concentric left ventricular hypertrophy, atrial fibrillation on Eliquis, hyperlipidemia, hypothyroidism, hypertension. who presented 2/28/2023 with hypokalemia with K 2.6 on admission. Patient has been on torsemide 100 mg daily and Lasix as needed basis. He has been taking more lasix due to worsening sob lately.     Patient was admitted and potassium was aggressively replaced.  On the day of discharge, potassium 3.3.  I have asked patient to continue torsemide, that held Lasix until seen by PCP or cardiology.  He is also on potassium supplements 20 mEq 5 times a day per patient.      Patient was noted to have ANTONIO on admission, which has been resolved upon discharge.    He is also noted to have Left lung base nodular density, recommend follow-up chest x-ray in 3 months for evaluation of stability or follow-up CT chest with contrast for further characterization.    He complains of right foot pain.  Right foot was noted erythema and warmness. R foot x ray notes No radiographic evidence of acute injury. Calcified enthesophyte at insertion site of the distal Achilles tendon with the calcaneus. Uric acid was checked which was elevated. Suspecting gout etiology. I have started him on colchicine.  PT has evaluated patient and no further physical therapy needs     Therefore, he is discharged in good and stable condition to home with close outpatient follow-up.  He is advised to follow-up with PCP/cardiology and repeat BMP in 1 week.    The patient met 2-midnight criteria for an inpatient stay at the time of  discharge.    Discharge Date  3/3/23    FOLLOW UP ITEMS POST DISCHARGE  PCP repeat BMP in one week     DISCHARGE DIAGNOSES  Principal Problem:    Hypokalemia POA: Yes  Active Problems:    Hyperlipidemia POA: Yes    AF (atrial fibrillation) (HCC) POA: Unknown    HF (heart failure), diastolic (HCC) POA: Unknown    Morbid obesity (HCC) POA: Unknown    Obesity (BMI 30-39.9) POA: Yes    Advanced care planning/counseling discussion POA: Unknown    Acute kidney injury (HCC) POA: Unknown    Pulmonary nodule POA: Unknown  Resolved Problems:    * No resolved hospital problems. *      FOLLOW UP  Future Appointments   Date Time Provider Department Center   3/8/2023  1:00 PM Tania Neely D.O. [facility]   3/17/2023  2:00 PM [facility] 1 [facility] [place]   5/18/2023  1:20 PM Hugo Chase M.D. [facility] TADIZA Chase M.D.  [address]  [facility] [address]  [facility] [address]  [phone]    Follow up in 1 week(s)        MEDICATIONS ON DISCHARGE     Medication List        START taking these medications        Instructions   colchicine 0.6 MG Tabs  Commonly known as: COLCRYS   Take 1 Tablet by mouth every day for 14 days.  Dose: 0.6 mg            CONTINUE taking these medications        Instructions   apixaban 5mg Tabs  Commonly known as: ELIQUIS   Take 1 Tablet by mouth every day.  Dose: 5 mg     atorvastatin 40 MG Tabs  Commonly known as: LIPITOR   Take 1 Tablet by mouth every day.  Dose: 40 mg     cyclobenzaprine 10 mg Tabs  Commonly known as: Flexeril   Take 1 Tablet by mouth 2 times a day as needed for Muscle Spasms.  Dose: 10 mg     doxycycline 100 MG Tabs  Commonly known as: VIBRAMYCIN   Take 1 Tablet by mouth 2 times a day for 7 days.  Dose: 100 mg     levothyroxine 125 MCG Tabs  Commonly known as: SYNTHROID   Doctor's comments: Synthroid non generic  Take 1 Tablet by mouth every morning on an empty stomach.  Dose: 125 mcg     metoprolol tartrate 25 MG Tabs  Commonly known as: LOPRESSOR   Take 25 mg by  mouth every 12 hours.  Dose: 25 mg     potassium chloride SA 20 MEQ Tbcr  Commonly known as: Kdur   Take 20 mEq by mouth 5 Times a Day.  Dose: 20 mEq     tamsulosin 0.4 MG capsule  Commonly known as: FLOMAX   Take 1 Capsule by mouth every day.  Dose: 0.4 mg     torsemide 100 MG Tabs  Commonly known as: DEMADEX   Take 1 Tablet by mouth every day.  Dose: 100 mg     VITAMIN B COMPLEX PO   Take 1 Tablet by mouth every day.  Dose: 1 Tablet     Vitamin C 1000 MG Tabs   Take 1,000 mg by mouth every day.  Dose: 1,000 mg            STOP taking these medications      furosemide 20 MG Tabs  Commonly known as: LASIX              Allergies  Allergies   Allergen Reactions    Azithromycin      nausea    Coumadin [Warfarin]      bleeding    Gabapentin      Pt does not want too many side effects    Ibuprofen     Nsaids      bleeding    Other Misc      Bee and wasp cause swelling and difficulty breathing    Penicillins Anaphylaxis    Plavix [Clopidogrel]     Pseudoephedrine      Locked up bladder    Sulfa Drugs      rash    Xarelto [Rivaroxaban]        DIET  Orders Placed This Encounter   Procedures    Diet Order Diet: Cardiac     Standing Status:   Standing     Number of Occurrences:   1     Order Specific Question:   Diet:     Answer:   Cardiac [6]       ACTIVITY  As tolerated.  Weight bearing as tolerated    CONSULTATIONS  na    PROCEDURES  na    LABORATORY  Lab Results   Component Value Date    SODIUM 136 03/03/2023    POTASSIUM 3.3 (L) 03/03/2023    CHLORIDE 97 03/03/2023    CO2 27 03/03/2023    GLUCOSE 160 (H) 03/03/2023    BUN 18 03/03/2023    CREATININE 0.95 03/03/2023        Lab Results   Component Value Date    WBC 10.8 03/03/2023    HEMOGLOBIN 15.0 03/03/2023    HEMATOCRIT 43.6 03/03/2023    PLATELETCT 201 03/03/2023      DX-FOOT-2- RIGHT   Final Result         1.  No radiographic evidence of acute injury.         2. Calcified enthesophyte at insertion site of the distal Achilles tendon with the calcaneus.       DX-CHEST-PORTABLE (1 VIEW)   Final Result         1.  No focal infiltrates.   2.  Left lung base nodular density, recommend follow-up chest x-ray in 3 months for evaluation of stability or follow-up CT chest with contrast for further characterization.   3.  Cardiomegaly   4.  Atherosclerosis   5.  Perihilar interstitial prominence and bronchial wall cuffing, appearance suggests changes of underlying bronchial inflammation, consider bronchitis.            Total time of the discharge process 31 minutes.

## 2023-03-03 NOTE — CARE PLAN
The patient is Stable - Low risk of patient condition declining or worsening    Shift Goals  Clinical Goals: vss, monitor lab values  Patient Goals: rest  Family Goals: ROJAS.  No family present    Progress made toward(s) clinical / shift goals:      Problem: Knowledge Deficit - Standard  Goal: Patient and family/care givers will demonstrate understanding of plan of care, disease process/condition, diagnostic tests and medications  Description: Target End Date:  1-3 days or as soon as patient condition allows    Document in Patient Education    1.  Patient and family/caregiver oriented to unit, equipment, visitation policy and means for communicating concern  2.  Complete/review Learning Assessment  3.  Assess knowledge level of disease process/condition, treatment plan, diagnostic tests and medications  4.  Explain disease process/condition, treatment plan, diagnostic tests and medications  Outcome: Progressing       Patient is not progressing towards the following goals:

## 2023-03-09 ENCOUNTER — APPOINTMENT (OUTPATIENT)
Dept: MEDICAL GROUP | Facility: MEDICAL CENTER | Age: 78
End: 2023-03-09
Payer: MEDICARE

## 2023-03-09 ENCOUNTER — HOSPITAL ENCOUNTER (OUTPATIENT)
Dept: LAB | Facility: MEDICAL CENTER | Age: 78
End: 2023-03-09
Attending: PHYSICIAN ASSISTANT
Payer: MEDICARE

## 2023-03-09 ENCOUNTER — HOSPITAL ENCOUNTER (OUTPATIENT)
Dept: LAB | Facility: MEDICAL CENTER | Age: 78
End: 2023-03-09
Attending: INTERNAL MEDICINE
Payer: MEDICARE

## 2023-03-09 PROBLEM — M10.379 ACUTE GOUT DUE TO RENAL IMPAIRMENT INVOLVING FOOT: Status: ACTIVE | Noted: 2023-03-09

## 2023-03-09 LAB
ANION GAP SERPL CALC-SCNC: 11 MMOL/L (ref 7–16)
BUN SERPL-MCNC: 12 MG/DL (ref 8–22)
CALCIUM SERPL-MCNC: 9.5 MG/DL (ref 8.5–10.5)
CHLORIDE SERPL-SCNC: 103 MMOL/L (ref 96–112)
CO2 SERPL-SCNC: 20 MMOL/L (ref 20–33)
CREAT SERPL-MCNC: 0.9 MG/DL (ref 0.5–1.4)
GFR SERPLBLD CREATININE-BSD FMLA CKD-EPI: 86 ML/MIN/1.73 M 2
GLUCOSE SERPL-MCNC: 119 MG/DL (ref 65–99)
MAGNESIUM SERPL-MCNC: 1.8 MG/DL (ref 1.5–2.5)
POTASSIUM SERPL-SCNC: 4.6 MMOL/L (ref 3.6–5.5)
SODIUM SERPL-SCNC: 134 MMOL/L (ref 135–145)
TSH SERPL DL<=0.005 MIU/L-ACNC: 2.87 UIU/ML (ref 0.38–5.33)

## 2023-03-09 PROCEDURE — 83735 ASSAY OF MAGNESIUM: CPT

## 2023-03-09 PROCEDURE — 36415 COLL VENOUS BLD VENIPUNCTURE: CPT

## 2023-03-09 PROCEDURE — 80048 BASIC METABOLIC PNL TOTAL CA: CPT

## 2023-03-09 PROCEDURE — 86800 THYROGLOBULIN ANTIBODY: CPT

## 2023-03-09 PROCEDURE — 84432 ASSAY OF THYROGLOBULIN: CPT

## 2023-03-09 PROCEDURE — 84443 ASSAY THYROID STIM HORMONE: CPT

## 2023-03-14 NOTE — DOCUMENTATION QUERY
[facility]                                                                       Query Response Note      PATIENT:               JAZMIN DIEHL  ACCT #:                  [id number]  MRN:                     [id number]  :                      1945  ADMIT DATE:       2023 11:50 PM  DISCH DATE:        3/3/2023 7:05 PM  RESPONDING  PROVIDER #:        [id number]           QUERY TEXT:    Based on the clinical findings noted, your clinical judgement and after study, can the diagnosis of Congestive Heart Failure be further specified?      NOTE:  If an appropriate response is not listed below, please respond with a new note.      The patient's Clinical Indicators include:  History and physical notes pt has a history of heart failure with preserved ejection fraction, moderate concentric left ventricular  hypertrophy, afib on Eliquis.  BNP noted 2608; Trop T 81; last ECHO on 23 noted EF of 50-55% and unable to determine diastolic function due to underlying rhythm.  Potassium noted to be 2.6.  Presented with complaints of SOB and LLE swelling and general weakness.   Treatment:  lasix; replace K; anticoagulation for afib  Risk:  Patient non-compliance with lasix and diagnosis of CHF and afib.    Thank you,  Teresa Gavin RN, BSN  Clinical    [facility] via [employer]  X [phone]  [email or web address]  Options provided:   -- Acute Diastolic heart failure   -- Chronic Diastolic heart failure   -- Acute on Chronic Diastolic heart failure   -- Other explanation, Please specify   -- Unable to determine      Query created by: Teresa Gavin on 3/9/2023 9:19 AM    RESPONSE TEXT:    Acute on Chronic Diastolic heart failure          Electronically signed by:  MIRIAM CANO MD 3/14/2023 7:06 AM

## 2023-03-15 DIAGNOSIS — E78.2 MIXED HYPERLIPIDEMIA: ICD-10-CM

## 2023-03-15 RX ORDER — ATORVASTATIN CALCIUM 40 MG/1
40 TABLET, FILM COATED ORAL DAILY
Qty: 100 TABLET | Refills: 3 | Status: SHIPPED | OUTPATIENT
Start: 2023-03-15

## 2023-03-15 NOTE — TELEPHONE ENCOUNTER
Subjective:       Patient ID:  Divine Ray is a 79 y.o. female who presents for   Chief Complaint   Patient presents with    Rash     Waist, buttocks     LOV 5/3/21- NUB, SK, Lentigo, Angioma    Skin, left forearm, shave biopsy:   -SEBORRHEIC KERATOSIS, INFLAMED     Patient here today for rash/ redness to buttocks, waist x 2 weeks  Pt states no symptoms presents with rash, no itch  Only daily meds are amlodipine and losartan, on same meds for 10+ years  Washes with dove soap, no moisturizer use    Derm Hx:  Denies Phx of NMSC  Denies Fhx of MM    Current Outpatient Medications:   ·  albuterol (PROVENTIL HFA) 90 mcg/actuation inhaler, Inhale 2 puffs into the lungs every 6 (six) hours as needed (cough/ wheezing). Rescue, Disp: 6.7 g, Rfl: 0  ·  amLODIPine (NORVASC) 5 MG tablet, TAKE 1 TABLET BY MOUTH EVERY DAY, Disp: 90 tablet, Rfl: 3  ·  BENEFIBER, GUAR GUM, ORAL, Take by mouth., Disp: , Rfl:   ·  benzonatate (TESSALON) 200 MG capsule, Take 1 capsule (200 mg total) by mouth 3 (three) times daily as needed for Cough., Disp: 30 capsule, Rfl: 0  ·  calcium carbonate/vitamin D3 (CALTRATE-600 PLUS VITAMIN D3 ORAL), Take 1 tablet by mouth once daily., Disp: , Rfl:   ·  clobetasol 0.05% (TEMOVATE) 0.05 % Oint, APPLY TOPICALLY TO THE AFFECTED AREA DAILY AS NEEDED, Disp: 45 g, Rfl: 0  ·  docusate sodium (COLACE) 250 MG capsule, Take 250 mg by mouth once daily., Disp: , Rfl:   ·  losartan (COZAAR) 100 MG tablet, Take 1 tablet (100 mg total) by mouth once daily., Disp: 90 tablet, Rfl: 3  ·  miconazole (MICOTIN) 2 % cream, Apply topically 2 (two) times daily., Disp: 28 g, Rfl: 0      Review of Systems   Constitutional:  Negative for fever, chills and fatigue.   Respiratory:  Negative for cough and shortness of breath.    Skin:  Positive for daily sunscreen use and activity-related sunscreen use. Negative for itching, rash and dry skin.      Objective:    Physical Exam   Constitutional: She appears well-developed and  Current weight is 262, eating a croissant egg, sausage and cheese with 610mg of sodium in it but hasn't had any further, he is still doubling up on diuretics, will plan to make daily calls to assess weight and symptoms and provide dietary counseling   well-nourished. No distress.   Neurological: She is alert and oriented to person, place, and time. She is not disoriented.   Psychiatric: She has a normal mood and affect.   Skin:   Areas Examined (abnormalities noted in diagram):   Head / Face Inspection Performed  Neck Inspection Performed  Chest / Axilla Inspection Performed  Abdomen Inspection Performed  Genitals / Buttocks / Groin Inspection Performed  Back Inspection Performed  RUE Inspected  LUE Inspection Performed  RLE Inspected  LLE Inspection Performed  Nails and Digits Inspection Performed                 Diagram Legend     Erythematous scaling macule/papule c/w actinic keratosis       Vascular papule c/w angioma      Pigmented verrucoid papule/plaque c/w seborrheic keratosis      Yellow umbilicated papule c/w sebaceous hyperplasia      Irregularly shaped tan macule c/w lentigo     1-2 mm smooth white papules consistent with Milia      Movable subcutaneous cyst with punctum c/w epidermal inclusion cyst      Subcutaneous movable cyst c/w pilar cyst      Firm pink to brown papule c/w dermatofibroma      Pedunculated fleshy papule(s) c/w skin tag(s)      Evenly pigmented macule c/w junctional nevus     Mildly variegated pigmented, slightly irregular-bordered macule c/w mildly atypical nevus      Flesh colored to evenly pigmented papule c/w intradermal nevus       Pink pearly papule/plaque c/w basal cell carcinoma      Erythematous hyperkeratotic cursted plaque c/w SCC      Surgical scar with no sign of skin cancer recurrence      Open and closed comedones      Inflammatory papules and pustules      Verrucoid papule consistent consistent with wart     Erythematous eczematous patches and plaques     Dystrophic onycholytic nail with subungual debris c/w onychomycosis     Umbilicated papule    Erythematous-base heme-crusted tan verrucoid plaque consistent with inflamed seborrheic keratosis     Erythematous Silvery Scaling Plaque c/w Psoriasis     See  annotation                    Assessment / Plan:      Pathology Orders:       Normal Orders This Visit    Specimen to Pathology, Dermatology     Comments:    Number of Specimens:->1  ------------------------->-------------------------  Spec 1 Procedure:->Biopsy  Spec 1 Clinical Impression:->2 weeks h/o rash in folds,  vulva, perianal, waistline, inframammary, rather  asymptomatic, no meds changes, drug vs viral exanthem  Spec 1 Source:->left abdomen    Questions:    Procedure Type: Dermatology and skin neoplasms    Number of Specimens: 1    ------------------------: -------------------------    Spec 1 Procedure: Biopsy    Spec 1 Clinical Impression: 2 weeks h/o rash in folds, vulva, perianal, waistline, inframammary, rather asymptomatic, no meds changes, drug vs viral exanthem    Spec 1 Source: left abdomen    Release to patient:           Disease of skin and subcutaneous tissue  -     Specimen to Pathology, Dermatology  -     triamcinolone acetonide 0.1% (KENALOG) 0.1 % cream; AAA bid  Dispense: 454 g; Refill: 3  -     levocetirizine (XYZAL) 5 MG tablet; Take 1 tablet (5 mg total) by mouth every evening.  Dispense: 30 tablet; Refill: 11  -     CBC Auto Differential; Future; Expected date: 03/15/2023  -     Comprehensive Metabolic Panel; Future; Expected date: 03/15/2023    Punch biopsy procedure note:  Punch biopsy performed after verbal consent obtained. Area marked and prepped with alcohol. Approximately 1cc of 1% lidocaine with epinephrine injected. 4 mm disposable punch used to remove lesion. Hemostasis obtained and biopsy site closed with 1 - 2 Prolene sutures. Wound care instructions reviewed with patient and handout given.    Cannot elicit h/o new medication in the past month  No antibiotherapy  No viral illness, no sore throat  2 daily meds unchanged x decade  Stop all non essential supplements/vitamins for now  No epidermal change, no itch             Follow up in about 2 weeks (around 3/29/2023).

## 2023-03-16 LAB
THYROGLOB AB SERPL-ACNC: 5.5 IU/ML (ref 0–4)
THYROGLOB SERPL-MCNC: 199 NG/ML (ref 1.3–31.8)
THYROGLOB SERPL-MCNC: ABNORMAL NG/ML (ref 1.3–31.8)

## 2023-03-16 NOTE — PROGRESS NOTES
Outcome: SCHEDULED AWV. I WAS NOT ABLE TO GO OVER PRE VISIT PLANNING, WILL CALL BACK TO COMPLETE       Helical Rim Advancement Flap Text: The defect edges were debeveled with a #15 blade scalpel.  Given the location of the defect and the proximity to free margins (helical rim) a double helical rim advancement flap was deemed most appropriate.  Using a sterile surgical marker, the appropriate advancement flaps were drawn incorporating the defect and placing the expected incisions between the helical rim and antihelix where possible.  The area thus outlined was incised through and through with a #15 scalpel blade and carried over to the primary defect.  With a skin hook and iris scissors, the flaps were gently and sharply undermined and freed up.

## 2023-03-17 ENCOUNTER — HOSPITAL ENCOUNTER (OUTPATIENT)
Dept: RADIOLOGY | Facility: MEDICAL CENTER | Age: 78
End: 2023-03-17
Attending: INTERNAL MEDICINE
Payer: MEDICARE

## 2023-03-17 DIAGNOSIS — C73 THYROID CANCER (HCC): ICD-10-CM

## 2023-03-17 PROBLEM — R25.2 LEG CRAMPS: Status: ACTIVE | Noted: 2023-03-17

## 2023-03-17 PROCEDURE — 76536 US EXAM OF HEAD AND NECK: CPT

## 2023-03-22 ENCOUNTER — HOSPITAL ENCOUNTER (OUTPATIENT)
Dept: LAB | Facility: MEDICAL CENTER | Age: 78
End: 2023-03-22
Attending: PHYSICIAN ASSISTANT
Payer: MEDICARE

## 2023-03-22 LAB
ANION GAP SERPL CALC-SCNC: 16 MMOL/L (ref 7–16)
BUN SERPL-MCNC: 23 MG/DL (ref 8–22)
CALCIUM SERPL-MCNC: 9.6 MG/DL (ref 8.5–10.5)
CHLORIDE SERPL-SCNC: 97 MMOL/L (ref 96–112)
CO2 SERPL-SCNC: 27 MMOL/L (ref 20–33)
CREAT SERPL-MCNC: 1.09 MG/DL (ref 0.5–1.4)
GFR SERPLBLD CREATININE-BSD FMLA CKD-EPI: 69 ML/MIN/1.73 M 2
GLUCOSE SERPL-MCNC: 122 MG/DL (ref 65–99)
MAGNESIUM SERPL-MCNC: 1.6 MG/DL (ref 1.5–2.5)
POTASSIUM SERPL-SCNC: 2.9 MMOL/L (ref 3.6–5.5)
SODIUM SERPL-SCNC: 140 MMOL/L (ref 135–145)

## 2023-03-22 PROCEDURE — 83735 ASSAY OF MAGNESIUM: CPT

## 2023-03-22 PROCEDURE — 80048 BASIC METABOLIC PNL TOTAL CA: CPT

## 2023-03-22 PROCEDURE — 36415 COLL VENOUS BLD VENIPUNCTURE: CPT

## 2023-03-25 ENCOUNTER — HOSPITAL ENCOUNTER (OUTPATIENT)
Dept: LAB | Facility: MEDICAL CENTER | Age: 78
End: 2023-03-25
Attending: PHYSICIAN ASSISTANT
Payer: MEDICARE

## 2023-03-25 LAB
ANION GAP SERPL CALC-SCNC: 17 MMOL/L (ref 7–16)
BUN SERPL-MCNC: 26 MG/DL (ref 8–22)
CALCIUM SERPL-MCNC: 10.2 MG/DL (ref 8.5–10.5)
CHLORIDE SERPL-SCNC: 97 MMOL/L (ref 96–112)
CO2 SERPL-SCNC: 29 MMOL/L (ref 20–33)
CREAT SERPL-MCNC: 1.42 MG/DL (ref 0.5–1.4)
GFR SERPLBLD CREATININE-BSD FMLA CKD-EPI: 50 ML/MIN/1.73 M 2
GLUCOSE SERPL-MCNC: 165 MG/DL (ref 65–99)
MAGNESIUM SERPL-MCNC: 1.8 MG/DL (ref 1.5–2.5)
POTASSIUM SERPL-SCNC: 3.5 MMOL/L (ref 3.6–5.5)
SODIUM SERPL-SCNC: 143 MMOL/L (ref 135–145)
URATE SERPL-MCNC: 11.8 MG/DL (ref 2.5–8.3)

## 2023-03-25 PROCEDURE — 80048 BASIC METABOLIC PNL TOTAL CA: CPT

## 2023-03-25 PROCEDURE — 84550 ASSAY OF BLOOD/URIC ACID: CPT

## 2023-03-25 PROCEDURE — 83735 ASSAY OF MAGNESIUM: CPT

## 2023-03-25 PROCEDURE — 36415 COLL VENOUS BLD VENIPUNCTURE: CPT

## 2023-03-28 ENCOUNTER — HOSPITAL ENCOUNTER (OUTPATIENT)
Dept: LAB | Facility: MEDICAL CENTER | Age: 78
End: 2023-03-28
Attending: PHYSICIAN ASSISTANT
Payer: MEDICARE

## 2023-03-28 LAB
ANION GAP SERPL CALC-SCNC: 13 MMOL/L (ref 7–16)
BUN SERPL-MCNC: 21 MG/DL (ref 8–22)
CALCIUM SERPL-MCNC: 9.4 MG/DL (ref 8.5–10.5)
CHLORIDE SERPL-SCNC: 102 MMOL/L (ref 96–112)
CO2 SERPL-SCNC: 28 MMOL/L (ref 20–33)
CREAT SERPL-MCNC: 1.21 MG/DL (ref 0.5–1.4)
GFR SERPLBLD CREATININE-BSD FMLA CKD-EPI: 61 ML/MIN/1.73 M 2
GLUCOSE SERPL-MCNC: 113 MG/DL (ref 65–99)
POTASSIUM SERPL-SCNC: 3.5 MMOL/L (ref 3.6–5.5)
SODIUM SERPL-SCNC: 143 MMOL/L (ref 135–145)
URATE SERPL-MCNC: 9.2 MG/DL (ref 2.5–8.3)

## 2023-03-28 PROCEDURE — 36415 COLL VENOUS BLD VENIPUNCTURE: CPT

## 2023-03-28 PROCEDURE — 80048 BASIC METABOLIC PNL TOTAL CA: CPT

## 2023-03-28 PROCEDURE — 84550 ASSAY OF BLOOD/URIC ACID: CPT

## 2023-03-30 ENCOUNTER — HOSPITAL ENCOUNTER (OUTPATIENT)
Dept: RADIOLOGY | Facility: MEDICAL CENTER | Age: 78
End: 2023-03-30
Attending: INTERNAL MEDICINE
Payer: MEDICARE

## 2023-04-03 ENCOUNTER — APPOINTMENT (RX ONLY)
Dept: URBAN - METROPOLITAN AREA CLINIC 6 | Facility: CLINIC | Age: 78
Setting detail: DERMATOLOGY
End: 2023-04-03

## 2023-04-03 DIAGNOSIS — Z85.828 PERSONAL HISTORY OF OTHER MALIGNANT NEOPLASM OF SKIN: ICD-10-CM

## 2023-04-03 DIAGNOSIS — Z85.820 PERSONAL HISTORY OF MALIGNANT MELANOMA OF SKIN: ICD-10-CM

## 2023-04-03 DIAGNOSIS — Z00.8 ENCOUNTER FOR OTHER GENERAL EXAMINATION: ICD-10-CM

## 2023-04-03 DIAGNOSIS — D18.0 HEMANGIOMA: ICD-10-CM

## 2023-04-03 DIAGNOSIS — L82.1 OTHER SEBORRHEIC KERATOSIS: ICD-10-CM

## 2023-04-03 DIAGNOSIS — L57.0 ACTINIC KERATOSIS: ICD-10-CM

## 2023-04-03 DIAGNOSIS — Z71.89 OTHER SPECIFIED COUNSELING: ICD-10-CM

## 2023-04-03 DIAGNOSIS — L81.4 OTHER MELANIN HYPERPIGMENTATION: ICD-10-CM

## 2023-04-03 DIAGNOSIS — D22 MELANOCYTIC NEVI: ICD-10-CM

## 2023-04-03 PROBLEM — D48.5 NEOPLASM OF UNCERTAIN BEHAVIOR OF SKIN: Status: ACTIVE | Noted: 2023-04-03

## 2023-04-03 PROBLEM — D22.5 MELANOCYTIC NEVI OF TRUNK: Status: ACTIVE | Noted: 2023-04-03

## 2023-04-03 PROBLEM — D18.01 HEMANGIOMA OF SKIN AND SUBCUTANEOUS TISSUE: Status: ACTIVE | Noted: 2023-04-03

## 2023-04-03 PROCEDURE — ? COUNSELING

## 2023-04-03 PROCEDURE — 11103 TANGNTL BX SKIN EA SEP/ADDL: CPT

## 2023-04-03 PROCEDURE — 99213 OFFICE O/P EST LOW 20 MIN: CPT | Mod: 25

## 2023-04-03 PROCEDURE — ? SUNSCREEN RECOMMENDATIONS

## 2023-04-03 PROCEDURE — 11102 TANGNTL BX SKIN SINGLE LES: CPT

## 2023-04-03 PROCEDURE — ? REFERRAL CORRESPONDENCE

## 2023-04-03 PROCEDURE — ? BIOPSY BY SHAVE METHOD

## 2023-04-03 PROCEDURE — ? LIQUID NITROGEN

## 2023-04-03 PROCEDURE — 17000 DESTRUCT PREMALG LESION: CPT | Mod: 59

## 2023-04-03 PROCEDURE — ? SUNSCREEN TREATMENT REGIMEN

## 2023-04-03 PROCEDURE — 17003 DESTRUCT PREMALG LES 2-14: CPT

## 2023-04-03 ASSESSMENT — LOCATION DETAILED DESCRIPTION DERM
LOCATION DETAILED: LEFT SUPERIOR CENTRAL MALAR CHEEK
LOCATION DETAILED: LEFT MEDIAL INFERIOR CHEST
LOCATION DETAILED: LEFT CENTRAL TEMPLE
LOCATION DETAILED: RIGHT ULNAR DORSAL HAND
LOCATION DETAILED: RIGHT MEDIAL FRONTAL SCALP
LOCATION DETAILED: RIGHT RADIAL DORSAL HAND
LOCATION DETAILED: RIGHT DISTAL POSTERIOR THIGH
LOCATION DETAILED: LEFT RADIAL DORSAL HAND
LOCATION DETAILED: LEFT INFERIOR CENTRAL MALAR CHEEK
LOCATION DETAILED: RIGHT MID-UPPER BACK
LOCATION DETAILED: SUPERIOR THORACIC SPINE
LOCATION DETAILED: STERNUM

## 2023-04-03 ASSESSMENT — LOCATION SIMPLE DESCRIPTION DERM
LOCATION SIMPLE: RIGHT SCALP
LOCATION SIMPLE: LEFT HAND
LOCATION SIMPLE: CHEST
LOCATION SIMPLE: RIGHT POSTERIOR THIGH
LOCATION SIMPLE: RIGHT HAND
LOCATION SIMPLE: LEFT CHEEK
LOCATION SIMPLE: RIGHT UPPER BACK
LOCATION SIMPLE: LEFT TEMPLE
LOCATION SIMPLE: UPPER BACK

## 2023-04-03 ASSESSMENT — LOCATION ZONE DERM
LOCATION ZONE: HAND
LOCATION ZONE: SCALP
LOCATION ZONE: TRUNK
LOCATION ZONE: FACE
LOCATION ZONE: LEG

## 2023-04-03 NOTE — PROCEDURE: LIQUID NITROGEN
pt reports has L eye prosthesis
Number Of Freeze-Thaw Cycles: 2 freeze-thaw cycles
Render Post-Care Instructions In Note?: no
Detail Level: Detailed
Show Aperture Variable?: Yes
Consent: The patient's consent was obtained including but not limited to risks of crusting, scabbing, blistering, scarring, darker or lighter pigmentary change, recurrence, incomplete removal and infection.
Duration Of Freeze Thaw-Cycle (Seconds): 10
Post-Care Instructions: I reviewed with the patient in detail post-care instructions. Patient is to wear sunprotection, and avoid picking at any of the treated lesions. Pt may apply Vaseline to crusted or scabbing areas.

## 2023-04-04 ENCOUNTER — HOSPITAL ENCOUNTER (OUTPATIENT)
Dept: LAB | Facility: MEDICAL CENTER | Age: 78
End: 2023-04-04
Attending: PHYSICIAN ASSISTANT
Payer: MEDICARE

## 2023-04-04 LAB
ANION GAP SERPL CALC-SCNC: 15 MMOL/L (ref 7–16)
BUN SERPL-MCNC: 17 MG/DL (ref 8–22)
CALCIUM SERPL-MCNC: 9.8 MG/DL (ref 8.5–10.5)
CHLORIDE SERPL-SCNC: 103 MMOL/L (ref 96–112)
CO2 SERPL-SCNC: 23 MMOL/L (ref 20–33)
CREAT SERPL-MCNC: 1.04 MG/DL (ref 0.5–1.4)
GFR SERPLBLD CREATININE-BSD FMLA CKD-EPI: 73 ML/MIN/1.73 M 2
GLUCOSE SERPL-MCNC: 95 MG/DL (ref 65–99)
POTASSIUM SERPL-SCNC: 4 MMOL/L (ref 3.6–5.5)
SODIUM SERPL-SCNC: 141 MMOL/L (ref 135–145)
URATE SERPL-MCNC: 6.4 MG/DL (ref 2.5–8.3)

## 2023-04-04 PROCEDURE — 80048 BASIC METABOLIC PNL TOTAL CA: CPT

## 2023-04-04 PROCEDURE — 36415 COLL VENOUS BLD VENIPUNCTURE: CPT

## 2023-04-04 PROCEDURE — 84550 ASSAY OF BLOOD/URIC ACID: CPT

## 2023-04-13 ENCOUNTER — HOSPITAL ENCOUNTER (OUTPATIENT)
Dept: LAB | Facility: MEDICAL CENTER | Age: 78
End: 2023-04-13
Attending: PHYSICIAN ASSISTANT
Payer: MEDICARE

## 2023-04-13 ENCOUNTER — HOSPITAL ENCOUNTER (OUTPATIENT)
Dept: LAB | Facility: MEDICAL CENTER | Age: 78
End: 2023-04-13
Attending: INTERNAL MEDICINE
Payer: MEDICARE

## 2023-04-13 LAB
ANION GAP SERPL CALC-SCNC: 13 MMOL/L (ref 7–16)
BUN SERPL-MCNC: 18 MG/DL (ref 8–22)
CALCIUM SERPL-MCNC: 9.9 MG/DL (ref 8.5–10.5)
CHLORIDE SERPL-SCNC: 100 MMOL/L (ref 96–112)
CO2 SERPL-SCNC: 25 MMOL/L (ref 20–33)
CREAT SERPL-MCNC: 0.92 MG/DL (ref 0.5–1.4)
GFR SERPLBLD CREATININE-BSD FMLA CKD-EPI: 84 ML/MIN/1.73 M 2
GLUCOSE SERPL-MCNC: 137 MG/DL (ref 65–99)
POTASSIUM SERPL-SCNC: 3.1 MMOL/L (ref 3.6–5.5)
SODIUM SERPL-SCNC: 138 MMOL/L (ref 135–145)

## 2023-04-13 PROCEDURE — 36415 COLL VENOUS BLD VENIPUNCTURE: CPT

## 2023-04-13 PROCEDURE — 80048 BASIC METABOLIC PNL TOTAL CA: CPT

## 2023-04-13 PROCEDURE — 86800 THYROGLOBULIN ANTIBODY: CPT

## 2023-04-15 LAB — THYROGLOB AB SERPL-ACNC: 10 IU/ML (ref 0–4)

## 2023-04-17 ENCOUNTER — HOSPITAL ENCOUNTER (OUTPATIENT)
Dept: RADIOLOGY | Facility: MEDICAL CENTER | Age: 78
End: 2023-04-17
Attending: INTERNAL MEDICINE
Payer: MEDICARE

## 2023-04-17 DIAGNOSIS — E89.0 POST-OPERATIVE HYPOTHYROIDISM: ICD-10-CM

## 2023-04-17 DIAGNOSIS — I50.20 SYSTOLIC CONGESTIVE HEART FAILURE, UNSPECIFIED HF CHRONICITY (HCC): ICD-10-CM

## 2023-04-17 PROCEDURE — A9552 F18 FDG: HCPCS

## 2023-04-25 ENCOUNTER — HOSPITAL ENCOUNTER (OUTPATIENT)
Dept: LAB | Facility: MEDICAL CENTER | Age: 78
End: 2023-04-25
Attending: PHYSICIAN ASSISTANT
Payer: MEDICARE

## 2023-04-25 LAB
ALBUMIN SERPL BCP-MCNC: 4.7 G/DL (ref 3.2–4.9)
ALBUMIN/GLOB SERPL: 1.6 G/DL
ALP SERPL-CCNC: 88 U/L (ref 30–99)
ALT SERPL-CCNC: 39 U/L (ref 2–50)
ANION GAP SERPL CALC-SCNC: 16 MMOL/L (ref 7–16)
AST SERPL-CCNC: 38 U/L (ref 12–45)
BILIRUB SERPL-MCNC: 0.8 MG/DL (ref 0.1–1.5)
BUN SERPL-MCNC: 25 MG/DL (ref 8–22)
CALCIUM ALBUM COR SERPL-MCNC: 9.6 MG/DL (ref 8.5–10.5)
CALCIUM SERPL-MCNC: 10.2 MG/DL (ref 8.5–10.5)
CHLORIDE SERPL-SCNC: 100 MMOL/L (ref 96–112)
CO2 SERPL-SCNC: 25 MMOL/L (ref 20–33)
CREAT SERPL-MCNC: 1 MG/DL (ref 0.5–1.4)
ERYTHROCYTE [DISTWIDTH] IN BLOOD BY AUTOMATED COUNT: 50.4 FL (ref 35.9–50)
GFR SERPLBLD CREATININE-BSD FMLA CKD-EPI: 76 ML/MIN/1.73 M 2
GLOBULIN SER CALC-MCNC: 3 G/DL (ref 1.9–3.5)
GLUCOSE SERPL-MCNC: 123 MG/DL (ref 65–99)
HCT VFR BLD AUTO: 49.5 % (ref 42–52)
HGB BLD-MCNC: 16.3 G/DL (ref 14–18)
MCH RBC QN AUTO: 33.5 PG (ref 27–33)
MCHC RBC AUTO-ENTMCNC: 32.9 G/DL (ref 33.7–35.3)
MCV RBC AUTO: 101.6 FL (ref 81.4–97.8)
PLATELET # BLD AUTO: 243 K/UL (ref 164–446)
PMV BLD AUTO: 10.2 FL (ref 9–12.9)
POTASSIUM SERPL-SCNC: 3.8 MMOL/L (ref 3.6–5.5)
PROT SERPL-MCNC: 7.7 G/DL (ref 6–8.2)
RBC # BLD AUTO: 4.87 M/UL (ref 4.7–6.1)
SODIUM SERPL-SCNC: 141 MMOL/L (ref 135–145)
URATE SERPL-MCNC: 6.4 MG/DL (ref 2.5–8.3)
WBC # BLD AUTO: 7.1 K/UL (ref 4.8–10.8)

## 2023-04-25 PROCEDURE — 36415 COLL VENOUS BLD VENIPUNCTURE: CPT

## 2023-04-25 PROCEDURE — 84550 ASSAY OF BLOOD/URIC ACID: CPT

## 2023-04-25 PROCEDURE — 80053 COMPREHEN METABOLIC PANEL: CPT

## 2023-04-25 PROCEDURE — 85027 COMPLETE CBC AUTOMATED: CPT

## 2023-05-08 ENCOUNTER — HOSPITAL ENCOUNTER (OUTPATIENT)
Dept: LAB | Facility: MEDICAL CENTER | Age: 78
End: 2023-05-08
Attending: INTERNAL MEDICINE
Payer: MEDICARE

## 2023-05-08 ENCOUNTER — HOSPITAL ENCOUNTER (OUTPATIENT)
Dept: LAB | Facility: MEDICAL CENTER | Age: 78
End: 2023-05-08
Attending: PHYSICIAN ASSISTANT
Payer: MEDICARE

## 2023-05-08 DIAGNOSIS — M10.379 ACUTE GOUT DUE TO RENAL IMPAIRMENT INVOLVING FOOT, UNSPECIFIED LATERALITY: ICD-10-CM

## 2023-05-08 PROCEDURE — 84550 ASSAY OF BLOOD/URIC ACID: CPT

## 2023-05-08 PROCEDURE — 80048 BASIC METABOLIC PNL TOTAL CA: CPT

## 2023-05-08 PROCEDURE — 36415 COLL VENOUS BLD VENIPUNCTURE: CPT

## 2023-05-08 PROCEDURE — 80048 BASIC METABOLIC PNL TOTAL CA: CPT | Mod: 91

## 2023-05-09 LAB
ANION GAP SERPL CALC-SCNC: 17 MMOL/L (ref 7–16)
ANION GAP SERPL CALC-SCNC: 17 MMOL/L (ref 7–16)
BUN SERPL-MCNC: 25 MG/DL (ref 8–22)
BUN SERPL-MCNC: 25 MG/DL (ref 8–22)
CALCIUM SERPL-MCNC: 9.3 MG/DL (ref 8.5–10.5)
CALCIUM SERPL-MCNC: 9.4 MG/DL (ref 8.5–10.5)
CHLORIDE SERPL-SCNC: 99 MMOL/L (ref 96–112)
CHLORIDE SERPL-SCNC: 99 MMOL/L (ref 96–112)
CO2 SERPL-SCNC: 25 MMOL/L (ref 20–33)
CO2 SERPL-SCNC: 25 MMOL/L (ref 20–33)
CREAT SERPL-MCNC: 1.09 MG/DL (ref 0.5–1.4)
CREAT SERPL-MCNC: 1.15 MG/DL (ref 0.5–1.4)
GFR SERPLBLD CREATININE-BSD FMLA CKD-EPI: 64 ML/MIN/1.73 M 2
GFR SERPLBLD CREATININE-BSD FMLA CKD-EPI: 69 ML/MIN/1.73 M 2
GLUCOSE SERPL-MCNC: 170 MG/DL (ref 65–99)
GLUCOSE SERPL-MCNC: 173 MG/DL (ref 65–99)
POTASSIUM SERPL-SCNC: 3.3 MMOL/L (ref 3.6–5.5)
POTASSIUM SERPL-SCNC: 3.4 MMOL/L (ref 3.6–5.5)
SODIUM SERPL-SCNC: 141 MMOL/L (ref 135–145)
SODIUM SERPL-SCNC: 141 MMOL/L (ref 135–145)
URATE SERPL-MCNC: 6.6 MG/DL (ref 2.5–8.3)

## 2023-05-16 ENCOUNTER — APPOINTMENT (OUTPATIENT)
Dept: ADMISSIONS | Facility: MEDICAL CENTER | Age: 78
End: 2023-05-16
Attending: INTERNAL MEDICINE
Payer: MEDICARE

## 2023-05-18 ENCOUNTER — OFFICE VISIT (OUTPATIENT)
Dept: MEDICAL GROUP | Facility: MEDICAL CENTER | Age: 78
End: 2023-05-18
Payer: MEDICARE

## 2023-05-18 VITALS
RESPIRATION RATE: 16 BRPM | TEMPERATURE: 97.6 F | HEART RATE: 84 BPM | DIASTOLIC BLOOD PRESSURE: 78 MMHG | SYSTOLIC BLOOD PRESSURE: 126 MMHG | BODY MASS INDEX: 33.66 KG/M2 | OXYGEN SATURATION: 95 % | WEIGHT: 254 LBS | HEIGHT: 73 IN

## 2023-05-18 DIAGNOSIS — I50.32 CHRONIC DIASTOLIC HEART FAILURE (HCC): ICD-10-CM

## 2023-05-18 DIAGNOSIS — I48.11 LONGSTANDING PERSISTENT ATRIAL FIBRILLATION (HCC): ICD-10-CM

## 2023-05-18 DIAGNOSIS — R73.03 PRE-DIABETES: ICD-10-CM

## 2023-05-18 DIAGNOSIS — R91.1 PULMONARY NODULE: ICD-10-CM

## 2023-05-18 DIAGNOSIS — Z85.820 HX OF MELANOMA OF SKIN: ICD-10-CM

## 2023-05-18 DIAGNOSIS — C73 PAPILLARY THYROID CARCINOMA (HCC): ICD-10-CM

## 2023-05-18 DIAGNOSIS — R97.20 ELEVATED PSA: ICD-10-CM

## 2023-05-18 DIAGNOSIS — M10.372 ACUTE GOUT DUE TO RENAL IMPAIRMENT INVOLVING LEFT FOOT: ICD-10-CM

## 2023-05-18 DIAGNOSIS — D68.69 SECONDARY HYPERCOAGULABLE STATE (HCC): ICD-10-CM

## 2023-05-18 DIAGNOSIS — E87.6 HYPOKALEMIA: ICD-10-CM

## 2023-05-18 PROBLEM — E66.01 MORBID OBESITY (HCC): Status: RESOLVED | Noted: 2022-02-07 | Resolved: 2023-05-18

## 2023-05-18 PROBLEM — N17.9 ACUTE KIDNEY INJURY (HCC): Status: RESOLVED | Noted: 2023-03-02 | Resolved: 2023-05-18

## 2023-05-18 PROBLEM — Z71.89 ADVANCED CARE PLANNING/COUNSELING DISCUSSION: Status: RESOLVED | Noted: 2023-03-01 | Resolved: 2023-05-18

## 2023-05-18 PROCEDURE — 3078F DIAST BP <80 MM HG: CPT | Performed by: FAMILY MEDICINE

## 2023-05-18 PROCEDURE — 99214 OFFICE O/P EST MOD 30 MIN: CPT | Performed by: FAMILY MEDICINE

## 2023-05-18 PROCEDURE — 3074F SYST BP LT 130 MM HG: CPT | Performed by: FAMILY MEDICINE

## 2023-05-18 RX ORDER — POTASSIUM CHLORIDE 20 MEQ/1
40 TABLET, EXTENDED RELEASE ORAL
COMMUNITY
Start: 2023-05-18 | End: 2023-06-05 | Stop reason: SDUPTHER

## 2023-05-18 RX ORDER — ALLOPURINOL 100 MG/1
100 TABLET ORAL DAILY
Qty: 90 TABLET | Refills: 1 | Status: SHIPPED | OUTPATIENT
Start: 2023-05-18 | End: 2023-10-13 | Stop reason: SDUPTHER

## 2023-05-18 RX ORDER — METHYLPREDNISOLONE 4 MG/1
TABLET ORAL
Qty: 21 TABLET | Refills: 0 | Status: SHIPPED
Start: 2023-05-18 | End: 2023-06-08

## 2023-05-18 RX ORDER — ALLOPURINOL 300 MG/1
300 TABLET ORAL DAILY
Qty: 90 TABLET | Refills: 1 | Status: SHIPPED | OUTPATIENT
Start: 2023-05-18 | End: 2023-10-13 | Stop reason: SDUPTHER

## 2023-05-18 ASSESSMENT — FIBROSIS 4 INDEX: FIB4 SCORE: 2

## 2023-05-18 NOTE — PROGRESS NOTES
"  Subjective:     Sina Oliver is a 78 y.o. male presenting with his wife for a follow up        Allergies, medications, medical history, surgical history, tobacco history, family history reviewed      Current Outpatient Medications:     potassium chloride SA (KDUR) 20 MEQ Tab CR, Take 2 Tablets by mouth 5 Times a Day., Disp: , Rfl:     allopurinol (ZYLOPRIM) 300 MG Tab, Take 1 Tablet by mouth every day. Take with 100mg capsule, Disp: 90 Tablet, Rfl: 1    allopurinol (ZYLOPRIM) 100 MG Tab, Take 1 Tablet by mouth every day. Take with 300mg capsule, Disp: 90 Tablet, Rfl: 1    methylPREDNISolone (MEDROL DOSEPAK) 4 MG Tablet Therapy Pack, As directed on the packaging label., Disp: 21 Tablet, Rfl: 0    Misc. Devices Misc, One wheelchair., Disp: 1 Each, Rfl: 0    metOLazone (ZAROXOLYN) 2.5 MG Tab, Take 2.5 mg by mouth as needed., Disp: , Rfl:     apixaban (ELIQUIS) 5mg Tab, Take 0.5 Tablets by mouth 2 times a day., Disp: , Rfl:     atorvastatin (LIPITOR) 40 MG Tab, Take 1 Tablet by mouth every day., Disp: 100 Tablet, Rfl: 3    torsemide (DEMADEX) 100 MG Tab, Take 100 mg by mouth every day. Pt takes up to 150mg if needed for edema, Disp: , Rfl:     cyclobenzaprine (FLEXERIL) 10 mg Tab, Take 1 Tablet by mouth 2 times a day as needed for Muscle Spasms., Disp: 180 Tablet, Rfl: 3    levothyroxine (SYNTHROID) 125 MCG Tab, Take 1 Tablet by mouth every morning on an empty stomach., Disp: , Rfl:     metoprolol tartrate (LOPRESSOR) 25 MG Tab, Take 25 mg by mouth every 12 hours., Disp: , Rfl:     tamsulosin (FLOMAX) 0.4 MG capsule, Take 1 Capsule by mouth every day., Disp: 100 Capsule, Rfl: 3    B Complex Vitamins (VITAMIN B COMPLEX PO), Take 1 Tablet by mouth every day., Disp: , Rfl:     Ascorbic Acid (VITAMIN C) 1000 MG Tab, Take 1,000 mg by mouth every day., Disp: , Rfl:     Objective:     Vitals: /78   Pulse 84   Temp 36.4 °C (97.6 °F)   Resp 16   Ht 1.854 m (6' 1\")   Wt 115 kg (254 lb)   SpO2 95%   BMI " 33.51 kg/m²   General: Alert  HEENT: Normocephalic.   Heart: Regular rate and rhythm.  S1 and S2 normal.  No murmurs appreciated.  Respiratory: Normal respiratory effort.  Clear to auscultation bilaterally.  Abdomen: Non-distended, soft  Extremities: No leg edema.  Slight hyperemia in the left distal foot, no swelling, no pain    Assessment/Plan:     Diagnoses and all orders for this visit:    Acute gout due to renal impairment involving left foot  Acute, uncomplicated issue.  Seems like his acute gout has resolved, but he continues to report intermittent pain at night.  We will try a course of methylprednisolone.  He can continue with allopurinol 400 mg daily.  -     CBC WITHOUT DIFFERENTIAL; Future  -     Comp Metabolic Panel; Future  -     Misc. Devices Misc; One wheelchair.  -     allopurinol (ZYLOPRIM) 300 MG Tab; Take 1 Tablet by mouth every day. Take with 100mg capsule  -     allopurinol (ZYLOPRIM) 100 MG Tab; Take 1 Tablet by mouth every day. Take with 300mg capsule  -     methylPREDNISolone (MEDROL DOSEPAK) 4 MG Tablet Therapy Pack; As directed on the packaging label.    Longstanding persistent atrial fibrillation (HCC)  Secondary hypercoagulable state (HCC)  Chronic diastolic heart failure (HCC)  Chronic, stable, recommended scheduling a follow-up with cardiology.  Continue Eliquis, metoprolol  -     CBC WITHOUT DIFFERENTIAL; Future  -     Comp Metabolic Panel; Future  -     Discontinue: Misc. Devices Misc; One wheelchair.  -     Misc. Devices Misc; One wheelchair.    Hypokalemia  Chronic, stable.  He continues to be hypokalemic.  We discussed a referral to nephrology or discussing with cardiology regarding his diuretics, potentially changing to spironolactone or another potassium sparing diuretic.  He is currently taking 40 mEq of potassium 5 times a day.  As he is currently undergoing work-up for potential cancer/mets, will hold off on a referral for now.  -     CBC WITHOUT DIFFERENTIAL; Future  -      Comp Metabolic Panel; Future    Pre-diabetes  Chronic, possibly worsening.  A1c is too early to check today.  -     CBC WITHOUT DIFFERENTIAL; Future  -     Comp Metabolic Panel; Future    Pulmonary nodule  Undiagnosed new problem with uncertain prognosis.  Is currently undergoing work-up with oncology.  He also will be seeing orthopedics for the irregularity in his left femur.  Is asymptomatic there.  -     CBC WITHOUT DIFFERENTIAL; Future  -     Comp Metabolic Panel; Future    Papillary thyroid carcinoma (HCC)  Chronic, managed by endocrinology    Hx of melanoma of skin  Chronic, managed by dermatology    Elevated PSA  Chronic, managed by urology      Return in about 2 weeks (around 6/1/2023) for Med check.

## 2023-05-21 PROBLEM — J96.11 CHRONIC RESPIRATORY FAILURE WITH HYPOXIA (HCC): Status: ACTIVE | Noted: 2023-05-21

## 2023-05-22 ENCOUNTER — PRE-ADMISSION TESTING (OUTPATIENT)
Dept: ADMISSIONS | Facility: MEDICAL CENTER | Age: 78
End: 2023-05-22
Attending: INTERNAL MEDICINE
Payer: MEDICARE

## 2023-05-22 DIAGNOSIS — Z01.812 PRE-OPERATIVE LABORATORY EXAMINATION: ICD-10-CM

## 2023-05-25 ENCOUNTER — HOSPITAL ENCOUNTER (OUTPATIENT)
Dept: LAB | Facility: MEDICAL CENTER | Age: 78
End: 2023-05-25
Attending: FAMILY MEDICINE
Payer: MEDICARE

## 2023-05-25 ENCOUNTER — HOSPITAL ENCOUNTER (OUTPATIENT)
Dept: LAB | Facility: MEDICAL CENTER | Age: 78
End: 2023-05-25
Attending: NURSE PRACTITIONER
Payer: MEDICARE

## 2023-05-25 DIAGNOSIS — Z01.812 PRE-OPERATIVE LABORATORY EXAMINATION: ICD-10-CM

## 2023-05-25 DIAGNOSIS — R91.1 PULMONARY NODULE: ICD-10-CM

## 2023-05-25 DIAGNOSIS — R73.03 PRE-DIABETES: ICD-10-CM

## 2023-05-25 DIAGNOSIS — E87.6 HYPOKALEMIA: ICD-10-CM

## 2023-05-25 DIAGNOSIS — I50.32 CHRONIC DIASTOLIC HEART FAILURE (HCC): ICD-10-CM

## 2023-05-25 DIAGNOSIS — D68.69 SECONDARY HYPERCOAGULABLE STATE (HCC): ICD-10-CM

## 2023-05-25 DIAGNOSIS — M10.372 ACUTE GOUT DUE TO RENAL IMPAIRMENT INVOLVING LEFT FOOT: ICD-10-CM

## 2023-05-25 DIAGNOSIS — I48.11 LONGSTANDING PERSISTENT ATRIAL FIBRILLATION (HCC): ICD-10-CM

## 2023-05-25 LAB
ALBUMIN SERPL BCP-MCNC: 4.3 G/DL (ref 3.2–4.9)
ALBUMIN/GLOB SERPL: 1.6 G/DL
ALP SERPL-CCNC: 81 U/L (ref 30–99)
ALT SERPL-CCNC: 40 U/L (ref 2–50)
ANION GAP SERPL CALC-SCNC: 13 MMOL/L (ref 7–16)
AST SERPL-CCNC: 40 U/L (ref 12–45)
BILIRUB SERPL-MCNC: 0.6 MG/DL (ref 0.1–1.5)
BUN SERPL-MCNC: 19 MG/DL (ref 8–22)
CALCIUM ALBUM COR SERPL-MCNC: 9.3 MG/DL (ref 8.5–10.5)
CALCIUM SERPL-MCNC: 9.5 MG/DL (ref 8.5–10.5)
CHLORIDE SERPL-SCNC: 103 MMOL/L (ref 96–112)
CO2 SERPL-SCNC: 31 MMOL/L (ref 20–33)
CREAT SERPL-MCNC: 1.03 MG/DL (ref 0.5–1.4)
ERYTHROCYTE [DISTWIDTH] IN BLOOD BY AUTOMATED COUNT: 47.5 FL (ref 35.9–50)
ERYTHROCYTE [DISTWIDTH] IN BLOOD BY AUTOMATED COUNT: 48.2 FL (ref 35.9–50)
GFR SERPLBLD CREATININE-BSD FMLA CKD-EPI: 73 ML/MIN/1.73 M 2
GLOBULIN SER CALC-MCNC: 2.7 G/DL (ref 1.9–3.5)
GLUCOSE SERPL-MCNC: 125 MG/DL (ref 65–99)
HCT VFR BLD AUTO: 48.2 % (ref 42–52)
HCT VFR BLD AUTO: 49 % (ref 42–52)
HGB BLD-MCNC: 16.1 G/DL (ref 14–18)
HGB BLD-MCNC: 16.3 G/DL (ref 14–18)
INR PPP: 1.08 (ref 0.87–1.13)
MCH RBC QN AUTO: 33.2 PG (ref 27–33)
MCH RBC QN AUTO: 33.2 PG (ref 27–33)
MCHC RBC AUTO-ENTMCNC: 33.3 G/DL (ref 32.3–36.5)
MCHC RBC AUTO-ENTMCNC: 33.4 G/DL (ref 32.3–36.5)
MCV RBC AUTO: 99.4 FL (ref 81.4–97.8)
MCV RBC AUTO: 99.8 FL (ref 81.4–97.8)
PLATELET # BLD AUTO: 214 K/UL (ref 164–446)
PLATELET # BLD AUTO: 217 K/UL (ref 164–446)
PMV BLD AUTO: 10.8 FL (ref 9–12.9)
PMV BLD AUTO: 10.9 FL (ref 9–12.9)
POTASSIUM SERPL-SCNC: 3.4 MMOL/L (ref 3.6–5.5)
PROT SERPL-MCNC: 7 G/DL (ref 6–8.2)
PROTHROMBIN TIME: 13.8 SEC (ref 12–14.6)
RBC # BLD AUTO: 4.85 M/UL (ref 4.7–6.1)
RBC # BLD AUTO: 4.91 M/UL (ref 4.7–6.1)
SODIUM SERPL-SCNC: 147 MMOL/L (ref 135–145)
WBC # BLD AUTO: 8 K/UL (ref 4.8–10.8)
WBC # BLD AUTO: 8.2 K/UL (ref 4.8–10.8)

## 2023-05-25 PROCEDURE — 36415 COLL VENOUS BLD VENIPUNCTURE: CPT

## 2023-05-25 PROCEDURE — 85610 PROTHROMBIN TIME: CPT

## 2023-05-25 PROCEDURE — 80053 COMPREHEN METABOLIC PANEL: CPT

## 2023-05-25 PROCEDURE — 85027 COMPLETE CBC AUTOMATED: CPT | Mod: 91

## 2023-05-25 PROCEDURE — 85027 COMPLETE CBC AUTOMATED: CPT

## 2023-05-26 ENCOUNTER — APPOINTMENT (OUTPATIENT)
Dept: RADIOLOGY | Facility: MEDICAL CENTER | Age: 78
End: 2023-05-26
Attending: STUDENT IN AN ORGANIZED HEALTH CARE EDUCATION/TRAINING PROGRAM
Payer: MEDICARE

## 2023-05-26 ENCOUNTER — HOSPITAL ENCOUNTER (OUTPATIENT)
Facility: MEDICAL CENTER | Age: 78
End: 2023-05-26
Attending: INTERNAL MEDICINE | Admitting: INTERNAL MEDICINE
Payer: MEDICARE

## 2023-05-26 ENCOUNTER — APPOINTMENT (OUTPATIENT)
Dept: RADIOLOGY | Facility: MEDICAL CENTER | Age: 78
End: 2023-05-26
Attending: INTERNAL MEDICINE
Payer: MEDICARE

## 2023-05-26 VITALS
SYSTOLIC BLOOD PRESSURE: 110 MMHG | BODY MASS INDEX: 33.51 KG/M2 | DIASTOLIC BLOOD PRESSURE: 65 MMHG | RESPIRATION RATE: 15 BRPM | HEIGHT: 73 IN | HEART RATE: 82 BPM | TEMPERATURE: 96.2 F | WEIGHT: 252.87 LBS | OXYGEN SATURATION: 97 %

## 2023-05-26 DIAGNOSIS — C73 MALIGNANT NEOPLASM OF THYROID GLAND (HCC): ICD-10-CM

## 2023-05-26 LAB — PATHOLOGY CONSULT NOTE: NORMAL

## 2023-05-26 PROCEDURE — 160047 HCHG PACU  - EA ADDL 30 MINS PHASE II

## 2023-05-26 PROCEDURE — 88305 TISSUE EXAM BY PATHOLOGIST: CPT

## 2023-05-26 PROCEDURE — 700111 HCHG RX REV CODE 636 W/ 250 OVERRIDE (IP): Performed by: STUDENT IN AN ORGANIZED HEALTH CARE EDUCATION/TRAINING PROGRAM

## 2023-05-26 PROCEDURE — 160036 HCHG PACU - EA ADDL 30 MINS PHASE I

## 2023-05-26 PROCEDURE — 88341 IMHCHEM/IMCYTCHM EA ADD ANTB: CPT | Mod: 91

## 2023-05-26 PROCEDURE — 700111 HCHG RX REV CODE 636 W/ 250 OVERRIDE (IP)

## 2023-05-26 PROCEDURE — 71045 X-RAY EXAM CHEST 1 VIEW: CPT

## 2023-05-26 PROCEDURE — 160002 HCHG RECOVERY MINUTES (STAT)

## 2023-05-26 PROCEDURE — 99152 MOD SED SAME PHYS/QHP 5/>YRS: CPT

## 2023-05-26 PROCEDURE — 160035 HCHG PACU - 1ST 60 MINS PHASE I

## 2023-05-26 PROCEDURE — 88342 IMHCHEM/IMCYTCHM 1ST ANTB: CPT

## 2023-05-26 PROCEDURE — C2613 LUNG BX PLUG W/DEL SYS: HCPCS

## 2023-05-26 PROCEDURE — 160046 HCHG PACU - 1ST 60 MINS PHASE II

## 2023-05-26 RX ORDER — LIDOCAINE HYDROCHLORIDE 20 MG/ML
INJECTION, SOLUTION INFILTRATION; PERINEURAL
Status: DISCONTINUED
Start: 2023-05-26 | End: 2023-05-26 | Stop reason: HOSPADM

## 2023-05-26 RX ORDER — MIDAZOLAM HYDROCHLORIDE 1 MG/ML
INJECTION INTRAMUSCULAR; INTRAVENOUS
Status: COMPLETED
Start: 2023-05-26 | End: 2023-05-26

## 2023-05-26 RX ORDER — SODIUM CHLORIDE 9 MG/ML
500 INJECTION, SOLUTION INTRAVENOUS
Status: DISCONTINUED | OUTPATIENT
Start: 2023-05-26 | End: 2023-05-26 | Stop reason: HOSPADM

## 2023-05-26 RX ORDER — MIDAZOLAM HYDROCHLORIDE 1 MG/ML
.5-2 INJECTION INTRAMUSCULAR; INTRAVENOUS PRN
Status: DISCONTINUED | OUTPATIENT
Start: 2023-05-26 | End: 2023-05-26 | Stop reason: HOSPADM

## 2023-05-26 RX ORDER — ONDANSETRON 2 MG/ML
4 INJECTION INTRAMUSCULAR; INTRAVENOUS PRN
Status: DISCONTINUED | OUTPATIENT
Start: 2023-05-26 | End: 2023-05-26 | Stop reason: HOSPADM

## 2023-05-26 RX ORDER — SODIUM CHLORIDE 9 MG/ML
1000 INJECTION, SOLUTION INTRAVENOUS CONTINUOUS
Status: DISCONTINUED | OUTPATIENT
Start: 2023-05-26 | End: 2023-05-26 | Stop reason: HOSPADM

## 2023-05-26 RX ADMIN — MIDAZOLAM HYDROCHLORIDE 1 MG: 1 INJECTION INTRAMUSCULAR; INTRAVENOUS at 11:28

## 2023-05-26 RX ADMIN — FENTANYL CITRATE 50 MCG: 50 INJECTION, SOLUTION INTRAMUSCULAR; INTRAVENOUS at 11:35

## 2023-05-26 RX ADMIN — MIDAZOLAM HYDROCHLORIDE 1 MG: 2 INJECTION, SOLUTION INTRAMUSCULAR; INTRAVENOUS at 11:28

## 2023-05-26 RX ADMIN — FENTANYL CITRATE 50 MCG: 50 INJECTION, SOLUTION INTRAMUSCULAR; INTRAVENOUS at 11:28

## 2023-05-26 ASSESSMENT — PAIN DESCRIPTION - PAIN TYPE: TYPE: SURGICAL PAIN

## 2023-05-26 ASSESSMENT — FIBROSIS 4 INDEX: FIB4 SCORE: 2.33

## 2023-05-26 NOTE — DISCHARGE INSTRUCTIONS
HOME CARE INSTRUCTIONS    ACTIVITY: Rest and take it easy for the first 24 hours.  A responsible adult is recommended to remain with you during that time.  It is normal to feel sleepy.  We encourage you to not do anything that requires balance, judgment or coordination.    FOR 24 HOURS DO NOT:  Drive, operate machinery or run household appliances.  Drink beer or alcoholic beverages.  Make important decisions or sign legal documents.    SPECIAL INSTRUCTIONS:   Needle Biopsy, Care After  These instructions tell you how to care for yourself after your procedure. Your doctor may also give you more specific instructions. Call your doctor if you have any problems or questions.  What can I expect after the procedure?  After the procedure, it is common to have:  Soreness.  Bruising.  Mild pain.  Follow these instructions at home:  Return to your normal activities as told by your doctor. Ask your doctor what activities are safe for you.  Take over-the-counter and prescription medicines only as told by your doctor.  Wash your hands with soap and water before you change your bandage (dressing). If you cannot use soap and water, use hand .  Follow instructions from your doctor about:  How to take care of your puncture site.  When and how to change your bandage.  When to remove your bandage.  Check your puncture site every day for signs of infection. Watch for:  Redness, swelling, or pain.  Fluid or blood.   Pus or a bad smell.  Warmth.  Do not take baths, swim, or use a hot tub until your doctor approves. Ask your doctor if you may take showers. You may only be allowed to take sponge baths.  Keep all follow-up visits as told by your doctor. This is important.  Contact a doctor if you have:  A fever.  Redness, swelling, or pain at the puncture site, and it lasts longer than a few days.  Fluid, blood, or pus coming from the puncture site.  Warmth coming from the puncture site.  Get help right away if:  You have a lot of  bleeding from the puncture site.  Summary  After the procedure, it is common to have soreness, bruising, or mild pain at the puncture site.  Check your puncture site every day for signs of infection, such as redness, swelling, or pain.  Get help right away if you have severe bleeding from your puncture site.      DIET: To avoid nausea, slowly advance diet as tolerated, avoiding spicy or greasy foods for the first day.  Add more substantial food to your diet according to your physician's instructions.  Babies can be fed formula or breast milk as soon as they are hungry.  INCREASE FLUIDS AND FIBER TO AVOID CONSTIPATION.    SURGICAL DRESSING/BATHING: keep dressing on for 24 hours, you may remove tomorrow and shower. No submerging in water (No baths, pools or hot tubs) for 7 days.    MEDICATIONS: Resume taking daily medication.  Take prescribed pain medication with food.  If no medication is prescribed, you may take non-aspirin pain medication if needed.  PAIN MEDICATION CAN BE VERY CONSTIPATING.  Take a stool softener or laxative such as senokot, pericolace, or milk of magnesia if needed.    A follow-up appointment should be arranged with your doctor; call to schedule.    You should CALL YOUR PHYSICIAN if you develop:  Fever greater than 101 degrees F.  Pain not relieved by medication, or persistent nausea or vomiting.  Excessive bleeding (blood soaking through dressing) or unexpected drainage from the wound.  Extreme redness or swelling around the incision site, drainage of pus or foul smelling drainage.  Inability to urinate or empty your bladder within 8 hours.  Problems with breathing or chest pain.    You should call 911 if you develop problems with breathing or chest pain.  If you are unable to contact your doctor or surgical center, you should go to the nearest emergency room or urgent care center.  Physician's telephone #: [phone] Dr Fragoso    MILD FLU-LIKE SYMPTOMS ARE NORMAL.  YOU MAY EXPERIENCE  GENERALIZED MUSCLE ACHES, THROAT IRRITATION, HEADACHE AND/OR SOME NAUSEA.    If any questions arise, call your doctor.  If your doctor is not available, please feel free to call the Surgical Center at [phone].  The Center is open Monday through Friday from 7AM to 7PM.      A registered nurse may call you a few days after your surgery to see how you are doing after your procedure.    You may also receive a survey in the mail within the next two weeks and we ask that you take a few moments to complete the survey and return it to us.  Our goal is to provide you with very good care and we value your comments.     Depression / Suicide Risk    As you are discharged from this [facility] facility, it is important to learn how to keep safe from harming yourself.    Recognize the warning signs:  Abrupt changes in personality, positive or negative- including increase in energy   Giving away possessions  Change in eating patterns- significant weight changes-  positive or negative  Change in sleeping patterns- unable to sleep or sleeping all the time   Unwillingness or inability to communicate  Depression  Unusual sadness, discouragement and loneliness  Talk of wanting to die  Neglect of personal appearance   Rebelliousness- reckless behavior  Withdrawal from people/activities they love  Confusion- inability to concentrate     If you or a loved one observes any of these behaviors or has concerns about self-harm, here's what you can do:  Talk about it- your feelings and reasons for harming yourself  Remove any means that you might use to hurt yourself (examples: pills, rope, extension cords, firearm)  Get professional help from the community (Mental Health, Substance Abuse, psychological counseling)  Do not be alone:Call your Safe Contact- someone whom you trust who will be there for you.  Call your local CRISIS HOTLINE 520-9983 or 385-644-3987  Call your local Children's Mobile Crisis Response Team [facility]  (277) 681-4398 or www.Grouply.atVenu  Call the toll free National Suicide Prevention Hotlines   National Suicide Prevention Lifeline 838-322-TWYD (6777)  [facility] Line Network 800-SUICIDE (086-1116)    I acknowledge receipt and understanding of these Home Care instructions.

## 2023-05-26 NOTE — OR NURSING
No pneumothorax per 1st CXR results. Okay to resume eating and drinking - confirmed with Dr. Mehta.

## 2023-05-26 NOTE — OR SURGEON
Immediate Post- Operative Note        Findings: Left lung nodule      Procedure(s): biopsy      Estimated Blood Loss: Less than 5 ml        Complications: None            5/26/2023     11:41 AM     Cruz Mehta M.D.

## 2023-05-26 NOTE — OR NURSING
Arrived from PACU  Pt is A&Ox4, VSS; denies N/V; states pain is at tolerable level. Dressing CDI to L-sided lateral chest; gauze and tegaderm; no drainage or bleeding noted.     Second CXR has completed and no pneumothorax found per result    D/c orders received. IV dc'd.   Pt changed into clothing with assistance.   Discharge reviewed  Pt and family verbalized understanding and questions answered.   Patient states ready to d/c home.   Pt dc'd in w/c with Shaniqua - wife.

## 2023-05-26 NOTE — PROGRESS NOTES
IR Nursing Note    Left lung biopsy by MD Mehta assisted by RT Aguayo,posterior left lung (upper left back)  access site.  Procedure site was marked by MD and verified using imaging guidance.  Patient was placed in a prone position. Vitals were taken every 5 minutes and remained stable during procedure (see doc flow sheet for results).  CO2 waveform capnography was monitored and remained stable throughout procedure.  A gauze and tegaderm dressing was placed over surgical site.     3 cores formalin collected by Dr Mehta, specimen sent and delivered to lab by Olivier CT tech.    Report given to JAYLIN Clark; patient transported to [facility] via IR RN monitored then transferred care to report RN.    Biosentry tract sealant system  REF: [id number]  LOT  [id number]  EXP 2026-02-28

## 2023-06-01 ENCOUNTER — HOSPITAL ENCOUNTER (OUTPATIENT)
Dept: LAB | Facility: MEDICAL CENTER | Age: 78
End: 2023-06-01
Attending: INTERNAL MEDICINE
Payer: MEDICARE

## 2023-06-01 PROCEDURE — 86800 THYROGLOBULIN ANTIBODY: CPT

## 2023-06-01 PROCEDURE — 84443 ASSAY THYROID STIM HORMONE: CPT

## 2023-06-01 PROCEDURE — 84432 ASSAY OF THYROGLOBULIN: CPT

## 2023-06-01 PROCEDURE — 84439 ASSAY OF FREE THYROXINE: CPT

## 2023-06-01 PROCEDURE — 36415 COLL VENOUS BLD VENIPUNCTURE: CPT

## 2023-06-02 LAB
T4 FREE SERPL-MCNC: 1.77 NG/DL (ref 0.93–1.7)
TSH SERPL DL<=0.005 MIU/L-ACNC: 0.28 UIU/ML (ref 0.38–5.33)

## 2023-06-06 ENCOUNTER — PATIENT MESSAGE (OUTPATIENT)
Dept: MEDICAL GROUP | Facility: MEDICAL CENTER | Age: 78
End: 2023-06-06
Payer: MEDICARE

## 2023-06-07 LAB
THYROGLOB AB SERPL-ACNC: 13.8 IU/ML (ref 0–4)
THYROGLOB SERPL-MCNC: 83.6 NG/ML (ref 1.3–31.8)
THYROGLOB SERPL-MCNC: ABNORMAL NG/ML (ref 1.3–31.8)

## 2023-06-07 RX ORDER — POTASSIUM CHLORIDE 20 MEQ/1
40 TABLET, EXTENDED RELEASE ORAL
Qty: 900 TABLET | Refills: 0 | Status: SHIPPED | OUTPATIENT
Start: 2023-06-07 | End: 2024-02-13

## 2023-06-08 ENCOUNTER — OFFICE VISIT (OUTPATIENT)
Dept: MEDICAL GROUP | Facility: MEDICAL CENTER | Age: 78
End: 2023-06-08
Payer: MEDICARE

## 2023-06-08 VITALS
WEIGHT: 256 LBS | BODY MASS INDEX: 33.93 KG/M2 | RESPIRATION RATE: 16 BRPM | TEMPERATURE: 97.6 F | OXYGEN SATURATION: 96 % | HEIGHT: 73 IN | SYSTOLIC BLOOD PRESSURE: 122 MMHG | DIASTOLIC BLOOD PRESSURE: 76 MMHG | HEART RATE: 74 BPM

## 2023-06-08 DIAGNOSIS — C79.51 METASTASIS TO BONE (HCC): ICD-10-CM

## 2023-06-08 DIAGNOSIS — C78.02 MALIGNANT NEOPLASM METASTATIC TO LEFT LUNG (HCC): ICD-10-CM

## 2023-06-08 DIAGNOSIS — E87.6 HYPOKALEMIA: ICD-10-CM

## 2023-06-08 DIAGNOSIS — R73.03 PRE-DIABETES: ICD-10-CM

## 2023-06-08 DIAGNOSIS — C73 PAPILLARY THYROID CARCINOMA (HCC): ICD-10-CM

## 2023-06-08 DIAGNOSIS — Z87.39 HX OF GOUT: ICD-10-CM

## 2023-06-08 DIAGNOSIS — I50.32 CHRONIC DIASTOLIC HEART FAILURE (HCC): ICD-10-CM

## 2023-06-08 PROBLEM — M10.379 ACUTE GOUT DUE TO RENAL IMPAIRMENT INVOLVING FOOT: Status: RESOLVED | Noted: 2023-03-09 | Resolved: 2023-06-08

## 2023-06-08 PROBLEM — R91.1 PULMONARY NODULE: Status: RESOLVED | Noted: 2023-03-02 | Resolved: 2023-06-08

## 2023-06-08 PROCEDURE — 3078F DIAST BP <80 MM HG: CPT | Performed by: FAMILY MEDICINE

## 2023-06-08 PROCEDURE — 3074F SYST BP LT 130 MM HG: CPT | Performed by: FAMILY MEDICINE

## 2023-06-08 PROCEDURE — 99214 OFFICE O/P EST MOD 30 MIN: CPT | Performed by: FAMILY MEDICINE

## 2023-06-08 RX ORDER — METHYLPREDNISOLONE 4 MG/1
TABLET ORAL
Qty: 21 TABLET | Refills: 0 | Status: SHIPPED | OUTPATIENT
Start: 2023-06-08 | End: 2023-11-02

## 2023-06-08 RX ORDER — METOPROLOL SUCCINATE 50 MG/1
50 TABLET, EXTENDED RELEASE ORAL DAILY
COMMUNITY
Start: 2023-05-24

## 2023-06-08 RX ORDER — OLMESARTAN MEDOXOMIL 20 MG/1
20 TABLET ORAL DAILY
COMMUNITY
Start: 2023-05-29

## 2023-06-08 ASSESSMENT — FIBROSIS 4 INDEX: FIB4 SCORE: 2.33

## 2023-06-08 NOTE — PROGRESS NOTES
Subjective:     Sina Oliver is a 78 y.o. male presenting with his wife for a follow-up        Current Outpatient Medications:     metoprolol SR (TOPROL XL) 50 MG TABLET SR 24 HR, , Disp: , Rfl:     olmesartan (BENICAR) 20 MG Tab, , Disp: , Rfl:     methylPREDNISolone (MEDROL DOSEPAK) 4 MG Tablet Therapy Pack, As directed on the packaging label., Disp: 21 Tablet, Rfl: 0    potassium chloride SA (KDUR) 20 MEQ Tab CR, Take 2 Tablets by mouth 5 Times a Day., Disp: 900 Tablet, Rfl: 0    allopurinol (ZYLOPRIM) 300 MG Tab, Take 1 Tablet by mouth every day. Take with 100mg capsule, Disp: 90 Tablet, Rfl: 1    allopurinol (ZYLOPRIM) 100 MG Tab, Take 1 Tablet by mouth every day. Take with 300mg capsule, Disp: 90 Tablet, Rfl: 1    Misc. Devices Misc, One wheelchair., Disp: 1 Each, Rfl: 0    metOLazone (ZAROXOLYN) 2.5 MG Tab, Take 1.25 mg by mouth as needed., Disp: , Rfl:     apixaban (ELIQUIS) 5mg Tab, Take 0.5 Tablets by mouth 2 times a day., Disp: , Rfl:     atorvastatin (LIPITOR) 40 MG Tab, Take 1 Tablet by mouth every day., Disp: 100 Tablet, Rfl: 3    torsemide (DEMADEX) 100 MG Tab, Take 150 mg by mouth every day. Pt takes up to 150mg if needed for edema, Disp: , Rfl:     cyclobenzaprine (FLEXERIL) 10 mg Tab, Take 1 Tablet by mouth 2 times a day as needed for Muscle Spasms. (Patient taking differently: Take 20 mg by mouth at bedtime as needed for Muscle Spasms.), Disp: 180 Tablet, Rfl: 3    levothyroxine (SYNTHROID) 125 MCG Tab, Take 1 Tablet by mouth every morning on an empty stomach., Disp: , Rfl:     tamsulosin (FLOMAX) 0.4 MG capsule, Take 1 Capsule by mouth every day., Disp: 100 Capsule, Rfl: 3    B Complex Vitamins (VITAMIN B COMPLEX PO), Take 1 Tablet by mouth every day., Disp: , Rfl:     Ascorbic Acid (VITAMIN C) 1000 MG Tab, Take 1,000 mg by mouth every day., Disp: , Rfl:     potassium chloride SA (KDUR) 20 MEQ Tab CR, Take 2 Tablets by mouth 5 Times a Day., Disp: 900 Tablet, Rfl: 1    Objective:  "    Vitals: /76   Pulse 74   Temp 36.4 °C (97.6 °F)   Resp 16   Ht 1.854 m (6' 1\")   Wt 116 kg (256 lb)   SpO2 96%   BMI 33.78 kg/m²   General: Alert  HEENT: Normocephalic.    Assessment/Plan:     Diagnoses and all orders for this visit:    Hypokalemia  Chronic, stable.  He continues on 40 mEq of potassium 5 times a day.  We discussed rechecking labs.  We also discussed a referral to nephrology for evaluation.  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  He had a biopsy of the lung mass that showed metastatic thyroid cancer.  He is waiting on some additional lab results before starting treatment.    Hx of gout  Chronic, stable, continue allopurinol.  Medrol Dosepak sent to pharmacy if he has a future flare  -     Basic Metabolic Panel; Future  -     methylPREDNISolone (MEDROL DOSEPAK) 4 MG Tablet Therapy Pack; As directed on the packaging label.    Pre-diabetes  Chronic, possibly worsening.  We discussed that his last A1c was in the diabetes range.  We discussed rechecking  -     Basic Metabolic Panel; Future  -     HEMOGLOBIN A1C; Future          Return in about 2 months (around 8/8/2023) for routine follow up.      "

## 2023-06-14 ENCOUNTER — OFFICE VISIT (OUTPATIENT)
Dept: NEPHROLOGY | Facility: MEDICAL CENTER | Age: 78
End: 2023-06-14
Payer: MEDICARE

## 2023-06-14 VITALS
SYSTOLIC BLOOD PRESSURE: 118 MMHG | RESPIRATION RATE: 18 BRPM | DIASTOLIC BLOOD PRESSURE: 68 MMHG | HEART RATE: 80 BPM | OXYGEN SATURATION: 95 % | HEIGHT: 73 IN | BODY MASS INDEX: 32.34 KG/M2 | WEIGHT: 244 LBS | TEMPERATURE: 97 F

## 2023-06-14 DIAGNOSIS — E87.6 HYPOKALEMIA: ICD-10-CM

## 2023-06-14 DIAGNOSIS — M10.9 GOUT, UNSPECIFIED CAUSE, UNSPECIFIED CHRONICITY, UNSPECIFIED SITE: ICD-10-CM

## 2023-06-14 DIAGNOSIS — I10 ESSENTIAL HYPERTENSION: ICD-10-CM

## 2023-06-14 DIAGNOSIS — E87.0 HYPERNATREMIA: ICD-10-CM

## 2023-06-14 DIAGNOSIS — I42.9 CARDIOMYOPATHY, UNSPECIFIED TYPE (HCC): ICD-10-CM

## 2023-06-14 PROCEDURE — 3074F SYST BP LT 130 MM HG: CPT | Performed by: INTERNAL MEDICINE

## 2023-06-14 PROCEDURE — 99204 OFFICE O/P NEW MOD 45 MIN: CPT | Performed by: INTERNAL MEDICINE

## 2023-06-14 PROCEDURE — 3078F DIAST BP <80 MM HG: CPT | Performed by: INTERNAL MEDICINE

## 2023-06-14 RX ORDER — COLCHICINE 0.6 MG/1
1 CAPSULE ORAL
Qty: 30 CAPSULE | Refills: 1 | Status: SHIPPED | OUTPATIENT
Start: 2023-06-14

## 2023-06-14 RX ORDER — SPIRONOLACTONE 25 MG/1
25 TABLET ORAL DAILY
Qty: 30 TABLET | Refills: 3 | Status: SHIPPED | OUTPATIENT
Start: 2023-06-14 | End: 2023-09-26

## 2023-06-14 ASSESSMENT — ENCOUNTER SYMPTOMS
COUGH: 0
VOMITING: 0
HYPERTENSION: 1
CHILLS: 0
FEVER: 0
NAUSEA: 0
SHORTNESS OF BREATH: 0

## 2023-06-14 ASSESSMENT — FIBROSIS 4 INDEX: FIB4 SCORE: 2.33
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
## 2023-06-20 ENCOUNTER — HOSPITAL ENCOUNTER (OUTPATIENT)
Dept: LAB | Facility: MEDICAL CENTER | Age: 78
End: 2023-06-20
Attending: INTERNAL MEDICINE
Payer: MEDICARE

## 2023-06-20 DIAGNOSIS — E87.6 HYPOKALEMIA: ICD-10-CM

## 2023-06-20 DIAGNOSIS — E87.0 HYPERNATREMIA: ICD-10-CM

## 2023-06-20 DIAGNOSIS — I10 ESSENTIAL HYPERTENSION: ICD-10-CM

## 2023-06-20 DIAGNOSIS — I42.9 CARDIOMYOPATHY, UNSPECIFIED TYPE (HCC): ICD-10-CM

## 2023-06-20 LAB
ANION GAP SERPL CALC-SCNC: 17 MMOL/L (ref 7–16)
BUN SERPL-MCNC: 27 MG/DL (ref 8–22)
CALCIUM SERPL-MCNC: 10 MG/DL (ref 8.5–10.5)
CHLORIDE SERPL-SCNC: 96 MMOL/L (ref 96–112)
CO2 SERPL-SCNC: 23 MMOL/L (ref 20–33)
CREAT SERPL-MCNC: 1.23 MG/DL (ref 0.5–1.4)
CREAT UR-MCNC: 108.97 MG/DL
GFR SERPLBLD CREATININE-BSD FMLA CKD-EPI: 59 ML/MIN/1.73 M 2
GLUCOSE SERPL-MCNC: 158 MG/DL (ref 65–99)
MICROALBUMIN UR-MCNC: 5.2 MG/DL
MICROALBUMIN/CREAT UR: 48 MG/G (ref 0–30)
POTASSIUM SERPL-SCNC: 3.3 MMOL/L (ref 3.6–5.5)
SODIUM SERPL-SCNC: 136 MMOL/L (ref 135–145)

## 2023-06-20 PROCEDURE — 36415 COLL VENOUS BLD VENIPUNCTURE: CPT

## 2023-06-20 PROCEDURE — 80048 BASIC METABOLIC PNL TOTAL CA: CPT

## 2023-06-20 PROCEDURE — 82570 ASSAY OF URINE CREATININE: CPT

## 2023-06-20 PROCEDURE — 82043 UR ALBUMIN QUANTITATIVE: CPT

## 2023-06-23 ENCOUNTER — PATIENT MESSAGE (OUTPATIENT)
Dept: MEDICAL GROUP | Facility: MEDICAL CENTER | Age: 78
End: 2023-06-23
Payer: MEDICARE

## 2023-06-23 ENCOUNTER — HOSPITAL ENCOUNTER (OUTPATIENT)
Dept: RADIOLOGY | Facility: MEDICAL CENTER | Age: 78
End: 2023-06-23
Attending: ORTHOPAEDIC SURGERY
Payer: MEDICARE

## 2023-06-23 DIAGNOSIS — J96.11 CHRONIC RESPIRATORY FAILURE WITH HYPOXIA (HCC): ICD-10-CM

## 2023-06-23 DIAGNOSIS — R35.1 BENIGN PROSTATIC HYPERPLASIA WITH NOCTURIA: ICD-10-CM

## 2023-06-23 DIAGNOSIS — I10 ESSENTIAL HYPERTENSION: ICD-10-CM

## 2023-06-23 DIAGNOSIS — C73 PAPILLARY THYROID CARCINOMA (HCC): ICD-10-CM

## 2023-06-23 DIAGNOSIS — M89.9 BONE DISEASE: ICD-10-CM

## 2023-06-23 DIAGNOSIS — D68.69 SECONDARY HYPERCOAGULABLE STATE (HCC): ICD-10-CM

## 2023-06-23 DIAGNOSIS — R73.03 PRE-DIABETES: ICD-10-CM

## 2023-06-23 DIAGNOSIS — I70.0 AORTO-ILIAC ATHEROSCLEROSIS (HCC): ICD-10-CM

## 2023-06-23 DIAGNOSIS — E78.5 HYPERLIPIDEMIA, UNSPECIFIED HYPERLIPIDEMIA TYPE: ICD-10-CM

## 2023-06-23 DIAGNOSIS — C79.51 METASTASIS TO BONE (HCC): ICD-10-CM

## 2023-06-23 DIAGNOSIS — I50.32 CHRONIC DIASTOLIC HEART FAILURE (HCC): ICD-10-CM

## 2023-06-23 DIAGNOSIS — N40.1 BENIGN PROSTATIC HYPERPLASIA WITH NOCTURIA: ICD-10-CM

## 2023-06-23 DIAGNOSIS — I32 PERICARDITIS SECONDARY TO TUMOR METASTATIC TO PERICARDIUM (HCC): ICD-10-CM

## 2023-06-23 DIAGNOSIS — I70.8 AORTO-ILIAC ATHEROSCLEROSIS (HCC): ICD-10-CM

## 2023-06-23 DIAGNOSIS — I48.11 LONGSTANDING PERSISTENT ATRIAL FIBRILLATION (HCC): ICD-10-CM

## 2023-06-23 DIAGNOSIS — C78.02 MALIGNANT NEOPLASM METASTATIC TO LEFT LUNG (HCC): ICD-10-CM

## 2023-06-23 DIAGNOSIS — C79.89 PERICARDITIS SECONDARY TO TUMOR METASTATIC TO PERICARDIUM (HCC): ICD-10-CM

## 2023-06-23 PROCEDURE — 700117 HCHG RX CONTRAST REV CODE 255: Performed by: ORTHOPAEDIC SURGERY

## 2023-06-23 PROCEDURE — 73702 CT LWR EXTREMITY W/O&W/DYE: CPT | Mod: LT

## 2023-06-23 RX ADMIN — IOHEXOL 80 ML: 350 INJECTION, SOLUTION INTRAVENOUS at 18:30

## 2023-09-13 ENCOUNTER — DOCUMENTATION (OUTPATIENT)
Dept: HEALTH INFORMATION MANAGEMENT | Facility: OTHER | Age: 78
End: 2023-09-13
Payer: MEDICARE

## 2023-10-13 RX ORDER — ALLOPURINOL 100 MG/1
100 TABLET ORAL DAILY
Qty: 90 TABLET | Refills: 1 | Status: SHIPPED | OUTPATIENT
Start: 2023-10-13 | End: 2024-02-13

## 2023-10-13 RX ORDER — ALLOPURINOL 300 MG/1
300 TABLET ORAL DAILY
Qty: 90 TABLET | Refills: 1 | Status: SHIPPED | OUTPATIENT
Start: 2023-10-13

## 2023-10-13 NOTE — TELEPHONE ENCOUNTER
Received request via:Patient    Was the patient seen in the last year in this department? Yes      Does the patient have an active prescription (recently filled or refills available) for medication(s) requested? No    Does the patient have long term Plus and need 100 day supply (blood pressure, diabetes and cholesterol meds only)? Medication is not for cholesterol, blood pressure or diabetes

## 2023-10-19 ENCOUNTER — TELEPHONE (OUTPATIENT)
Dept: NEPHROLOGY | Facility: MEDICAL CENTER | Age: 78
End: 2023-10-19
Payer: MEDICARE

## 2023-10-19 DIAGNOSIS — I10 ESSENTIAL HYPERTENSION: ICD-10-CM

## 2023-10-19 NOTE — TELEPHONE ENCOUNTER
Patient has follow up appt 11/7    Do you need more labs.  Only order is MircroAlb/Cre urine.    Please advise.

## 2023-11-02 ENCOUNTER — OFFICE VISIT (OUTPATIENT)
Dept: MEDICAL GROUP | Facility: PHYSICIAN GROUP | Age: 78
End: 2023-11-02
Payer: MEDICARE

## 2023-11-02 ENCOUNTER — HOSPITAL ENCOUNTER (OUTPATIENT)
Dept: LAB | Facility: MEDICAL CENTER | Age: 78
End: 2023-11-02
Attending: INTERNAL MEDICINE
Payer: MEDICARE

## 2023-11-02 ENCOUNTER — HOSPITAL ENCOUNTER (OUTPATIENT)
Dept: LAB | Facility: MEDICAL CENTER | Age: 78
End: 2023-11-02
Attending: FAMILY MEDICINE
Payer: MEDICARE

## 2023-11-02 VITALS
HEIGHT: 73 IN | TEMPERATURE: 97.9 F | HEART RATE: 64 BPM | WEIGHT: 255 LBS | SYSTOLIC BLOOD PRESSURE: 122 MMHG | DIASTOLIC BLOOD PRESSURE: 70 MMHG | BODY MASS INDEX: 33.8 KG/M2 | OXYGEN SATURATION: 94 %

## 2023-11-02 DIAGNOSIS — R73.03 PRE-DIABETES: ICD-10-CM

## 2023-11-02 DIAGNOSIS — I10 ESSENTIAL HYPERTENSION: ICD-10-CM

## 2023-11-02 DIAGNOSIS — G47.33 OSA ON CPAP: ICD-10-CM

## 2023-11-02 DIAGNOSIS — R97.20 ELEVATED PSA: ICD-10-CM

## 2023-11-02 DIAGNOSIS — C73 PAPILLARY THYROID CARCINOMA (HCC): ICD-10-CM

## 2023-11-02 DIAGNOSIS — E55.9 VITAMIN D DEFICIENCY: ICD-10-CM

## 2023-11-02 DIAGNOSIS — E26.1 SECONDARY HYPERALDOSTERONISM (HCC): ICD-10-CM

## 2023-11-02 DIAGNOSIS — I50.32 CHRONIC DIASTOLIC HEART FAILURE (HCC): ICD-10-CM

## 2023-11-02 DIAGNOSIS — M1A.9XX0 CHRONIC GOUT WITHOUT TOPHUS, UNSPECIFIED CAUSE, UNSPECIFIED SITE: ICD-10-CM

## 2023-11-02 LAB
25(OH)D3 SERPL-MCNC: 33 NG/ML (ref 30–100)
ALBUMIN SERPL BCP-MCNC: 4.3 G/DL (ref 3.2–4.9)
ALBUMIN/GLOB SERPL: 1.5 G/DL
ALP SERPL-CCNC: 118 U/L (ref 30–99)
ALT SERPL-CCNC: 67 U/L (ref 2–50)
ANION GAP SERPL CALC-SCNC: 11 MMOL/L (ref 7–16)
ANION GAP SERPL CALC-SCNC: 13 MMOL/L (ref 7–16)
AST SERPL-CCNC: 59 U/L (ref 12–45)
BASOPHILS # BLD AUTO: 0.7 % (ref 0–1.8)
BASOPHILS # BLD: 0.05 K/UL (ref 0–0.12)
BILIRUB SERPL-MCNC: 0.9 MG/DL (ref 0.1–1.5)
BUN SERPL-MCNC: 21 MG/DL (ref 8–22)
BUN SERPL-MCNC: 21 MG/DL (ref 8–22)
CALCIUM ALBUM COR SERPL-MCNC: 9.3 MG/DL (ref 8.5–10.5)
CALCIUM SERPL-MCNC: 9.5 MG/DL (ref 8.5–10.5)
CALCIUM SERPL-MCNC: 9.5 MG/DL (ref 8.5–10.5)
CHLORIDE SERPL-SCNC: 103 MMOL/L (ref 96–112)
CHLORIDE SERPL-SCNC: 103 MMOL/L (ref 96–112)
CO2 SERPL-SCNC: 22 MMOL/L (ref 20–33)
CO2 SERPL-SCNC: 24 MMOL/L (ref 20–33)
CREAT SERPL-MCNC: 0.94 MG/DL (ref 0.5–1.4)
CREAT SERPL-MCNC: 0.95 MG/DL (ref 0.5–1.4)
CREAT UR-MCNC: 58.26 MG/DL
EOSINOPHIL # BLD AUTO: 0.37 K/UL (ref 0–0.51)
EOSINOPHIL NFR BLD: 5.5 % (ref 0–6.9)
ERYTHROCYTE [DISTWIDTH] IN BLOOD BY AUTOMATED COUNT: 54.4 FL (ref 35.9–50)
ERYTHROCYTE [DISTWIDTH] IN BLOOD BY AUTOMATED COUNT: 54.8 FL (ref 35.9–50)
EST. AVERAGE GLUCOSE BLD GHB EST-MCNC: 137 MG/DL
GFR SERPLBLD CREATININE-BSD FMLA CKD-EPI: 81 ML/MIN/1.73 M 2
GFR SERPLBLD CREATININE-BSD FMLA CKD-EPI: 82 ML/MIN/1.73 M 2
GLOBULIN SER CALC-MCNC: 2.9 G/DL (ref 1.9–3.5)
GLUCOSE SERPL-MCNC: 100 MG/DL (ref 65–99)
GLUCOSE SERPL-MCNC: 98 MG/DL (ref 65–99)
HBA1C MFR BLD: 6.4 % (ref 4–5.6)
HCT VFR BLD AUTO: 51.5 % (ref 42–52)
HCT VFR BLD AUTO: 51.9 % (ref 42–52)
HGB BLD-MCNC: 16.3 G/DL (ref 14–18)
HGB BLD-MCNC: 16.9 G/DL (ref 14–18)
IMM GRANULOCYTES # BLD AUTO: 0.02 K/UL (ref 0–0.11)
IMM GRANULOCYTES NFR BLD AUTO: 0.3 % (ref 0–0.9)
LYMPHOCYTES # BLD AUTO: 1 K/UL (ref 1–4.8)
LYMPHOCYTES NFR BLD: 14.8 % (ref 22–41)
MCH RBC QN AUTO: 32.8 PG (ref 27–33)
MCH RBC QN AUTO: 33.5 PG (ref 27–33)
MCHC RBC AUTO-ENTMCNC: 31.7 G/DL (ref 32.3–36.5)
MCHC RBC AUTO-ENTMCNC: 32.6 G/DL (ref 32.3–36.5)
MCV RBC AUTO: 103 FL (ref 81.4–97.8)
MCV RBC AUTO: 103.6 FL (ref 81.4–97.8)
MICROALBUMIN UR-MCNC: 3.5 MG/DL
MICROALBUMIN/CREAT UR: 60 MG/G (ref 0–30)
MONOCYTES # BLD AUTO: 0.69 K/UL (ref 0–0.85)
MONOCYTES NFR BLD AUTO: 10.2 % (ref 0–13.4)
NEUTROPHILS # BLD AUTO: 4.62 K/UL (ref 1.82–7.42)
NEUTROPHILS NFR BLD: 68.5 % (ref 44–72)
NRBC # BLD AUTO: 0 K/UL
NRBC BLD-RTO: 0 /100 WBC (ref 0–0.2)
PLATELET # BLD AUTO: 198 K/UL (ref 164–446)
PLATELET # BLD AUTO: 209 K/UL (ref 164–446)
PMV BLD AUTO: 10.7 FL (ref 9–12.9)
PMV BLD AUTO: 11.2 FL (ref 9–12.9)
POTASSIUM SERPL-SCNC: 4.5 MMOL/L (ref 3.6–5.5)
POTASSIUM SERPL-SCNC: 4.7 MMOL/L (ref 3.6–5.5)
PROT SERPL-MCNC: 7.2 G/DL (ref 6–8.2)
RBC # BLD AUTO: 4.97 M/UL (ref 4.7–6.1)
RBC # BLD AUTO: 5.04 M/UL (ref 4.7–6.1)
SODIUM SERPL-SCNC: 138 MMOL/L (ref 135–145)
SODIUM SERPL-SCNC: 138 MMOL/L (ref 135–145)
TSH SERPL DL<=0.005 MIU/L-ACNC: 9.94 UIU/ML (ref 0.38–5.33)
URATE SERPL-MCNC: 5.5 MG/DL (ref 2.5–8.3)
WBC # BLD AUTO: 6.6 K/UL (ref 4.8–10.8)
WBC # BLD AUTO: 6.8 K/UL (ref 4.8–10.8)

## 2023-11-02 PROCEDURE — 84550 ASSAY OF BLOOD/URIC ACID: CPT

## 2023-11-02 PROCEDURE — 80048 BASIC METABOLIC PNL TOTAL CA: CPT

## 2023-11-02 PROCEDURE — 36415 COLL VENOUS BLD VENIPUNCTURE: CPT

## 2023-11-02 PROCEDURE — 80053 COMPREHEN METABOLIC PANEL: CPT

## 2023-11-02 PROCEDURE — 85025 COMPLETE CBC W/AUTO DIFF WBC: CPT

## 2023-11-02 PROCEDURE — 3078F DIAST BP <80 MM HG: CPT | Performed by: FAMILY MEDICINE

## 2023-11-02 PROCEDURE — 3074F SYST BP LT 130 MM HG: CPT | Performed by: FAMILY MEDICINE

## 2023-11-02 PROCEDURE — 82043 UR ALBUMIN QUANTITATIVE: CPT

## 2023-11-02 PROCEDURE — 85027 COMPLETE CBC AUTOMATED: CPT | Mod: XU

## 2023-11-02 PROCEDURE — 99214 OFFICE O/P EST MOD 30 MIN: CPT | Performed by: FAMILY MEDICINE

## 2023-11-02 PROCEDURE — 84443 ASSAY THYROID STIM HORMONE: CPT

## 2023-11-02 PROCEDURE — 82306 VITAMIN D 25 HYDROXY: CPT

## 2023-11-02 PROCEDURE — 83036 HEMOGLOBIN GLYCOSYLATED A1C: CPT

## 2023-11-02 PROCEDURE — 82570 ASSAY OF URINE CREATININE: CPT

## 2023-11-02 RX ORDER — FUROSEMIDE 40 MG/1
40 TABLET ORAL DAILY
COMMUNITY
End: 2024-03-19

## 2023-11-02 ASSESSMENT — FIBROSIS 4 INDEX: FIB4 SCORE: 2.33
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
## 2023-11-07 ENCOUNTER — OFFICE VISIT (OUTPATIENT)
Dept: NEPHROLOGY | Facility: MEDICAL CENTER | Age: 78
End: 2023-11-07
Payer: MEDICARE

## 2023-11-07 VITALS
WEIGHT: 262 LBS | HEIGHT: 73 IN | OXYGEN SATURATION: 97 % | SYSTOLIC BLOOD PRESSURE: 132 MMHG | HEART RATE: 77 BPM | TEMPERATURE: 98.2 F | DIASTOLIC BLOOD PRESSURE: 76 MMHG | BODY MASS INDEX: 34.72 KG/M2

## 2023-11-07 DIAGNOSIS — E87.6 HYPOKALEMIA: ICD-10-CM

## 2023-11-07 DIAGNOSIS — I42.9 CARDIOMYOPATHY, UNSPECIFIED TYPE (HCC): ICD-10-CM

## 2023-11-07 DIAGNOSIS — I10 ESSENTIAL HYPERTENSION: ICD-10-CM

## 2023-11-07 PROCEDURE — 3075F SYST BP GE 130 - 139MM HG: CPT | Performed by: INTERNAL MEDICINE

## 2023-11-07 PROCEDURE — 3078F DIAST BP <80 MM HG: CPT | Performed by: INTERNAL MEDICINE

## 2023-11-07 PROCEDURE — 99214 OFFICE O/P EST MOD 30 MIN: CPT | Performed by: INTERNAL MEDICINE

## 2023-11-07 ASSESSMENT — ENCOUNTER SYMPTOMS
HYPERTENSION: 1
NAUSEA: 0
FEVER: 0
VOMITING: 0
CHILLS: 0
SHORTNESS OF BREATH: 0
COUGH: 0

## 2023-11-07 ASSESSMENT — FIBROSIS 4 INDEX: FIB4 SCORE: 2.76
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
## 2023-11-16 ENCOUNTER — HOSPITAL ENCOUNTER (OUTPATIENT)
Dept: RADIOLOGY | Facility: MEDICAL CENTER | Age: 78
End: 2023-11-16
Attending: INTERNAL MEDICINE
Payer: MEDICARE

## 2023-11-16 DIAGNOSIS — C73 MALIGNANT NEOPLASM OF THYROID GLAND (HCC): ICD-10-CM

## 2023-11-16 PROCEDURE — 71260 CT THORAX DX C+: CPT

## 2023-11-16 PROCEDURE — 700117 HCHG RX CONTRAST REV CODE 255: Performed by: INTERNAL MEDICINE

## 2023-11-16 PROCEDURE — A9503 TC99M MEDRONATE: HCPCS

## 2023-11-16 RX ADMIN — IOHEXOL 75 ML: 350 INJECTION, SOLUTION INTRAVENOUS at 12:43

## 2023-11-20 ENCOUNTER — OFFICE VISIT (OUTPATIENT)
Dept: MEDICAL GROUP | Facility: PHYSICIAN GROUP | Age: 78
End: 2023-11-20
Payer: MEDICARE

## 2023-11-20 VITALS
WEIGHT: 260.14 LBS | HEART RATE: 84 BPM | DIASTOLIC BLOOD PRESSURE: 74 MMHG | TEMPERATURE: 96.3 F | HEIGHT: 73 IN | OXYGEN SATURATION: 94 % | RESPIRATION RATE: 16 BRPM | BODY MASS INDEX: 34.48 KG/M2 | SYSTOLIC BLOOD PRESSURE: 128 MMHG

## 2023-11-20 DIAGNOSIS — R71.8 ABNORMAL RBC INDICES: ICD-10-CM

## 2023-11-20 DIAGNOSIS — R74.8 ELEVATED LIVER ENZYMES: ICD-10-CM

## 2023-11-20 DIAGNOSIS — C73 PAPILLARY THYROID CARCINOMA (HCC): ICD-10-CM

## 2023-11-20 PROCEDURE — 3074F SYST BP LT 130 MM HG: CPT | Performed by: FAMILY MEDICINE

## 2023-11-20 PROCEDURE — 99214 OFFICE O/P EST MOD 30 MIN: CPT | Performed by: FAMILY MEDICINE

## 2023-11-20 PROCEDURE — 3078F DIAST BP <80 MM HG: CPT | Performed by: FAMILY MEDICINE

## 2023-11-20 ASSESSMENT — FIBROSIS 4 INDEX: FIB4 SCORE: 2.76
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
## 2023-12-14 ENCOUNTER — HOSPITAL ENCOUNTER (OUTPATIENT)
Dept: LAB | Facility: MEDICAL CENTER | Age: 78
End: 2023-12-14
Attending: INTERNAL MEDICINE
Payer: MEDICARE

## 2023-12-14 LAB — TSH SERPL DL<=0.005 MIU/L-ACNC: 2.5 UIU/ML (ref 0.38–5.33)

## 2023-12-14 PROCEDURE — 84432 ASSAY OF THYROGLOBULIN: CPT

## 2023-12-14 PROCEDURE — 36415 COLL VENOUS BLD VENIPUNCTURE: CPT

## 2023-12-14 PROCEDURE — 84443 ASSAY THYROID STIM HORMONE: CPT

## 2023-12-14 PROCEDURE — 86800 THYROGLOBULIN ANTIBODY: CPT

## 2023-12-18 ENCOUNTER — APPOINTMENT (RX ONLY)
Dept: URBAN - METROPOLITAN AREA CLINIC 6 | Facility: CLINIC | Age: 78
Setting detail: DERMATOLOGY
End: 2023-12-18

## 2023-12-18 DIAGNOSIS — Z85.820 PERSONAL HISTORY OF MALIGNANT MELANOMA OF SKIN: ICD-10-CM

## 2023-12-18 DIAGNOSIS — Z71.89 OTHER SPECIFIED COUNSELING: ICD-10-CM

## 2023-12-18 DIAGNOSIS — L57.0 ACTINIC KERATOSIS: ICD-10-CM

## 2023-12-18 DIAGNOSIS — D18.0 HEMANGIOMA: ICD-10-CM

## 2023-12-18 DIAGNOSIS — D22 MELANOCYTIC NEVI: ICD-10-CM

## 2023-12-18 DIAGNOSIS — Z85.828 PERSONAL HISTORY OF OTHER MALIGNANT NEOPLASM OF SKIN: ICD-10-CM

## 2023-12-18 DIAGNOSIS — L81.4 OTHER MELANIN HYPERPIGMENTATION: ICD-10-CM

## 2023-12-18 DIAGNOSIS — Z00.8 ENCOUNTER FOR OTHER GENERAL EXAMINATION: ICD-10-CM

## 2023-12-18 DIAGNOSIS — L82.1 OTHER SEBORRHEIC KERATOSIS: ICD-10-CM

## 2023-12-18 PROBLEM — D18.01 HEMANGIOMA OF SKIN AND SUBCUTANEOUS TISSUE: Status: ACTIVE | Noted: 2023-12-18

## 2023-12-18 PROBLEM — D22.5 MELANOCYTIC NEVI OF TRUNK: Status: ACTIVE | Noted: 2023-12-18

## 2023-12-18 PROCEDURE — ? ADDITIONAL NOTES

## 2023-12-18 PROCEDURE — ? SUNSCREEN TREATMENT REGIMEN

## 2023-12-18 PROCEDURE — 17003 DESTRUCT PREMALG LES 2-14: CPT

## 2023-12-18 PROCEDURE — ? SUNSCREEN RECOMMENDATIONS

## 2023-12-18 PROCEDURE — ? COUNSELING

## 2023-12-18 PROCEDURE — 17000 DESTRUCT PREMALG LESION: CPT

## 2023-12-18 PROCEDURE — ? LIQUID NITROGEN

## 2023-12-18 PROCEDURE — ? REFERRAL CORRESPONDENCE

## 2023-12-18 PROCEDURE — 99213 OFFICE O/P EST LOW 20 MIN: CPT | Mod: 25

## 2023-12-18 ASSESSMENT — LOCATION DETAILED DESCRIPTION DERM
LOCATION DETAILED: STERNUM
LOCATION DETAILED: RIGHT RADIAL DORSAL HAND
LOCATION DETAILED: RIGHT MEDIAL FRONTAL SCALP
LOCATION DETAILED: LEFT SUPERIOR MEDIAL FOREHEAD
LOCATION DETAILED: LEFT INFERIOR CENTRAL MALAR CHEEK
LOCATION DETAILED: RIGHT SUPERIOR HELIX
LOCATION DETAILED: LEFT PROXIMAL DORSAL FOREARM
LOCATION DETAILED: LEFT CAVUM CONCHA
LOCATION DETAILED: RIGHT ULNAR DORSAL HAND
LOCATION DETAILED: LEFT FOREHEAD
LOCATION DETAILED: POSTERIOR MID-PARIETAL SCALP
LOCATION DETAILED: RIGHT DISTAL DORSAL FOREARM
LOCATION DETAILED: LEFT DISTAL DORSAL FOREARM
LOCATION DETAILED: LEFT MEDIAL FOREHEAD
LOCATION DETAILED: LEFT MEDIAL INFERIOR CHEST
LOCATION DETAILED: RIGHT SUPERIOR FOREHEAD
LOCATION DETAILED: SUPERIOR THORACIC SPINE
LOCATION DETAILED: RIGHT SUPERIOR MEDIAL FOREHEAD
LOCATION DETAILED: LEFT RADIAL DORSAL HAND
LOCATION DETAILED: LEFT CENTRAL TEMPLE
LOCATION DETAILED: RIGHT DISTAL POSTERIOR THIGH
LOCATION DETAILED: RIGHT MID-UPPER BACK

## 2023-12-18 ASSESSMENT — LOCATION SIMPLE DESCRIPTION DERM
LOCATION SIMPLE: CHEST
LOCATION SIMPLE: LEFT CHEEK
LOCATION SIMPLE: RIGHT SCALP
LOCATION SIMPLE: UPPER BACK
LOCATION SIMPLE: RIGHT POSTERIOR THIGH
LOCATION SIMPLE: RIGHT FOREARM
LOCATION SIMPLE: LEFT FOREHEAD
LOCATION SIMPLE: LEFT EAR
LOCATION SIMPLE: POSTERIOR SCALP
LOCATION SIMPLE: LEFT HAND
LOCATION SIMPLE: LEFT TEMPLE
LOCATION SIMPLE: RIGHT UPPER BACK
LOCATION SIMPLE: RIGHT HAND
LOCATION SIMPLE: RIGHT EAR
LOCATION SIMPLE: LEFT FOREARM
LOCATION SIMPLE: RIGHT FOREHEAD

## 2023-12-18 ASSESSMENT — LOCATION ZONE DERM
LOCATION ZONE: LEG
LOCATION ZONE: HAND
LOCATION ZONE: FACE
LOCATION ZONE: EAR
LOCATION ZONE: SCALP
LOCATION ZONE: TRUNK
LOCATION ZONE: ARM

## 2023-12-18 NOTE — PROCEDURE: ADDITIONAL NOTES
Additional Notes: [id number] Bx proven AK. Well-healed scar, NER. Will continue to monitor.
Render Risk Assessment In Note?: no
Detail Level: Simple

## 2023-12-18 NOTE — PROCEDURE: LIQUID NITROGEN
Duration Of Freeze Thaw-Cycle (Seconds): 10
Show Applicator Variable?: Yes
Detail Level: Detailed
Consent: The patient's consent was obtained including but not limited to risks of crusting, scabbing, blistering, scarring, darker or lighter pigmentary change, recurrence, incomplete removal and infection.
Post-Care Instructions: I reviewed with the patient in detail post-care instructions. Patient is to wear sunprotection, and avoid picking at any of the treated lesions. Pt may apply Vaseline to crusted or scabbing areas.
Render Post-Care Instructions In Note?: no
Number Of Freeze-Thaw Cycles: 2 freeze-thaw cycles

## 2023-12-19 ENCOUNTER — HOSPITAL ENCOUNTER (OUTPATIENT)
Dept: LAB | Facility: MEDICAL CENTER | Age: 78
End: 2023-12-19
Attending: FAMILY MEDICINE
Payer: MEDICARE

## 2023-12-19 ENCOUNTER — OFFICE VISIT (OUTPATIENT)
Dept: MEDICAL GROUP | Facility: PHYSICIAN GROUP | Age: 78
End: 2023-12-19
Payer: MEDICARE

## 2023-12-19 VITALS
SYSTOLIC BLOOD PRESSURE: 128 MMHG | RESPIRATION RATE: 18 BRPM | BODY MASS INDEX: 34.88 KG/M2 | WEIGHT: 263.2 LBS | HEIGHT: 73 IN | DIASTOLIC BLOOD PRESSURE: 76 MMHG | HEART RATE: 78 BPM | OXYGEN SATURATION: 95 % | TEMPERATURE: 96.7 F

## 2023-12-19 DIAGNOSIS — R71.8 ABNORMAL RBC INDICES: ICD-10-CM

## 2023-12-19 DIAGNOSIS — R29.898 HAND PROBLEMS: ICD-10-CM

## 2023-12-19 DIAGNOSIS — C73 PAPILLARY THYROID CARCINOMA (HCC): ICD-10-CM

## 2023-12-19 DIAGNOSIS — I10 ESSENTIAL HYPERTENSION: ICD-10-CM

## 2023-12-19 DIAGNOSIS — R74.8 ELEVATED LIVER ENZYMES: ICD-10-CM

## 2023-12-19 LAB
ALBUMIN SERPL BCP-MCNC: 4.6 G/DL (ref 3.2–4.9)
ALBUMIN/GLOB SERPL: 1.7 G/DL
ALP SERPL-CCNC: 88 U/L (ref 30–99)
ALT SERPL-CCNC: 73 U/L (ref 2–50)
ANION GAP SERPL CALC-SCNC: 11 MMOL/L (ref 7–16)
AST SERPL-CCNC: 58 U/L (ref 12–45)
BASOPHILS # BLD AUTO: 0.4 % (ref 0–1.8)
BASOPHILS # BLD: 0.03 K/UL (ref 0–0.12)
BILIRUB SERPL-MCNC: 0.8 MG/DL (ref 0.1–1.5)
BUN SERPL-MCNC: 20 MG/DL (ref 8–22)
CALCIUM ALBUM COR SERPL-MCNC: 9.5 MG/DL (ref 8.5–10.5)
CALCIUM SERPL-MCNC: 10 MG/DL (ref 8.5–10.5)
CHLORIDE SERPL-SCNC: 102 MMOL/L (ref 96–112)
CO2 SERPL-SCNC: 23 MMOL/L (ref 20–33)
CREAT SERPL-MCNC: 1.11 MG/DL (ref 0.5–1.4)
EOSINOPHIL # BLD AUTO: 0.17 K/UL (ref 0–0.51)
EOSINOPHIL NFR BLD: 2.3 % (ref 0–6.9)
ERYTHROCYTE [DISTWIDTH] IN BLOOD BY AUTOMATED COUNT: 50.3 FL (ref 35.9–50)
FOLATE SERPL-MCNC: 13.4 NG/ML
GFR SERPLBLD CREATININE-BSD FMLA CKD-EPI: 67 ML/MIN/1.73 M 2
GLOBULIN SER CALC-MCNC: 2.7 G/DL (ref 1.9–3.5)
GLUCOSE SERPL-MCNC: 115 MG/DL (ref 65–99)
HCT VFR BLD AUTO: 48.7 % (ref 42–52)
HGB BLD-MCNC: 16.5 G/DL (ref 14–18)
IMM GRANULOCYTES # BLD AUTO: 0.03 K/UL (ref 0–0.11)
IMM GRANULOCYTES NFR BLD AUTO: 0.4 % (ref 0–0.9)
LYMPHOCYTES # BLD AUTO: 1 K/UL (ref 1–4.8)
LYMPHOCYTES NFR BLD: 13.5 % (ref 22–41)
MCH RBC QN AUTO: 33.4 PG (ref 27–33)
MCHC RBC AUTO-ENTMCNC: 33.9 G/DL (ref 32.3–36.5)
MCV RBC AUTO: 98.6 FL (ref 81.4–97.8)
MONOCYTES # BLD AUTO: 0.76 K/UL (ref 0–0.85)
MONOCYTES NFR BLD AUTO: 10.2 % (ref 0–13.4)
NEUTROPHILS # BLD AUTO: 5.44 K/UL (ref 1.82–7.42)
NEUTROPHILS NFR BLD: 73.2 % (ref 44–72)
NRBC # BLD AUTO: 0 K/UL
NRBC BLD-RTO: 0 /100 WBC (ref 0–0.2)
PLATELET # BLD AUTO: 185 K/UL (ref 164–446)
PMV BLD AUTO: 10.7 FL (ref 9–12.9)
POTASSIUM SERPL-SCNC: 5 MMOL/L (ref 3.6–5.5)
PROT SERPL-MCNC: 7.3 G/DL (ref 6–8.2)
RBC # BLD AUTO: 4.94 M/UL (ref 4.7–6.1)
SODIUM SERPL-SCNC: 136 MMOL/L (ref 135–145)
THYROGLOB AB SERPL-ACNC: 48 IU/ML (ref 0–4)
THYROGLOB SERPL-MCNC: 65.6 NG/ML (ref 1.3–31.8)
THYROGLOB SERPL-MCNC: ABNORMAL NG/ML (ref 1.3–31.8)
VIT B12 SERPL-MCNC: 1125 PG/ML (ref 211–911)
WBC # BLD AUTO: 7.4 K/UL (ref 4.8–10.8)

## 2023-12-19 PROCEDURE — 3078F DIAST BP <80 MM HG: CPT | Performed by: FAMILY MEDICINE

## 2023-12-19 PROCEDURE — 36415 COLL VENOUS BLD VENIPUNCTURE: CPT

## 2023-12-19 PROCEDURE — 3074F SYST BP LT 130 MM HG: CPT | Performed by: FAMILY MEDICINE

## 2023-12-19 PROCEDURE — 99213 OFFICE O/P EST LOW 20 MIN: CPT | Performed by: FAMILY MEDICINE

## 2023-12-19 PROCEDURE — 85025 COMPLETE CBC W/AUTO DIFF WBC: CPT

## 2023-12-19 PROCEDURE — 80053 COMPREHEN METABOLIC PANEL: CPT

## 2023-12-19 PROCEDURE — 82746 ASSAY OF FOLIC ACID SERUM: CPT

## 2023-12-19 PROCEDURE — 82607 VITAMIN B-12: CPT

## 2023-12-19 RX ORDER — CLARITHROMYCIN 500 MG/1
TABLET, COATED ORAL
COMMUNITY
End: 2024-02-13

## 2023-12-19 RX ORDER — PREDNISONE 10 MG/1
TABLET ORAL
COMMUNITY
End: 2024-03-19

## 2023-12-19 RX ORDER — AMLODIPINE BESYLATE 10 MG/1
TABLET ORAL
COMMUNITY
End: 2024-03-19

## 2023-12-19 RX ORDER — TESTOSTERONE CYPIONATE 200 MG/ML
INJECTION, SOLUTION INTRAMUSCULAR
COMMUNITY
End: 2024-03-19

## 2023-12-19 RX ORDER — FINASTERIDE 5 MG/1
1 TABLET, FILM COATED ORAL
COMMUNITY
End: 2024-02-13

## 2023-12-19 RX ORDER — CLINDAMYCIN HYDROCHLORIDE 150 MG/1
CAPSULE ORAL
COMMUNITY

## 2023-12-19 ASSESSMENT — FIBROSIS 4 INDEX: FIB4 SCORE: 2.76
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
## 2023-12-27 ENCOUNTER — OFFICE VISIT (OUTPATIENT)
Dept: MEDICAL GROUP | Facility: PHYSICIAN GROUP | Age: 78
End: 2023-12-27
Payer: MEDICARE

## 2023-12-27 VITALS
HEART RATE: 71 BPM | WEIGHT: 262.2 LBS | DIASTOLIC BLOOD PRESSURE: 62 MMHG | OXYGEN SATURATION: 97 % | TEMPERATURE: 96.9 F | BODY MASS INDEX: 34.75 KG/M2 | HEIGHT: 73 IN | RESPIRATION RATE: 18 BRPM | SYSTOLIC BLOOD PRESSURE: 106 MMHG

## 2023-12-27 DIAGNOSIS — C73 PAPILLARY THYROID CARCINOMA (HCC): ICD-10-CM

## 2023-12-27 DIAGNOSIS — I10 ESSENTIAL HYPERTENSION: ICD-10-CM

## 2023-12-27 DIAGNOSIS — R73.03 PRE-DIABETES: ICD-10-CM

## 2023-12-27 DIAGNOSIS — R71.8 ELEVATED MCV: ICD-10-CM

## 2023-12-27 DIAGNOSIS — R29.898 HAND PROBLEMS: ICD-10-CM

## 2023-12-27 DIAGNOSIS — R79.89 ABNORMAL LIVER FUNCTION TEST: ICD-10-CM

## 2023-12-27 PROCEDURE — 3074F SYST BP LT 130 MM HG: CPT | Performed by: FAMILY MEDICINE

## 2023-12-27 PROCEDURE — 3078F DIAST BP <80 MM HG: CPT | Performed by: FAMILY MEDICINE

## 2023-12-27 PROCEDURE — 99214 OFFICE O/P EST MOD 30 MIN: CPT | Performed by: FAMILY MEDICINE

## 2023-12-27 ASSESSMENT — FIBROSIS 4 INDEX: FIB4 SCORE: 2.94
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
## 2023-12-29 ENCOUNTER — HOSPITAL ENCOUNTER (OUTPATIENT)
Dept: RADIOLOGY | Facility: MEDICAL CENTER | Age: 78
End: 2023-12-29
Attending: FAMILY MEDICINE
Payer: MEDICARE

## 2023-12-29 DIAGNOSIS — R79.89 ABNORMAL LIVER FUNCTION TEST: ICD-10-CM

## 2023-12-29 PROCEDURE — 76705 ECHO EXAM OF ABDOMEN: CPT

## 2024-01-12 DIAGNOSIS — E87.6 HYPOKALEMIA: ICD-10-CM

## 2024-01-12 RX ORDER — POTASSIUM CHLORIDE 20 MEQ/1
40 TABLET, EXTENDED RELEASE ORAL
Qty: 900 TABLET | Refills: 0 | OUTPATIENT
Start: 2024-01-12

## 2024-01-14 RX ORDER — POTASSIUM CHLORIDE 20 MEQ/1
40 TABLET, EXTENDED RELEASE ORAL
Qty: 900 TABLET | Refills: 1 | Status: SHIPPED | OUTPATIENT
Start: 2024-01-14

## 2024-01-16 ENCOUNTER — PHARMACY VISIT (OUTPATIENT)
Dept: PHARMACY | Facility: MEDICAL CENTER | Age: 79
End: 2024-01-16
Payer: COMMERCIAL

## 2024-01-16 PROCEDURE — RXMED WILLOW AMBULATORY MEDICATION CHARGE: Performed by: FAMILY MEDICINE

## 2024-02-09 ENCOUNTER — HOSPITAL ENCOUNTER (OUTPATIENT)
Dept: LAB | Facility: MEDICAL CENTER | Age: 79
End: 2024-02-09
Attending: FAMILY MEDICINE
Payer: MEDICARE

## 2024-02-09 DIAGNOSIS — R73.03 PRE-DIABETES: ICD-10-CM

## 2024-02-09 DIAGNOSIS — R79.89 ABNORMAL LIVER FUNCTION TEST: ICD-10-CM

## 2024-02-09 DIAGNOSIS — R71.8 ELEVATED MCV: ICD-10-CM

## 2024-02-09 LAB
BASOPHILS # BLD AUTO: 0.3 % (ref 0–1.8)
BASOPHILS # BLD: 0.02 K/UL (ref 0–0.12)
EOSINOPHIL # BLD AUTO: 0.23 K/UL (ref 0–0.51)
EOSINOPHIL NFR BLD: 3.8 % (ref 0–6.9)
ERYTHROCYTE [DISTWIDTH] IN BLOOD BY AUTOMATED COUNT: 53.1 FL (ref 35.9–50)
EST. AVERAGE GLUCOSE BLD GHB EST-MCNC: 140 MG/DL
HBA1C MFR BLD: 6.5 % (ref 4–5.6)
HCT VFR BLD AUTO: 44.8 % (ref 42–52)
HGB BLD-MCNC: 15.3 G/DL (ref 14–18)
IMM GRANULOCYTES # BLD AUTO: 0.01 K/UL (ref 0–0.11)
IMM GRANULOCYTES NFR BLD AUTO: 0.2 % (ref 0–0.9)
LYMPHOCYTES # BLD AUTO: 0.94 K/UL (ref 1–4.8)
LYMPHOCYTES NFR BLD: 15.7 % (ref 22–41)
MCH RBC QN AUTO: 34 PG (ref 27–33)
MCHC RBC AUTO-ENTMCNC: 34.2 G/DL (ref 32.3–36.5)
MCV RBC AUTO: 99.6 FL (ref 81.4–97.8)
MONOCYTES # BLD AUTO: 0.66 K/UL (ref 0–0.85)
MONOCYTES NFR BLD AUTO: 11 % (ref 0–13.4)
NEUTROPHILS # BLD AUTO: 4.12 K/UL (ref 1.82–7.42)
NEUTROPHILS NFR BLD: 69 % (ref 44–72)
NRBC # BLD AUTO: 0 K/UL
NRBC BLD-RTO: 0 /100 WBC (ref 0–0.2)
PLATELET # BLD AUTO: 200 K/UL (ref 164–446)
PMV BLD AUTO: 10.6 FL (ref 9–12.9)
RBC # BLD AUTO: 4.5 M/UL (ref 4.7–6.1)
WBC # BLD AUTO: 6 K/UL (ref 4.8–10.8)

## 2024-02-09 PROCEDURE — 80053 COMPREHEN METABOLIC PANEL: CPT

## 2024-02-09 PROCEDURE — 80074 ACUTE HEPATITIS PANEL: CPT

## 2024-02-09 PROCEDURE — 36415 COLL VENOUS BLD VENIPUNCTURE: CPT

## 2024-02-09 PROCEDURE — 82607 VITAMIN B-12: CPT

## 2024-02-09 PROCEDURE — 85025 COMPLETE CBC W/AUTO DIFF WBC: CPT

## 2024-02-09 PROCEDURE — 83036 HEMOGLOBIN GLYCOSYLATED A1C: CPT

## 2024-02-10 LAB
ALBUMIN SERPL BCP-MCNC: 4.2 G/DL (ref 3.2–4.9)
ALBUMIN/GLOB SERPL: 1.8 G/DL
ALP SERPL-CCNC: 79 U/L (ref 30–99)
ALT SERPL-CCNC: 30 U/L (ref 2–50)
ANION GAP SERPL CALC-SCNC: 10 MMOL/L (ref 7–16)
AST SERPL-CCNC: 26 U/L (ref 12–45)
BILIRUB SERPL-MCNC: 1 MG/DL (ref 0.1–1.5)
BUN SERPL-MCNC: 20 MG/DL (ref 8–22)
CALCIUM ALBUM COR SERPL-MCNC: 9.1 MG/DL (ref 8.5–10.5)
CALCIUM SERPL-MCNC: 9.3 MG/DL (ref 8.5–10.5)
CHLORIDE SERPL-SCNC: 105 MMOL/L (ref 96–112)
CO2 SERPL-SCNC: 24 MMOL/L (ref 20–33)
CREAT SERPL-MCNC: 1.04 MG/DL (ref 0.5–1.4)
GFR SERPLBLD CREATININE-BSD FMLA CKD-EPI: 72 ML/MIN/1.73 M 2
GLOBULIN SER CALC-MCNC: 2.4 G/DL (ref 1.9–3.5)
GLUCOSE SERPL-MCNC: 115 MG/DL (ref 65–99)
POTASSIUM SERPL-SCNC: 4.4 MMOL/L (ref 3.6–5.5)
PROT SERPL-MCNC: 6.6 G/DL (ref 6–8.2)
SODIUM SERPL-SCNC: 139 MMOL/L (ref 135–145)
VIT B12 SERPL-MCNC: 813 PG/ML (ref 211–911)

## 2024-02-11 LAB
HAV IGM SERPL QL IA: NORMAL
HBV CORE IGM SERPL QL IA: NEGATIVE
HBV SURFACE AG SER QL: NORMAL
HCV AB SER QL: NORMAL

## 2024-02-13 PROBLEM — G56.00 CARPAL TUNNEL SYNDROME: Status: ACTIVE | Noted: 2024-02-13

## 2024-02-13 ASSESSMENT — FIBROSIS 4 INDEX: FIB4 SCORE: 1.9

## 2024-02-16 ENCOUNTER — PATIENT MESSAGE (OUTPATIENT)
Dept: HEALTH INFORMATION MANAGEMENT | Facility: OTHER | Age: 79
End: 2024-02-16

## 2024-02-16 ENCOUNTER — APPOINTMENT (OUTPATIENT)
Dept: ADMISSIONS | Facility: MEDICAL CENTER | Age: 79
End: 2024-02-16
Attending: ORTHOPAEDIC SURGERY
Payer: MEDICARE

## 2024-02-23 ENCOUNTER — PHARMACY VISIT (OUTPATIENT)
Dept: PHARMACY | Facility: MEDICAL CENTER | Age: 79
End: 2024-02-23
Payer: COMMERCIAL

## 2024-02-23 DIAGNOSIS — G89.18 POSTOPERATIVE PAIN: ICD-10-CM

## 2024-02-23 PROCEDURE — RXMED WILLOW AMBULATORY MEDICATION CHARGE: Performed by: PHYSICIAN ASSISTANT

## 2024-02-23 RX ORDER — LEVOTHYROXINE SODIUM 0.12 MG/1
125 TABLET ORAL
Qty: 30 TABLET | Refills: 3 | Status: CANCELLED | OUTPATIENT
Start: 2024-02-20

## 2024-03-05 ENCOUNTER — APPOINTMENT (OUTPATIENT)
Dept: FAMILY PLANNING/WOMEN'S HEALTH CLINIC | Facility: PHYSICIAN GROUP | Age: 79
End: 2024-03-05
Attending: FAMILY MEDICINE
Payer: MEDICARE

## 2024-03-06 ENCOUNTER — OFFICE VISIT (OUTPATIENT)
Dept: FAMILY PLANNING/WOMEN'S HEALTH CLINIC | Facility: PHYSICIAN GROUP | Age: 79
End: 2024-03-06
Attending: FAMILY MEDICINE
Payer: MEDICARE

## 2024-03-06 VITALS
HEIGHT: 73 IN | DIASTOLIC BLOOD PRESSURE: 54 MMHG | WEIGHT: 251 LBS | SYSTOLIC BLOOD PRESSURE: 100 MMHG | BODY MASS INDEX: 33.27 KG/M2

## 2024-03-06 DIAGNOSIS — I70.8 AORTO-ILIAC ATHEROSCLEROSIS (HCC): ICD-10-CM

## 2024-03-06 DIAGNOSIS — D68.69 SECONDARY HYPERCOAGULABLE STATE (HCC): ICD-10-CM

## 2024-03-06 DIAGNOSIS — C78.02 MALIGNANT NEOPLASM METASTATIC TO BOTH LUNGS (HCC): ICD-10-CM

## 2024-03-06 DIAGNOSIS — I50.32 CHRONIC DIASTOLIC HEART FAILURE (HCC): ICD-10-CM

## 2024-03-06 DIAGNOSIS — C73 PAPILLARY THYROID CARCINOMA (HCC): ICD-10-CM

## 2024-03-06 DIAGNOSIS — Z99.81 DEPENDENCE ON SUPPLEMENTAL OXYGEN: ICD-10-CM

## 2024-03-06 DIAGNOSIS — I10 ESSENTIAL HYPERTENSION: ICD-10-CM

## 2024-03-06 DIAGNOSIS — E26.1 SECONDARY HYPERALDOSTERONISM (HCC): ICD-10-CM

## 2024-03-06 DIAGNOSIS — C78.01 MALIGNANT NEOPLASM METASTATIC TO BOTH LUNGS (HCC): ICD-10-CM

## 2024-03-06 DIAGNOSIS — J96.11 CHRONIC RESPIRATORY FAILURE WITH HYPOXIA (HCC): ICD-10-CM

## 2024-03-06 DIAGNOSIS — Z13.31 POSITIVE DEPRESSION SCREENING: ICD-10-CM

## 2024-03-06 DIAGNOSIS — I48.11 LONGSTANDING PERSISTENT ATRIAL FIBRILLATION (HCC): ICD-10-CM

## 2024-03-06 DIAGNOSIS — Z99.3 DEPENDENCE ON WHEELCHAIR: ICD-10-CM

## 2024-03-06 DIAGNOSIS — I70.0 AORTO-ILIAC ATHEROSCLEROSIS (HCC): ICD-10-CM

## 2024-03-06 PROCEDURE — G0439 PPPS, SUBSEQ VISIT: HCPCS

## 2024-03-06 PROCEDURE — 3074F SYST BP LT 130 MM HG: CPT

## 2024-03-06 PROCEDURE — 3078F DIAST BP <80 MM HG: CPT

## 2024-03-06 SDOH — ECONOMIC STABILITY: HOUSING INSECURITY
IN THE LAST 12 MONTHS, WAS THERE A TIME WHEN YOU DID NOT HAVE A STEADY PLACE TO SLEEP OR SLEPT IN A SHELTER (INCLUDING NOW)?: NO

## 2024-03-06 SDOH — ECONOMIC STABILITY: FOOD INSECURITY: WITHIN THE PAST 12 MONTHS, THE FOOD YOU BOUGHT JUST DIDN'T LAST AND YOU DIDN'T HAVE MONEY TO GET MORE.: NEVER TRUE

## 2024-03-06 SDOH — ECONOMIC STABILITY: INCOME INSECURITY: IN THE LAST 12 MONTHS, WAS THERE A TIME WHEN YOU WERE NOT ABLE TO PAY THE MORTGAGE OR RENT ON TIME?: NO

## 2024-03-06 SDOH — ECONOMIC STABILITY: INCOME INSECURITY: HOW HARD IS IT FOR YOU TO PAY FOR THE VERY BASICS LIKE FOOD, HOUSING, MEDICAL CARE, AND HEATING?: NOT VERY HARD

## 2024-03-06 SDOH — ECONOMIC STABILITY: HOUSING INSECURITY: IN THE LAST 12 MONTHS, HOW MANY PLACES HAVE YOU LIVED?: 1

## 2024-03-06 SDOH — ECONOMIC STABILITY: FOOD INSECURITY: WITHIN THE PAST 12 MONTHS, YOU WORRIED THAT YOUR FOOD WOULD RUN OUT BEFORE YOU GOT MONEY TO BUY MORE.: NEVER TRUE

## 2024-03-06 SDOH — ECONOMIC STABILITY: TRANSPORTATION INSECURITY
IN THE PAST 12 MONTHS, HAS THE LACK OF TRANSPORTATION KEPT YOU FROM MEDICAL APPOINTMENTS OR FROM GETTING MEDICATIONS?: NO

## 2024-03-06 SDOH — ECONOMIC STABILITY: TRANSPORTATION INSECURITY
IN THE PAST 12 MONTHS, HAS LACK OF TRANSPORTATION KEPT YOU FROM MEETINGS, WORK, OR FROM GETTING THINGS NEEDED FOR DAILY LIVING?: NO

## 2024-03-06 ASSESSMENT — PATIENT HEALTH QUESTIONNAIRE - PHQ9
CLINICAL INTERPRETATION OF PHQ2 SCORE: 3
5. POOR APPETITE OR OVEREATING: 0 - NOT AT ALL
SUM OF ALL RESPONSES TO PHQ QUESTIONS 1-9: 10

## 2024-03-06 ASSESSMENT — ACTIVITIES OF DAILY LIVING (ADL): BATHING_REQUIRES_ASSISTANCE: 0

## 2024-03-06 ASSESSMENT — FIBROSIS 4 INDEX: FIB4 SCORE: 1.9

## 2024-03-06 ASSESSMENT — ENCOUNTER SYMPTOMS: GENERAL WELL-BEING: FAIR

## 2024-03-06 ASSESSMENT — PAIN SCALES - GENERAL: PAINLEVEL: 7=MODERATE-SEVERE PAIN

## 2024-03-06 NOTE — ASSESSMENT & PLAN NOTE
Chronic, stable. The patient is being followed by cardiology. Continue with current defined treatment plan: apixaban (ELIQUIS) 5mg Tab, metoprolol SR (TOPROL XL) 50 MG TABLET SR 24 HR . Follow-up at least annually.

## 2024-03-06 NOTE — ASSESSMENT & PLAN NOTE
Chronic, stable. The patient had thyroidectomy in 2022 resulting in hypothyroidism. The patient is being followed by oncology Dr. Fragoso. CT CHEST on 11/16/2023 noted metastases to lungs. Continue with current defined treatment plan: watchful waiting. Follow-up at least annually.

## 2024-03-06 NOTE — ASSESSMENT & PLAN NOTE
Chronic, stable.  The patient was placed on home oxygen at night about a year ago. He is using oxygen at 7 L/min with CPAP at night.  Continue with current defined treatment plan: home oxygen. Follow-up at least annually.

## 2024-03-06 NOTE — ASSESSMENT & PLAN NOTE
Chronic, stable.  The patient is being followed by cardiology.  Continue with current defined treatment plan: torsemide (DEMADEX) 100 MG Tab, metoprolol SR (TOPROL XL) 50 MG TABLET SR 24 HR, furosemide (LASIX) 40 MG Tab, metOLazone (ZAROXOLYN) 2.5 MG Tab  . Follow-up at least annually.
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
## 2024-03-07 PROBLEM — Z99.81 DEPENDENCE ON SUPPLEMENTAL OXYGEN: Status: ACTIVE | Noted: 2024-03-07

## 2024-03-07 PROBLEM — Z13.31 POSITIVE DEPRESSION SCREENING: Status: ACTIVE | Noted: 2024-03-07

## 2024-03-07 PROBLEM — Z99.3 DEPENDENCE ON WHEELCHAIR: Status: ACTIVE | Noted: 2024-03-07

## 2024-03-07 PROBLEM — C78.01 MALIGNANT NEOPLASM METASTATIC TO BOTH LUNGS (HCC): Status: ACTIVE | Noted: 2023-06-08

## 2024-03-07 NOTE — ASSESSMENT & PLAN NOTE
Chronic, stable. The patient's blood pressure is well controlled with current medication. Continue with current defined treatment plan: amLODIPine (NORVASC) 10 MG Tab, furosemide (LASIX) 40 MG Tab, metOLazone (ZAROXOLYN) 2.5 MG Tab, metoprolol SR (TOPROL XL) 50 MG TABLET SR 24 HR, olmesartan (BENICAR) 20 MG Tab, torsemide (DEMADEX) 100 MG Tab  . Follow-up at least annually.

## 2024-03-07 NOTE — ASSESSMENT & PLAN NOTE
Chronic, stable. The patient reports that his screening is positive due to some recent problems at home. He declines behavioral health referral today. He denies SI/HI. No current treatment.  Follow-up at least annually.

## 2024-03-07 NOTE — ASSESSMENT & PLAN NOTE
Chronic, stable. The patient has a diagnosis of heart failure requiring chronic diuretic use. Continue with current defined treatment plan: potassium chloride SA (KDUR) 20 MEQ Tab CR . Follow-up at least annually.

## 2024-03-07 NOTE — ASSESSMENT & PLAN NOTE
Chronic, stable. Noted on abdominal ultrasound (October 2021). He is being followed by cardiology. Continue with current defined treatment plan: atorvastatin (LIPITOR) 40 MG Tab . Follow-up at least annually.

## 2024-03-07 NOTE — ASSESSMENT & PLAN NOTE
The patient reports that he uses a wheelchair and a walker for ambulation. He can only walk a few feet without an assistive device.

## 2024-03-07 NOTE — ASSESSMENT & PLAN NOTE
Chronic, stable. The patient had thyroidectomy in 2022 resulting in hypothyroidism. The patient is being followed by oncology Dr. Fragoso. CT CHEST on 11/16/2023 noted metastases to bilateral lungs. Continue with current defined treatment plan: watchful waiting. Follow-up at least annually.

## 2024-03-12 DIAGNOSIS — I50.30 ACC/AHA STAGE C HEART FAILURE WITH PRESERVED EJECTION FRACTION (HCC): ICD-10-CM

## 2024-03-12 RX ORDER — CYCLOBENZAPRINE HCL 10 MG
10 TABLET ORAL 2 TIMES DAILY PRN
Qty: 180 TABLET | Refills: 0 | Status: SHIPPED | OUTPATIENT
Start: 2024-03-12 | End: 2024-03-13 | Stop reason: SDUPTHER

## 2024-03-12 NOTE — TELEPHONE ENCOUNTER
Received request via: Pharmacy    Was the patient seen in the last year in this department? Yes    Does the patient have an active prescription (recently filled or refills available) for medication(s) requested? No    Pharmacy Name: [facility]     Does the patient have custodial Plus and need 100 day supply (blood pressure, diabetes and cholesterol meds only)? Medication is not for cholesterol, blood pressure or diabetes

## 2024-03-12 NOTE — TELEPHONE ENCOUNTER
Received request via: Pharmacy    Was the patient seen in the last year in this department? Yes    Does the patient have an active prescription (recently filled or refills available) for medication(s) requested? No    Pharmacy Name: [facility]     Does the patient have long term Plus and need 100 day supply (blood pressure, diabetes and cholesterol meds only)? Medication is not for cholesterol, blood pressure or diabetes

## 2024-03-15 RX ORDER — FUROSEMIDE 40 MG/1
40 TABLET ORAL 2 TIMES DAILY
Qty: 180 TABLET | Refills: 0 | Status: SHIPPED | OUTPATIENT
Start: 2024-03-15

## 2024-03-15 RX ORDER — CYCLOBENZAPRINE HCL 10 MG
10 TABLET ORAL 2 TIMES DAILY PRN
Qty: 180 TABLET | Refills: 0 | Status: SHIPPED | OUTPATIENT
Start: 2024-03-15

## 2024-03-18 PROCEDURE — RXMED WILLOW AMBULATORY MEDICATION CHARGE: Performed by: FAMILY MEDICINE

## 2024-03-19 ENCOUNTER — PRE-ADMISSION TESTING (OUTPATIENT)
Dept: ADMISSIONS | Facility: MEDICAL CENTER | Age: 79
End: 2024-03-19
Attending: ORTHOPAEDIC SURGERY
Payer: MEDICARE

## 2024-03-19 DIAGNOSIS — Z01.812 PRE-OPERATIVE LABORATORY EXAMINATION: ICD-10-CM

## 2024-03-20 ENCOUNTER — PHARMACY VISIT (OUTPATIENT)
Dept: PHARMACY | Facility: MEDICAL CENTER | Age: 79
End: 2024-03-20
Payer: COMMERCIAL

## 2024-03-21 ENCOUNTER — OFFICE VISIT (OUTPATIENT)
Dept: MEDICAL GROUP | Facility: PHYSICIAN GROUP | Age: 79
End: 2024-03-21
Payer: MEDICARE

## 2024-03-21 VITALS
SYSTOLIC BLOOD PRESSURE: 96 MMHG | HEART RATE: 72 BPM | DIASTOLIC BLOOD PRESSURE: 64 MMHG | WEIGHT: 265.2 LBS | TEMPERATURE: 96.9 F | BODY MASS INDEX: 35.15 KG/M2 | HEIGHT: 73 IN | OXYGEN SATURATION: 95 % | RESPIRATION RATE: 18 BRPM

## 2024-03-21 DIAGNOSIS — R73.09 ELEVATED HEMOGLOBIN A1C: ICD-10-CM

## 2024-03-21 DIAGNOSIS — E66.9 OBESITY (BMI 30-39.9): ICD-10-CM

## 2024-03-21 DIAGNOSIS — Z99.81 DEPENDENCE ON SUPPLEMENTAL OXYGEN: ICD-10-CM

## 2024-03-21 DIAGNOSIS — I50.32 CHRONIC DIASTOLIC HEART FAILURE (HCC): ICD-10-CM

## 2024-03-21 DIAGNOSIS — M25.562 ACUTE PAIN OF LEFT KNEE: ICD-10-CM

## 2024-03-21 PROCEDURE — 99214 OFFICE O/P EST MOD 30 MIN: CPT | Performed by: FAMILY MEDICINE

## 2024-03-21 PROCEDURE — 3074F SYST BP LT 130 MM HG: CPT | Performed by: FAMILY MEDICINE

## 2024-03-21 PROCEDURE — 3078F DIAST BP <80 MM HG: CPT | Performed by: FAMILY MEDICINE

## 2024-03-21 ASSESSMENT — FIBROSIS 4 INDEX: FIB4 SCORE: 1.9

## 2024-03-21 NOTE — LETTER
[facility]  Mayelin Morel M.D.  [address]  [place] [address]  Fax: [phone]   Authorization for Release/Disclosure of   Protected Health Information   Name: JAZMIN DIEHL : 1945 SSN: [id number]   Address: [address] [place] [address] Phone:    [phone] (home)    I authorize the entity listed below to release/disclose the PHI below to:   [facility]/Mayelin Moerl M.D. and Mayelin Morel M.D.   Provider or Entity Name: [facility] Cesar Vásquez MD, [facility]     Address   [facility], [address] [place] [address]   Phone: [phone]      Fax: [phone]   Reason for request: continuity of care   Information to be released:    [  ] LAST COLONOSCOPY,  including any PATH REPORT and follow-up  [  ] LAST FIT/COLOGUARD RESULT [  ] LAST DEXA  [  ] LAST MAMMOGRAM  [  ] LAST PAP  [  ] LAST LABS [  ] RETINA EXAM REPORT  [  ] IMMUNIZATION RECORDS  [ XXX ] Release all info      [  ] Check here and initial the line next to each item to release ALL health information INCLUDING  _____ Care and treatment for drug and / or alcohol abuse  _____ HIV testing, infection status, or AIDS  _____ Genetic Testing    DATES OF SERVICE OR TIME PERIOD TO BE DISCLOSED: _____________  I understand and acknowledge that:  * This Authorization may be revoked at any time by you in writing, except if your health information has already been used or disclosed.  * Your health information that will be used or disclosed as a result of you signing this authorization could be re-disclosed by the recipient. If this occurs, your re-disclosed health information may no longer be protected by State or Federal laws.  * You may refuse to sign this Authorization. Your refusal will not affect your ability to obtain treatment.  * This Authorization becomes effective upon signing and will  on (date) __________.      If no date is indicated, this Authorization will  one (1) year from  the signature date.    Name: Sina Oliver  Signature: Date:   3/21/2024     PLEASE FAX REQUESTED RECORDS BACK TO: [phone]
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
## 2024-03-22 ENCOUNTER — DOCUMENTATION (OUTPATIENT)
Dept: HEALTH INFORMATION MANAGEMENT | Facility: OTHER | Age: 79
End: 2024-03-22
Payer: MEDICARE

## 2024-03-22 NOTE — OR NURSING
Patient's wife called in to say that patient had an EKG done 10/19/2023 thru [facility].  I told her that I could see it was done but do not have access to the tracing.  She was going to look for it.  Told her if she finds it, to bring it for testing appt so we can scan it in to the chart.

## 2024-03-26 ENCOUNTER — PRE-ADMISSION TESTING (OUTPATIENT)
Dept: ADMISSIONS | Facility: MEDICAL CENTER | Age: 79
End: 2024-03-26
Attending: ORTHOPAEDIC SURGERY
Payer: MEDICARE

## 2024-03-26 DIAGNOSIS — Z01.812 PRE-OPERATIVE LABORATORY EXAMINATION: ICD-10-CM

## 2024-03-26 DIAGNOSIS — Z01.810 PRE-OPERATIVE CARDIOVASCULAR EXAMINATION: ICD-10-CM

## 2024-03-26 LAB
ANION GAP SERPL CALC-SCNC: 12 MMOL/L (ref 7–16)
BUN SERPL-MCNC: 24 MG/DL (ref 8–22)
CALCIUM SERPL-MCNC: 9.2 MG/DL (ref 8.5–10.5)
CHLORIDE SERPL-SCNC: 99 MMOL/L (ref 96–112)
CO2 SERPL-SCNC: 23 MMOL/L (ref 20–33)
CREAT SERPL-MCNC: 1 MG/DL (ref 0.5–1.4)
ERYTHROCYTE [DISTWIDTH] IN BLOOD BY AUTOMATED COUNT: 50.9 FL (ref 35.9–50)
GFR SERPLBLD CREATININE-BSD FMLA CKD-EPI: 76 ML/MIN/1.73 M 2
GLUCOSE SERPL-MCNC: 146 MG/DL (ref 65–99)
HCT VFR BLD AUTO: 46.1 % (ref 42–52)
HGB BLD-MCNC: 15.6 G/DL (ref 14–18)
MCH RBC QN AUTO: 35.1 PG (ref 27–33)
MCHC RBC AUTO-ENTMCNC: 33.8 G/DL (ref 32.3–36.5)
MCV RBC AUTO: 103.6 FL (ref 81.4–97.8)
PLATELET # BLD AUTO: 218 K/UL (ref 164–446)
PMV BLD AUTO: 10.4 FL (ref 9–12.9)
POTASSIUM SERPL-SCNC: 4.7 MMOL/L (ref 3.6–5.5)
RBC # BLD AUTO: 4.45 M/UL (ref 4.7–6.1)
SODIUM SERPL-SCNC: 134 MMOL/L (ref 135–145)
WBC # BLD AUTO: 15.8 K/UL (ref 4.8–10.8)

## 2024-03-26 PROCEDURE — 80048 BASIC METABOLIC PNL TOTAL CA: CPT

## 2024-03-26 PROCEDURE — 85027 COMPLETE CBC AUTOMATED: CPT

## 2024-03-26 PROCEDURE — 36415 COLL VENOUS BLD VENIPUNCTURE: CPT

## 2024-03-28 ENCOUNTER — APPOINTMENT (OUTPATIENT)
Dept: MEDICAL GROUP | Facility: PHYSICIAN GROUP | Age: 79
End: 2024-03-28
Payer: MEDICARE

## 2024-04-01 ENCOUNTER — ANESTHESIA EVENT (OUTPATIENT)
Dept: SURGERY | Facility: MEDICAL CENTER | Age: 79
End: 2024-04-01
Payer: MEDICARE

## 2024-04-01 ENCOUNTER — TELEPHONE (OUTPATIENT)
Dept: HEALTH INFORMATION MANAGEMENT | Facility: OTHER | Age: 79
End: 2024-04-01
Payer: MEDICARE
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
## 2024-04-02 ENCOUNTER — PHARMACY VISIT (OUTPATIENT)
Dept: PHARMACY | Facility: MEDICAL CENTER | Age: 79
End: 2024-04-02
Payer: COMMERCIAL

## 2024-04-02 ENCOUNTER — HOSPITAL ENCOUNTER (OUTPATIENT)
Facility: MEDICAL CENTER | Age: 79
End: 2024-04-02
Attending: ORTHOPAEDIC SURGERY | Admitting: ORTHOPAEDIC SURGERY
Payer: MEDICARE

## 2024-04-02 ENCOUNTER — ANESTHESIA (OUTPATIENT)
Dept: SURGERY | Facility: MEDICAL CENTER | Age: 79
End: 2024-04-02
Payer: MEDICARE

## 2024-04-02 VITALS
SYSTOLIC BLOOD PRESSURE: 105 MMHG | RESPIRATION RATE: 19 BRPM | WEIGHT: 258.82 LBS | HEIGHT: 74 IN | OXYGEN SATURATION: 97 % | TEMPERATURE: 97.2 F | DIASTOLIC BLOOD PRESSURE: 63 MMHG | BODY MASS INDEX: 33.22 KG/M2 | HEART RATE: 56 BPM

## 2024-04-02 DIAGNOSIS — G89.18 POST-OPERATIVE PAIN: ICD-10-CM

## 2024-04-02 PROCEDURE — 160046 HCHG PACU - 1ST 60 MINS PHASE II: Performed by: ORTHOPAEDIC SURGERY

## 2024-04-02 PROCEDURE — 700101 HCHG RX REV CODE 250: Performed by: ORTHOPAEDIC SURGERY

## 2024-04-02 PROCEDURE — 700102 HCHG RX REV CODE 250 W/ 637 OVERRIDE(OP): Performed by: STUDENT IN AN ORGANIZED HEALTH CARE EDUCATION/TRAINING PROGRAM

## 2024-04-02 PROCEDURE — 160009 HCHG ANES TIME/MIN: Performed by: ORTHOPAEDIC SURGERY

## 2024-04-02 PROCEDURE — 160002 HCHG RECOVERY MINUTES (STAT): Performed by: ORTHOPAEDIC SURGERY

## 2024-04-02 PROCEDURE — 700111 HCHG RX REV CODE 636 W/ 250 OVERRIDE (IP): Performed by: STUDENT IN AN ORGANIZED HEALTH CARE EDUCATION/TRAINING PROGRAM

## 2024-04-02 PROCEDURE — 160048 HCHG OR STATISTICAL LEVEL 1-5: Performed by: ORTHOPAEDIC SURGERY

## 2024-04-02 PROCEDURE — 160028 HCHG SURGERY MINUTES - 1ST 30 MINS LEVEL 3: Performed by: ORTHOPAEDIC SURGERY

## 2024-04-02 PROCEDURE — RXMED WILLOW AMBULATORY MEDICATION CHARGE: Performed by: ORTHOPAEDIC SURGERY

## 2024-04-02 PROCEDURE — 64721 CARPAL TUNNEL SURGERY: CPT | Mod: LT | Performed by: ORTHOPAEDIC SURGERY

## 2024-04-02 PROCEDURE — A9270 NON-COVERED ITEM OR SERVICE: HCPCS | Performed by: STUDENT IN AN ORGANIZED HEALTH CARE EDUCATION/TRAINING PROGRAM

## 2024-04-02 PROCEDURE — 160035 HCHG PACU - 1ST 60 MINS PHASE I: Performed by: ORTHOPAEDIC SURGERY

## 2024-04-02 PROCEDURE — 160025 RECOVERY II MINUTES (STATS): Performed by: ORTHOPAEDIC SURGERY

## 2024-04-02 PROCEDURE — 700111 HCHG RX REV CODE 636 W/ 250 OVERRIDE (IP): Performed by: ORTHOPAEDIC SURGERY

## 2024-04-02 PROCEDURE — 700105 HCHG RX REV CODE 258: Performed by: ORTHOPAEDIC SURGERY

## 2024-04-02 PROCEDURE — 700101 HCHG RX REV CODE 250: Performed by: STUDENT IN AN ORGANIZED HEALTH CARE EDUCATION/TRAINING PROGRAM

## 2024-04-02 RX ORDER — ACETAMINOPHEN 500 MG
1000 TABLET ORAL ONCE
Status: COMPLETED | OUTPATIENT
Start: 2024-04-02 | End: 2024-04-02

## 2024-04-02 RX ORDER — LIDOCAINE HYDROCHLORIDE 10 MG/ML
INJECTION, SOLUTION EPIDURAL; INFILTRATION; INTRACAUDAL; PERINEURAL
Status: DISCONTINUED
Start: 2024-04-02 | End: 2024-04-02 | Stop reason: HOSPADM

## 2024-04-02 RX ORDER — LIDOCAINE HYDROCHLORIDE 20 MG/ML
INJECTION, SOLUTION EPIDURAL; INFILTRATION; INTRACAUDAL; PERINEURAL PRN
Status: DISCONTINUED | OUTPATIENT
Start: 2024-04-02 | End: 2024-04-02 | Stop reason: SURG

## 2024-04-02 RX ORDER — SODIUM CHLORIDE, SODIUM LACTATE, POTASSIUM CHLORIDE, CALCIUM CHLORIDE 600; 310; 30; 20 MG/100ML; MG/100ML; MG/100ML; MG/100ML
INJECTION, SOLUTION INTRAVENOUS CONTINUOUS
Status: DISCONTINUED | OUTPATIENT
Start: 2024-04-02 | End: 2024-04-02 | Stop reason: HOSPADM

## 2024-04-02 RX ORDER — BUPIVACAINE HYDROCHLORIDE AND EPINEPHRINE 5; 5 MG/ML; UG/ML
INJECTION, SOLUTION EPIDURAL; INTRACAUDAL; PERINEURAL
Status: DISCONTINUED
Start: 2024-04-02 | End: 2024-04-02 | Stop reason: HOSPADM

## 2024-04-02 RX ORDER — BUPIVACAINE HYDROCHLORIDE 5 MG/ML
INJECTION, SOLUTION EPIDURAL; INTRACAUDAL
Status: DISCONTINUED
Start: 2024-04-02 | End: 2024-04-02 | Stop reason: HOSPADM

## 2024-04-02 RX ORDER — CEFAZOLIN SODIUM 1 G/3ML
2 INJECTION, POWDER, FOR SOLUTION INTRAMUSCULAR; INTRAVENOUS ONCE
Status: COMPLETED | OUTPATIENT
Start: 2024-04-02 | End: 2024-04-02

## 2024-04-02 RX ORDER — HALOPERIDOL 5 MG/ML
1 INJECTION INTRAMUSCULAR
Status: DISCONTINUED | OUTPATIENT
Start: 2024-04-02 | End: 2024-04-02 | Stop reason: HOSPADM

## 2024-04-02 RX ORDER — LIDOCAINE HYDROCHLORIDE 10 MG/ML
INJECTION, SOLUTION INFILTRATION; PERINEURAL
Status: DISCONTINUED | OUTPATIENT
Start: 2024-04-02 | End: 2024-04-02 | Stop reason: HOSPADM

## 2024-04-02 RX ORDER — TRAMADOL HYDROCHLORIDE 50 MG/1
50 TABLET ORAL EVERY 6 HOURS PRN
Qty: 12 TABLET | Refills: 0 | Status: SHIPPED | OUTPATIENT
Start: 2024-04-02 | End: 2024-04-07

## 2024-04-02 RX ORDER — ONDANSETRON 2 MG/ML
4 INJECTION INTRAMUSCULAR; INTRAVENOUS
Status: DISCONTINUED | OUTPATIENT
Start: 2024-04-02 | End: 2024-04-02 | Stop reason: HOSPADM

## 2024-04-02 RX ORDER — BUPIVACAINE HYDROCHLORIDE 5 MG/ML
INJECTION, SOLUTION EPIDURAL; INTRACAUDAL
Status: DISCONTINUED | OUTPATIENT
Start: 2024-04-02 | End: 2024-04-02 | Stop reason: HOSPADM

## 2024-04-02 RX ADMIN — LIDOCAINE HYDROCHLORIDE 100 MG: 20 INJECTION, SOLUTION EPIDURAL; INFILTRATION; INTRACAUDAL at 08:50

## 2024-04-02 RX ADMIN — PROPOFOL 100 MG: 10 INJECTION, EMULSION INTRAVENOUS at 08:50

## 2024-04-02 RX ADMIN — LIDOCAINE HYDROCHLORIDE 0.5 ML: 10 INJECTION, SOLUTION EPIDURAL; INFILTRATION; INTRACAUDAL at 06:54

## 2024-04-02 RX ADMIN — SODIUM CHLORIDE, POTASSIUM CHLORIDE, SODIUM LACTATE AND CALCIUM CHLORIDE: 600; 310; 30; 20 INJECTION, SOLUTION INTRAVENOUS at 07:03

## 2024-04-02 RX ADMIN — CEFAZOLIN 2 G: 1 INJECTION, POWDER, FOR SOLUTION INTRAMUSCULAR; INTRAVENOUS at 08:51

## 2024-04-02 RX ADMIN — ACETAMINOPHEN 1000 MG: 500 TABLET, FILM COATED ORAL at 06:53

## 2024-04-02 ASSESSMENT — PAIN DESCRIPTION - PAIN TYPE
TYPE: SURGICAL PAIN
TYPE: SURGICAL PAIN
TYPE: ACUTE PAIN

## 2024-04-02 ASSESSMENT — FIBROSIS 4 INDEX: FIB4 SCORE: 1.74

## 2024-04-02 NOTE — OP REPORT
DATE OF SERVICE:  04/02/2024     PREOPERATIVE DIAGNOSIS:  Left carpal tunnel syndrome.     POSTOPERATIVE DIAGNOSIS:  Left carpal tunnel syndrome.     PROCEDURE:  Left open carpal tunnel release.     SURGEON:  Stu Mai MD.     ANESTHESIA:  IV sedation, local.     ESTIMATED BLOOD LOSS:  Minimal.     DRAINS:  None.     COMPLICATIONS:  None.     INDICATIONS:  The patient is a gentleman, who has symptoms of carpal tunnel   syndrome with positive EMG, has constant numbness of the hand in the median   nerve distribution.  Risks, benefits, complications and alternatives were   explained to the patient.  All questions were answered.  No guarantees given.    Signed consent was obtained.     DESCRIPTION OF PROCEDURE:  The patient was taken to the operating room, laid   supine on the operating table.  All bony prominences were well padded.    Adequate IV sedation was given.  The left upper extremity was prepped and   draped in the usual sterile fashion using a ChloraPrep.  Limb was   exsanguinated with elevation and tourniquet was raised.  Total tourniquet time   was under 10 minutes.  The area was infiltrated with Marcaine, lidocaine,   both without epinephrine. An incision was made along the radial border of the   fourth digit over the transverse carpal ligament through skin and subcutaneous   tissues.  Transverse carpal ligament was identified, sharply incised, carried   proximally and distally, found to be nice and released along its entirety.    The floor of the canal was visualized.  No other gross abnormalities, the   nerve appeared to be nicely released.  The wound was irrigated, closed with   nylon in mattress fashion.  Sterile dressing, Xeroform, 4 x 4's, Sof-Rol, bias   was applied.  All needle, sponge counts correct.  The patient tolerated the   procedure well and was transferred to recovery in stable condition.        ______________________________  MD SHIRA PINZON/TOMMY    DD:  04/02/2024 13:39  DT:   04/02/2024 14:00    Job#:  [id number]

## 2024-04-02 NOTE — ANESTHESIA TIME REPORT
Anesthesia Start and Stop Event Times       Date Time Event    4/2/2024 0837 Ready for Procedure     0846 Anesthesia Start     0907 Anesthesia Stop          Responsible Staff  04/02/24      Name Role Begin End    Jerome Calix M.D. Anesth 0846 [id number]          Overtime Reason:  no overtime (within assigned shift)    Comments:

## 2024-04-02 NOTE — DISCHARGE INSTRUCTIONS
HOME CARE INSTRUCTIONS    ACTIVITY: Rest and take it easy for the first 24 hours.  A responsible adult is recommended to remain with you during that time.  It is normal to feel sleepy.  We encourage you to not do anything that requires balance, judgment or coordination.    FOR 24 HOURS DO NOT:  Drive, operate machinery or run household appliances.  Drink beer or alcoholic beverages.  Make important decisions or sign legal documents.    SPECIAL INSTRUCTIONS: Keep dressing clean and dry   Elevate extremity   May remove dressing 5-7 days and shower   Encourage moving all fingers     DIET: To avoid nausea, slowly advance diet as tolerated, avoiding spicy or greasy foods for the first day.  Add more substantial food to your diet according to your physician's instructions.  Babies can be fed formula or breast milk as soon as they are hungry.  INCREASE FLUIDS AND FIBER TO AVOID CONSTIPATION.    SURGICAL DRESSING/BATHING: Ok to shower tomorrow, keep dressing on and dry.     MEDICATIONS: Resume taking daily medication.  Take prescribed pain medication with food.  If no medication is prescribed, you may take non-aspirin pain medication if needed.  PAIN MEDICATION CAN BE VERY CONSTIPATING.  Take a stool softener or laxative such as senokot, pericolace, or milk of magnesia if needed.      Last pain medication given: Tylenol given at 7 am. Ok to take again after 1 pm.         A follow-up appointment should be arranged with your doctor ; call to schedule.    You should CALL YOUR PHYSICIAN if you develop:  Fever greater than 101 degrees F.  Pain not relieved by medication, or persistent nausea or vomiting.  Excessive bleeding (blood soaking through dressing) or unexpected drainage from the wound.  Extreme redness or swelling around the incision site, drainage of pus or foul smelling drainage.  Inability to urinate or empty your bladder within 8 hours.  Problems with breathing or chest pain.    You should call 911 if you develop  problems with breathing or chest pain.  If you are unable to contact your doctor or surgical center, you should go to the nearest emergency room or urgent care center.  Physician's telephone #: Dr. Mai [phone]     MILD FLU-LIKE SYMPTOMS ARE NORMAL.  YOU MAY EXPERIENCE GENERALIZED MUSCLE ACHES, THROAT IRRITATION, HEADACHE AND/OR SOME NAUSEA.    If any questions arise, call your doctor.  If your doctor is not available, please feel free to call the Surgical Center at [phone].  The Center is open Monday through Friday from 7AM to 7PM.      A registered nurse may call you a few days after your surgery to see how you are doing after your procedure.    You may also receive a survey in the mail within the next two weeks and we ask that you take a few moments to complete the survey and return it to us.  Our goal is to provide you with very good care and we value your comments.     Depression / Suicide Risk    As you are discharged from this [facility] facility, it is important to learn how to keep safe from harming yourself.    Recognize the warning signs:  Abrupt changes in personality, positive or negative- including increase in energy   Giving away possessions  Change in eating patterns- significant weight changes-  positive or negative  Change in sleeping patterns- unable to sleep or sleeping all the time   Unwillingness or inability to communicate  Depression  Unusual sadness, discouragement and loneliness  Talk of wanting to die  Neglect of personal appearance   Rebelliousness- reckless behavior  Withdrawal from people/activities they love  Confusion- inability to concentrate     If you or a loved one observes any of these behaviors or has concerns about self-harm, here's what you can do:  Talk about it- your feelings and reasons for harming yourself  Remove any means that you might use to hurt yourself (examples: pills, rope, extension cords, firearm)  Get professional help from the community  (Mental Health, Substance Abuse, psychological counseling)  Do not be alone:Call your Safe Contact- someone whom you trust who will be there for you.  Call your local CRISIS HOTLINE 778-0723 or 388-109-3647  Call your local Children's Mobile Crisis Response Team [place] (112) 610-1880 or www.[employer]  Call the toll free National Suicide Prevention Hotlines   National Suicide Prevention Lifeline 917-810-VRXM (2319)  [facility] Line Network 800-SUICIDE (718-2474)    I acknowledge receipt and understanding of these Home Care instructions.

## 2024-04-02 NOTE — OR NURSING
0937 Report received from Felicitas MOSQUEDA.     0939: Pt arrived to [address] and ambulated to recliner with assistance. Left arm wrapped, extremity warm cap refill < 3 sec.     0943 wife to bedside     0959 Discharge instructions reviewed with family at bedside, verbalize understanding and all questions answered. Pt then changed into clothing with assistance. Prescription medication brought to bedside.     1011: IV and ID bands removed. Pt escorted to lobby via wheelchair, accompanied by CNA. All personal belongings and discharge instructions with patient.
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
## 2024-04-02 NOTE — OR NURSING
0906 Patient arrived from OR to PACU 1. Connected to monitor and report received from anesthesia and RN. VSS. 8 L 02 via mask. No airway in place. Breaths calm, even, unlabored.     Ace wrap dressing to L wrist; clean, dry, intact. Arm elevated and ice applied.     0908: Pt waking up; on room air. Pt denies pain and nausea.  Pt tolerating sips of water.     0917: Updated pt' spouse via phone    0934: Report to JAYLIN Caro in phase II    0937: Pt transported to phase II with all personal belongings.

## 2024-04-02 NOTE — LETTER
February 16, 2024    Patient Name: Sina Oliver  Surgeon Name: Stu Mai M.D.  Surgery Facility: [facility] ([address] [place] [address])  Surgery Date: 4/2/2024    The time of your surgery is not final and may change up to and until the day of your surgery. You will be contacted 24-48 hours prior to your surgery date with your check-in and surgery time.    If you will not be at one of the below numbers please call the surgery scheduler at [phone]  Preferred Phone: [phone]    BEFORE YOUR SURGERY   Pre Registration and/or Lab Work must be done within and no earlier than 28 days prior to your surgery date. Your scheduled facility will contact you regarding all required preregistration and/or lab work. If you have not been contacted within 7 days of your scheduled procedure please call [facility] at [phone] for an appointment as soon as possible.    Instructions: Bring a list of all medications you are taking including the dosing and frequency.    DAY OF YOUR SURGERY  Nothing to eat or drink after midnight     Refrain from smoking any substance after midnight prior to surgery. Smoking may interfere with the anesthetic and frequently produces nausea during the recovery period.    Continue taking all lifesaving medications. Including the morning of your surgery with small sip of water.        Please do NOT take on the day of surgery:  Diuretics: examples- furosemide (Lasix), spironolactone, hydrochlorothiazide  ACE-inhibitors: examples- lisinopril, ramipril, enalapril  “ARBs”: examples- losartan, Olmesartan, valsartan    Please arrive at the hospital/surgery center at the check-in time provided.     An adult will need to bring you and take you home after your surgery.     AFTER YOUR SURGERY  Post op Appointment:   Date: 4/15   Time: 1PM   With: Stu Mai MD   Location: [address] [place] [address] [place] [id number]    - Post Surgery - You will need someone to drive you  home  - You must have someone provide transportation post surgery and someone to monitor you for at least 24 hours post-surgery. If you don't have either of these your appointment will be canceled.     TIME OFF WORK  FMLA or Disability forms can be faxed directly to: [phone] or you may drop them off at [address] [place] [address]. Our office charges a $35.00 fee per form. Forms will be completed within 10 business days of drop off and payment received. For the status of your forms you may contact our disability office directly [phone].    MEDICATION INSTRUCTIONS **Please read section completely**  The following medications should be stopped a minimum of 10 days prior to surgery:  All over the counter, Supplements & Herbal medications    Anorectics: Phentermine (Adipex-P, Lomaira and Suprenza), Phentermine-topiramate (Qsymia), Bupropion-naltrexone (Contrave)    **If you are on Bupropion for anxiety/depression, please continue this medication up until the day of surgery.     Opiod Partial Agonists/Opioid Antagonists: Buprenorphine (Suboxone, Belbuca, Butrans, Probuphine Implant, Sublocade), Naltrexone (ReVia, Vivitrol), Naloxone    Amphetamines: Dextroamphetamine/Amphetamine (Adderall, Mydayis), Methylphenidate Hydrochloride (Concerta, Metadate, Methylin, Ritalin)    The following medications should be stopped 5 days prior to surgery:  Blood Thinners: Any Aspirin, Aspirin products, anti-inflammatories such as ibuprofen and any blood thinners such as Coumadin and Plavix. Please consult your prescribing physician if you are on life saving blood thinners, in regards to when to stop medications prior to surgery.     The following medications should be stopped a minimum of 3 days prior to surgery:  PDE-5 inhibitors: Sildenafil (Viagra), Tadalafil (Cialis), Vardenafil (Levitra), Avanafil (Stendra)    MAO Inhibitors: Rasagiline (Azilect), Selegiline (Eldepryl, Emsam, Selapar), Isocarboxazid (Marplan),  Phenelzine (Nardil)       COVID and INFLUENZA NOTICE TO PATIENTS    Currently, the [facility] does not routinely test patients for COVID-19 or Influenza prior to their elective surgery.  However, if patients develop the following symptoms prior to their surgery date, they should voluntarily test for COVID-19 and Influenza and notify the surgical office of their condition and results.  The symptoms warranting testing would be any two of the following:    Fever (Temp above 100.4 F)  Chills  Cough  Shortness of breath or difficulty breathing  Fatigue  Myalgias (muscle or body aches)  Headache  Sore Throat  Congestion or Runny Nose  Nausea or vomiting  Diarrhea  New loss of taste or smell    Having these symptoms prior to surgery can significantly increase your risk of morbidity and mortality under anesthesia, which may compromise your health and require a transfer to a hospital for a higher level of care.  Therefore, it is advisable to notify the surgical team of any illness in order to get information for the appropriate time to delay the surgery to minimize these preventable risks.    Your health and safety are our number one priority at the [facility], and we are thankful that you entrust us with your care.  Please help us ensure the best possible surgical and anesthetic outcome by sharing appropriate health information with our perioperative team and staff.  We look forward to taking great care of you!    Thank you for your time and consideration on this matter.    Paul Schultz MD  Medical Director  Anesthesiologist  [facility]

## 2024-04-02 NOTE — ANESTHESIA POSTPROCEDURE EVALUATION
Patient: Sina Oliver    Procedure Summary       Date: 04/02/24 Room / Location: [facility] ROOM 21 / SURGERY SAME DAY [facility]    Anesthesia Start: 0846 Anesthesia Stop: 0907    Procedure: LEFT OPEN CARPAL TUNNEL RELEASE (Left: Wrist) Diagnosis:       Carpal tunnel syndrome      (Carpal tunnel syndrome)    Surgeons: Stu Mai M.D. Responsible Provider: Jerome Calix M.D.    Anesthesia Type: MAC ASA Status: 3            Final Anesthesia Type: MAC  Last vitals  BP   Blood Pressure : 105/63    Temp   36.2 °C (97.2 °F)    Pulse   (!) 56   Resp   19    SpO2   97 %      Anesthesia Post Evaluation    Patient location during evaluation: PACU  Patient participation: complete - patient participated  Level of consciousness: awake and alert    Airway patency: patent  Anesthetic complications: no  Cardiovascular status: hemodynamically stable  Respiratory status: acceptable  Hydration status: euvolemic    PONV: none          There were no known notable events for this encounter.     Nurse Pain Score: 0 (NPRS)

## 2024-04-02 NOTE — DISCHARGE INSTR - OTHER INFO
"{[facility] additional discharge instructions:[id number]::\"Minimal activity at home for *** days\"}                                                                                                          DR. HERNANDEZ POST-OPERATIVE INSTRUCTIONS    You have just undergone a sugery by Dr. Hernandez in the operating room.  It is our wish that your postoperative recovery be as quick and comfortable as possible.  Please carefully review the following items that are important for your recovery.    After any operation, a certain degree of pain is to be expected. Take Advil (ibuprofen) and Extra Strength Tylenol as first line medications for mild to moderate pain. Taking each one every 6 hours, and staggering them so that you are taking one medicine every 3 hours, is the most effective. Refer to dosing instructions on the bottle, but in general ibuprofen dose is 600-800mg and Tylenol dose is 500-1000mg. For most small procedures, this should be enough to keep you comfortable. Take these medications until your followup visit. You may have been given a small prescription for stronger pain medicine which will help relieve more severe pain.  Pain medicine may make you drowsy so please keep this in mind.  Do not drive while taking pain medicine.      When you go home, please keep your operated arm elevated at all times (above the level of your heart).  If you do this, your swelling will resolve more quickly and your pain will be improve more quickly as well. You may also place an ice pack over your dressing or splint to help with swelling and pain.    It is also very important to keep your fingers moving after most procedures, unless you had a tendon repair or fracture repair in which case you will be in a splint. Keep all of your fingers moving through a full range of motion to prevent stiffness.    For small hand procedures such as carpal tunnel release, trigger finger release, and cyst excision, the dressing that you have on your extremity " should remain on for 3-4 days. It may then be removed, you can wash the incision gently with soap and water, and keep the incision covered with a band-aid or similar clean dressing. For larger procedures or if you have a splint on, these should remain on until follow up or as specifically instructed. If you feel that your dressing is too tight during the first 3 days after surgery, you may loosen it. It is normal to see minor staining on the dressing after surgery. If there is significant bleeding, you are advised to call the office during regular office hours to have this checked.  Make sure that your dressing is kept dry at all times.  You can take a shower if you cover your arm with a plastic bag. If your dressing gets wet, replace it with sterile dressing that you can obtain from your local drug store.    Follow-up appointments can usually be found on the pre-op paperwork if not please call our office for a follow-up appointment, [phone]. Follow up after surgery is typically 10-14 days, unless you were specifically instructed otherwise. The sutures will be removed and you may be asked to see a hand therapist to optimize your functional result. Each of the hand therapists that you will be referred to have received special training in the care of the hand and upper extremity.    If you have questions regarding your surgery postop that you feel requires attention, please call the office at [phone] during business hours, or [phone] after hours for the answering service. If you feel that you have a surgical emergency postoperatively that requires immediate attention, please call the above numbers or go to the Emergency Department and ask for the Orthopedic Surgeon on call.

## 2024-04-08 DIAGNOSIS — M10.9 ACUTE GOUT OF RIGHT FOOT, UNSPECIFIED CAUSE: ICD-10-CM

## 2024-04-08 PROCEDURE — RXMED WILLOW AMBULATORY MEDICATION CHARGE: Performed by: FAMILY MEDICINE

## 2024-04-10 ENCOUNTER — PHARMACY VISIT (OUTPATIENT)
Dept: PHARMACY | Facility: MEDICAL CENTER | Age: 79
End: 2024-04-10
Payer: COMMERCIAL

## 2024-04-10 PROCEDURE — RXMED WILLOW AMBULATORY MEDICATION CHARGE: Performed by: FAMILY MEDICINE

## 2024-04-10 RX ORDER — COLCHICINE 0.6 MG/1
0.6 TABLET ORAL DAILY
Qty: 14 TABLET | Refills: 0 | Status: SHIPPED | OUTPATIENT
Start: 2024-04-10 | End: 2024-04-24

## 2024-04-16 PROCEDURE — RXMED WILLOW AMBULATORY MEDICATION CHARGE: Performed by: FAMILY MEDICINE

## 2024-04-16 RX ORDER — ALLOPURINOL 300 MG/1
300 TABLET ORAL DAILY
Qty: 100 TABLET | Refills: 1 | Status: SHIPPED | OUTPATIENT
Start: 2024-04-16

## 2024-04-16 RX ORDER — CYCLOBENZAPRINE HCL 10 MG
10 TABLET ORAL 2 TIMES DAILY PRN
Qty: 180 TABLET | Refills: 0 | Status: SHIPPED | OUTPATIENT
Start: 2024-04-16

## 2024-04-18 ENCOUNTER — PHARMACY VISIT (OUTPATIENT)
Dept: PHARMACY | Facility: MEDICAL CENTER | Age: 79
End: 2024-04-18
Payer: COMMERCIAL

## 2024-04-21 DIAGNOSIS — E03.9 HYPOTHYROIDISM, UNSPECIFIED TYPE: ICD-10-CM

## 2024-05-03 PROCEDURE — RXMED WILLOW AMBULATORY MEDICATION CHARGE: Performed by: PHYSICIAN ASSISTANT

## 2024-05-06 ENCOUNTER — PHARMACY VISIT (OUTPATIENT)
Dept: PHARMACY | Facility: MEDICAL CENTER | Age: 79
End: 2024-05-06
Payer: COMMERCIAL

## 2024-05-13 ENCOUNTER — HOSPITAL ENCOUNTER (OUTPATIENT)
Dept: RADIOLOGY | Facility: MEDICAL CENTER | Age: 79
End: 2024-05-13
Attending: INTERNAL MEDICINE
Payer: MEDICARE

## 2024-05-13 DIAGNOSIS — C73 MALIGNANT NEOPLASM OF THYROID GLAND (HCC): ICD-10-CM

## 2024-05-13 RX ADMIN — IOHEXOL 100 ML: 350 INJECTION, SOLUTION INTRAVENOUS at 14:55

## 2024-05-13 RX ADMIN — IOHEXOL 25 ML: 240 INJECTION, SOLUTION INTRATHECAL; INTRAVASCULAR; INTRAVENOUS; ORAL at 14:55

## 2024-05-23 ENCOUNTER — TELEPHONE (OUTPATIENT)
Dept: HEALTH INFORMATION MANAGEMENT | Facility: OTHER | Age: 79
End: 2024-05-23

## 2024-05-23 NOTE — TELEPHONE ENCOUNTER
TylerSina's spouse Shaniqua called in requesting Auth to see Dr Cezar Mortensen at [address] [facility], [place] [address] Phone: [phone] fax [phone].as Sina has blood in urin and on going health issues. Also Shaniqua would like Sina to have a Auth to see MARK LINDSEY MD at [address] [facility] [address] [place] [address]   fax [phone] Sina will be in [place] at the end of June if you have any questions please call Shaniqua at [phone] or if you need a office visit please advise thank you.

## 2024-05-27 PROCEDURE — RXMED WILLOW AMBULATORY MEDICATION CHARGE: Performed by: FAMILY MEDICINE

## 2024-05-28 ENCOUNTER — PHARMACY VISIT (OUTPATIENT)
Dept: PHARMACY | Facility: MEDICAL CENTER | Age: 79
End: 2024-05-28
Payer: COMMERCIAL

## 2024-05-30 ENCOUNTER — PATIENT OUTREACH (OUTPATIENT)
Dept: HEALTH INFORMATION MANAGEMENT | Facility: OTHER | Age: 79
End: 2024-05-30

## 2024-05-30 ENCOUNTER — HOSPITAL ENCOUNTER (OUTPATIENT)
Dept: LAB | Facility: MEDICAL CENTER | Age: 79
End: 2024-05-30
Attending: INTERNAL MEDICINE
Payer: MEDICARE

## 2024-05-30 DIAGNOSIS — I70.8 AORTO-ILIAC ATHEROSCLEROSIS (HCC): ICD-10-CM

## 2024-05-30 DIAGNOSIS — Z99.81 DEPENDENCE ON SUPPLEMENTAL OXYGEN: ICD-10-CM

## 2024-05-30 DIAGNOSIS — G47.33 OSA ON CPAP: ICD-10-CM

## 2024-05-30 DIAGNOSIS — I70.0 AORTO-ILIAC ATHEROSCLEROSIS (HCC): ICD-10-CM

## 2024-05-30 DIAGNOSIS — C73 PAPILLARY THYROID CARCINOMA (HCC): ICD-10-CM

## 2024-05-30 DIAGNOSIS — I48.11 LONGSTANDING PERSISTENT ATRIAL FIBRILLATION (HCC): ICD-10-CM

## 2024-05-30 DIAGNOSIS — G56.02 CARPAL TUNNEL SYNDROME OF LEFT WRIST: ICD-10-CM

## 2024-05-30 DIAGNOSIS — E26.1 SECONDARY HYPERALDOSTERONISM (HCC): ICD-10-CM

## 2024-05-30 DIAGNOSIS — R35.1 BENIGN PROSTATIC HYPERPLASIA WITH NOCTURIA: ICD-10-CM

## 2024-05-30 DIAGNOSIS — R97.20 ELEVATED PSA: ICD-10-CM

## 2024-05-30 DIAGNOSIS — I50.32 CHRONIC DIASTOLIC HEART FAILURE (HCC): ICD-10-CM

## 2024-05-30 DIAGNOSIS — R25.2 LEG CRAMPS: ICD-10-CM

## 2024-05-30 DIAGNOSIS — R73.09 ELEVATED HEMOGLOBIN A1C: ICD-10-CM

## 2024-05-30 DIAGNOSIS — N40.1 BENIGN PROSTATIC HYPERPLASIA WITH NOCTURIA: ICD-10-CM

## 2024-05-30 DIAGNOSIS — C78.01 MALIGNANT NEOPLASM METASTATIC TO BOTH LUNGS (HCC): ICD-10-CM

## 2024-05-30 DIAGNOSIS — Z85.820 HX OF MELANOMA OF SKIN: ICD-10-CM

## 2024-05-30 DIAGNOSIS — R29.898 HAND PROBLEMS: ICD-10-CM

## 2024-05-30 DIAGNOSIS — Z13.31 POSITIVE DEPRESSION SCREENING: ICD-10-CM

## 2024-05-30 DIAGNOSIS — J96.11 CHRONIC RESPIRATORY FAILURE WITH HYPOXIA (HCC): ICD-10-CM

## 2024-05-30 DIAGNOSIS — Z87.39 HX OF GOUT: ICD-10-CM

## 2024-05-30 DIAGNOSIS — E78.5 HYPERLIPIDEMIA, UNSPECIFIED HYPERLIPIDEMIA TYPE: ICD-10-CM

## 2024-05-30 DIAGNOSIS — Z99.3 DEPENDENCE ON WHEELCHAIR: ICD-10-CM

## 2024-05-30 DIAGNOSIS — R73.03 PRE-DIABETES: ICD-10-CM

## 2024-05-30 DIAGNOSIS — C78.02 MALIGNANT NEOPLASM METASTATIC TO BOTH LUNGS (HCC): ICD-10-CM

## 2024-05-30 DIAGNOSIS — I10 ESSENTIAL HYPERTENSION: ICD-10-CM

## 2024-05-30 LAB
BASOPHILS # BLD AUTO: 0.4 % (ref 0–1.8)
BASOPHILS # BLD: 0.03 K/UL (ref 0–0.12)
EOSINOPHIL # BLD AUTO: 0.1 K/UL (ref 0–0.51)
EOSINOPHIL NFR BLD: 1.2 % (ref 0–6.9)
ERYTHROCYTE [DISTWIDTH] IN BLOOD BY AUTOMATED COUNT: 51 FL (ref 35.9–50)
HCT VFR BLD AUTO: 48.2 % (ref 42–52)
HGB BLD-MCNC: 15.8 G/DL (ref 14–18)
IMM GRANULOCYTES # BLD AUTO: 0.03 K/UL (ref 0–0.11)
IMM GRANULOCYTES NFR BLD AUTO: 0.4 % (ref 0–0.9)
LYMPHOCYTES # BLD AUTO: 0.96 K/UL (ref 1–4.8)
LYMPHOCYTES NFR BLD: 11.5 % (ref 22–41)
MCH RBC QN AUTO: 34.1 PG (ref 27–33)
MCHC RBC AUTO-ENTMCNC: 32.8 G/DL (ref 32.3–36.5)
MCV RBC AUTO: 104.1 FL (ref 81.4–97.8)
MONOCYTES # BLD AUTO: 0.77 K/UL (ref 0–0.85)
MONOCYTES NFR BLD AUTO: 9.2 % (ref 0–13.4)
NEUTROPHILS # BLD AUTO: 6.47 K/UL (ref 1.82–7.42)
NEUTROPHILS NFR BLD: 77.3 % (ref 44–72)
NRBC # BLD AUTO: 0 K/UL
NRBC BLD-RTO: 0 /100 WBC (ref 0–0.2)
PLATELET # BLD AUTO: 215 K/UL (ref 164–446)
PMV BLD AUTO: 10.4 FL (ref 9–12.9)
RBC # BLD AUTO: 4.63 M/UL (ref 4.7–6.1)
WBC # BLD AUTO: 8.4 K/UL (ref 4.8–10.8)

## 2024-05-30 PROCEDURE — 99490 CHRNC CARE MGMT STAFF 1ST 20: CPT | Performed by: FAMILY MEDICINE

## 2024-05-30 SDOH — HEALTH STABILITY: PHYSICAL HEALTH: ON AVERAGE, HOW MANY MINUTES DO YOU ENGAGE IN EXERCISE AT THIS LEVEL?: 20 MIN

## 2024-05-30 SDOH — HEALTH STABILITY: PHYSICAL HEALTH: ON AVERAGE, HOW MANY DAYS PER WEEK DO YOU ENGAGE IN MODERATE TO STRENUOUS EXERCISE (LIKE A BRISK WALK)?: 7 DAYS

## 2024-05-30 SDOH — ECONOMIC STABILITY: FOOD INSECURITY: WITHIN THE PAST 12 MONTHS, THE FOOD YOU BOUGHT JUST DIDN'T LAST AND YOU DIDN'T HAVE MONEY TO GET MORE.: NEVER TRUE

## 2024-05-30 SDOH — ECONOMIC STABILITY: HOUSING INSECURITY: IN THE LAST 12 MONTHS, HOW MANY PLACES HAVE YOU LIVED?: 1

## 2024-05-30 SDOH — ECONOMIC STABILITY: INCOME INSECURITY: IN THE LAST 12 MONTHS, WAS THERE A TIME WHEN YOU WERE NOT ABLE TO PAY THE MORTGAGE OR RENT ON TIME?: NO

## 2024-05-30 SDOH — ECONOMIC STABILITY: FOOD INSECURITY: WITHIN THE PAST 12 MONTHS, YOU WORRIED THAT YOUR FOOD WOULD RUN OUT BEFORE YOU GOT MONEY TO BUY MORE.: NEVER TRUE

## 2024-05-30 ASSESSMENT — LIFESTYLE VARIABLES
HOW MANY STANDARD DRINKS CONTAINING ALCOHOL DO YOU HAVE ON A TYPICAL DAY: PATIENT DOES NOT DRINK
HOW OFTEN DO YOU HAVE SIX OR MORE DRINKS ON ONE OCCASION: NEVER
HOW OFTEN DO YOU HAVE A DRINK CONTAINING ALCOHOL: NEVER
AUDIT-C TOTAL SCORE: 0
SKIP TO QUESTIONS 9-10: 1

## 2024-05-30 ASSESSMENT — SOCIAL DETERMINANTS OF HEALTH (SDOH)
DO YOU BELONG TO ANY CLUBS OR ORGANIZATIONS SUCH AS CHURCH GROUPS UNIONS, FRATERNAL OR ATHLETIC GROUPS, OR SCHOOL GROUPS?: YES
HOW OFTEN DO YOU ATTEND CHURCH OR RELIGIOUS SERVICES?: 1 TO 4 TIMES PER YEAR
WITHIN THE LAST YEAR, HAVE YOU BEEN AFRAID OF YOUR PARTNER OR EX-PARTNER?: NO
IN A TYPICAL WEEK, HOW MANY TIMES DO YOU TALK ON THE PHONE WITH FAMILY, FRIENDS, OR NEIGHBORS?: MORE THAN THREE TIMES A WEEK
WITHIN THE LAST YEAR, HAVE YOU BEEN KICKED, HIT, SLAPPED, OR OTHERWISE PHYSICALLY HURT BY YOUR PARTNER OR EX-PARTNER?: NO
HOW OFTEN DO YOU GET TOGETHER WITH FRIENDS OR RELATIVES?: TWICE A WEEK
HOW HARD IS IT FOR YOU TO PAY FOR THE VERY BASICS LIKE FOOD, HOUSING, MEDICAL CARE, AND HEATING?: NOT HARD AT ALL
IN THE PAST 12 MONTHS, HAS THE ELECTRIC, GAS, OIL, OR WATER COMPANY THREATENED TO SHUT OFF SERVICE IN YOUR HOME?: NO
WITHIN THE LAST YEAR, HAVE TO BEEN RAPED OR FORCED TO HAVE ANY KIND OF SEXUAL ACTIVITY BY YOUR PARTNER OR EX-PARTNER?: NO
WITHIN THE LAST YEAR, HAVE YOU BEEN HUMILIATED OR EMOTIONALLY ABUSED IN OTHER WAYS BY YOUR PARTNER OR EX-PARTNER?: NO
HOW OFTEN DO YOU ATTENT MEETINGS OF THE CLUB OR ORGANIZATION YOU BELONG TO?: 1 TO 4 TIMES PER YEAR

## 2024-05-31 LAB
25(OH)D3 SERPL-MCNC: 42 NG/ML (ref 30–100)
ALBUMIN SERPL BCP-MCNC: 4.4 G/DL (ref 3.2–4.9)
ALBUMIN/GLOB SERPL: 1.8 G/DL
ALP SERPL-CCNC: 81 U/L (ref 30–99)
ALT SERPL-CCNC: 42 U/L (ref 2–50)
ANION GAP SERPL CALC-SCNC: 14 MMOL/L (ref 7–16)
AST SERPL-CCNC: 38 U/L (ref 12–45)
BILIRUB SERPL-MCNC: 1 MG/DL (ref 0.1–1.5)
BUN SERPL-MCNC: 16 MG/DL (ref 8–22)
CALCIUM ALBUM COR SERPL-MCNC: 9.1 MG/DL (ref 8.5–10.5)
CALCIUM SERPL-MCNC: 9.4 MG/DL (ref 8.5–10.5)
CHLORIDE SERPL-SCNC: 105 MMOL/L (ref 96–112)
CO2 SERPL-SCNC: 20 MMOL/L (ref 20–33)
CREAT SERPL-MCNC: 1.02 MG/DL (ref 0.5–1.4)
FSH SERPL-ACNC: 9.6 MIU/ML (ref 1.5–12.4)
GFR SERPLBLD CREATININE-BSD FMLA CKD-EPI: 74 ML/MIN/1.73 M 2
GLOBULIN SER CALC-MCNC: 2.4 G/DL (ref 1.9–3.5)
GLUCOSE SERPL-MCNC: 142 MG/DL (ref 65–99)
LH SERPL-ACNC: 9.1 IU/L (ref 1.7–8.6)
POTASSIUM SERPL-SCNC: 4.5 MMOL/L (ref 3.6–5.5)
PROLACTIN SERPL-MCNC: 7.91 NG/ML (ref 2.1–17.7)
PROT SERPL-MCNC: 6.8 G/DL (ref 6–8.2)
SODIUM SERPL-SCNC: 139 MMOL/L (ref 135–145)
T3FREE SERPL-MCNC: 3.03 PG/ML (ref 2–4.4)
T4 FREE SERPL-MCNC: 1.81 NG/DL (ref 0.93–1.7)
T4 FREE SERPL-MCNC: 1.81 NG/DL (ref 0.93–1.7)
TESTOST SERPL-MCNC: 258 NG/DL (ref 175–781)
TSH SERPL DL<=0.005 MIU/L-ACNC: 0.54 UIU/ML (ref 0.38–5.33)
TSH SERPL DL<=0.005 MIU/L-ACNC: 0.56 UIU/ML (ref 0.38–5.33)
VIT B12 SERPL-MCNC: 1154 PG/ML (ref 211–911)

## 2024-06-01 LAB — SHBG SERPL-SCNC: 68 NMOL/L (ref 19–76)

## 2024-06-04 ASSESSMENT — PATIENT HEALTH QUESTIONNAIRE - PHQ9
SUM OF ALL RESPONSES TO PHQ QUESTIONS 1-9: 2
5. POOR APPETITE OR OVEREATING: 0 - NOT AT ALL
CLINICAL INTERPRETATION OF PHQ2 SCORE: 2

## 2024-06-04 NOTE — PROGRESS NOTES
INITIAL CARE MANAGEMENT CARE PLAN/ASSESSMENT     Sina is a 79 year old male  and lives in a two story home. Shaniqua spouse answered most of the questions for the enrollment to the PCM program. He is a active  and recently had surgery for carpal tunnel done by Dr. Mai ([facility]). He did have a stumble in the driveway about four months ago and injured his left knee also saw [facility] for sprain knee. He does get tired easily with SOB so climbing up a flight of stairs is difficult for him. He continues to stay active and understands the importance of rest periods between activity. They have another home in [place] and he will be leaving to take care of that home for approximately three to six months. We discussed about services for outside of the [facility] under [facility]. I updated his medication lists to ensure accuracy. STEADI risk score 7 we discussed goals and he is careful and aware of surroundings as part of fall risk prevention. Requested records from [facility] (Endocrinology) and also for [facility] (Dr. Cesar Vásquez).  Received the OV notes from the specialist it's scanned in the chart. Also, filled out [employer] Medical Authorization forms for providers in [place].     Medication Self-Management Goals:     Reviewed medications listed below with patient.      Current Outpatient Medications:     Non Formulary Request, Take  by mouth every day. CoQ10, Disp: , Rfl:     Non Formulary Request, Take  by mouth every day. Vitamin A, Disp: , Rfl:     Non Formulary Request, Take 5,000 mg by mouth every day. Vitamin D3, Disp: , Rfl:     Non Formulary Request, Take  by mouth every day. Lutein, Disp: , Rfl:     Non Formulary Request, Take  by mouth as needed. Zinc as needed, Disp: , Rfl:     allopurinol (ZYLOPRIM) 300 MG Tab, Take 1 Tablet by mouth every day. Take with 100mg capsule, Disp: 100 Tablet, Rfl: 1    cyclobenzaprine (FLEXERIL) 10 mg Tab, Take 1 Tablet by mouth 2  times a day as needed for Muscle Spasms., Disp: 180 Tablet, Rfl: 0    psyllium (METAMUCIL) 51.7 % Pack, Take 1 Packet by mouth every day., Disp: , Rfl:     furosemide (LASIX) 40 MG Tab, Take 1 Tablet by mouth 2 times a day., Disp: 180 Tablet, Rfl: 0    levothyroxine (SYNTHROID) 137 MCG Tab, Take 1 tablet by mouth once a day, Disp: 90 Tablet, Rfl: 3    potassium chloride SA (KDUR) 20 MEQ Tab CR, Take 2 Tablets by mouth 5 Times a Day., Disp: 900 Tablet, Rfl: 1    metoprolol SR (TOPROL XL) 50 MG TABLET SR 24 HR, 50 mg every day., Disp: , Rfl:     olmesartan (BENICAR) 20 MG Tab, 20 mg every day., Disp: , Rfl:     Misc. Devices Misc, One wheelchair., Disp: 1 Each, Rfl: 0    metOLazone (ZAROXOLYN) 2.5 MG Tab, Take 1.25 mg by mouth as needed., Disp: , Rfl:     apixaban (ELIQUIS) 5mg Tab, Take 0.5 Tablets by mouth 2 times a day., Disp: , Rfl:     atorvastatin (LIPITOR) 40 MG Tab, Take 1 Tablet by mouth every day., Disp: 100 Tablet, Rfl: 3    torsemide (DEMADEX) 100 MG Tab, Take 40 mg by mouth every day. Pt takes up to 150mg if needed for edema, Disp: , Rfl:     tamsulosin (FLOMAX) 0.4 MG capsule, Take 1 Capsule by mouth every day., Disp: 100 Capsule, Rfl: 3    B Complex Vitamins (VITAMIN B COMPLEX PO), Take 1 Tablet by mouth every day., Disp: , Rfl:     Ascorbic Acid (VITAMIN C) 1000 MG Tab, Take 1,000 mg by mouth every day., Disp: , Rfl:     clindamycin (CLEOCIN) 150 MG Cap, TK 4 CS PO 30 TO 60 MINUTES PRIOR TO APPOINMENT (Patient not taking: Reported on 6/4/2024), Disp: , Rfl:     Colchicine (MITIGARE) 0.6 MG Cap, Take 1 Capsule by mouth 1 time a day as needed (pain). (Patient not taking: Reported on 6/4/2024), Disp: 30 Capsule, Rfl: 1         Goal:  Patient will take all medications as prescribed       Physical/Functional/Environmental Status:     Activities of Daily Living:  Bathing: independent   Dressing: independent  Grooming: independent  Mouth Care: independent  Toileting: independent  Climbing a Flight of Stairs:  needs assistance    Independent Activities of Daily Living:  Shopping: independent  Cooking: independent  Managing Medications: independent  Using the phone and looking up numbers: independent  Driving or using public transportation: independent  Managing Finances: independent        6/4/2024     9:08 AM   STEADI Fall Risk   STEADI Risk for Falling Score 7   One or more falls in the last year Yes   Advised to use a cane or walker to get around safely No   Feels unsteady when walking Yes   Steadies self on furniture while walking at home Yes   Worried about falling Yes   Needs to push with hands when rising from a chair Yes   Has trouble stepping up onto a curb / using stairs No   Often has to rush to the toilet Yes   Has lost some feeling in feet No   Takes medicine that makes him/her feel lightheaded or more tired than usual No   Takes medicine to sleep or improve mood No   Often feels sad or depressed No         Goal:  Patient will remain free from falls or injuries    Social Determinants of Health       Financial Status:      Financial Resource Strain: Low Risk  (5/30/2024)    Overall Financial Resource Strain (CARDIA)     Difficulty of Paying Living Expenses: Not hard at all         Referred to CHW/SW:  NA       Transportation Status:      Transportation Needs: No Transportation Needs (5/30/2024)    PRAPARE - Transportation     Lack of Transportation (Medical): No     Lack of Transportation (Non-Medical): No        Referred to CHW/SW:  NA      Food Insecurity:      Food Insecurity: No Food Insecurity (5/30/2024)    Hunger Vital Sign     Worried About Running Out of Food in the Last Year: Never true     Ran Out of Food in the Last Year: Never true        Referred to CHW/SW:  NA       Housing Status:     Housing Stability: Low Risk  (5/30/2024)    Housing Stability Vital Sign     Unable to Pay for Housing in the Last Year: No     Number of Places Lived in the Last Year: 1     Unstable Housing in the Last Year: No         Referred to CHW/SW:  NA      Social Connections:     Social Connections: Socially Integrated (5/30/2024)    Social Connection and Isolation Panel [NHANES]     Frequency of Communication with Friends and Family: More than three times a week     Frequency of Social Gatherings with Friends and Family: Twice a week     Attends Gnosticism Services: 1 to 4 times per year     Active Member of Clubs or Organizations: Yes     Attends Club or Organization Meetings: 1 to 4 times per year     Marital Status:         Referred to CHW/SW:  NA      Mental/Behavioral/Psychosocial Status:        5/18/2023     3:00 PM 3/6/2024     2:00 PM 5/30/2024    12:00 AM   Depression Screen (PHQ-2/PHQ-9)   PHQ-2 Total Score 3 3 2   PHQ-9 Total Score 10 10 2       Interpretation of PHQ-9 Total Score   Score Severity   1-4 No Depression   5-9 Mild Depression   10-14 Moderate Depression   15-19 Moderately Severe Depression   20-27 Severe Depression       Goal:  Patient will continue current coping strategies      Chronic Care Management Care Plan      Goal:  To maximize health and minimize hospital use  Barriers: Age, management of multiple chronic conditions, need to increase strength and stamina  Interventions:  ·While in the program patient will engage in regular exercise to build muscle strength and stamina  ·Nursing will provide a point of contact for patient to help navigate healthcare needs  ·Continue consistent medication compliance with monthly checks     Start Date: 05/30/2024  End Date:      Goal:  Remain free from falls/injuries during the period of enrollment in the PCM program  Barriers: Age and chronic condition related to fatigue   Interventions:  Increase awareness of medical and environmental fall risk factors  Reinforce appropriate use of assistive devices  Minimize factors that increase risk for falls  Maximizing methods for accessing help if needed     Start Date: 05/30/2024  End Date:     Goal:  Demonstrate  effective management of heart failure  Barriers: Age, management of multiple chronic conditions, need to increase strength and stamina  Interventions:  ·Patient will remain free from hospitalization for heart failure exacerbation     Patient will receive evidence based intervention when signs of heart failure exacerbations are seen during visit throughout episode of care  Patient and Nursing will help identify symptoms are under control / no change in symptoms:  - No new shortness of breath  - No new weight gain of more than 2 pounds  - No new swelling or the feet, ankles, legs or stomach  - No new chest pain     Start Date: 05/30/2024  End Date:        Discussion:  Personal Care Management, CCM services, resources    Goals: created     Next Scheduled patient outreach: 6/28/2024

## 2024-06-05 ENCOUNTER — OFFICE VISIT (OUTPATIENT)
Dept: MEDICAL GROUP | Facility: PHYSICIAN GROUP | Age: 79
End: 2024-06-05
Payer: MEDICARE

## 2024-06-05 VITALS
SYSTOLIC BLOOD PRESSURE: 108 MMHG | TEMPERATURE: 96.9 F | BODY MASS INDEX: 33.52 KG/M2 | DIASTOLIC BLOOD PRESSURE: 60 MMHG | OXYGEN SATURATION: 94 % | RESPIRATION RATE: 18 BRPM | WEIGHT: 261.2 LBS | HEART RATE: 83 BPM | HEIGHT: 74 IN

## 2024-06-05 DIAGNOSIS — I50.32 CHRONIC DIASTOLIC HEART FAILURE (HCC): ICD-10-CM

## 2024-06-05 DIAGNOSIS — C73 PAPILLARY THYROID CARCINOMA (HCC): ICD-10-CM

## 2024-06-05 DIAGNOSIS — R73.03 PRE-DIABETES: ICD-10-CM

## 2024-06-05 DIAGNOSIS — I10 ESSENTIAL HYPERTENSION: ICD-10-CM

## 2024-06-05 LAB
THYROGLOB AB SERPL-ACNC: 23.7 IU/ML (ref 0–4)
THYROGLOB SERPL-MCNC: 90.5 NG/ML (ref 1.3–31.8)
THYROGLOB SERPL-MCNC: ABNORMAL NG/ML (ref 1.3–31.8)

## 2024-06-05 PROCEDURE — 3074F SYST BP LT 130 MM HG: CPT | Performed by: FAMILY MEDICINE

## 2024-06-05 PROCEDURE — 99213 OFFICE O/P EST LOW 20 MIN: CPT | Performed by: FAMILY MEDICINE

## 2024-06-05 PROCEDURE — 3078F DIAST BP <80 MM HG: CPT | Performed by: FAMILY MEDICINE

## 2024-06-05 ASSESSMENT — FIBROSIS 4 INDEX: FIB4 SCORE: 2.21
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
## 2024-06-10 PROBLEM — G56.03 CARPAL TUNNEL SYNDROME ON BOTH SIDES: Status: ACTIVE | Noted: 2024-06-10

## 2024-06-17 DIAGNOSIS — E87.6 HYPOKALEMIA: ICD-10-CM

## 2024-06-17 DIAGNOSIS — I50.30 ACC/AHA STAGE C HEART FAILURE WITH PRESERVED EJECTION FRACTION (HCC): ICD-10-CM

## 2024-06-17 NOTE — TELEPHONE ENCOUNTER
Received request via: Pharmacy    Was the patient seen in the last year in this department? Yes    Does the patient have an active prescription (recently filled or refills available) for medication(s) requested? No    Pharmacy Name: [facility]    Does the patient have nursing home Plus and need 100 day supply (blood pressure, diabetes and cholesterol meds only)? Yes, quantity updated to 100 days

## 2024-06-17 NOTE — TELEPHONE ENCOUNTER
Received request via: Pharmacy    Was the patient seen in the last year in this department? Yes    Does the patient have an active prescription (recently filled or refills available) for medication(s) requested? No    Pharmacy Name: [facility]    Does the patient have care home Plus and need 100 day supply (blood pressure, diabetes and cholesterol meds only)? Yes, quantity updated to 100 days

## 2024-06-18 PROCEDURE — RXMED WILLOW AMBULATORY MEDICATION CHARGE: Performed by: FAMILY MEDICINE

## 2024-06-18 RX ORDER — POTASSIUM CHLORIDE 20 MEQ/1
40 TABLET, EXTENDED RELEASE ORAL
Qty: 900 TABLET | Refills: 1 | Status: SHIPPED | OUTPATIENT
Start: 2024-06-18

## 2024-06-18 RX ORDER — FUROSEMIDE 40 MG/1
40 TABLET ORAL 2 TIMES DAILY
Qty: 180 TABLET | Refills: 1 | Status: SHIPPED | OUTPATIENT
Start: 2024-06-18

## 2024-06-19 ENCOUNTER — PHARMACY VISIT (OUTPATIENT)
Dept: PHARMACY | Facility: MEDICAL CENTER | Age: 79
End: 2024-06-19
Payer: COMMERCIAL

## 2024-06-21 ENCOUNTER — PATIENT OUTREACH (OUTPATIENT)
Dept: MEDICAL GROUP | Facility: PHYSICIAN GROUP | Age: 79
End: 2024-06-21
Payer: MEDICARE

## 2024-06-21 DIAGNOSIS — C78.02 MALIGNANT NEOPLASM METASTATIC TO LEFT LUNG (HCC): ICD-10-CM

## 2024-06-21 DIAGNOSIS — E87.6 HYPOKALEMIA: ICD-10-CM

## 2024-06-21 DIAGNOSIS — N40.1 BENIGN PROSTATIC HYPERPLASIA WITH NOCTURIA: ICD-10-CM

## 2024-06-21 DIAGNOSIS — Z99.81 DEPENDENCE ON SUPPLEMENTAL OXYGEN: ICD-10-CM

## 2024-06-21 DIAGNOSIS — R35.1 BENIGN PROSTATIC HYPERPLASIA WITH NOCTURIA: ICD-10-CM

## 2024-06-21 DIAGNOSIS — I10 ESSENTIAL HYPERTENSION: ICD-10-CM

## 2024-06-21 DIAGNOSIS — R73.03 PRE-DIABETES: ICD-10-CM

## 2024-06-21 DIAGNOSIS — J96.11 CHRONIC RESPIRATORY FAILURE WITH HYPOXIA (HCC): ICD-10-CM

## 2024-06-21 DIAGNOSIS — C73 PAPILLARY THYROID CARCINOMA (HCC): ICD-10-CM

## 2024-06-21 DIAGNOSIS — E66.9 OBESITY (BMI 30-39.9): ICD-10-CM

## 2024-06-21 DIAGNOSIS — I50.32 CHRONIC DIASTOLIC HEART FAILURE (HCC): ICD-10-CM

## 2024-06-21 DIAGNOSIS — C79.51 METASTASIS TO BONE (HCC): ICD-10-CM

## 2024-06-21 NOTE — PROGRESS NOTES
Received call from spouse states that patient saw Dr. Fragoso at [facility] and she is going to put in an order for DME (O2) for patient because his O2 sats dropped to 82% when he was in the office and the MA walked him down the hallway. I called [facility] and the note for the recent OV note has not been signed yet so she left a message for the MA to call me regarding the order for O2. Awaiting call back and for the OV note.

## 2024-07-08 ENCOUNTER — PATIENT OUTREACH (OUTPATIENT)
Dept: MEDICAL GROUP | Facility: PHYSICIAN GROUP | Age: 79
End: 2024-07-08
Payer: MEDICARE

## 2024-07-08 DIAGNOSIS — G47.33 OSA ON CPAP: ICD-10-CM

## 2024-07-08 DIAGNOSIS — C78.01 MALIGNANT NEOPLASM METASTATIC TO BOTH LUNGS (HCC): ICD-10-CM

## 2024-07-08 DIAGNOSIS — J96.11 CHRONIC RESPIRATORY FAILURE WITH HYPOXIA (HCC): ICD-10-CM

## 2024-07-08 DIAGNOSIS — N40.1 BENIGN PROSTATIC HYPERPLASIA WITH NOCTURIA: ICD-10-CM

## 2024-07-08 DIAGNOSIS — G56.02 CARPAL TUNNEL SYNDROME OF LEFT WRIST: ICD-10-CM

## 2024-07-08 DIAGNOSIS — E78.5 HYPERLIPIDEMIA, UNSPECIFIED HYPERLIPIDEMIA TYPE: ICD-10-CM

## 2024-07-08 DIAGNOSIS — Z87.39 HX OF GOUT: ICD-10-CM

## 2024-07-08 DIAGNOSIS — R35.1 BENIGN PROSTATIC HYPERPLASIA WITH NOCTURIA: ICD-10-CM

## 2024-07-08 DIAGNOSIS — I10 ESSENTIAL HYPERTENSION: ICD-10-CM

## 2024-07-08 DIAGNOSIS — I48.11 LONGSTANDING PERSISTENT ATRIAL FIBRILLATION (HCC): ICD-10-CM

## 2024-07-08 DIAGNOSIS — G56.03 CARPAL TUNNEL SYNDROME ON BOTH SIDES: ICD-10-CM

## 2024-07-08 DIAGNOSIS — R97.20 ELEVATED PSA: ICD-10-CM

## 2024-07-08 DIAGNOSIS — C79.51 METASTASIS TO BONE (HCC): ICD-10-CM

## 2024-07-08 DIAGNOSIS — C78.02 MALIGNANT NEOPLASM METASTATIC TO BOTH LUNGS (HCC): ICD-10-CM

## 2024-07-29 ENCOUNTER — PATIENT OUTREACH (OUTPATIENT)
Dept: MEDICAL GROUP | Facility: PHYSICIAN GROUP | Age: 79
End: 2024-07-29
Payer: MEDICARE

## 2024-07-29 DIAGNOSIS — N40.1 BENIGN PROSTATIC HYPERPLASIA WITH NOCTURIA: ICD-10-CM

## 2024-07-29 DIAGNOSIS — R35.1 BENIGN PROSTATIC HYPERPLASIA WITH NOCTURIA: ICD-10-CM

## 2024-07-29 DIAGNOSIS — I10 ESSENTIAL HYPERTENSION: ICD-10-CM

## 2024-07-29 DIAGNOSIS — I48.11 LONGSTANDING PERSISTENT ATRIAL FIBRILLATION (HCC): ICD-10-CM

## 2024-07-29 DIAGNOSIS — J96.11 CHRONIC RESPIRATORY FAILURE WITH HYPOXIA (HCC): ICD-10-CM

## 2024-07-29 DIAGNOSIS — E78.5 HYPERLIPIDEMIA, UNSPECIFIED HYPERLIPIDEMIA TYPE: ICD-10-CM

## 2024-07-29 DIAGNOSIS — C79.51 METASTASIS TO BONE (HCC): ICD-10-CM

## 2024-07-29 DIAGNOSIS — R29.898 HAND PROBLEMS: ICD-10-CM

## 2024-07-29 RX ORDER — CYCLOBENZAPRINE HCL 10 MG
10 TABLET ORAL 2 TIMES DAILY PRN
Qty: 180 TABLET | Refills: 1 | Status: SHIPPED | OUTPATIENT
Start: 2024-07-29

## 2024-08-30 ENCOUNTER — PATIENT OUTREACH (OUTPATIENT)
Dept: HEALTH INFORMATION MANAGEMENT | Facility: OTHER | Age: 79
End: 2024-08-30
Payer: MEDICARE

## 2024-08-30 DIAGNOSIS — R97.20 ELEVATED PSA: ICD-10-CM

## 2024-08-30 DIAGNOSIS — C78.01 MALIGNANT NEOPLASM METASTATIC TO BOTH LUNGS (HCC): ICD-10-CM

## 2024-08-30 DIAGNOSIS — C78.02 MALIGNANT NEOPLASM METASTATIC TO BOTH LUNGS (HCC): ICD-10-CM

## 2024-08-30 DIAGNOSIS — I10 ESSENTIAL HYPERTENSION: ICD-10-CM

## 2024-08-30 DIAGNOSIS — I50.32 CHRONIC DIASTOLIC HEART FAILURE (HCC): ICD-10-CM

## 2024-08-30 DIAGNOSIS — E78.5 HYPERLIPIDEMIA, UNSPECIFIED HYPERLIPIDEMIA TYPE: ICD-10-CM

## 2024-08-30 DIAGNOSIS — N40.1 BENIGN PROSTATIC HYPERPLASIA WITH NOCTURIA: ICD-10-CM

## 2024-08-30 DIAGNOSIS — R35.1 BENIGN PROSTATIC HYPERPLASIA WITH NOCTURIA: ICD-10-CM

## 2024-08-30 DIAGNOSIS — J96.11 CHRONIC RESPIRATORY FAILURE WITH HYPOXIA (HCC): ICD-10-CM

## 2024-08-30 DIAGNOSIS — G47.33 OSA ON CPAP: ICD-10-CM

## 2024-09-10 RX ORDER — ALLOPURINOL 300 MG/1
300 TABLET ORAL DAILY
Qty: 100 TABLET | Refills: 1 | Status: SHIPPED | OUTPATIENT
Start: 2024-09-10

## 2024-09-10 NOTE — TELEPHONE ENCOUNTER
Received request via: Patient    Was the patient seen in the last year in this department? Yes    Does the patient have an active prescription (recently filled or refills available) for medication(s) requested? No    Pharmacy Name:   [facility] - [place], [place] - [address]  [address] [address]  Phone: [phone] Fax: [phone]       Does the patient have half-way Plus and need 100-day supply? (This applies to ALL medications) Yes, quantity updated to 100 days

## 2024-09-17 NOTE — PROGRESS NOTES
Assessment     I spoke to the patient's spouse this morning for his monthly and quarterly follow ups. The patient is aware of future appointment dates and times. The patient denies any recent falls or injuries. The patient denies any changes to his medications within the last month. The patient denies any respiratory or cardiac symptoms at this time. The patient states that he is doing okay looking forward to coming back has been in [place] since June.  Patient denies any other pressing questions or concerns. Patient voices understanding of how to contact me if needed.     Education     Discussed medication regimen - make sure he gets his refills since he is out of town. Hopefully he will be back next month (October). We discussed getting evaluated for possible oxygen need d/t O2 sat drops to 88% while ambulating even for a few steps only. Informed spouse we will need to do a walk test for medical necessity. Clarified with Dr. Fragoso's office she will not be ordering the oxygen deferring to PCP. Patient has a f/u appt 10/9/2024     Plan of Care and Goals     Patient will remain free from falls and injuries  Healthy nutrition, hydration, and activity     Barriers:     Management of chronic medical concerns.     Progress:     Progressing     Next outreach:  One Month    Quarterly chart review completed. Pt continues to qualify for and benefit from personal care management program.

## 2024-09-24 ENCOUNTER — PATIENT OUTREACH (OUTPATIENT)
Dept: HEALTH INFORMATION MANAGEMENT | Facility: OTHER | Age: 79
End: 2024-09-24
Payer: MEDICARE

## 2024-09-24 DIAGNOSIS — G47.33 OSA ON CPAP: ICD-10-CM

## 2024-09-24 DIAGNOSIS — E78.5 HYPERLIPIDEMIA, UNSPECIFIED HYPERLIPIDEMIA TYPE: ICD-10-CM

## 2024-09-24 DIAGNOSIS — I10 ESSENTIAL HYPERTENSION: ICD-10-CM

## 2024-09-24 DIAGNOSIS — Z99.81 DEPENDENCE ON SUPPLEMENTAL OXYGEN: ICD-10-CM

## 2024-09-24 NOTE — PROGRESS NOTES
Spoke to spouse (Shaniqua) patient will be coming home from [place] this Sunday - confirming the appt for 10/9/2024 no conflict with other appts. Patient does have an ECHO scheduled ordered by Dr. Vásquez for 10/1 asked Shaniqua to please have them send us a copy of the result. No other concern/question at this time.

## 2024-09-25 NOTE — ED NOTES
Pt resting comfortably with even chest rise and fall, call light available and at bedside. No needs at this time.    normal for race

## 2024-09-26 PROCEDURE — RXMED WILLOW AMBULATORY MEDICATION CHARGE: Performed by: FAMILY MEDICINE

## 2024-09-27 ENCOUNTER — PHARMACY VISIT (OUTPATIENT)
Dept: PHARMACY | Facility: MEDICAL CENTER | Age: 79
End: 2024-09-27
Payer: COMMERCIAL

## 2024-10-07 ENCOUNTER — HOSPITAL ENCOUNTER (OUTPATIENT)
Dept: LAB | Facility: MEDICAL CENTER | Age: 79
End: 2024-10-07
Attending: INTERNAL MEDICINE
Payer: MEDICARE

## 2024-10-07 LAB
ALBUMIN SERPL BCP-MCNC: 4.4 G/DL (ref 3.2–4.9)
ALBUMIN/GLOB SERPL: 1.5 G/DL
ALP SERPL-CCNC: 89 U/L (ref 30–99)
ALT SERPL-CCNC: 23 U/L (ref 2–50)
ANION GAP SERPL CALC-SCNC: 15 MMOL/L (ref 7–16)
AST SERPL-CCNC: 25 U/L (ref 12–45)
BASOPHILS # BLD AUTO: 0.4 % (ref 0–1.8)
BASOPHILS # BLD: 0.04 K/UL (ref 0–0.12)
BILIRUB SERPL-MCNC: 0.9 MG/DL (ref 0.1–1.5)
BUN SERPL-MCNC: 22 MG/DL (ref 8–22)
CALCIUM ALBUM COR SERPL-MCNC: 10 MG/DL (ref 8.5–10.5)
CALCIUM SERPL-MCNC: 10.3 MG/DL (ref 8.5–10.5)
CHLORIDE SERPL-SCNC: 103 MMOL/L (ref 96–112)
CO2 SERPL-SCNC: 21 MMOL/L (ref 20–33)
CREAT SERPL-MCNC: 1.33 MG/DL (ref 0.5–1.4)
EOSINOPHIL # BLD AUTO: 0.15 K/UL (ref 0–0.51)
EOSINOPHIL NFR BLD: 1.7 % (ref 0–6.9)
ERYTHROCYTE [DISTWIDTH] IN BLOOD BY AUTOMATED COUNT: 51.1 FL (ref 35.9–50)
GFR SERPLBLD CREATININE-BSD FMLA CKD-EPI: 54 ML/MIN/1.73 M 2
GLOBULIN SER CALC-MCNC: 2.9 G/DL (ref 1.9–3.5)
GLUCOSE SERPL-MCNC: 104 MG/DL (ref 65–99)
HCT VFR BLD AUTO: 46 % (ref 42–52)
HGB BLD-MCNC: 15 G/DL (ref 14–18)
IMM GRANULOCYTES # BLD AUTO: 0.04 K/UL (ref 0–0.11)
IMM GRANULOCYTES NFR BLD AUTO: 0.4 % (ref 0–0.9)
LYMPHOCYTES # BLD AUTO: 1.08 K/UL (ref 1–4.8)
LYMPHOCYTES NFR BLD: 12.1 % (ref 22–41)
MCH RBC QN AUTO: 33.9 PG (ref 27–33)
MCHC RBC AUTO-ENTMCNC: 32.6 G/DL (ref 32.3–36.5)
MCV RBC AUTO: 103.8 FL (ref 81.4–97.8)
MONOCYTES # BLD AUTO: 0.98 K/UL (ref 0–0.85)
MONOCYTES NFR BLD AUTO: 10.9 % (ref 0–13.4)
NEUTROPHILS # BLD AUTO: 6.67 K/UL (ref 1.82–7.42)
NEUTROPHILS NFR BLD: 74.5 % (ref 44–72)
NRBC # BLD AUTO: 0 K/UL
NRBC BLD-RTO: 0 /100 WBC (ref 0–0.2)
NT-PROBNP SERPL IA-MCNC: 1146 PG/ML (ref 0–125)
PLATELET # BLD AUTO: 253 K/UL (ref 164–446)
PMV BLD AUTO: 10.3 FL (ref 9–12.9)
POTASSIUM SERPL-SCNC: 4.9 MMOL/L (ref 3.6–5.5)
PROT SERPL-MCNC: 7.3 G/DL (ref 6–8.2)
RBC # BLD AUTO: 4.43 M/UL (ref 4.7–6.1)
SODIUM SERPL-SCNC: 139 MMOL/L (ref 135–145)
T4 FREE SERPL-MCNC: 1.73 NG/DL (ref 0.93–1.7)
TSH SERPL-ACNC: 1.14 UIU/ML (ref 0.35–5.5)
WBC # BLD AUTO: 9 K/UL (ref 4.8–10.8)

## 2024-10-07 PROCEDURE — 83880 ASSAY OF NATRIURETIC PEPTIDE: CPT

## 2024-10-07 PROCEDURE — 84439 ASSAY OF FREE THYROXINE: CPT

## 2024-10-07 PROCEDURE — 80053 COMPREHEN METABOLIC PANEL: CPT

## 2024-10-07 PROCEDURE — 85025 COMPLETE CBC W/AUTO DIFF WBC: CPT

## 2024-10-07 PROCEDURE — 86800 THYROGLOBULIN ANTIBODY: CPT

## 2024-10-07 PROCEDURE — 84443 ASSAY THYROID STIM HORMONE: CPT

## 2024-10-07 PROCEDURE — 36415 COLL VENOUS BLD VENIPUNCTURE: CPT

## 2024-10-09 ENCOUNTER — OFFICE VISIT (OUTPATIENT)
Dept: MEDICAL GROUP | Facility: PHYSICIAN GROUP | Age: 79
End: 2024-10-09
Payer: MEDICARE

## 2024-10-09 VITALS
DIASTOLIC BLOOD PRESSURE: 58 MMHG | OXYGEN SATURATION: 94 % | SYSTOLIC BLOOD PRESSURE: 94 MMHG | HEART RATE: 87 BPM | TEMPERATURE: 96.9 F | BODY MASS INDEX: 34.8 KG/M2 | WEIGHT: 271.2 LBS | RESPIRATION RATE: 18 BRPM | HEIGHT: 74 IN

## 2024-10-09 DIAGNOSIS — R42 DIZZINESS: ICD-10-CM

## 2024-10-09 DIAGNOSIS — I95.9 HYPOTENSION, UNSPECIFIED HYPOTENSION TYPE: ICD-10-CM

## 2024-10-09 DIAGNOSIS — C78.02 MALIGNANT NEOPLASM METASTATIC TO BOTH LUNGS (HCC): ICD-10-CM

## 2024-10-09 DIAGNOSIS — C78.01 MALIGNANT NEOPLASM METASTATIC TO BOTH LUNGS (HCC): ICD-10-CM

## 2024-10-09 LAB — THYROGLOB AB SERPL-ACNC: 50.1 IU/ML (ref 0–4)

## 2024-10-09 PROCEDURE — 99214 OFFICE O/P EST MOD 30 MIN: CPT | Performed by: FAMILY MEDICINE

## 2024-10-09 PROCEDURE — 3074F SYST BP LT 130 MM HG: CPT | Performed by: FAMILY MEDICINE

## 2024-10-09 PROCEDURE — 3078F DIAST BP <80 MM HG: CPT | Performed by: FAMILY MEDICINE

## 2024-10-09 ASSESSMENT — FIBROSIS 4 INDEX: FIB4 SCORE: 1.67

## 2024-10-10 ENCOUNTER — PATIENT OUTREACH (OUTPATIENT)
Dept: HEALTH INFORMATION MANAGEMENT | Facility: OTHER | Age: 79
End: 2024-10-10
Payer: MEDICARE

## 2024-10-10 ENCOUNTER — HOSPITAL ENCOUNTER (OUTPATIENT)
Dept: RADIOLOGY | Facility: MEDICAL CENTER | Age: 79
End: 2024-10-10
Attending: INTERNAL MEDICINE
Payer: MEDICARE

## 2024-10-10 DIAGNOSIS — E78.5 HYPERLIPIDEMIA, UNSPECIFIED HYPERLIPIDEMIA TYPE: ICD-10-CM

## 2024-10-10 DIAGNOSIS — J96.11 CHRONIC RESPIRATORY FAILURE WITH HYPOXIA (HCC): ICD-10-CM

## 2024-10-10 DIAGNOSIS — I95.9 HYPOTENSION, UNSPECIFIED HYPOTENSION TYPE: ICD-10-CM

## 2024-10-10 DIAGNOSIS — I10 ESSENTIAL HYPERTENSION: ICD-10-CM

## 2024-10-10 DIAGNOSIS — R42 DIZZINESS: ICD-10-CM

## 2024-10-10 DIAGNOSIS — Z99.3 DEPENDENCE ON WHEELCHAIR: ICD-10-CM

## 2024-10-10 DIAGNOSIS — C73 MALIGNANT NEOPLASM OF THYROID GLAND (HCC): ICD-10-CM

## 2024-10-10 PROCEDURE — 71260 CT THORAX DX C+: CPT

## 2024-10-10 PROCEDURE — 700117 HCHG RX CONTRAST REV CODE 255: Performed by: INTERNAL MEDICINE

## 2024-10-10 RX ADMIN — IOHEXOL 75 ML: 350 INJECTION, SOLUTION INTRAVENOUS at 14:38

## 2024-10-17 ENCOUNTER — TELEPHONE (OUTPATIENT)
Dept: MEDICAL GROUP | Facility: PHYSICIAN GROUP | Age: 79
End: 2024-10-17
Payer: MEDICARE

## 2024-10-23 DIAGNOSIS — I50.30 ACC/AHA STAGE C HEART FAILURE WITH PRESERVED EJECTION FRACTION (HCC): ICD-10-CM

## 2024-10-23 PROCEDURE — RXMED WILLOW AMBULATORY MEDICATION CHARGE: Performed by: PHYSICIAN ASSISTANT

## 2024-10-27 RX ORDER — ALLOPURINOL 300 MG/1
300 TABLET ORAL DAILY
Qty: 100 TABLET | Refills: 1 | Status: SHIPPED | OUTPATIENT
Start: 2024-10-27

## 2024-10-29 DIAGNOSIS — E78.2 MIXED HYPERLIPIDEMIA: ICD-10-CM

## 2024-10-29 RX ORDER — CYCLOBENZAPRINE HCL 10 MG
10 TABLET ORAL 2 TIMES DAILY PRN
Qty: 180 TABLET | Refills: 0 | OUTPATIENT
Start: 2024-10-29

## 2024-10-29 RX ORDER — FUROSEMIDE 40 MG/1
40 TABLET ORAL 2 TIMES DAILY
Qty: 180 TABLET | Refills: 1 | OUTPATIENT
Start: 2024-10-29

## 2024-10-30 PROCEDURE — RXMED WILLOW AMBULATORY MEDICATION CHARGE: Performed by: FAMILY MEDICINE

## 2024-10-30 RX ORDER — ATORVASTATIN CALCIUM 40 MG/1
40 TABLET, FILM COATED ORAL DAILY
Qty: 100 TABLET | Refills: 0 | Status: SHIPPED | OUTPATIENT
Start: 2024-10-30

## 2024-10-31 ENCOUNTER — OFFICE VISIT (OUTPATIENT)
Dept: MEDICAL GROUP | Facility: PHYSICIAN GROUP | Age: 79
End: 2024-10-31
Payer: MEDICARE

## 2024-10-31 ENCOUNTER — TELEPHONE (OUTPATIENT)
Dept: MEDICAL GROUP | Facility: PHYSICIAN GROUP | Age: 79
End: 2024-10-31

## 2024-10-31 ENCOUNTER — PHARMACY VISIT (OUTPATIENT)
Dept: PHARMACY | Facility: MEDICAL CENTER | Age: 79
End: 2024-10-31
Payer: COMMERCIAL

## 2024-10-31 VITALS
TEMPERATURE: 98.9 F | OXYGEN SATURATION: 97 % | WEIGHT: 270.8 LBS | HEART RATE: 144 BPM | RESPIRATION RATE: 16 BRPM | SYSTOLIC BLOOD PRESSURE: 122 MMHG | DIASTOLIC BLOOD PRESSURE: 66 MMHG | BODY MASS INDEX: 34.75 KG/M2 | HEIGHT: 74 IN

## 2024-10-31 DIAGNOSIS — I10 ESSENTIAL HYPERTENSION: ICD-10-CM

## 2024-10-31 DIAGNOSIS — J96.11 CHRONIC RESPIRATORY FAILURE WITH HYPOXIA (HCC): ICD-10-CM

## 2024-10-31 DIAGNOSIS — C73 PAPILLARY THYROID CARCINOMA (HCC): ICD-10-CM

## 2024-10-31 DIAGNOSIS — R42 DIZZINESS: ICD-10-CM

## 2024-10-31 DIAGNOSIS — I48.91 ATRIAL FIBRILLATION, UNSPECIFIED TYPE (HCC): ICD-10-CM

## 2024-10-31 ASSESSMENT — FIBROSIS 4 INDEX: FIB4 SCORE: 1.67

## 2024-11-05 PROCEDURE — RXMED WILLOW AMBULATORY MEDICATION CHARGE: Performed by: FAMILY MEDICINE

## 2024-11-07 ENCOUNTER — PHARMACY VISIT (OUTPATIENT)
Dept: PHARMACY | Facility: MEDICAL CENTER | Age: 79
End: 2024-11-07
Payer: COMMERCIAL

## 2024-11-18 ENCOUNTER — HOSPITAL ENCOUNTER (EMERGENCY)
Facility: MEDICAL CENTER | Age: 79
End: 2024-11-18
Attending: EMERGENCY MEDICINE
Payer: MEDICARE

## 2024-11-18 ENCOUNTER — APPOINTMENT (OUTPATIENT)
Dept: RADIOLOGY | Facility: MEDICAL CENTER | Age: 79
End: 2024-11-18
Attending: EMERGENCY MEDICINE
Payer: MEDICARE

## 2024-11-18 VITALS
HEART RATE: 78 BPM | BODY MASS INDEX: 35.27 KG/M2 | OXYGEN SATURATION: 94 % | HEIGHT: 73 IN | SYSTOLIC BLOOD PRESSURE: 111 MMHG | WEIGHT: 266.1 LBS | TEMPERATURE: 96.6 F | RESPIRATION RATE: 16 BRPM | DIASTOLIC BLOOD PRESSURE: 75 MMHG

## 2024-11-18 DIAGNOSIS — E87.5 HYPERKALEMIA: ICD-10-CM

## 2024-11-18 DIAGNOSIS — S81.801A OPEN WOUND OF RIGHT LOWER LEG, INITIAL ENCOUNTER: ICD-10-CM

## 2024-11-18 LAB
ALBUMIN SERPL BCP-MCNC: 4.2 G/DL (ref 3.2–4.9)
ALBUMIN/GLOB SERPL: 1.6 G/DL
ALP SERPL-CCNC: 76 U/L (ref 30–99)
ALT SERPL-CCNC: 64 U/L (ref 2–50)
ANION GAP SERPL CALC-SCNC: 10 MMOL/L (ref 7–16)
ANION GAP SERPL CALC-SCNC: 11 MMOL/L (ref 7–16)
AST SERPL-CCNC: 47 U/L (ref 12–45)
BASOPHILS # BLD AUTO: 0.1 % (ref 0–1.8)
BASOPHILS # BLD: 0.01 K/UL (ref 0–0.12)
BILIRUB SERPL-MCNC: 0.9 MG/DL (ref 0.1–1.5)
BUN SERPL-MCNC: 31 MG/DL (ref 8–22)
BUN SERPL-MCNC: 33 MG/DL (ref 8–22)
CALCIUM ALBUM COR SERPL-MCNC: 9.6 MG/DL (ref 8.5–10.5)
CALCIUM SERPL-MCNC: 9.1 MG/DL (ref 8.5–10.5)
CALCIUM SERPL-MCNC: 9.8 MG/DL (ref 8.5–10.5)
CHLORIDE SERPL-SCNC: 105 MMOL/L (ref 96–112)
CHLORIDE SERPL-SCNC: 106 MMOL/L (ref 96–112)
CO2 SERPL-SCNC: 17 MMOL/L (ref 20–33)
CO2 SERPL-SCNC: 19 MMOL/L (ref 20–33)
CREAT SERPL-MCNC: 1.2 MG/DL (ref 0.5–1.4)
CREAT SERPL-MCNC: 1.29 MG/DL (ref 0.5–1.4)
CRP SERPL HS-MCNC: <0.3 MG/DL (ref 0–0.75)
EOSINOPHIL # BLD AUTO: 0 K/UL (ref 0–0.51)
EOSINOPHIL NFR BLD: 0 % (ref 0–6.9)
ERYTHROCYTE [DISTWIDTH] IN BLOOD BY AUTOMATED COUNT: 49.2 FL (ref 35.9–50)
ERYTHROCYTE [SEDIMENTATION RATE] IN BLOOD BY WESTERGREN METHOD: 6 MM/HOUR (ref 0–20)
GFR SERPLBLD CREATININE-BSD FMLA CKD-EPI: 55 ML/MIN/1.73 M 2
GFR SERPLBLD CREATININE-BSD FMLA CKD-EPI: 61 ML/MIN/1.73 M 2
GLOBULIN SER CALC-MCNC: 2.7 G/DL (ref 1.9–3.5)
GLUCOSE SERPL-MCNC: 133 MG/DL (ref 65–99)
GLUCOSE SERPL-MCNC: 133 MG/DL (ref 65–99)
HCT VFR BLD AUTO: 44.4 % (ref 42–52)
HGB BLD-MCNC: 14.9 G/DL (ref 14–18)
IMM GRANULOCYTES # BLD AUTO: 0.06 K/UL (ref 0–0.11)
IMM GRANULOCYTES NFR BLD AUTO: 0.4 % (ref 0–0.9)
LACTATE SERPL-SCNC: 1.4 MMOL/L (ref 0.5–2)
LYMPHOCYTES # BLD AUTO: 0.52 K/UL (ref 1–4.8)
LYMPHOCYTES NFR BLD: 3.6 % (ref 22–41)
MCH RBC QN AUTO: 34 PG (ref 27–33)
MCHC RBC AUTO-ENTMCNC: 33.6 G/DL (ref 32.3–36.5)
MCV RBC AUTO: 101.4 FL (ref 81.4–97.8)
MONOCYTES # BLD AUTO: 0.72 K/UL (ref 0–0.85)
MONOCYTES NFR BLD AUTO: 5 % (ref 0–13.4)
NEUTROPHILS # BLD AUTO: 13.1 K/UL (ref 1.82–7.42)
NEUTROPHILS NFR BLD: 90.9 % (ref 44–72)
NRBC # BLD AUTO: 0 K/UL
NRBC BLD-RTO: 0 /100 WBC (ref 0–0.2)
PLATELET # BLD AUTO: 192 K/UL (ref 164–446)
PMV BLD AUTO: 9.4 FL (ref 9–12.9)
POTASSIUM SERPL-SCNC: 5.8 MMOL/L (ref 3.6–5.5)
POTASSIUM SERPL-SCNC: 6.1 MMOL/L (ref 3.6–5.5)
PROCALCITONIN SERPL-MCNC: 0.05 NG/ML
PROT SERPL-MCNC: 6.9 G/DL (ref 6–8.2)
RBC # BLD AUTO: 4.38 M/UL (ref 4.7–6.1)
SODIUM SERPL-SCNC: 133 MMOL/L (ref 135–145)
SODIUM SERPL-SCNC: 135 MMOL/L (ref 135–145)
WBC # BLD AUTO: 14.4 K/UL (ref 4.8–10.8)

## 2024-11-18 PROCEDURE — 96376 TX/PRO/DX INJ SAME DRUG ADON: CPT

## 2024-11-18 PROCEDURE — 86140 C-REACTIVE PROTEIN: CPT

## 2024-11-18 PROCEDURE — 85652 RBC SED RATE AUTOMATED: CPT

## 2024-11-18 PROCEDURE — 83605 ASSAY OF LACTIC ACID: CPT

## 2024-11-18 PROCEDURE — 96374 THER/PROPH/DIAG INJ IV PUSH: CPT

## 2024-11-18 PROCEDURE — 99284 EMERGENCY DEPT VISIT MOD MDM: CPT

## 2024-11-18 PROCEDURE — 84145 PROCALCITONIN (PCT): CPT

## 2024-11-18 PROCEDURE — 80048 BASIC METABOLIC PNL TOTAL CA: CPT

## 2024-11-18 PROCEDURE — 700111 HCHG RX REV CODE 636 W/ 250 OVERRIDE (IP): Mod: JZ | Performed by: EMERGENCY MEDICINE

## 2024-11-18 PROCEDURE — 36415 COLL VENOUS BLD VENIPUNCTURE: CPT

## 2024-11-18 PROCEDURE — 80053 COMPREHEN METABOLIC PANEL: CPT

## 2024-11-18 PROCEDURE — 73590 X-RAY EXAM OF LOWER LEG: CPT | Mod: RT

## 2024-11-18 PROCEDURE — 85025 COMPLETE CBC W/AUTO DIFF WBC: CPT

## 2024-11-18 RX ORDER — MORPHINE SULFATE 4 MG/ML
4 INJECTION INTRAVENOUS ONCE
Status: COMPLETED | OUTPATIENT
Start: 2024-11-18 | End: 2024-11-18

## 2024-11-18 RX ADMIN — MORPHINE SULFATE 4 MG: 4 INJECTION, SOLUTION INTRAMUSCULAR; INTRAVENOUS at 06:15

## 2024-11-18 RX ADMIN — MORPHINE SULFATE 4 MG: 4 INJECTION, SOLUTION INTRAMUSCULAR; INTRAVENOUS at 03:59

## 2024-11-18 ASSESSMENT — FIBROSIS 4 INDEX: FIB4 SCORE: 1.67
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
## 2024-11-18 NOTE — DISCHARGE INSTRUCTIONS
Please hold your potassium supplement today and tomorrow and follow-up with your doctor for recheck of your potassium level

## 2024-11-18 NOTE — ED NOTES
Discharge teaching and paperwork provided including information regarding Rx. Questions answered. VSS, assessment stable. Patient belonging with patient. Patient D/C to the care of self and wheeled out of the ED.

## 2024-11-18 NOTE — ED TRIAGE NOTES
"Chief Complaint   Patient presents with    Wound Check     Pt has red circular wound to right shin since yesterday. Pt had medical personnel to house to evaluate yesterday which prescribed him prednisone and amoxicillin. Pt began taking medications yesterday. Came to ER tonight due to severe pain.        Pt has hx of Rt femur, Rt lung, melanoma, and thyroid CA. Reports treatments have been on hold due to cardiac issues.     Pt wheeled from [facility]. Skin signs pink, warm, dry. Pt speaking full sentences, A & O x 4. Respirations equal and unlabored. Pt educated on triage process and to alert staff of any changing conditions. [facility]     /71   Pulse 77   Temp 35.9 °C (96.6 °F) (Temporal)   Resp 18   Ht 1.854 m (6' 1\")   Wt 121 kg (266 lb 1.5 oz)   SpO2 91%   BMI 35.11 kg/m²     "

## 2024-11-21 ENCOUNTER — HOSPITAL ENCOUNTER (OUTPATIENT)
Dept: LAB | Facility: MEDICAL CENTER | Age: 79
End: 2024-11-21
Attending: PHYSICIAN ASSISTANT
Payer: MEDICARE

## 2024-11-21 PROCEDURE — 80048 BASIC METABOLIC PNL TOTAL CA: CPT

## 2024-11-21 PROCEDURE — 36415 COLL VENOUS BLD VENIPUNCTURE: CPT

## 2024-11-22 LAB
ANION GAP SERPL CALC-SCNC: 11 MMOL/L (ref 7–16)
BUN SERPL-MCNC: 34 MG/DL (ref 8–22)
CALCIUM SERPL-MCNC: 9.3 MG/DL (ref 8.5–10.5)
CHLORIDE SERPL-SCNC: 105 MMOL/L (ref 96–112)
CO2 SERPL-SCNC: 21 MMOL/L (ref 20–33)
CREAT SERPL-MCNC: 1.3 MG/DL (ref 0.5–1.4)
GFR SERPLBLD CREATININE-BSD FMLA CKD-EPI: 55 ML/MIN/1.73 M 2
GLUCOSE SERPL-MCNC: 171 MG/DL (ref 65–99)
POTASSIUM SERPL-SCNC: 4.8 MMOL/L (ref 3.6–5.5)
SODIUM SERPL-SCNC: 137 MMOL/L (ref 135–145)

## 2024-11-26 ENCOUNTER — APPOINTMENT (RX ONLY)
Dept: URBAN - METROPOLITAN AREA CLINIC 6 | Facility: CLINIC | Age: 79
Setting detail: DERMATOLOGY
End: 2024-11-26

## 2024-11-26 ENCOUNTER — PHARMACY VISIT (OUTPATIENT)
Dept: PHARMACY | Facility: MEDICAL CENTER | Age: 79
End: 2024-11-26
Payer: COMMERCIAL

## 2024-11-26 DIAGNOSIS — T07XXXA ABRASION OR FRICTION BURN OF OTHER, MULTIPLE, AND UNSPECIFIED SITES, WITHOUT MENTION OF INFECTION: ICD-10-CM

## 2024-11-26 DIAGNOSIS — D18.0 HEMANGIOMA: ICD-10-CM

## 2024-11-26 DIAGNOSIS — Z85.820 PERSONAL HISTORY OF MALIGNANT MELANOMA OF SKIN: ICD-10-CM

## 2024-11-26 DIAGNOSIS — Z00.8 ENCOUNTER FOR OTHER GENERAL EXAMINATION: ICD-10-CM

## 2024-11-26 DIAGNOSIS — L81.4 OTHER MELANIN HYPERPIGMENTATION: ICD-10-CM

## 2024-11-26 DIAGNOSIS — L82.1 OTHER SEBORRHEIC KERATOSIS: ICD-10-CM

## 2024-11-26 DIAGNOSIS — D22 MELANOCYTIC NEVI: ICD-10-CM

## 2024-11-26 DIAGNOSIS — Z85.828 PERSONAL HISTORY OF OTHER MALIGNANT NEOPLASM OF SKIN: ICD-10-CM

## 2024-11-26 DIAGNOSIS — Z71.89 OTHER SPECIFIED COUNSELING: ICD-10-CM

## 2024-11-26 PROBLEM — D22.5 MELANOCYTIC NEVI OF TRUNK: Status: ACTIVE | Noted: 2024-11-26

## 2024-11-26 PROBLEM — S80.921A UNSPECIFIED SUPERFICIAL INJURY OF RIGHT LOWER LEG, INITIAL ENCOUNTER: Status: ACTIVE | Noted: 2024-11-26

## 2024-11-26 PROBLEM — D18.01 HEMANGIOMA OF SKIN AND SUBCUTANEOUS TISSUE: Status: ACTIVE | Noted: 2024-11-26

## 2024-11-26 PROCEDURE — ? SUNSCREEN RECOMMENDATIONS

## 2024-11-26 PROCEDURE — 99213 OFFICE O/P EST LOW 20 MIN: CPT

## 2024-11-26 PROCEDURE — RXMED WILLOW AMBULATORY MEDICATION CHARGE: Performed by: FAMILY MEDICINE

## 2024-11-26 PROCEDURE — ? REFERRAL CORRESPONDENCE

## 2024-11-26 PROCEDURE — ? SUNSCREEN TREATMENT REGIMEN

## 2024-11-26 PROCEDURE — ? COUNSELING

## 2024-11-26 ASSESSMENT — LOCATION DETAILED DESCRIPTION DERM
LOCATION DETAILED: LEFT INFERIOR CENTRAL MALAR CHEEK
LOCATION DETAILED: SUPERIOR THORACIC SPINE
LOCATION DETAILED: RIGHT DISTAL POSTERIOR THIGH
LOCATION DETAILED: RIGHT ULNAR DORSAL HAND
LOCATION DETAILED: LEFT RADIAL DORSAL HAND
LOCATION DETAILED: LEFT MEDIAL INFERIOR CHEST
LOCATION DETAILED: RIGHT DISTAL PRETIBIAL REGION
LOCATION DETAILED: RIGHT MID-UPPER BACK
LOCATION DETAILED: STERNUM
LOCATION DETAILED: RIGHT RADIAL DORSAL HAND

## 2024-11-26 ASSESSMENT — LOCATION SIMPLE DESCRIPTION DERM
LOCATION SIMPLE: LEFT CHEEK
LOCATION SIMPLE: RIGHT POSTERIOR THIGH
LOCATION SIMPLE: CHEST
LOCATION SIMPLE: RIGHT PRETIBIAL REGION
LOCATION SIMPLE: UPPER BACK
LOCATION SIMPLE: RIGHT HAND
LOCATION SIMPLE: LEFT HAND
LOCATION SIMPLE: RIGHT UPPER BACK

## 2024-11-26 ASSESSMENT — LOCATION ZONE DERM
LOCATION ZONE: FACE
LOCATION ZONE: TRUNK
LOCATION ZONE: LEG
LOCATION ZONE: HAND

## 2024-12-05 RX ORDER — ALLOPURINOL 300 MG/1
1 TABLET ORAL
COMMUNITY
End: 2024-12-06

## 2024-12-05 RX ORDER — COLCHICINE 0.6 MG/1
TABLET ORAL
COMMUNITY
End: 2024-12-06

## 2024-12-05 RX ORDER — ATORVASTATIN CALCIUM 40 MG/1
1 TABLET, FILM COATED ORAL
COMMUNITY
End: 2024-12-06

## 2024-12-06 ENCOUNTER — HOSPITAL ENCOUNTER (OUTPATIENT)
Dept: LAB | Facility: MEDICAL CENTER | Age: 79
End: 2024-12-06
Attending: FAMILY MEDICINE
Payer: MEDICARE

## 2024-12-06 ENCOUNTER — OFFICE VISIT (OUTPATIENT)
Dept: MEDICAL GROUP | Facility: PHYSICIAN GROUP | Age: 79
End: 2024-12-06
Payer: MEDICARE

## 2024-12-06 VITALS
SYSTOLIC BLOOD PRESSURE: 108 MMHG | HEART RATE: 69 BPM | WEIGHT: 269.2 LBS | RESPIRATION RATE: 10 BRPM | OXYGEN SATURATION: 95 % | HEIGHT: 74 IN | BODY MASS INDEX: 34.55 KG/M2 | TEMPERATURE: 97.5 F | DIASTOLIC BLOOD PRESSURE: 56 MMHG

## 2024-12-06 DIAGNOSIS — R25.2 LEG CRAMPS: ICD-10-CM

## 2024-12-06 DIAGNOSIS — D72.829 LEUKOCYTOSIS, UNSPECIFIED TYPE: ICD-10-CM

## 2024-12-06 DIAGNOSIS — Z87.39 HX OF GOUT: ICD-10-CM

## 2024-12-06 LAB
ALBUMIN SERPL BCP-MCNC: 4.5 G/DL (ref 3.2–4.9)
ALBUMIN/GLOB SERPL: 1.8 G/DL
ALP SERPL-CCNC: 79 U/L (ref 30–99)
ALT SERPL-CCNC: 61 U/L (ref 2–50)
ANION GAP SERPL CALC-SCNC: 12 MMOL/L (ref 7–16)
AST SERPL-CCNC: 33 U/L (ref 12–45)
BASOPHILS # BLD AUTO: 0.4 % (ref 0–1.8)
BASOPHILS # BLD: 0.03 K/UL (ref 0–0.12)
BILIRUB SERPL-MCNC: 1.1 MG/DL (ref 0.1–1.5)
BUN SERPL-MCNC: 22 MG/DL (ref 8–22)
CALCIUM ALBUM COR SERPL-MCNC: 9.2 MG/DL (ref 8.5–10.5)
CALCIUM SERPL-MCNC: 9.6 MG/DL (ref 8.5–10.5)
CHLORIDE SERPL-SCNC: 103 MMOL/L (ref 96–112)
CO2 SERPL-SCNC: 23 MMOL/L (ref 20–33)
CREAT SERPL-MCNC: 1.06 MG/DL (ref 0.5–1.4)
EOSINOPHIL # BLD AUTO: 0.09 K/UL (ref 0–0.51)
EOSINOPHIL NFR BLD: 1.2 % (ref 0–6.9)
ERYTHROCYTE [DISTWIDTH] IN BLOOD BY AUTOMATED COUNT: 53.1 FL (ref 35.9–50)
GFR SERPLBLD CREATININE-BSD FMLA CKD-EPI: 70 ML/MIN/1.73 M 2
GLOBULIN SER CALC-MCNC: 2.5 G/DL (ref 1.9–3.5)
GLUCOSE SERPL-MCNC: 128 MG/DL (ref 65–99)
HCT VFR BLD AUTO: 46.7 % (ref 42–52)
HGB BLD-MCNC: 15.5 G/DL (ref 14–18)
IMM GRANULOCYTES # BLD AUTO: 0.04 K/UL (ref 0–0.11)
IMM GRANULOCYTES NFR BLD AUTO: 0.5 % (ref 0–0.9)
LYMPHOCYTES # BLD AUTO: 0.88 K/UL (ref 1–4.8)
LYMPHOCYTES NFR BLD: 11.6 % (ref 22–41)
MCH RBC QN AUTO: 34.2 PG (ref 27–33)
MCHC RBC AUTO-ENTMCNC: 33.2 G/DL (ref 32.3–36.5)
MCV RBC AUTO: 103.1 FL (ref 81.4–97.8)
MONOCYTES # BLD AUTO: 0.54 K/UL (ref 0–0.85)
MONOCYTES NFR BLD AUTO: 7.1 % (ref 0–13.4)
NEUTROPHILS # BLD AUTO: 5.98 K/UL (ref 1.82–7.42)
NEUTROPHILS NFR BLD: 79.2 % (ref 44–72)
NRBC # BLD AUTO: 0 K/UL
NRBC BLD-RTO: 0 /100 WBC (ref 0–0.2)
PLATELET # BLD AUTO: 178 K/UL (ref 164–446)
PMV BLD AUTO: 10.1 FL (ref 9–12.9)
POTASSIUM SERPL-SCNC: 4.9 MMOL/L (ref 3.6–5.5)
PROT SERPL-MCNC: 7 G/DL (ref 6–8.2)
RBC # BLD AUTO: 4.53 M/UL (ref 4.7–6.1)
SODIUM SERPL-SCNC: 138 MMOL/L (ref 135–145)
URATE SERPL-MCNC: 3.7 MG/DL (ref 2.5–8.3)
WBC # BLD AUTO: 7.6 K/UL (ref 4.8–10.8)

## 2024-12-06 PROCEDURE — 3074F SYST BP LT 130 MM HG: CPT | Performed by: FAMILY MEDICINE

## 2024-12-06 PROCEDURE — 85025 COMPLETE CBC W/AUTO DIFF WBC: CPT

## 2024-12-06 PROCEDURE — 80053 COMPREHEN METABOLIC PANEL: CPT

## 2024-12-06 PROCEDURE — 99214 OFFICE O/P EST MOD 30 MIN: CPT | Performed by: FAMILY MEDICINE

## 2024-12-06 PROCEDURE — 3078F DIAST BP <80 MM HG: CPT | Performed by: FAMILY MEDICINE

## 2024-12-06 PROCEDURE — 84550 ASSAY OF BLOOD/URIC ACID: CPT

## 2024-12-06 PROCEDURE — 36415 COLL VENOUS BLD VENIPUNCTURE: CPT

## 2024-12-06 RX ORDER — ALLOPURINOL 100 MG/1
TABLET ORAL
Qty: 100 TABLET | Refills: 1 | Status: SHIPPED | OUTPATIENT
Start: 2024-12-06

## 2024-12-06 ASSESSMENT — FIBROSIS 4 INDEX: FIB4 SCORE: 2.48
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
## 2024-12-08 DIAGNOSIS — R74.8 ELEVATED LIVER ENZYMES: ICD-10-CM

## 2024-12-11 DIAGNOSIS — M10.9 ACUTE GOUT OF RIGHT FOOT, UNSPECIFIED CAUSE: ICD-10-CM

## 2024-12-11 PROCEDURE — RXMED WILLOW AMBULATORY MEDICATION CHARGE: Performed by: FAMILY MEDICINE

## 2024-12-12 ENCOUNTER — PHARMACY VISIT (OUTPATIENT)
Dept: PHARMACY | Facility: MEDICAL CENTER | Age: 79
End: 2024-12-12
Payer: COMMERCIAL

## 2024-12-12 PROCEDURE — RXMED WILLOW AMBULATORY MEDICATION CHARGE: Performed by: FAMILY MEDICINE

## 2024-12-12 RX ORDER — COLCHICINE 0.6 MG/1
0.6 TABLET ORAL DAILY
Qty: 14 TABLET | Refills: 0 | Status: SHIPPED | OUTPATIENT
Start: 2024-12-12 | End: 2024-12-26

## 2024-12-12 NOTE — TELEPHONE ENCOUNTER
Received request via: Pharmacy    Was the patient seen in the last year in this department? Yes    Does the patient have an active prescription (recently filled or refills available) for medication(s) requested? No    Pharmacy Name:   [facility]  [address] [place] [id number]  Phone: [phone] Fax: [phone]        Does the patient have skilled nursing Plus and need 100-day supply? (This applies to ALL medications) not a maintenance med

## 2024-12-13 RX ORDER — CYCLOBENZAPRINE HCL 10 MG
TABLET ORAL
Qty: 30 TABLET | Refills: 0 | Status: SHIPPED | OUTPATIENT
Start: 2024-12-13

## 2024-12-16 ENCOUNTER — PATIENT OUTREACH (OUTPATIENT)
Dept: HEALTH INFORMATION MANAGEMENT | Facility: OTHER | Age: 79
End: 2024-12-16
Payer: MEDICARE

## 2024-12-16 DIAGNOSIS — Z99.81 DEPENDENCE ON SUPPLEMENTAL OXYGEN: ICD-10-CM

## 2024-12-16 DIAGNOSIS — C78.02 MALIGNANT NEOPLASM METASTATIC TO BOTH LUNGS (HCC): ICD-10-CM

## 2024-12-16 DIAGNOSIS — I50.32 CHRONIC DIASTOLIC HEART FAILURE (HCC): ICD-10-CM

## 2024-12-16 DIAGNOSIS — C78.01 MALIGNANT NEOPLASM METASTATIC TO BOTH LUNGS (HCC): ICD-10-CM

## 2024-12-16 DIAGNOSIS — I10 ESSENTIAL HYPERTENSION: ICD-10-CM

## 2024-12-16 DIAGNOSIS — R25.2 LEG CRAMPS: ICD-10-CM

## 2024-12-16 PROCEDURE — RXMED WILLOW AMBULATORY MEDICATION CHARGE: Performed by: FAMILY MEDICINE

## 2024-12-17 ENCOUNTER — PHARMACY VISIT (OUTPATIENT)
Dept: PHARMACY | Facility: MEDICAL CENTER | Age: 79
End: 2024-12-17
Payer: COMMERCIAL

## 2024-12-17 NOTE — CARE PLAN
Problem: Knowledge deficit of hypertension  Goal: Demonstrate Knowledge of Hypertension - long term   Outcome: Progressing     Problem: Knowledge deficit of factors contributing to hypertension  Goal: Demonstrate factors that can contribute to high blood pressure - long term   Outcome: Progressing  Note: Patient has verbalized understanding of factors contributing to high blood pressure      Problem: Recognize current health habits that may contribute to hypertension  Goal: Identify which modifiable health habits can be modified - long term   Outcome: Progressing  Note: Eating a heart-healthy diet is important to help manage your blood pressure and reduce your risk of heart attack, stroke and other health threats.     Problem: Patient barriers to managing high blood pressure  Goal: Identify barriers to managing blood pressure - long term   Outcome: Progressing  Note: Eating a heart-healthy diet is important to help manage your blood pressure and reduce your risk of heart attack, stroke and other health threats.     Problem: Knowledge deficit of self-monitoring blood pressure  Goal: Demonstrate the elements of self-monitoring blood pressure - long term   Outcome: Progressing  Note:   -Patient has verbalized commitment to self-monitoring blood pressure at least once a week.    -Patient will log home blood pressures and bring the log to medical provider appointments.       Problem: Adherence to prescribed medications  Goal: Improve medication adherence - long term   Outcome: Progressing     Problem: Knowledge deficient regarding condition, treatment regimen, and self care  Goal: Increase understanding of cardiac function/disease/failure - long term   Outcome: Progressing     Problem: Diet Management  Goal: Learn to Manage Calories - long term   Outcome: Progressing     Problem: Knowledge deficit of congestive heart failure disease process  Goal: HP Increase understanding of congestive heart failure - long term    Outcome: Progressing     Problem: Low Sodium Diet Management  Goal: Limit dietary sodium - long term   Outcome: Progressing     Problem: Excess Fluid  Goal: Establish a plan for excess fluid management - long term   Outcome: Progressing     Problem: Med Adherence  Goal: Consistently take Medications as Prescribed - long term   Outcome: Progressing  Goal: Establish resources to assist with medical costs - long term   Outcome: Progressing     Problem: Patient is Inactive  Goal: Exercise a designated amount of time daily - long term   Outcome: Progressing

## 2024-12-17 NOTE — PROGRESS NOTES
Assessment     I spoke to the patient at the office while he was here for the appointment with PCP for his monthly and quarterly follow ups. The patient is aware of future appointment dates and times.  Spouse takes care of his appointments. She takes him to all appointments - patient has a hard time walking. Patient can only walk a short distance before he gets short of breath and knee pain starts. The patient denies any recent falls or injuries. The patient states Cardiologist changed to his Potassium dose within the last month. The patient denies any other new respiratory or cardiac symptoms at this time. He continues to have the same complaint which shortness of breath with exertion. Patient had an ECHO done at [facility] (Cardiology) result in the chart. The patient states that he is overall maintaining. Patient denies any other pressing questions or concerns. Patient voices understanding of how to contact me if needed.     Education     Management of dyspnea with exertion, chronic pain, increase heart rate, fall and safety prevention.      Plan of Care and Goals     Patient will remain free from falls and injuries  Healthy nutrition, hydration, and activity  Updated care plans     Barriers:     Management of chronic medical concerns, age, cognitive impairment and pain      Progress:     Progressing     Next outreach:  One Month    Quarterly chart review completed. Pt continues to qualify for and benefit from personal care management program.   
No

## 2024-12-23 ENCOUNTER — TELEPHONE (OUTPATIENT)
Dept: MEDICAL GROUP | Facility: PHYSICIAN GROUP | Age: 79
End: 2024-12-23
Payer: MEDICARE

## 2024-12-23 DIAGNOSIS — I50.30 ACC/AHA STAGE C HEART FAILURE WITH PRESERVED EJECTION FRACTION (HCC): ICD-10-CM

## 2024-12-23 PROCEDURE — RXMED WILLOW AMBULATORY MEDICATION CHARGE: Performed by: FAMILY MEDICINE

## 2024-12-23 RX ORDER — FUROSEMIDE 40 MG/1
40 TABLET ORAL 2 TIMES DAILY
Qty: 180 TABLET | Refills: 1 | Status: SHIPPED | OUTPATIENT
Start: 2024-12-23

## 2024-12-23 NOTE — TELEPHONE ENCOUNTER
Received call from spouse (Shaniqua) she's requesting a refill for the Furosemide 40mg last written by PCP in June 2024. I verified that preferred pharmacy is [facility] and she confirmed.  [facility] she will send a message to Ana to ask about vascular studies. I left my ohone number for any question/concern.

## 2024-12-24 PROCEDURE — RXMED WILLOW AMBULATORY MEDICATION CHARGE: Performed by: PHYSICIAN ASSISTANT

## 2024-12-26 ENCOUNTER — PHARMACY VISIT (OUTPATIENT)
Dept: PHARMACY | Facility: MEDICAL CENTER | Age: 79
End: 2024-12-26
Payer: COMMERCIAL

## 2025-01-03 ENCOUNTER — HOSPITAL ENCOUNTER (OUTPATIENT)
Dept: LAB | Facility: MEDICAL CENTER | Age: 80
End: 2025-01-03
Attending: PHYSICIAN ASSISTANT
Payer: MEDICARE

## 2025-01-03 LAB — PSA SERPL DL<=0.01 NG/ML-MCNC: 3.61 NG/ML (ref 0–4)

## 2025-01-03 PROCEDURE — 36415 COLL VENOUS BLD VENIPUNCTURE: CPT

## 2025-01-03 PROCEDURE — 84153 ASSAY OF PSA TOTAL: CPT

## 2025-01-06 ENCOUNTER — TELEPHONE (OUTPATIENT)
Dept: HEALTH INFORMATION MANAGEMENT | Facility: OTHER | Age: 80
End: 2025-01-06
Payer: MEDICARE

## 2025-01-06 NOTE — TELEPHONE ENCOUNTER
Ann DUPONT patient on CCM/PCM program called and requested medication refills on Eliquis ([facility]) and Flexeril. Routing to PCP Dr. Mayelin Morel on Ann Hill's behalf.

## 2025-01-06 NOTE — TELEPHONE ENCOUNTER
Patient wife called today regarding refill requests on medications Flexeril and Eliquis; refill request sent to PCP and this RN contacted cardiology speciality Dr. Stallworth office and left VM regarding refill request.

## 2025-01-06 NOTE — TELEPHONE ENCOUNTER
Comments:     Last Office Visit (last PCP visit):   7/17/2024    Next Visit Date:  Future Appointments   Date Time Provider Department Center   1/13/2025  1:00 PM Tray Agarwal MD [facility]   1/22/2025 11:30 AM Segun Brasher MD [id number]       **If hasn't been seen in over a year OR hasn't followed up according to last diabetes/ADHD visit, make appointment for patient before sending refill to provider.    Rx requested:  Requested Prescriptions     Pending Prescriptions Disp Refills    levothyroxine (SYNTHROID) 75 MCG tablet [Pharmacy Med Name: Levothyroxine Sodium 75 MCG Oral Tablet] 30 tablet 0     Sig: Take 1 tablet by mouth once daily    amitriptyline (ELAVIL) 25 MG tablet [Pharmacy Med Name: Amitriptyline HCl 25 MG Oral Tablet] 150 tablet 0     Sig: Take 1 tablet by mouth nightly    esomeprazole (NEXIUM) 40 MG delayed release capsule [Pharmacy Med Name: Esomeprazole Magnesium 40 MG Oral Capsule Delayed Release] 180 capsule 0     Sig: TAKE 1 CAPSULE BY MOUTH IN THE MORNING BEFORE BREAKFAST                Received request via: Patient    Was the patient seen in the last year in this department? Yes    Does the patient have an active prescription (recently filled or refills available) for medication(s) requested? No     Insurance needs 100 day supply

## 2025-01-09 ENCOUNTER — APPOINTMENT (OUTPATIENT)
Dept: SLEEP MEDICINE | Facility: MEDICAL CENTER | Age: 80
End: 2025-01-09
Attending: STUDENT IN AN ORGANIZED HEALTH CARE EDUCATION/TRAINING PROGRAM
Payer: MEDICARE

## 2025-01-09 ENCOUNTER — PATIENT OUTREACH (OUTPATIENT)
Dept: HEALTH INFORMATION MANAGEMENT | Facility: OTHER | Age: 80
End: 2025-01-09

## 2025-01-09 DIAGNOSIS — Z87.39 HX OF GOUT: ICD-10-CM

## 2025-01-09 DIAGNOSIS — M25.562 ACUTE PAIN OF LEFT KNEE: ICD-10-CM

## 2025-01-09 DIAGNOSIS — I10 ESSENTIAL HYPERTENSION: ICD-10-CM

## 2025-01-09 DIAGNOSIS — C78.01 MALIGNANT NEOPLASM METASTATIC TO BOTH LUNGS (HCC): ICD-10-CM

## 2025-01-09 DIAGNOSIS — C78.02 MALIGNANT NEOPLASM METASTATIC TO BOTH LUNGS (HCC): ICD-10-CM

## 2025-01-09 DIAGNOSIS — C79.51 METASTASIS TO BONE (HCC): ICD-10-CM

## 2025-01-09 DIAGNOSIS — R25.2 LEG CRAMPS: ICD-10-CM

## 2025-01-09 RX ORDER — CYCLOBENZAPRINE HCL 10 MG
10 TABLET ORAL 2 TIMES DAILY PRN
Qty: 60 TABLET | Refills: 0 | Status: SHIPPED | OUTPATIENT
Start: 2025-01-09 | End: 2025-02-09
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
## 2025-01-10 ENCOUNTER — PHARMACY VISIT (OUTPATIENT)
Dept: PHARMACY | Facility: MEDICAL CENTER | Age: 80
End: 2025-01-10
Payer: COMMERCIAL

## 2025-01-10 PROCEDURE — RXMED WILLOW AMBULATORY MEDICATION CHARGE: Performed by: FAMILY MEDICINE

## 2025-01-14 ENCOUNTER — TELEPHONE (OUTPATIENT)
Dept: HEALTH INFORMATION MANAGEMENT | Facility: OTHER | Age: 80
End: 2025-01-14
Payer: MEDICARE

## 2025-01-14 NOTE — TELEPHONE ENCOUNTER
Patient's spouse, Shaniqua called on behalf of patient to inquire about recently referral placed to [facility]. Specifically, wondering if this referral was placed to study the reason for Sina's leg cramping and recent vascular issues. I've explained to the best of my ability that yes, this is most likely what this is for. Additionally, she was curious if that was received by [facility] because someone there by the name of Ana isn't seeing this on their end. I can see it has been authorized... I will route to RN, Ann Patel and Shaniqua and Sina are aware she returns on Thursday 1/16/25. FYI only!

## 2025-01-20 ENCOUNTER — HOSPITAL ENCOUNTER (OUTPATIENT)
Dept: CARDIOLOGY | Facility: MEDICAL CENTER | Age: 80
End: 2025-01-20
Attending: INTERNAL MEDICINE
Payer: MEDICARE

## 2025-01-20 DIAGNOSIS — I48.20 CHRONIC ATRIAL FIBRILLATION (HCC): ICD-10-CM

## 2025-01-20 DIAGNOSIS — I50.32 CHRONIC DIASTOLIC HEART FAILURE (HCC): ICD-10-CM

## 2025-01-20 LAB
LV EJECT FRACT  99904: 55
LV EJECT FRACT MOD 2C 99903: 35.47
LV EJECT FRACT MOD 4C 99902: 48.27
LV EJECT FRACT MOD BP 99901: 39.51

## 2025-01-20 PROCEDURE — 93306 TTE W/DOPPLER COMPLETE: CPT

## 2025-01-20 PROCEDURE — 93306 TTE W/DOPPLER COMPLETE: CPT | Mod: 26 | Performed by: INTERNAL MEDICINE

## 2025-01-31 NOTE — PROGRESS NOTES
Reason for Follow-Up:     Reach out to patient for monthly PCM follow-up call.     Current Health Status:     Patient states he has been doing okay.  Patient was in the ER 11/18/2024 Wound Check   Pt has red circular wound to right shin pt had it evaluated on 11/17/2024 had a home health visit and was started on prednisone and amoxicillin for the wound but without any improvement. . Pt began taking medications came to ER due to severe pain.     Medical Updates: Patient was seen by PCP on 12/6/2024. While in ER he had an X-ray of right tibia/fibula> he also had a complete ECHO done which was ordered by Dr. Vásquez Cardiologist.       Medication Updates: Patient was started on prednisone and amoxicillin for the infection.  Patient was then seen in the ER 11/18/2024 d/t severe pain.      Symptoms: Continues with leg cramps and limited mobility d/t shortness of breath and muscle cramps. Patient states no other new or worsening symptoms.     Plan of Care Goals and Progress:     Goal 1.  Falls                 Progress: Sina was seen in the emergency room due to an infection to his lower leg.  Patient's white blood cell count was elevated at that time.  Patient was placed on antibiotics since then patient states the area is healing well.  Patient also was noted to have an elevated potassium Shaniqua (spouse) states that cardiology clinic informed her to have him decrease his potassium last metabolic panel that was checked his potassium was in good range.                             Barriers: Patient continues to get muscle cramps (b/l legs), limited walking d/t pain, other chronic medication conditions                  Interventions: Patient has been working with other providers and will be getting further studies done [facility] to address his leg cramps and pain                  Education: Patient educated on the importance of safety and fall preventions.     Goal 2.  Blood pressure                 Progress: Patient states  his blood pressure has been stable but it does fluctuate d/t pain. Per patient and spouse, has not been monitoring blood pressure as often as he should be.                           Barriers: Patient not monitoring blood pressure as often as he should be.                 Interventions: Patient encouraged to monitor as frequently as he can.                 Education: Encouraged to monitor blood pressure on a consistent basis and also pulse ox d/t dyspnea episodes with exertion.        Patient's Concerns and Feedback (Self Management of Care):     Patient no other questions or needs at the end of phone call.     Next Steps:                 Follow-Up Plan: Next phone call will be February 2025                           Appointments: Patient does have upcoming appointments PCP, Pulmonology and Nuclear Medicine Imaging also trying to get the [facility] study scheduled working with Dr. Vásquez's office.                 Contact Information:  Call [phone] with any questions or concerns.

## 2025-02-04 DIAGNOSIS — E78.2 MIXED HYPERLIPIDEMIA: ICD-10-CM

## 2025-02-04 PROCEDURE — RXMED WILLOW AMBULATORY MEDICATION CHARGE: Performed by: FAMILY MEDICINE

## 2025-02-04 RX ORDER — ATORVASTATIN CALCIUM 40 MG/1
40 TABLET, FILM COATED ORAL DAILY
Qty: 100 TABLET | Refills: 0 | Status: SHIPPED | OUTPATIENT
Start: 2025-02-04

## 2025-02-04 NOTE — TELEPHONE ENCOUNTER
Received request via: Pharmacy    Was the patient seen in the last year in this department? Yes    Does the patient have an active prescription (recently filled or refills available) for medication(s) requested? No    Pharmacy Name:   [facility]  [address] [place] [id number]  Phone: [phone] Fax: [phone]    Does the patient have nursing home Plus and need 100-day supply? (This applies to ALL medications) Yes, quantity updated to 100 days

## 2025-02-05 ENCOUNTER — PHARMACY VISIT (OUTPATIENT)
Dept: PHARMACY | Facility: MEDICAL CENTER | Age: 80
End: 2025-02-05
Payer: COMMERCIAL

## 2025-02-07 ENCOUNTER — HOSPITAL ENCOUNTER (OUTPATIENT)
Dept: RADIOLOGY | Facility: MEDICAL CENTER | Age: 80
End: 2025-02-07
Attending: FAMILY MEDICINE

## 2025-02-07 ENCOUNTER — OFFICE VISIT (OUTPATIENT)
Dept: MEDICAL GROUP | Facility: PHYSICIAN GROUP | Age: 80
End: 2025-02-07
Payer: MEDICARE

## 2025-02-07 ENCOUNTER — HOSPITAL ENCOUNTER (OUTPATIENT)
Dept: LAB | Facility: MEDICAL CENTER | Age: 80
End: 2025-02-07
Attending: INTERNAL MEDICINE
Payer: MEDICARE

## 2025-02-07 ENCOUNTER — HOSPITAL ENCOUNTER (OUTPATIENT)
Dept: LAB | Facility: MEDICAL CENTER | Age: 80
End: 2025-02-07
Attending: FAMILY MEDICINE
Payer: MEDICARE

## 2025-02-07 VITALS
DIASTOLIC BLOOD PRESSURE: 68 MMHG | TEMPERATURE: 96.7 F | OXYGEN SATURATION: 94 % | BODY MASS INDEX: 36.22 KG/M2 | WEIGHT: 273.3 LBS | SYSTOLIC BLOOD PRESSURE: 110 MMHG | HEART RATE: 72 BPM | HEIGHT: 73 IN

## 2025-02-07 DIAGNOSIS — I70.8 AORTO-ILIAC ATHEROSCLEROSIS (HCC): ICD-10-CM

## 2025-02-07 DIAGNOSIS — I70.0 AORTO-ILIAC ATHEROSCLEROSIS (HCC): ICD-10-CM

## 2025-02-07 DIAGNOSIS — L81.9 DECOLORATION SKIN: ICD-10-CM

## 2025-02-07 DIAGNOSIS — I48.91 ATRIAL FIBRILLATION, UNSPECIFIED TYPE (HCC): ICD-10-CM

## 2025-02-07 DIAGNOSIS — R79.89 ELEVATED LIVER FUNCTION TESTS: ICD-10-CM

## 2025-02-07 DIAGNOSIS — C73 PAPILLARY THYROID CARCINOMA (HCC): ICD-10-CM

## 2025-02-07 DIAGNOSIS — M79.89 LEG SWELLING: ICD-10-CM

## 2025-02-07 DIAGNOSIS — C78.01 MALIGNANT NEOPLASM METASTATIC TO BOTH LUNGS (HCC): ICD-10-CM

## 2025-02-07 DIAGNOSIS — Z87.39 HX OF GOUT: ICD-10-CM

## 2025-02-07 DIAGNOSIS — I10 ESSENTIAL HYPERTENSION: ICD-10-CM

## 2025-02-07 DIAGNOSIS — M79.89 SWELLING OF LIMB: ICD-10-CM

## 2025-02-07 DIAGNOSIS — C78.02 MALIGNANT NEOPLASM METASTATIC TO BOTH LUNGS (HCC): ICD-10-CM

## 2025-02-07 DIAGNOSIS — E66.01 SEVERE OBESITY (HCC): ICD-10-CM

## 2025-02-07 DIAGNOSIS — L81.9 DISCOLORATION OF SKIN: ICD-10-CM

## 2025-02-07 DIAGNOSIS — C79.51 METASTASIS TO BONE (HCC): ICD-10-CM

## 2025-02-07 DIAGNOSIS — R74.8 ELEVATED LIVER ENZYMES: ICD-10-CM

## 2025-02-07 LAB
25(OH)D3 SERPL-MCNC: 56 NG/ML (ref 30–100)
ALBUMIN SERPL BCP-MCNC: 4.1 G/DL (ref 3.2–4.9)
ALBUMIN/GLOB SERPL: 1.5 G/DL
ALP SERPL-CCNC: 87 U/L (ref 30–99)
ALT SERPL-CCNC: 43 U/L (ref 2–50)
ANION GAP SERPL CALC-SCNC: 11 MMOL/L (ref 7–16)
AST SERPL-CCNC: 36 U/L (ref 12–45)
BILIRUB SERPL-MCNC: 0.9 MG/DL (ref 0.1–1.5)
BUN SERPL-MCNC: 15 MG/DL (ref 8–22)
CALCIUM ALBUM COR SERPL-MCNC: 9.3 MG/DL (ref 8.5–10.5)
CALCIUM SERPL-MCNC: 9.4 MG/DL (ref 8.5–10.5)
CHLORIDE SERPL-SCNC: 105 MMOL/L (ref 96–112)
CO2 SERPL-SCNC: 23 MMOL/L (ref 20–33)
CREAT SERPL-MCNC: 1.09 MG/DL (ref 0.5–1.4)
GFR SERPLBLD CREATININE-BSD FMLA CKD-EPI: 68 ML/MIN/1.73 M 2
GLOBULIN SER CALC-MCNC: 2.7 G/DL (ref 1.9–3.5)
GLUCOSE SERPL-MCNC: 118 MG/DL (ref 65–99)
POTASSIUM SERPL-SCNC: 4.6 MMOL/L (ref 3.6–5.5)
PROT SERPL-MCNC: 6.8 G/DL (ref 6–8.2)
SODIUM SERPL-SCNC: 139 MMOL/L (ref 135–145)
T3FREE SERPL-MCNC: 2.43 PG/ML (ref 2–4.4)
T4 FREE SERPL-MCNC: 1.57 NG/DL (ref 0.93–1.7)
TSH SERPL-ACNC: 0.48 UIU/ML (ref 0.35–5.5)

## 2025-02-07 PROCEDURE — 84439 ASSAY OF FREE THYROXINE: CPT

## 2025-02-07 PROCEDURE — 80053 COMPREHEN METABOLIC PANEL: CPT

## 2025-02-07 PROCEDURE — 84481 FREE ASSAY (FT-3): CPT

## 2025-02-07 PROCEDURE — 84443 ASSAY THYROID STIM HORMONE: CPT

## 2025-02-07 PROCEDURE — 3078F DIAST BP <80 MM HG: CPT | Performed by: FAMILY MEDICINE

## 2025-02-07 PROCEDURE — 99214 OFFICE O/P EST MOD 30 MIN: CPT | Performed by: FAMILY MEDICINE

## 2025-02-07 PROCEDURE — 36415 COLL VENOUS BLD VENIPUNCTURE: CPT

## 2025-02-07 PROCEDURE — 82306 VITAMIN D 25 HYDROXY: CPT

## 2025-02-07 PROCEDURE — 3074F SYST BP LT 130 MM HG: CPT | Performed by: FAMILY MEDICINE

## 2025-02-07 RX ORDER — CYCLOBENZAPRINE HCL 10 MG
10 TABLET ORAL 2 TIMES DAILY PRN
Qty: 100 TABLET | Refills: 0 | Status: SHIPPED | OUTPATIENT
Start: 2025-02-07 | End: 2025-05-18

## 2025-02-07 RX ORDER — CYCLOBENZAPRINE HCL 10 MG
TABLET ORAL
Qty: 30 TABLET | Refills: 0 | Status: SHIPPED | OUTPATIENT
Start: 2025-02-07

## 2025-02-07 ASSESSMENT — PATIENT HEALTH QUESTIONNAIRE - PHQ9: CLINICAL INTERPRETATION OF PHQ2 SCORE: 0

## 2025-02-07 ASSESSMENT — FIBROSIS 4 INDEX: FIB4 SCORE: 1.92
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
## 2025-02-11 ENCOUNTER — PATIENT OUTREACH (OUTPATIENT)
Dept: HEALTH INFORMATION MANAGEMENT | Facility: OTHER | Age: 80
End: 2025-02-11
Payer: MEDICARE

## 2025-02-11 DIAGNOSIS — R25.2 LEG CRAMPS: ICD-10-CM

## 2025-02-11 DIAGNOSIS — M25.562 ACUTE PAIN OF LEFT KNEE: ICD-10-CM

## 2025-02-11 DIAGNOSIS — E87.6 HYPOKALEMIA: ICD-10-CM

## 2025-02-11 DIAGNOSIS — C78.02 MALIGNANT NEOPLASM METASTATIC TO BOTH LUNGS (HCC): ICD-10-CM

## 2025-02-11 DIAGNOSIS — C78.01 MALIGNANT NEOPLASM METASTATIC TO BOTH LUNGS (HCC): ICD-10-CM

## 2025-02-11 DIAGNOSIS — Z87.39 HX OF GOUT: ICD-10-CM

## 2025-02-11 DIAGNOSIS — I10 ESSENTIAL HYPERTENSION: ICD-10-CM

## 2025-02-11 PROCEDURE — RXMED WILLOW AMBULATORY MEDICATION CHARGE: Performed by: FAMILY MEDICINE

## 2025-02-11 RX ORDER — POTASSIUM CHLORIDE 1500 MG/1
40 TABLET, EXTENDED RELEASE ORAL
Qty: 900 TABLET | Refills: 0 | OUTPATIENT
Start: 2025-02-11

## 2025-02-11 NOTE — TELEPHONE ENCOUNTER
Received request via: Pharmacy    Was the patient seen in the last year in this department? Yes    Does the patient have an active prescription (recently filled or refills available) for medication(s) requested? No    Pharmacy Name:  [facility] [place]     Does the patient have detention Plus and need 100-day supply? (This applies to ALL medications) Yes, quantity updated to 100 days

## 2025-02-12 ENCOUNTER — PHARMACY VISIT (OUTPATIENT)
Dept: PHARMACY | Facility: MEDICAL CENTER | Age: 80
End: 2025-02-12
Payer: COMMERCIAL

## 2025-02-12 PROCEDURE — RXMED WILLOW AMBULATORY MEDICATION CHARGE: Performed by: PHYSICIAN ASSISTANT

## 2025-02-13 ENCOUNTER — PHARMACY VISIT (OUTPATIENT)
Dept: PHARMACY | Facility: MEDICAL CENTER | Age: 80
End: 2025-02-13
Payer: COMMERCIAL

## 2025-02-18 RX ORDER — COLCHICINE 0.6 MG/1
1 CAPSULE ORAL
Qty: 30 CAPSULE | Refills: 1 | Status: SHIPPED | OUTPATIENT
Start: 2025-02-18

## 2025-02-18 NOTE — PROGRESS NOTES
Reason for Follow-Up:    Saw patient in office for monthly PCM follow-up - spouse (Shaniqua) was present. Patient had an appointment with PCP. Sina is seeing other outside specialists, he does have multiple chronic diagnosis.      Current Health Status:    Benign prostatic hyperplasia with nocturia [N40.1,R35.1]   Hyperlipidemia [E78.5]   AF (atrial fibrillation) (HCC) [I48.91]   Essential hypertension [I10]   Papillary thyroid carcinoma (HCC) [C73]  Leg cramps [R25.2]      Medical Updates:  No new diagnoses. Patient reports stable condition with no recent hospitalizations.    Medication Updates:  No changes in medication regimen since last outreach. Patient reports taking medications as prescribed.    Symptoms:  Patient complaining of leg cramps it has been on and off but lately it's been more constant. He does have Vascular studies (US) both arterial and venous scheduled 03/04/2025.     Plan of Care Goals and Progress:    Goal 1. Pain bilateral leg - vascular studies pending appointment scheduled 03/04/2025    Progress:  Patient will continue to verbalize his pain level and any new symptoms to all providers and this RN involved in his care.    Barriers:  advanced age, multiple chronic medical conditions, limited mobility d/t pain and SOB     Interventions:  This RN and patient will continue to work collaboratively with regards to management of pain/discomfort, education on self-management techniques, promoting healthy lifestyle habits like diet and exercise.    Education:  Discussed with patient pain control, fall preventions and safety d/t limited mobility and unsteady gait.      Goal 2. Follow through    Progress:  Patient will work with this RN about follow up labs, appointments and Imaging studies    Barriers:  advanced age, other chronic medical conditions    Interventions:  This RN and patient will continue to work collaboratively on the importance of follow through - fluid and food intake, promote regular  exercise as tolerated by patient    Education:  Discussed with patient signs and symptoms for management of care, disease process and importance of adherence to treatment plan with lifestyle modifications.      Patient's Concerns and Feedback (Self Management of Care):    Sina expressed satisfaction with the current care plan outlined and no other questions or needs at the end of in person outreach, Shaniqua spouse present for this outreach.      Next Steps:    Follow-Up Plan: Discussed next follow-up visit in one month to reassess progress.    Appointments:  Patient is scheduled for 02/20/2025 with Pulmonary and Sleep Medicine, Imaging (US) 03/04/2025, PCP appointment 04/18/2025.     Contact Information:  Call [phone] with any questions or concerns.  Patient waiting for approval for Potassium Chloride 20 meq from Dr. Vásquez's office left message - awaiting call back.

## 2025-02-20 ENCOUNTER — OFFICE VISIT (OUTPATIENT)
Dept: SLEEP MEDICINE | Facility: MEDICAL CENTER | Age: 80
End: 2025-02-20
Attending: STUDENT IN AN ORGANIZED HEALTH CARE EDUCATION/TRAINING PROGRAM
Payer: MEDICARE

## 2025-02-20 VITALS
HEIGHT: 73 IN | HEART RATE: 83 BPM | BODY MASS INDEX: 34.33 KG/M2 | SYSTOLIC BLOOD PRESSURE: 102 MMHG | DIASTOLIC BLOOD PRESSURE: 60 MMHG | WEIGHT: 259 LBS | OXYGEN SATURATION: 91 %

## 2025-02-20 DIAGNOSIS — G47.33 OSA ON CPAP: ICD-10-CM

## 2025-02-20 PROCEDURE — 3074F SYST BP LT 130 MM HG: CPT | Performed by: STUDENT IN AN ORGANIZED HEALTH CARE EDUCATION/TRAINING PROGRAM

## 2025-02-20 PROCEDURE — 99214 OFFICE O/P EST MOD 30 MIN: CPT | Performed by: STUDENT IN AN ORGANIZED HEALTH CARE EDUCATION/TRAINING PROGRAM

## 2025-02-20 PROCEDURE — 3078F DIAST BP <80 MM HG: CPT | Performed by: STUDENT IN AN ORGANIZED HEALTH CARE EDUCATION/TRAINING PROGRAM

## 2025-02-20 ASSESSMENT — FIBROSIS 4 INDEX: FIB4 SCORE: 2.5
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
## 2025-02-27 ENCOUNTER — APPOINTMENT (OUTPATIENT)
Dept: PHYSICAL THERAPY | Facility: REHABILITATION | Age: 80
End: 2025-02-27
Attending: STUDENT IN AN ORGANIZED HEALTH CARE EDUCATION/TRAINING PROGRAM
Payer: MEDICARE

## 2025-02-27 DIAGNOSIS — E87.6 HYPOKALEMIA: ICD-10-CM

## 2025-02-27 RX ORDER — POTASSIUM CHLORIDE 1500 MG/1
40 TABLET, EXTENDED RELEASE ORAL
Qty: 900 TABLET | Refills: 0 | OUTPATIENT
Start: 2025-02-27

## 2025-03-03 ENCOUNTER — APPOINTMENT (OUTPATIENT)
Dept: PHYSICAL THERAPY | Facility: REHABILITATION | Age: 80
End: 2025-03-03
Attending: STUDENT IN AN ORGANIZED HEALTH CARE EDUCATION/TRAINING PROGRAM
Payer: MEDICARE

## 2025-03-04 ENCOUNTER — HOSPITAL ENCOUNTER (OUTPATIENT)
Dept: RADIOLOGY | Facility: MEDICAL CENTER | Age: 80
End: 2025-03-04
Attending: FAMILY MEDICINE
Payer: MEDICARE

## 2025-03-04 DIAGNOSIS — L81.9 DISCOLORATION OF SKIN: ICD-10-CM

## 2025-03-04 DIAGNOSIS — M79.89 LEG SWELLING: ICD-10-CM

## 2025-03-04 PROCEDURE — 93970 EXTREMITY STUDY: CPT

## 2025-03-04 PROCEDURE — 93922 UPR/L XTREMITY ART 2 LEVELS: CPT

## 2025-03-05 ENCOUNTER — APPOINTMENT (OUTPATIENT)
Dept: PHYSICAL THERAPY | Facility: REHABILITATION | Age: 80
End: 2025-03-05
Attending: STUDENT IN AN ORGANIZED HEALTH CARE EDUCATION/TRAINING PROGRAM
Payer: MEDICARE

## 2025-03-06 ENCOUNTER — PHARMACY VISIT (OUTPATIENT)
Dept: PHARMACY | Facility: MEDICAL CENTER | Age: 80
End: 2025-03-06
Payer: COMMERCIAL

## 2025-03-06 PROCEDURE — RXMED WILLOW AMBULATORY MEDICATION CHARGE: Performed by: PHYSICIAN ASSISTANT

## 2025-03-06 RX ORDER — POTASSIUM CHLORIDE 1500 MG/1
TABLET, EXTENDED RELEASE ORAL
Qty: 630 TABLET | Refills: 0 | OUTPATIENT
Start: 2025-03-06

## 2025-03-07 ENCOUNTER — TELEPHONE (OUTPATIENT)
Dept: SLEEP MEDICINE | Facility: MEDICAL CENTER | Age: 80
End: 2025-03-07
Payer: MEDICARE

## 2025-03-07 ENCOUNTER — TELEPHONE (OUTPATIENT)
Dept: HEALTH INFORMATION MANAGEMENT | Facility: OTHER | Age: 80
End: 2025-03-07
Payer: MEDICARE

## 2025-03-07 PROBLEM — M25.562 ACUTE PAIN OF LEFT KNEE: Status: RESOLVED | Noted: 2024-03-21 | Resolved: 2025-03-07

## 2025-03-10 ENCOUNTER — APPOINTMENT (OUTPATIENT)
Dept: PHYSICAL THERAPY | Facility: REHABILITATION | Age: 80
End: 2025-03-10
Attending: STUDENT IN AN ORGANIZED HEALTH CARE EDUCATION/TRAINING PROGRAM
Payer: MEDICARE

## 2025-03-10 PROCEDURE — RXMED WILLOW AMBULATORY MEDICATION CHARGE: Performed by: FAMILY MEDICINE

## 2025-03-10 ASSESSMENT — PATIENT HEALTH QUESTIONNAIRE - PHQ9
1. LITTLE INTEREST OR PLEASURE IN DOING THINGS: SEVERAL DAYS
2. FEELING DOWN, DEPRESSED, IRRITABLE, OR HOPELESS: SEVERAL DAYS

## 2025-03-10 ASSESSMENT — ENCOUNTER SYMPTOMS: GENERAL WELL-BEING: POOR

## 2025-03-10 ASSESSMENT — ACTIVITIES OF DAILY LIVING (ADL): BATHING_REQUIRES_ASSISTANCE: 0

## 2025-03-12 ENCOUNTER — APPOINTMENT (OUTPATIENT)
Dept: PHYSICAL THERAPY | Facility: REHABILITATION | Age: 80
End: 2025-03-12
Attending: STUDENT IN AN ORGANIZED HEALTH CARE EDUCATION/TRAINING PROGRAM
Payer: MEDICARE

## 2025-03-12 ENCOUNTER — PHARMACY VISIT (OUTPATIENT)
Dept: PHARMACY | Facility: MEDICAL CENTER | Age: 80
End: 2025-03-12
Payer: COMMERCIAL

## 2025-03-12 ASSESSMENT — PATIENT HEALTH QUESTIONNAIRE - PHQ9: CLINICAL INTERPRETATION OF PHQ2 SCORE: 0

## 2025-03-13 ENCOUNTER — OFFICE VISIT (OUTPATIENT)
Dept: FAMILY PLANNING/WOMEN'S HEALTH CLINIC | Facility: PHYSICIAN GROUP | Age: 80
End: 2025-03-13
Payer: MEDICARE

## 2025-03-13 VITALS
BODY MASS INDEX: 32.6 KG/M2 | DIASTOLIC BLOOD PRESSURE: 68 MMHG | HEIGHT: 74 IN | TEMPERATURE: 98.1 F | WEIGHT: 254 LBS | RESPIRATION RATE: 16 BRPM | HEART RATE: 88 BPM | SYSTOLIC BLOOD PRESSURE: 122 MMHG | OXYGEN SATURATION: 97 %

## 2025-03-13 DIAGNOSIS — E78.5 HYPERLIPIDEMIA, UNSPECIFIED HYPERLIPIDEMIA TYPE: ICD-10-CM

## 2025-03-13 DIAGNOSIS — I10 ESSENTIAL HYPERTENSION: ICD-10-CM

## 2025-03-13 DIAGNOSIS — J96.11 CHRONIC RESPIRATORY FAILURE WITH HYPOXIA (HCC): ICD-10-CM

## 2025-03-13 DIAGNOSIS — C73 PAPILLARY THYROID CARCINOMA (HCC): ICD-10-CM

## 2025-03-13 DIAGNOSIS — C78.02 MALIGNANT NEOPLASM METASTATIC TO BOTH LUNGS (HCC): ICD-10-CM

## 2025-03-13 DIAGNOSIS — Z99.3 DEPENDENCE ON WHEELCHAIR: ICD-10-CM

## 2025-03-13 DIAGNOSIS — I70.8 AORTO-ILIAC ATHEROSCLEROSIS (HCC): ICD-10-CM

## 2025-03-13 DIAGNOSIS — I70.0 AORTO-ILIAC ATHEROSCLEROSIS (HCC): ICD-10-CM

## 2025-03-13 DIAGNOSIS — I50.32 CHRONIC DIASTOLIC HEART FAILURE (HCC): ICD-10-CM

## 2025-03-13 DIAGNOSIS — E26.1 SECONDARY HYPERALDOSTERONISM (HCC): ICD-10-CM

## 2025-03-13 DIAGNOSIS — I48.91 ATRIAL FIBRILLATION, UNSPECIFIED TYPE (HCC): ICD-10-CM

## 2025-03-13 DIAGNOSIS — C78.01 MALIGNANT NEOPLASM METASTATIC TO BOTH LUNGS (HCC): ICD-10-CM

## 2025-03-13 DIAGNOSIS — C79.51 METASTASIS TO BONE (HCC): ICD-10-CM

## 2025-03-13 PROBLEM — E66.01 SEVERE OBESITY (HCC): Status: RESOLVED | Noted: 2025-02-07 | Resolved: 2025-03-13

## 2025-03-13 PROCEDURE — G0439 PPPS, SUBSEQ VISIT: HCPCS

## 2025-03-13 PROCEDURE — 1126F AMNT PAIN NOTED NONE PRSNT: CPT

## 2025-03-13 PROCEDURE — 3078F DIAST BP <80 MM HG: CPT

## 2025-03-13 PROCEDURE — 3074F SYST BP LT 130 MM HG: CPT

## 2025-03-13 SDOH — ECONOMIC STABILITY: HOUSING INSECURITY: IN THE LAST 12 MONTHS, WAS THERE A TIME WHEN YOU WERE NOT ABLE TO PAY THE MORTGAGE OR RENT ON TIME?: NO

## 2025-03-13 SDOH — ECONOMIC STABILITY: FOOD INSECURITY: WITHIN THE PAST 12 MONTHS, THE FOOD YOU BOUGHT JUST DIDN'T LAST AND YOU DIDN'T HAVE MONEY TO GET MORE.: NEVER TRUE

## 2025-03-13 SDOH — HEALTH STABILITY: PHYSICAL HEALTH: ON AVERAGE, HOW MANY MINUTES DO YOU ENGAGE IN EXERCISE AT THIS LEVEL?: 0 MIN

## 2025-03-13 SDOH — HEALTH STABILITY: PHYSICAL HEALTH: ON AVERAGE, HOW MANY DAYS PER WEEK DO YOU ENGAGE IN MODERATE TO STRENUOUS EXERCISE (LIKE A BRISK WALK)?: 0 DAYS

## 2025-03-13 SDOH — ECONOMIC STABILITY: FOOD INSECURITY: HOW HARD IS IT FOR YOU TO PAY FOR THE VERY BASICS LIKE FOOD, HOUSING, MEDICAL CARE, AND HEATING?: NOT HARD AT ALL

## 2025-03-13 SDOH — ECONOMIC STABILITY: HOUSING INSECURITY: AT ANY TIME IN THE PAST 12 MONTHS, WERE YOU HOMELESS OR LIVING IN A SHELTER (INCLUDING NOW)?: NO

## 2025-03-13 SDOH — ECONOMIC STABILITY: FOOD INSECURITY: WITHIN THE PAST 12 MONTHS, YOU WORRIED THAT YOUR FOOD WOULD RUN OUT BEFORE YOU GOT THE MONEY TO BUY MORE.: NEVER TRUE

## 2025-03-13 SDOH — ECONOMIC STABILITY: TRANSPORTATION INSECURITY: IN THE PAST 12 MONTHS, HAS LACK OF TRANSPORTATION KEPT YOU FROM MEDICAL APPOINTMENTS OR FROM GETTING MEDICATIONS?: NO

## 2025-03-13 ASSESSMENT — ACTIVITIES OF DAILY LIVING (ADL): LACK_OF_TRANSPORTATION: NO

## 2025-03-13 ASSESSMENT — FIBROSIS 4 INDEX: FIB4 SCORE: 2.5

## 2025-03-13 ASSESSMENT — PAIN SCALES - GENERAL: PAINLEVEL_OUTOF10: NO PAIN
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
## 2025-03-13 NOTE — ASSESSMENT & PLAN NOTE
Chronic, stable.  Blood pressure today 122/68.  The patient's blood pressure is well controlled with current medication. Continue with current defined treatment plan: furosemide (LASIX) 40 MG Tab, metoprolol SR (TOPROL XL) 50 MG TABLET SR 24 HR, olmesartan (BENICAR) 20 MG Tab. Follow-up at least annually.

## 2025-03-13 NOTE — ASSESSMENT & PLAN NOTE
Chronic, stable. Noted on abdominal ultrasound (October 2021). He is being followed by cardiology. Continue with current defined treatment plan: atorvastatin (LIPITOR) 40 MG Tab .  Continue with blood pressure control.  Follow-up at least annually.

## 2025-03-13 NOTE — ASSESSMENT & PLAN NOTE
Chronic, stable. The patient is being followed by cardiology.  .heart Continue with current defined treatment plan: apixaban (ELIQUIS) 5mg Tab, metoprolol SR (TOPROL XL) 50 MG TABLET SR 24 HR . Follow-up at least annually.

## 2025-03-13 NOTE — ASSESSMENT & PLAN NOTE
The patient reports that he uses a wheelchair or a walker for ambulation. He can only walk a few feet without an assistive device.

## 2025-03-13 NOTE — ASSESSMENT & PLAN NOTE
Chronic, stable. Secondary  to papillary thyroid cancer.  The patient had thyroidectomy in 2022 resulting in hypothyroidism. The patient is being followed by oncology Dr. Fragoso. CT CHEST on 10/10/24 noted stable metastases to bilateral lungs. Continue with current defined treatment plan: watchful waiting. Follow-up at least annually.

## 2025-03-14 ENCOUNTER — PATIENT OUTREACH (OUTPATIENT)
Dept: HEALTH INFORMATION MANAGEMENT | Facility: OTHER | Age: 80
End: 2025-03-14
Payer: MEDICARE

## 2025-03-14 DIAGNOSIS — R25.2 LEG CRAMPS: ICD-10-CM

## 2025-03-14 DIAGNOSIS — Z87.39 HX OF GOUT: ICD-10-CM

## 2025-03-14 DIAGNOSIS — Z99.3 DEPENDENCE ON WHEELCHAIR: ICD-10-CM

## 2025-03-14 DIAGNOSIS — C79.51 METASTASIS TO BONE (HCC): ICD-10-CM

## 2025-03-14 DIAGNOSIS — I10 ESSENTIAL HYPERTENSION: ICD-10-CM

## 2025-03-15 NOTE — PROGRESS NOTES
Reason for Follow-Up:     Spoke to Shaniqua (spouse) for patient's monthly and quarterly PCM follow-up. Sina is seeing other outside specialists, he does have multiple chronic diagnosis.       Current Health Status:     Benign prostatic hyperplasia with nocturia [N40.1,R35.1]   Hyperlipidemia [E78.5]   AF (atrial fibrillation) (HCC) [I48.91]   Essential hypertension [I10]   Papillary thyroid carcinoma (HCC) [C73]  Leg cramps [R25.2]       Medical Updates:  Sina was seen yesterday for his Comprehensive Health Assessment. He did have some new concerns: swelling of limb, discoloration of skin and elevated LFTs overall patient's spouse Shaniqua reports stable condition with no recent hospitalizations.     Medication Updates:  No changes in medication regimen since last outreach. Patient reports taking medications as prescribed.     Symptoms:  Patient continues to complain of leg cramps it has been on and off and spouse states that when he drinks more water and takes the Magnesium then he does alright. He had some Vascular studies (US) both arterial and venous done and results were normal.      Plan of Care Goals and Progress:     Goal 1. Pain bilateral leg - vascular studies done results normal     Progress:  Patient will continue to verbalize his pain level and any new symptoms to all providers and this RN involved in his care.    Barriers:  advanced age, multiple chronic medical conditions, limited mobility d/t pain and SOB      Interventions:  This RN and patient will continue to work collaboratively with regards to management of pain/discomfort, education on self-management techniques, promoting healthy lifestyle habits like diet and exercise.     Education:  Discussed with patient pain control, fall preventions and safety d/t limited mobility and unsteady gait.       Goal 2. Follow through     Progress:  Patient will work with this RN about follow up labs, appointments and Imaging studies    Barriers:  advanced age,  other chronic medical conditions     Interventions:  This RN and patient will continue to work collaboratively on the importance of follow through - fluid and food intake, promote regular exercise as tolerated by patient     Education:  Discussed with patient signs and symptoms for management of care, disease process and importance of adherence to treatment plan with lifestyle modifications.        Patient's Concerns and Feedback (Self Management of Care):     Sina and Shaniqua expressed satisfaction with the current care plan outlined and no other questions or needs at the end outreach call. Patient has other testings scheduled ordered by Cancer Care Provider Dr. Fragoso,         Next Steps:     Follow-Up Plan: Discussed next follow-up outreach in one month to reassess progress.    Appointments:  Patient is scheduled Radiology 4/7/2025, PCP appointment 04/18/2025.      Contact Information:  Call [phone] with any questions or concerns.  Patient's spouse called regarding refill for Colchicine (MITIGARE) 0.6 MG Cap  informed her that PCP sent it 2/18/2025.   Quarterly chart review completed. Pt continues to qualify for and benefit from personal care management program.

## 2025-03-17 ENCOUNTER — APPOINTMENT (OUTPATIENT)
Dept: PHYSICAL THERAPY | Facility: REHABILITATION | Age: 80
End: 2025-03-17
Attending: STUDENT IN AN ORGANIZED HEALTH CARE EDUCATION/TRAINING PROGRAM
Payer: MEDICARE

## 2025-03-19 ENCOUNTER — APPOINTMENT (OUTPATIENT)
Dept: URBAN - METROPOLITAN AREA CLINIC 6 | Facility: CLINIC | Age: 80
Setting detail: DERMATOLOGY
End: 2025-03-19

## 2025-03-19 ENCOUNTER — APPOINTMENT (OUTPATIENT)
Dept: PHYSICAL THERAPY | Facility: REHABILITATION | Age: 80
End: 2025-03-19
Attending: STUDENT IN AN ORGANIZED HEALTH CARE EDUCATION/TRAINING PROGRAM
Payer: MEDICARE

## 2025-03-19 DIAGNOSIS — L57.0 ACTINIC KERATOSIS: ICD-10-CM

## 2025-03-19 DIAGNOSIS — M10.0 IDIOPATHIC GOUT: ICD-10-CM

## 2025-03-19 PROBLEM — M10.072 IDIOPATHIC GOUT, LEFT ANKLE AND FOOT: Status: ACTIVE | Noted: 2025-03-19

## 2025-03-19 PROBLEM — D48.5 NEOPLASM OF UNCERTAIN BEHAVIOR OF SKIN: Status: ACTIVE | Noted: 2025-03-19

## 2025-03-19 PROCEDURE — ? PRESCRIPTION

## 2025-03-19 PROCEDURE — ? LIQUID NITROGEN

## 2025-03-19 PROCEDURE — ? BIOPSY BY SHAVE METHOD

## 2025-03-19 PROCEDURE — ? COUNSELING

## 2025-03-19 PROCEDURE — 17003 DESTRUCT PREMALG LES 2-14: CPT

## 2025-03-19 PROCEDURE — 17000 DESTRUCT PREMALG LESION: CPT | Mod: 59

## 2025-03-19 PROCEDURE — 11102 TANGNTL BX SKIN SINGLE LES: CPT

## 2025-03-19 PROCEDURE — 99212 OFFICE O/P EST SF 10 MIN: CPT | Mod: 25

## 2025-03-19 PROCEDURE — ? PRESCRIPTION MEDICATION MANAGEMENT

## 2025-03-19 RX ORDER — COLCHICINE 0.6 MG/1
TABLET, FILM COATED ORAL
Qty: 30 | Refills: 1 | Status: ERX | COMMUNITY
Start: 2025-03-19

## 2025-03-19 RX ADMIN — COLCHICINE: 0.6 TABLET, FILM COATED ORAL at 00:00

## 2025-03-19 ASSESSMENT — LOCATION DETAILED DESCRIPTION DERM
LOCATION DETAILED: LEFT DORSAL RING METACARPOPHALANGEAL JOINT
LOCATION DETAILED: LEFT PROXIMAL DORSAL RING FINGER
LOCATION DETAILED: RIGHT SUPERIOR HELIX
LOCATION DETAILED: LEFT DORSAL FOOT

## 2025-03-19 ASSESSMENT — LOCATION ZONE DERM
LOCATION ZONE: FINGER
LOCATION ZONE: EAR
LOCATION ZONE: HAND
LOCATION ZONE: FEET

## 2025-03-19 ASSESSMENT — LOCATION SIMPLE DESCRIPTION DERM
LOCATION SIMPLE: LEFT FOOT
LOCATION SIMPLE: LEFT HAND
LOCATION SIMPLE: LEFT RING FINGER
LOCATION SIMPLE: RIGHT EAR

## 2025-03-19 NOTE — PROCEDURE: PRESCRIPTION MEDICATION MANAGEMENT
Initiate Treatment: colchicine 0.6 mg tablet : Take once daily.
Detail Level: Zone
Render In Strict Bullet Format?: No

## 2025-03-19 NOTE — PROCEDURE: LIQUID NITROGEN
Render Note In Bullet Format When Appropriate: No
Number Of Freeze-Thaw Cycles: 2 freeze-thaw cycles
Post-Care Instructions: I reviewed with the patient in detail post-care instructions. Patient is to wear sunprotection, and avoid picking at any of the treated lesions. Pt may apply Vaseline to crusted or scabbing areas.
Duration Of Freeze Thaw-Cycle (Seconds): 10
Show Applicator Variable?: Yes
Consent: The patient's consent was obtained including but not limited to risks of crusting, scabbing, blistering, scarring, darker or lighter pigmentary change, recurrence, incomplete removal and infection.
Detail Level: Zone

## 2025-03-24 ENCOUNTER — APPOINTMENT (OUTPATIENT)
Dept: PHYSICAL THERAPY | Facility: REHABILITATION | Age: 80
End: 2025-03-24
Attending: STUDENT IN AN ORGANIZED HEALTH CARE EDUCATION/TRAINING PROGRAM
Payer: MEDICARE

## 2025-03-26 ENCOUNTER — APPOINTMENT (OUTPATIENT)
Dept: PHYSICAL THERAPY | Facility: REHABILITATION | Age: 80
End: 2025-03-26
Attending: STUDENT IN AN ORGANIZED HEALTH CARE EDUCATION/TRAINING PROGRAM
Payer: MEDICARE

## 2025-03-28 ENCOUNTER — TELEPHONE (OUTPATIENT)
Dept: HEALTH INFORMATION MANAGEMENT | Facility: OTHER | Age: 80
End: 2025-03-28
Payer: MEDICARE

## 2025-03-28 RX ORDER — COLCHICINE 0.6 MG/1
TABLET ORAL
Qty: 30 TABLET | Refills: 1 | Status: SHIPPED | OUTPATIENT
Start: 2025-03-28

## 2025-03-28 NOTE — TELEPHONE ENCOUNTER
Pt's spouse called stating that her husbands latest prescription Colchicine (MITIGARE) 0.6 MG Cap that was called in should be tablets and not capsules. There is a significant price difference for the pt between the two. Will notify PCP. No further needs at this time.

## 2025-03-31 ENCOUNTER — APPOINTMENT (OUTPATIENT)
Dept: PHYSICAL THERAPY | Facility: REHABILITATION | Age: 80
End: 2025-03-31
Attending: STUDENT IN AN ORGANIZED HEALTH CARE EDUCATION/TRAINING PROGRAM
Payer: MEDICARE

## 2025-04-01 ENCOUNTER — APPOINTMENT (OUTPATIENT)
Dept: URBAN - METROPOLITAN AREA CLINIC 6 | Facility: CLINIC | Age: 80
Setting detail: DERMATOLOGY
End: 2025-04-01

## 2025-04-01 DIAGNOSIS — T07XXXA INSECT BITE, NONVENOMOUS, OF OTHER, MULTIPLE, AND UNSPECIFIED SITES, WITHOUT MENTION OF INFECTION: ICD-10-CM

## 2025-04-01 PROBLEM — S80.862A INSECT BITE (NONVENOMOUS), LEFT LOWER LEG, INITIAL ENCOUNTER: Status: ACTIVE | Noted: 2025-04-01

## 2025-04-01 PROCEDURE — 99212 OFFICE O/P EST SF 10 MIN: CPT

## 2025-04-01 PROCEDURE — ? PRESCRIPTION MEDICATION MANAGEMENT

## 2025-04-01 PROCEDURE — ? PRESCRIPTION

## 2025-04-01 PROCEDURE — ? COUNSELING

## 2025-04-01 RX ORDER — MUPIROCIN 20 MG/G
OINTMENT TOPICAL
Qty: 22 | Refills: 2 | Status: ERX | COMMUNITY
Start: 2025-04-01

## 2025-04-01 RX ORDER — DOXYCYCLINE MONOHYDRATE 100 MG/1
CAPSULE ORAL
Qty: 20 | Refills: 0 | Status: ERX | COMMUNITY
Start: 2025-04-01

## 2025-04-01 RX ADMIN — MUPIROCIN: 20 OINTMENT TOPICAL at 00:00

## 2025-04-01 RX ADMIN — DOXYCYCLINE MONOHYDRATE: 100 CAPSULE ORAL at 00:00

## 2025-04-01 ASSESSMENT — LOCATION SIMPLE DESCRIPTION DERM: LOCATION SIMPLE: LEFT LOWER LEG

## 2025-04-01 ASSESSMENT — LOCATION DETAILED DESCRIPTION DERM: LOCATION DETAILED: LEFT LATERAL DISTAL CALF

## 2025-04-01 ASSESSMENT — LOCATION ZONE DERM: LOCATION ZONE: LEG

## 2025-04-01 NOTE — PROCEDURE: PRESCRIPTION MEDICATION MANAGEMENT
Detail Level: Zone
Initiate Treatment: Doxycycline monohydrate 100 mg capsule and Mupirocin 2 % topical ointment
Plan: If lack of improvement at follow up visit in 2 weeks, will proceed with biopsy to rule out a SCC
Render In Strict Bullet Format?: No

## 2025-04-02 ENCOUNTER — APPOINTMENT (OUTPATIENT)
Dept: PHYSICAL THERAPY | Facility: REHABILITATION | Age: 80
End: 2025-04-02
Attending: STUDENT IN AN ORGANIZED HEALTH CARE EDUCATION/TRAINING PROGRAM
Payer: MEDICARE

## 2025-04-04 PROCEDURE — RXMED WILLOW AMBULATORY MEDICATION CHARGE: Performed by: FAMILY MEDICINE

## 2025-04-05 ENCOUNTER — PHARMACY VISIT (OUTPATIENT)
Dept: PHARMACY | Facility: MEDICAL CENTER | Age: 80
End: 2025-04-05
Payer: COMMERCIAL

## 2025-04-07 ENCOUNTER — HOSPITAL ENCOUNTER (OUTPATIENT)
Dept: RADIOLOGY | Facility: MEDICAL CENTER | Age: 80
End: 2025-04-07
Attending: INTERNAL MEDICINE
Payer: MEDICARE

## 2025-04-07 DIAGNOSIS — C73 MALIGNANT NEOPLASM OF THYROID GLAND (HCC): ICD-10-CM

## 2025-04-07 PROCEDURE — A9503 TC99M MEDRONATE: HCPCS

## 2025-04-08 ENCOUNTER — TELEPHONE (OUTPATIENT)
Dept: MEDICAL GROUP | Facility: PHYSICIAN GROUP | Age: 80
End: 2025-04-08
Payer: MEDICARE

## 2025-04-08 DIAGNOSIS — Z87.39 HX OF GOUT: ICD-10-CM

## 2025-04-08 NOTE — TELEPHONE ENCOUNTER
Received a call from spouse states that patient gout has flared up - it's on the other foot now. Do you want him to get a Uric acid level check last one done was in December 2024. Please advise.     Patient went to get his labs done but the lab at [place] did not draw the Uric Acid -- patient will go today per spouse.

## 2025-04-09 ENCOUNTER — HOSPITAL ENCOUNTER (OUTPATIENT)
Dept: LAB | Facility: MEDICAL CENTER | Age: 80
End: 2025-04-09
Attending: INTERNAL MEDICINE
Payer: MEDICARE

## 2025-04-09 LAB
ALBUMIN SERPL BCP-MCNC: 4.1 G/DL (ref 3.2–4.9)
ALBUMIN/GLOB SERPL: 1.6 G/DL
ALP SERPL-CCNC: 89 U/L (ref 30–99)
ALT SERPL-CCNC: 53 U/L (ref 2–50)
ANION GAP SERPL CALC-SCNC: 12 MMOL/L (ref 7–16)
ANION GAP SERPL CALC-SCNC: 20 MMOL/L (ref 7–16)
AST SERPL-CCNC: 42 U/L (ref 12–45)
BASOPHILS # BLD AUTO: 0.5 % (ref 0–1.8)
BASOPHILS # BLD: 0.05 K/UL (ref 0–0.12)
BILIRUB SERPL-MCNC: 0.9 MG/DL (ref 0.1–1.5)
BUN SERPL-MCNC: 24 MG/DL (ref 8–22)
BUN SERPL-MCNC: 25 MG/DL (ref 8–22)
CALCIUM ALBUM COR SERPL-MCNC: 9.8 MG/DL (ref 8.5–10.5)
CALCIUM SERPL-MCNC: 9.8 MG/DL (ref 8.5–10.5)
CALCIUM SERPL-MCNC: 9.9 MG/DL (ref 8.5–10.5)
CHLORIDE SERPL-SCNC: 101 MMOL/L (ref 96–112)
CHLORIDE SERPL-SCNC: 107 MMOL/L (ref 96–112)
CO2 SERPL-SCNC: 20 MMOL/L (ref 20–33)
CO2 SERPL-SCNC: 21 MMOL/L (ref 20–33)
CREAT SERPL-MCNC: 1.2 MG/DL (ref 0.5–1.4)
CREAT SERPL-MCNC: 1.21 MG/DL (ref 0.5–1.4)
EOSINOPHIL # BLD AUTO: 0.1 K/UL (ref 0–0.51)
EOSINOPHIL NFR BLD: 1 % (ref 0–6.9)
ERYTHROCYTE [DISTWIDTH] IN BLOOD BY AUTOMATED COUNT: 52.2 FL (ref 35.9–50)
GFR SERPLBLD CREATININE-BSD FMLA CKD-EPI: 60 ML/MIN/1.73 M 2
GFR SERPLBLD CREATININE-BSD FMLA CKD-EPI: 60 ML/MIN/1.73 M 2
GLOBULIN SER CALC-MCNC: 2.6 G/DL (ref 1.9–3.5)
GLUCOSE SERPL-MCNC: 135 MG/DL (ref 65–99)
GLUCOSE SERPL-MCNC: 136 MG/DL (ref 65–99)
HCT VFR BLD AUTO: 44 % (ref 42–52)
HGB BLD-MCNC: 14.7 G/DL (ref 14–18)
IMM GRANULOCYTES # BLD AUTO: 0.07 K/UL (ref 0–0.11)
IMM GRANULOCYTES NFR BLD AUTO: 0.7 % (ref 0–0.9)
LYMPHOCYTES # BLD AUTO: 0.93 K/UL (ref 1–4.8)
LYMPHOCYTES NFR BLD: 9.6 % (ref 22–41)
MCH RBC QN AUTO: 34.8 PG (ref 27–33)
MCHC RBC AUTO-ENTMCNC: 33.4 G/DL (ref 32.3–36.5)
MCV RBC AUTO: 104.3 FL (ref 81.4–97.8)
MONOCYTES # BLD AUTO: 0.89 K/UL (ref 0–0.85)
MONOCYTES NFR BLD AUTO: 9.1 % (ref 0–13.4)
NEUTROPHILS # BLD AUTO: 7.69 K/UL (ref 1.82–7.42)
NEUTROPHILS NFR BLD: 79.1 % (ref 44–72)
NRBC # BLD AUTO: 0 K/UL
NRBC BLD-RTO: 0 /100 WBC (ref 0–0.2)
NT-PROBNP SERPL IA-MCNC: 1630 PG/ML (ref 0–125)
PLATELET # BLD AUTO: 194 K/UL (ref 164–446)
PMV BLD AUTO: 10.2 FL (ref 9–12.9)
POTASSIUM SERPL-SCNC: 4.4 MMOL/L (ref 3.6–5.5)
POTASSIUM SERPL-SCNC: 4.5 MMOL/L (ref 3.6–5.5)
PROT SERPL-MCNC: 6.7 G/DL (ref 6–8.2)
RBC # BLD AUTO: 4.22 M/UL (ref 4.7–6.1)
SODIUM SERPL-SCNC: 140 MMOL/L (ref 135–145)
SODIUM SERPL-SCNC: 141 MMOL/L (ref 135–145)
T4 FREE SERPL-MCNC: 1.5 NG/DL (ref 0.93–1.7)
TSH SERPL-ACNC: 1.34 UIU/ML (ref 0.38–5.33)
WBC # BLD AUTO: 9.7 K/UL (ref 4.8–10.8)

## 2025-04-09 PROCEDURE — 36415 COLL VENOUS BLD VENIPUNCTURE: CPT

## 2025-04-09 PROCEDURE — 84439 ASSAY OF FREE THYROXINE: CPT

## 2025-04-09 PROCEDURE — 80048 BASIC METABOLIC PNL TOTAL CA: CPT

## 2025-04-09 PROCEDURE — 83880 ASSAY OF NATRIURETIC PEPTIDE: CPT

## 2025-04-09 PROCEDURE — 85025 COMPLETE CBC W/AUTO DIFF WBC: CPT

## 2025-04-09 PROCEDURE — 86800 THYROGLOBULIN ANTIBODY: CPT

## 2025-04-09 PROCEDURE — 84443 ASSAY THYROID STIM HORMONE: CPT

## 2025-04-09 PROCEDURE — 80053 COMPREHEN METABOLIC PANEL: CPT

## 2025-04-10 ENCOUNTER — HOSPITAL ENCOUNTER (OUTPATIENT)
Dept: LAB | Facility: MEDICAL CENTER | Age: 80
End: 2025-04-10
Attending: FAMILY MEDICINE
Payer: MEDICARE

## 2025-04-10 DIAGNOSIS — Z87.39 HX OF GOUT: ICD-10-CM

## 2025-04-10 LAB — URATE SERPL-MCNC: 3.9 MG/DL (ref 2.5–8.3)

## 2025-04-10 PROCEDURE — 36415 COLL VENOUS BLD VENIPUNCTURE: CPT

## 2025-04-10 PROCEDURE — 84550 ASSAY OF BLOOD/URIC ACID: CPT

## 2025-04-11 LAB — THYROGLOB AB SERPL-ACNC: 39.6 IU/ML (ref 0–4)

## 2025-04-14 ENCOUNTER — PHARMACY VISIT (OUTPATIENT)
Dept: PHARMACY | Facility: MEDICAL CENTER | Age: 80
End: 2025-04-14
Payer: COMMERCIAL

## 2025-04-14 ENCOUNTER — RX ONLY (RX ONLY)
Age: 80
End: 2025-04-14

## 2025-04-14 PROCEDURE — RXMED WILLOW AMBULATORY MEDICATION CHARGE: Performed by: PHYSICIAN ASSISTANT

## 2025-04-14 PROCEDURE — RXMED WILLOW AMBULATORY MEDICATION CHARGE: Performed by: FAMILY MEDICINE

## 2025-04-14 RX ORDER — COLCHICINE 0.6 MG/1
TABLET, FILM COATED ORAL
Qty: 90 | Refills: 1 | Status: ERX | COMMUNITY
Start: 2025-04-14

## 2025-04-15 ENCOUNTER — APPOINTMENT (OUTPATIENT)
Dept: URBAN - METROPOLITAN AREA CLINIC 6 | Facility: CLINIC | Age: 80
Setting detail: DERMATOLOGY
End: 2025-04-15

## 2025-04-15 PROBLEM — D48.5 NEOPLASM OF UNCERTAIN BEHAVIOR OF SKIN: Status: ACTIVE | Noted: 2025-04-15

## 2025-04-15 PROCEDURE — ? BIOPSY BY SHAVE METHOD

## 2025-04-15 PROCEDURE — 11102 TANGNTL BX SKIN SINGLE LES: CPT

## 2025-04-18 ENCOUNTER — HOSPITAL ENCOUNTER (OUTPATIENT)
Facility: MEDICAL CENTER | Age: 80
End: 2025-04-18
Attending: FAMILY MEDICINE
Payer: MEDICARE

## 2025-04-18 ENCOUNTER — OFFICE VISIT (OUTPATIENT)
Dept: MEDICAL GROUP | Facility: PHYSICIAN GROUP | Age: 80
End: 2025-04-18
Payer: MEDICARE

## 2025-04-18 VITALS
DIASTOLIC BLOOD PRESSURE: 60 MMHG | SYSTOLIC BLOOD PRESSURE: 112 MMHG | WEIGHT: 272.9 LBS | RESPIRATION RATE: 20 BRPM | HEIGHT: 73 IN | BODY MASS INDEX: 36.17 KG/M2 | TEMPERATURE: 97.5 F | HEART RATE: 71 BPM | OXYGEN SATURATION: 94 %

## 2025-04-18 DIAGNOSIS — M79.672 LEFT FOOT PAIN: ICD-10-CM

## 2025-04-18 DIAGNOSIS — R31.9 HEMATURIA, UNSPECIFIED TYPE: ICD-10-CM

## 2025-04-18 DIAGNOSIS — I50.32 CHRONIC DIASTOLIC HEART FAILURE (HCC): ICD-10-CM

## 2025-04-18 DIAGNOSIS — R73.9 HYPERGLYCEMIA: ICD-10-CM

## 2025-04-18 DIAGNOSIS — M25.572 ACUTE LEFT ANKLE PAIN: ICD-10-CM

## 2025-04-18 LAB
HBA1C MFR BLD: 6.1 % (ref ?–5.8)
POCT INT CON NEG: NEGATIVE
POCT INT CON POS: POSITIVE

## 2025-04-18 PROCEDURE — 83036 HEMOGLOBIN GLYCOSYLATED A1C: CPT | Performed by: FAMILY MEDICINE

## 2025-04-18 PROCEDURE — 87086 URINE CULTURE/COLONY COUNT: CPT

## 2025-04-18 PROCEDURE — 3078F DIAST BP <80 MM HG: CPT | Performed by: FAMILY MEDICINE

## 2025-04-18 PROCEDURE — 99214 OFFICE O/P EST MOD 30 MIN: CPT | Performed by: FAMILY MEDICINE

## 2025-04-18 PROCEDURE — 3074F SYST BP LT 130 MM HG: CPT | Performed by: FAMILY MEDICINE

## 2025-04-18 RX ORDER — TORSEMIDE 100 MG/1
TABLET ORAL
COMMUNITY

## 2025-04-18 RX ORDER — METOPROLOL TARTRATE 50 MG
TABLET ORAL
COMMUNITY

## 2025-04-18 RX ORDER — MUPIROCIN 20 MG/G
OINTMENT TOPICAL
COMMUNITY
Start: 2025-04-01

## 2025-04-18 RX ORDER — DOXYCYCLINE 100 MG/1
CAPSULE ORAL
COMMUNITY
Start: 2025-04-01

## 2025-04-18 ASSESSMENT — FIBROSIS 4 INDEX: FIB4 SCORE: 2.44
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
## 2025-04-21 ENCOUNTER — HOSPITAL ENCOUNTER (OUTPATIENT)
Dept: RADIOLOGY | Facility: MEDICAL CENTER | Age: 80
End: 2025-04-21
Attending: FAMILY MEDICINE
Payer: MEDICARE

## 2025-04-21 DIAGNOSIS — M79.672 LEFT FOOT PAIN: ICD-10-CM

## 2025-04-21 DIAGNOSIS — M25.572 ACUTE LEFT ANKLE PAIN: ICD-10-CM

## 2025-04-21 LAB
BACTERIA UR CULT: NORMAL
SIGNIFICANT IND 70042: NORMAL
SITE SITE: NORMAL
SOURCE SOURCE: NORMAL

## 2025-04-21 PROCEDURE — 73610 X-RAY EXAM OF ANKLE: CPT | Mod: LT

## 2025-04-21 PROCEDURE — 73630 X-RAY EXAM OF FOOT: CPT | Mod: LT

## 2025-04-22 ENCOUNTER — TELEPHONE (OUTPATIENT)
Dept: MEDICAL GROUP | Facility: PHYSICIAN GROUP | Age: 80
End: 2025-04-22
Payer: MEDICARE

## 2025-04-22 NOTE — TELEPHONE ENCOUNTER
Received call from spouse they had the appt today with Cardiology [facility] and provider ordered lab asap d/t patient taking both torsemide and furosemide. Shaniqua states he will go to the lab tomorrow. Scheduled him for a f/u appt with PCP the beginning of May. No other questions/concerns at this time.

## 2025-04-23 ENCOUNTER — HOSPITAL ENCOUNTER (OUTPATIENT)
Dept: LAB | Facility: MEDICAL CENTER | Age: 80
End: 2025-04-23
Attending: PHYSICIAN ASSISTANT
Payer: MEDICARE

## 2025-04-23 PROCEDURE — 80048 BASIC METABOLIC PNL TOTAL CA: CPT

## 2025-04-23 PROCEDURE — 83880 ASSAY OF NATRIURETIC PEPTIDE: CPT

## 2025-04-23 PROCEDURE — 36415 COLL VENOUS BLD VENIPUNCTURE: CPT

## 2025-04-24 LAB
ANION GAP SERPL CALC-SCNC: 14 MMOL/L (ref 7–16)
BUN SERPL-MCNC: 33 MG/DL (ref 8–22)
CALCIUM SERPL-MCNC: 9.9 MG/DL (ref 8.5–10.5)
CHLORIDE SERPL-SCNC: 104 MMOL/L (ref 96–112)
CO2 SERPL-SCNC: 21 MMOL/L (ref 20–33)
CREAT SERPL-MCNC: 1.49 MG/DL (ref 0.5–1.4)
GFR SERPLBLD CREATININE-BSD FMLA CKD-EPI: 47 ML/MIN/1.73 M 2
GLUCOSE SERPL-MCNC: 154 MG/DL (ref 65–99)
NT-PROBNP SERPL IA-MCNC: 1367 PG/ML (ref 0–125)
POTASSIUM SERPL-SCNC: 4.7 MMOL/L (ref 3.6–5.5)
SODIUM SERPL-SCNC: 139 MMOL/L (ref 135–145)

## 2025-04-28 PROCEDURE — RXMED WILLOW AMBULATORY MEDICATION CHARGE: Performed by: PHYSICIAN ASSISTANT

## 2025-04-29 PROCEDURE — RXMED WILLOW AMBULATORY MEDICATION CHARGE: Performed by: FAMILY MEDICINE

## 2025-04-29 RX ORDER — CYCLOBENZAPRINE HCL 10 MG
TABLET ORAL
Qty: 30 TABLET | Refills: 0 | Status: SHIPPED | OUTPATIENT
Start: 2025-04-29

## 2025-04-29 NOTE — TELEPHONE ENCOUNTER
Received request via: Pharmacy    Was the patient seen in the last year in this department? Yes    Does the patient have an active prescription (recently filled or refills available) for medication(s) requested? No    Pharmacy Name: [facility]    Does the patient have longterm Plus and need 100-day supply? (This applies to ALL medications) Yes, quantity updated to 100 days

## 2025-05-01 ENCOUNTER — PHARMACY VISIT (OUTPATIENT)
Dept: PHARMACY | Facility: MEDICAL CENTER | Age: 80
End: 2025-05-01
Payer: COMMERCIAL

## 2025-05-01 PROCEDURE — RXMED WILLOW AMBULATORY MEDICATION CHARGE: Performed by: PHYSICIAN ASSISTANT

## 2025-05-02 ENCOUNTER — PHARMACY VISIT (OUTPATIENT)
Dept: PHARMACY | Facility: MEDICAL CENTER | Age: 80
End: 2025-05-02
Payer: COMMERCIAL

## 2025-05-05 ENCOUNTER — TELEPHONE (OUTPATIENT)
Dept: MEDICAL GROUP | Facility: PHYSICIAN GROUP | Age: 80
End: 2025-05-05
Payer: MEDICARE

## 2025-05-05 NOTE — TELEPHONE ENCOUNTER
Spoke to Gabe at [facility] last dispensed Flexeril 10mg 4 days ago for a 15 day supply. Patient has an appt tomorrow with PCP. PCP alerted.     Informed PCP that patient will need a referral to Dr. Michael Wood MD, FAC   [id number] [facility] [place], [place] [id number] [phone]  Fax: [phone]

## 2025-05-06 ENCOUNTER — OFFICE VISIT (OUTPATIENT)
Dept: MEDICAL GROUP | Facility: PHYSICIAN GROUP | Age: 80
End: 2025-05-06
Payer: MEDICARE

## 2025-05-06 VITALS
HEIGHT: 73 IN | BODY MASS INDEX: 35.85 KG/M2 | WEIGHT: 270.5 LBS | TEMPERATURE: 98.5 F | SYSTOLIC BLOOD PRESSURE: 118 MMHG | DIASTOLIC BLOOD PRESSURE: 64 MMHG | HEART RATE: 68 BPM | RESPIRATION RATE: 17 BRPM | OXYGEN SATURATION: 92 %

## 2025-05-06 DIAGNOSIS — R25.2 MUSCLE CRAMPS: ICD-10-CM

## 2025-05-06 DIAGNOSIS — I48.91 ATRIAL FIBRILLATION, UNSPECIFIED TYPE (HCC): ICD-10-CM

## 2025-05-06 DIAGNOSIS — R79.89 ABNORMAL SERUM CREATININE LEVEL: ICD-10-CM

## 2025-05-06 DIAGNOSIS — I10 ESSENTIAL HYPERTENSION: ICD-10-CM

## 2025-05-06 DIAGNOSIS — R25.2 LEG CRAMPS: ICD-10-CM

## 2025-05-06 PROCEDURE — 3078F DIAST BP <80 MM HG: CPT | Performed by: FAMILY MEDICINE

## 2025-05-06 PROCEDURE — 3074F SYST BP LT 130 MM HG: CPT | Performed by: FAMILY MEDICINE

## 2025-05-06 PROCEDURE — 99214 OFFICE O/P EST MOD 30 MIN: CPT | Performed by: FAMILY MEDICINE

## 2025-05-06 RX ORDER — CYCLOBENZAPRINE HCL 10 MG
TABLET ORAL
Qty: 90 TABLET | Refills: 0 | Status: SHIPPED | OUTPATIENT
Start: 2025-05-06 | End: 2025-08-04

## 2025-05-06 ASSESSMENT — FIBROSIS 4 INDEX: FIB4 SCORE: 2.44
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
## 2025-05-15 ENCOUNTER — APPOINTMENT (OUTPATIENT)
Dept: URBAN - METROPOLITAN AREA CLINIC 6 | Facility: CLINIC | Age: 80
Setting detail: DERMATOLOGY
End: 2025-05-15

## 2025-05-15 PROBLEM — C44.729 SQUAMOUS CELL CARCINOMA OF SKIN OF LEFT LOWER LIMB, INCLUDING HIP: Status: ACTIVE | Noted: 2025-05-15

## 2025-05-15 PROCEDURE — ? PRESCRIPTION

## 2025-05-15 PROCEDURE — ? PRESCRIPTION MEDICATION MANAGEMENT

## 2025-05-15 PROCEDURE — ? EXCISION

## 2025-05-15 PROCEDURE — 12034 INTMD RPR S/TR/EXT 7.6-12.5: CPT

## 2025-05-15 PROCEDURE — 11603 EXC TR-EXT MAL+MARG 2.1-3 CM: CPT

## 2025-05-15 RX ORDER — CLINDAMYCIN HYDROCHLORIDE 300 MG/1
1 CAPSULE ORAL QID
Qty: 20 | Refills: 0 | Status: ERX | COMMUNITY
Start: 2025-05-15

## 2025-05-15 RX ADMIN — CLINDAMYCIN HYDROCHLORIDE 1: 300 CAPSULE ORAL at 00:00

## 2025-05-15 NOTE — PROCEDURE: PRESCRIPTION MEDICATION MANAGEMENT
Initiate Treatment: clindamycin HCl 300 mg capsule QidTake one capsule 4 times a day for 5 days
Detail Level: Zone
Render In Strict Bullet Format?: No

## 2025-05-15 NOTE — PROCEDURE: EXCISION
Referring Physician (Optional): Dr. Albarran
Surgeon (Optional): Elizabeth Christensen MD
Biopsy Photograph Reviewed: Yes
Accession #: [id number]
Date Of Previous Biopsy (Optional): 04/15/2025
Size Of Lesion In Cm: 1.7
X Size Of Lesion In Cm (Optional): 0.8
Size Of Margin In Cm: 0.5
Anesthesia Volume In Cc: 6
Was An Eye Clamp Used?: No
Eye Clamp Note Details: An eye clamp was used during the procedure.
Excision Method: Elliptical
Saucerization Depth: dermis and superficial adipose tissue
Repair Type: Intermediate
Intermediate / Complex Repair - Final Wound Length In Cm: 7.6
Deep Sutures: 3-0 Maxon
Epidermal Sutures: 4-0 Vicryl Rapide
Suture Removal: 14 days
Suturegard Retention Suture: 2-0 Nylon
Retention Suture Bite Size: 3 mm
Length To Time In Minutes Device Was In Place: 10
Number Of Hemigard Strips Per Side: 1
Undermining Type: Entire Wound
Debridement Text: The wound edges were debrided prior to proceeding with the closure to facilitate wound healing.
Helical Rim Text: The closure involved the helical rim.
Vermilion Border Text: The closure involved the vermilion border.
Nostril Rim Text: The closure involved the nostril rim.
Retention Suture Text: Retention sutures were placed to support the closure and prevent dehiscence.
Primary Defect Length (In Cm): 0
Lab: [id number]
Lab Facility: 
Graft Donor Site Bandage (Optional-Leave Blank If You Don't Want In Note): Steri-strips and a pressure bandage were applied to the donor site.
Epidermal Closure Graft Donor Site (Optional): simple interrupted
Billing Type: Third-Party Bill
Excision Depth: adipose tissue
Scalpel Size: 15 blade
Anesthesia Type: 1% lidocaine with epinephrine and a 1:10 solution of 8.4% sodium bicarbonate
Hemostasis: Electrodesiccation
Estimated Blood Loss (Cc): minimal
Detail Level: Detailed
Repair Depth: use same depth as excision depth
Dermal Closure: buried vertical mattress
Epidermal Closure: running cuticular
Wound Care: Vaseline
Dressing: pressure dressing
Suturegard Intro: Intraoperative tissue expansion was performed, utilizing the SUTUREGARD device, in order to reduce wound tension.
Suturegard Body: The suture ends were repeatedly re-tightened and re-clamped to achieve the desired tissue expansion.
Hemigard Intro: Due to skin fragility and wound tension, it was decided to use HEMIGARD adhesive retention suture devices to permit a linear closure. The skin was cleaned and dried for a 6cm distance away from the wound. Excessive hair, if present, was removed to allow for adhesion.
Hemigard Postcare Instructions: The HEMIGARD strips are to remain completely dry for at least 5-7 days.
Positioning (Leave Blank If You Do Not Want): The patient was placed in a comfortable position exposing the surgical site.
Pre-Excision Curettage Text (Leave Blank If You Do Not Want): Prior to drawing the surgical margin the visible lesion was removed with electrodesiccation and curettage to clearly define the lesion size.
Complex Repair Preamble Text (Leave Blank If You Do Not Want): Extensive wide undermining was performed.
Intermediate Repair Preamble Text (Leave Blank If You Do Not Want): Undermining was performed with blunt dissection.
Curvilinear Excision Additional Text (Leave Blank If You Do Not Want): The margin was drawn around the clinically apparent lesion.  A curvilinear shape was then drawn on the skin incorporating the lesion and margins.  Incisions were then made along these lines to the appropriate tissue plane and the lesion was extirpated.
Fusiform Excision Additional Text (Leave Blank If You Do Not Want): The margin was drawn around the clinically apparent lesion.  A fusiform shape was then drawn on the skin incorporating the lesion and margins.  Incisions were then made along these lines to the appropriate tissue plane and the lesion was extirpated.
Elliptical Excision Additional Text (Leave Blank If You Do Not Want): The margin was drawn around the clinically apparent lesion.  An elliptical shape was then drawn on the skin incorporating the lesion and margins.  Incisions were then made along these lines to the appropriate tissue plane and the lesion was extirpated.
Saucerization Excision Additional Text (Leave Blank If You Do Not Want): The margin was drawn around the clinically apparent lesion.  Incisions were then made along these lines, in a tangential fashion, to the appropriate tissue plane and the lesion was extirpated.
Slit Excision Additional Text (Leave Blank If You Do Not Want): A linear line was drawn on the skin overlying the lesion. An incision was made slowly until the lesion was visualized.  Once visualized, the lesion was removed with blunt dissection.
Excisional Biopsy Additional Text (Leave Blank If You Do Not Want): The margin was drawn around the clinically apparent lesion. An elliptical shape was then drawn on the skin incorporating the lesion and margins.  Incisions were then made along these lines to the appropriate tissue plane and the lesion was extirpated.
Perilesional Excision Additional Text (Leave Blank If You Do Not Want): The margin was drawn around the clinically apparent lesion. Incisions were then made along these lines to the appropriate tissue plane and the lesion was extirpated.
Repair Performed By Another Provider Text (Leave Blank If You Do Not Want): After the tissue was excised the defect was repaired by another provider.
No Repair - Repaired With Adjacent Surgical Defect Text (Leave Blank If You Do Not Want): After the excision the defect was repaired concurrently with another surgical defect which was in close approximation.
Adjacent Tissue Transfer Text: The defect edges were debeveled with a #15 scalpel blade. Given the location of the defect and the proximity to free margins an adjacent tissue transfer was deemed most appropriate. Using a sterile surgical marker, an appropriate flap was drawn incorporating the defect and placing the expected incisions within the relaxed skin tension lines where possible. The area thus outlined was incised deep to adipose tissue with a #15 scalpel blade. The skin margins were undermined to an appropriate distance in all directions utilizing iris scissors and carried over to close the primary defect.
Advancement Flap (Single) Text: The defect edges were debeveled with a #15 scalpel blade.  Given the location of the defect and the proximity to free margins a single advancement flap was deemed most appropriate.  Using a sterile surgical marker, an appropriate advancement flap was drawn incorporating the defect and placing the expected incisions within the relaxed skin tension lines where possible.    The area thus outlined was incised deep to adipose tissue with a #15 scalpel blade.  The skin margins were undermined to an appropriate distance in all directions utilizing iris scissors.
Advancement Flap (Double) Text: The defect edges were debeveled with a #15 scalpel blade.  Given the location of the defect and the proximity to free margins a double advancement flap was deemed most appropriate.  Using a sterile surgical marker, the appropriate advancement flaps were drawn incorporating the defect and placing the expected incisions within the relaxed skin tension lines where possible.    The area thus outlined was incised deep to adipose tissue with a #15 scalpel blade.  The skin margins were undermined to an appropriate distance in all directions utilizing iris scissors.
Burow's Advancement Flap Text: The defect edges were debeveled with a #15 scalpel blade.  Given the location of the defect and the proximity to free margins a Burow's advancement flap was deemed most appropriate.  Using a sterile surgical marker, the appropriate advancement flap was drawn incorporating the defect and placing the expected incisions within the relaxed skin tension lines where possible.    The area thus outlined was incised deep to adipose tissue with a #15 scalpel blade.  The skin margins were undermined to an appropriate distance in all directions utilizing iris scissors.
Chonodrocutaneous Helical Advancement Flap Text: The defect edges were debeveled with a #15 scalpel blade.  Given the location of the defect and the proximity to free margins a chondrocutaneous helical advancement flap was deemed most appropriate.  Using a sterile surgical marker, the appropriate advancement flap was drawn incorporating the defect and placing the expected incisions within the relaxed skin tension lines where possible.    The area thus outlined was incised deep to adipose tissue with a #15 scalpel blade.  The skin margins were undermined to an appropriate distance in all directions utilizing iris scissors.
Crescentic Advancement Flap Text: The defect edges were debeveled with a #15 scalpel blade.  Given the location of the defect and the proximity to free margins a crescentic advancement flap was deemed most appropriate.  Using a sterile surgical marker, the appropriate advancement flap was drawn incorporating the defect and placing the expected incisions within the relaxed skin tension lines where possible.    The area thus outlined was incised deep to adipose tissue with a #15 scalpel blade.  The skin margins were undermined to an appropriate distance in all directions utilizing iris scissors.
A-T Advancement Flap Text: The defect edges were debeveled with a #15 scalpel blade.  Given the location of the defect, shape of the defect and the proximity to free margins an A-T advancement flap was deemed most appropriate.  Using a sterile surgical marker, an appropriate advancement flap was drawn incorporating the defect and placing the expected incisions within the relaxed skin tension lines where possible.    The area thus outlined was incised deep to adipose tissue with a #15 scalpel blade.  The skin margins were undermined to an appropriate distance in all directions utilizing iris scissors.
O-T Advancement Flap Text: The defect edges were debeveled with a #15 scalpel blade.  Given the location of the defect, shape of the defect and the proximity to free margins an O-T advancement flap was deemed most appropriate.  Using a sterile surgical marker, an appropriate advancement flap was drawn incorporating the defect and placing the expected incisions within the relaxed skin tension lines where possible.    The area thus outlined was incised deep to adipose tissue with a #15 scalpel blade.  The skin margins were undermined to an appropriate distance in all directions utilizing iris scissors.
O-L Flap Text: The defect edges were debeveled with a #15 scalpel blade.  Given the location of the defect, shape of the defect and the proximity to free margins an O-L flap was deemed most appropriate.  Using a sterile surgical marker, an appropriate advancement flap was drawn incorporating the defect and placing the expected incisions within the relaxed skin tension lines where possible.    The area thus outlined was incised deep to adipose tissue with a #15 scalpel blade.  The skin margins were undermined to an appropriate distance in all directions utilizing iris scissors.
O-Z Flap Text: The defect edges were debeveled with a #15 scalpel blade.  Given the location of the defect, shape of the defect and the proximity to free margins an O-Z flap was deemed most appropriate.  Using a sterile surgical marker, an appropriate transposition flap was drawn incorporating the defect and placing the expected incisions within the relaxed skin tension lines where possible. The area thus outlined was incised deep to adipose tissue with a #15 scalpel blade.  The skin margins were undermined to an appropriate distance in all directions utilizing iris scissors.
Double O-Z Flap Text: The defect edges were debeveled with a #15 scalpel blade.  Given the location of the defect, shape of the defect and the proximity to free margins a Double O-Z flap was deemed most appropriate.  Using a sterile surgical marker, an appropriate transposition flap was drawn incorporating the defect and placing the expected incisions within the relaxed skin tension lines where possible. The area thus outlined was incised deep to adipose tissue with a #15 scalpel blade.  The skin margins were undermined to an appropriate distance in all directions utilizing iris scissors.
V-Y Flap Text: The defect edges were debeveled with a #15 scalpel blade.  Given the location of the defect, shape of the defect and the proximity to free margins a V-Y flap was deemed most appropriate.  Using a sterile surgical marker, an appropriate advancement flap was drawn incorporating the defect and placing the expected incisions within the relaxed skin tension lines where possible.    The area thus outlined was incised deep to adipose tissue with a #15 scalpel blade.  The skin margins were undermined to an appropriate distance in all directions utilizing iris scissors.
Advancement-Rotation Flap Text: The defect edges were debeveled with a #15 scalpel blade.  Given the location of the defect, shape of the defect and the proximity to free margins an advancement-rotation flap was deemed most appropriate.  Using a sterile surgical marker, an appropriate flap was drawn incorporating the defect and placing the expected incisions within the relaxed skin tension lines where possible. The area thus outlined was incised deep to adipose tissue with a #15 scalpel blade.  The skin margins were undermined to an appropriate distance in all directions utilizing iris scissors.
Mercedes Flap Text: The defect edges were debeveled with a #15 scalpel blade.  Given the location of the defect, shape of the defect and the proximity to free margins a Mercedes flap was deemed most appropriate.  Using a sterile surgical marker, an appropriate advancement flap was drawn incorporating the defect and placing the expected incisions within the relaxed skin tension lines where possible. The area thus outlined was incised deep to adipose tissue with a #15 scalpel blade.  The skin margins were undermined to an appropriate distance in all directions utilizing iris scissors.
Modified Advancement Flap Text: The defect edges were debeveled with a #15 scalpel blade.  Given the location of the defect, shape of the defect and the proximity to free margins a modified advancement flap was deemed most appropriate.  Using a sterile surgical marker, an appropriate advancement flap was drawn incorporating the defect and placing the expected incisions within the relaxed skin tension lines where possible.    The area thus outlined was incised deep to adipose tissue with a #15 scalpel blade.  The skin margins were undermined to an appropriate distance in all directions utilizing iris scissors.
Mucosal Advancement Flap Text: Given the location of the defect, shape of the defect and the proximity to free margins a mucosal advancement flap was deemed most appropriate. Incisions were made with a 15 blade scalpel in the appropriate fashion along the cutaneous vermilion border and the mucosal lip. The remaining actinically damaged mucosal tissue was excised.  The mucosal advancement flap was then elevated to the gingival sulcus with care taken to preserve the neurovascular structures and advanced into the primary defect. Care was taken to ensure that precise realignment of the vermilion border was achieved.
Peng Advancement Flap Text: The defect edges were debeveled with a #15 scalpel blade.  Given the location of the defect, shape of the defect and the proximity to free margins a Peng advancement flap was deemed most appropriate.  Using a sterile surgical marker, an appropriate advancement flap was drawn incorporating the defect and placing the expected incisions within the relaxed skin tension lines where possible. The area thus outlined was incised deep to adipose tissue with a #15 scalpel blade.  The skin margins were undermined to an appropriate distance in all directions utilizing iris scissors.
Hatchet Flap Text: The defect edges were debeveled with a #15 scalpel blade.  Given the location of the defect, shape of the defect and the proximity to free margins a hatchet flap was deemed most appropriate.  Using a sterile surgical marker, an appropriate hatchet flap was drawn incorporating the defect and placing the expected incisions within the relaxed skin tension lines where possible.    The area thus outlined was incised deep to adipose tissue with a #15 scalpel blade.  The skin margins were undermined to an appropriate distance in all directions utilizing iris scissors.
Rotation Flap Text: The defect edges were debeveled with a #15 scalpel blade.  Given the location of the defect, shape of the defect and the proximity to free margins a rotation flap was deemed most appropriate.  Using a sterile surgical marker, an appropriate rotation flap was drawn incorporating the defect and placing the expected incisions within the relaxed skin tension lines where possible.    The area thus outlined was incised deep to adipose tissue with a #15 scalpel blade.  The skin margins were undermined to an appropriate distance in all directions utilizing iris scissors.
Bilateral Rotation Flap Text: The defect edges were debeveled with a #15 scalpel blade. Given the location of the defect, shape of the defect and the proximity to free margins a bilateral rotation flap was deemed most appropriate. Using a sterile surgical marker, an appropriate rotation flap was drawn incorporating the defect and placing the expected incisions within the relaxed skin tension lines where possible. The area thus outlined was incised deep to adipose tissue with a #15 scalpel blade. The skin margins were undermined to an appropriate distance in all directions utilizing iris scissors. Following this, the designed flap was carried over into the primary defect and sutured into place.
Spiral Flap Text: The defect edges were debeveled with a #15 scalpel blade.  Given the location of the defect, shape of the defect and the proximity to free margins a spiral flap was deemed most appropriate.  Using a sterile surgical marker, an appropriate rotation flap was drawn incorporating the defect and placing the expected incisions within the relaxed skin tension lines where possible. The area thus outlined was incised deep to adipose tissue with a #15 scalpel blade.  The skin margins were undermined to an appropriate distance in all directions utilizing iris scissors.
Staged Advancement Flap Text: The defect edges were debeveled with a #15 scalpel blade.  Given the location of the defect, shape of the defect and the proximity to free margins a staged advancement flap was deemed most appropriate.  Using a sterile surgical marker, an appropriate advancement flap was drawn incorporating the defect and placing the expected incisions within the relaxed skin tension lines where possible. The area thus outlined was incised deep to adipose tissue with a #15 scalpel blade.  The skin margins were undermined to an appropriate distance in all directions utilizing iris scissors.
Star Wedge Flap Text: The defect edges were debeveled with a #15 scalpel blade.  Given the location of the defect, shape of the defect and the proximity to free margins a star wedge flap was deemed most appropriate.  Using a sterile surgical marker, an appropriate rotation flap was drawn incorporating the defect and placing the expected incisions within the relaxed skin tension lines where possible. The area thus outlined was incised deep to adipose tissue with a #15 scalpel blade.  The skin margins were undermined to an appropriate distance in all directions utilizing iris scissors.
Transposition Flap Text: The defect edges were debeveled with a #15 scalpel blade.  Given the location of the defect and the proximity to free margins a transposition flap was deemed most appropriate.  Using a sterile surgical marker, an appropriate transposition flap was drawn incorporating the defect.    The area thus outlined was incised deep to adipose tissue with a #15 scalpel blade.  The skin margins were undermined to an appropriate distance in all directions utilizing iris scissors.
Muscle Hinge Flap Text: The defect edges were debeveled with a #15 scalpel blade.  Given the size, depth and location of the defect and the proximity to free margins a muscle hinge flap was deemed most appropriate.  Using a sterile surgical marker, an appropriate hinge flap was drawn incorporating the defect. The area thus outlined was incised with a #15 scalpel blade.  The skin margins were undermined to an appropriate distance in all directions utilizing iris scissors.
Mustarde Flap Text: The defect edges were debeveled with a #15 scalpel blade.  Given the size, depth and location of the defect and the proximity to free margins a Mustarde flap was deemed most appropriate. Using a sterile surgical marker, an appropriate flap was drawn incorporating the defect. The area thus outlined was incised with a #15 scalpel blade. The skin margins were undermined to an appropriate distance in all directions utilizing iris scissors. Following this, the designed flap was carried into the primary defect and sutured into place.
Nasal Turnover Hinge Flap Text: The defect edges were debeveled with a #15 scalpel blade.  Given the size, depth, location of the defect and the defect being full thickness a nasal turnover hinge flap was deemed most appropriate.  Using a sterile surgical marker, an appropriate hinge flap was drawn incorporating the defect. The area thus outlined was incised with a #15 scalpel blade. The flap was designed to recreate the nasal mucosal lining and the alar rim. The skin margins were undermined to an appropriate distance in all directions utilizing iris scissors.
Nasalis-Muscle-Based Myocutaneous Island Pedicle Flap Text: Using a #15 blade, an incision was made around the donor flap to the level of the nasalis muscle. Wide lateral undermining was then performed in both the subcutaneous plane above the nasalis muscle, and in a submuscular plane just above periosteum. This allowed the formation of a free nasalis muscle axial pedicle (based on the angular artery) which was still attached to the actual cutaneous flap, increasing its mobility and vascular viability. Hemostasis was obtained with pinpoint electrocoagulation. The flap was mobilized into position and the pivotal anchor points positioned and stabilized with buried interrupted sutures. Subcutaneous and dermal tissues were closed in a multilayered fashion with sutures. Tissue redundancies were excised, and the epidermal edges were apposed without significant tension and sutured with sutures.
Nasalis Myocutaneous Flap Text: Using a #15 blade, an incision was made around the donor flap to the level of the nasalis muscle. Wide lateral undermining was then performed in both the subcutaneous plane above the nasalis muscle, and in a submuscular plane just above periosteum. This allowed the formation of a free nasalis muscle axial pedicle which was still attached to the actual cutaneous flap, increasing its mobility and vascular viability. Hemostasis was obtained with pinpoint electrocoagulation. The flap was mobilized into position and the pivotal anchor points positioned and stabilized with buried interrupted sutures. Subcutaneous and dermal tissues were closed in a multilayered fashion with sutures. Tissue redundancies were excised, and the epidermal edges were apposed without significant tension and sutured with sutures.
Nasolabial Transposition Flap Text: The defect edges were debeveled with a #15 scalpel blade.  Given the size, depth and location of the defect and the proximity to free margins a nasolabial transposition flap was deemed most appropriate. Using a sterile surgical marker, an appropriate flap was drawn incorporating the defect. The area thus outlined was incised with a #15 scalpel blade. The skin margins were undermined to an appropriate distance in all directions utilizing iris scissors. Following this, the designed flap was carried into the primary defect and sutured into place.
Orbicularis Oris Muscle Flap Text: The defect edges were debeveled with a #15 scalpel blade.  Given that the defect affected the competency of the oral sphincter an obicularis oris muscle flap was deemed most appropriate to restore this competency and normal muscle function.  Using a sterile surgical marker, an appropriate flap was drawn incorporating the defect. The area thus outlined was incised with a #15 scalpel blade.
Melolabial Transposition Flap Text: The defect edges were debeveled with a #15 scalpel blade.  Given the location of the defect and the proximity to free margins a melolabial flap was deemed most appropriate.  Using a sterile surgical marker, an appropriate melolabial transposition flap was drawn incorporating the defect.    The area thus outlined was incised deep to adipose tissue with a #15 scalpel blade.  The skin margins were undermined to an appropriate distance in all directions utilizing iris scissors.
Rectangular Flap Text: The defect edges were debeveled with a #15 scalpel blade. Given the location of the defect and the proximity to free margins a rectangular flap was deemed most appropriate. Using a sterile surgical marker, an appropriate rectangular flap was drawn incorporating the defect. The area thus outlined was incised deep to adipose tissue with a #15 scalpel blade. The skin margins were undermined to an appropriate distance in all directions utilizing iris scissors. Following this, the designed flap was carried over into the primary defect and sutured into place.
Rhombic Flap Text: The defect edges were debeveled with a #15 scalpel blade.  Given the location of the defect and the proximity to free margins a rhombic flap was deemed most appropriate.  Using a sterile surgical marker, an appropriate rhombic flap was drawn incorporating the defect.    The area thus outlined was incised deep to adipose tissue with a #15 scalpel blade.  The skin margins were undermined to an appropriate distance in all directions utilizing iris scissors.
Rhomboid Transposition Flap Text: The defect edges were debeveled with a #15 scalpel blade.  Given the location of the defect and the proximity to free margins a rhomboid transposition flap was deemed most appropriate.  Using a sterile surgical marker, an appropriate rhomboid flap was drawn incorporating the defect.    The area thus outlined was incised deep to adipose tissue with a #15 scalpel blade.  The skin margins were undermined to an appropriate distance in all directions utilizing iris scissors.
Bi-Rhombic Flap Text: The defect edges were debeveled with a #15 scalpel blade.  Given the location of the defect and the proximity to free margins a bi-rhombic flap was deemed most appropriate.  Using a sterile surgical marker, an appropriate rhombic flap was drawn incorporating the defect. The area thus outlined was incised deep to adipose tissue with a #15 scalpel blade.  The skin margins were undermined to an appropriate distance in all directions utilizing iris scissors.
Helical Rim Advancement Flap Text: The defect edges were debeveled with a #15 blade scalpel.  Given the location of the defect and the proximity to free margins (helical rim) a double helical rim advancement flap was deemed most appropriate.  Using a sterile surgical marker, the appropriate advancement flaps were drawn incorporating the defect and placing the expected incisions between the helical rim and antihelix where possible.  The area thus outlined was incised through and through with a #15 scalpel blade.  With a skin hook and iris scissors, the flaps were gently and sharply undermined and freed up.
Bilateral Helical Rim Advancement Flap Text: The defect edges were debeveled with a #15 blade scalpel.  Given the location of the defect and the proximity to free margins (helical rim) a bilateral helical rim advancement flap was deemed most appropriate.  Using a sterile surgical marker, the appropriate advancement flaps were drawn incorporating the defect and placing the expected incisions between the helical rim and antihelix where possible.  The area thus outlined was incised through and through with a #15 scalpel blade.  With a skin hook and iris scissors, the flaps were gently and sharply undermined and freed up.
Ear Star Wedge Flap Text: The defect edges were debeveled with a #15 blade scalpel.  Given the location of the defect and the proximity to free margins (helical rim) an ear star wedge flap was deemed most appropriate.  Using a sterile surgical marker, the appropriate flap was drawn incorporating the defect and placing the expected incisions between the helical rim and antihelix where possible.  The area thus outlined was incised through and through with a #15 scalpel blade.
Flip-Flop Flap Text: The defect edges were debeveled with a #15 blade scalpel.  Given the location of the defect and the proximity to free margins a flip-flop flap was deemed most appropriate. Using a sterile surgical marker, the appropriate flap was drawn incorporating the defect and placing the expected incisions between the helical rim and antihelix where possible.  The area thus outlined was incised through and through with a #15 scalpel blade. Following this, the designed flap was carried over into the primary defect and sutured into place.
Banner Transposition Flap Text: The defect edges were debeveled with a #15 scalpel blade.  Given the location of the defect and the proximity to free margins a Banner transposition flap was deemed most appropriate.  Using a sterile surgical marker, an appropriate flap drawn around the defect. The area thus outlined was incised deep to adipose tissue with a #15 scalpel blade.  The skin margins were undermined to an appropriate distance in all directions utilizing iris scissors.
Bilobed Flap Text: The defect edges were debeveled with a #15 scalpel blade.  Given the location of the defect and the proximity to free margins a bilobe flap was deemed most appropriate.  Using a sterile surgical marker, an appropriate bilobe flap drawn around the defect.    The area thus outlined was incised deep to adipose tissue with a #15 scalpel blade.  The skin margins were undermined to an appropriate distance in all directions utilizing iris scissors.
Bilobed Transposition Flap Text: The defect edges were debeveled with a #15 scalpel blade.  Given the location of the defect and the proximity to free margins a bilobed transposition flap was deemed most appropriate.  Using a sterile surgical marker, an appropriate bilobe flap drawn around the defect.    The area thus outlined was incised deep to adipose tissue with a #15 scalpel blade.  The skin margins were undermined to an appropriate distance in all directions utilizing iris scissors.
Trilobed Flap Text: The defect edges were debeveled with a #15 scalpel blade.  Given the location of the defect and the proximity to free margins a trilobed flap was deemed most appropriate.  Using a sterile surgical marker, an appropriate trilobed flap drawn around the defect.    The area thus outlined was incised deep to adipose tissue with a #15 scalpel blade.  The skin margins were undermined to an appropriate distance in all directions utilizing iris scissors.
Dorsal Nasal Flap Text: The defect edges were debeveled with a #15 scalpel blade.  Given the location of the defect and the proximity to free margins a dorsal nasal flap was deemed most appropriate.  Using a sterile surgical marker, an appropriate dorsal nasal flap was drawn around the defect.    The area thus outlined was incised deep to adipose tissue with a #15 scalpel blade.  The skin margins were undermined to an appropriate distance in all directions utilizing iris scissors.
Island Pedicle Flap Text: The defect edges were debeveled with a #15 scalpel blade.  Given the location of the defect, shape of the defect and the proximity to free margins an island pedicle advancement flap was deemed most appropriate.  Using a sterile surgical marker, an appropriate advancement flap was drawn incorporating the defect, outlining the appropriate donor tissue and placing the expected incisions within the relaxed skin tension lines where possible.    The area thus outlined was incised deep to adipose tissue with a #15 scalpel blade.  The skin margins were undermined to an appropriate distance in all directions around the primary defect and laterally outward around the island pedicle utilizing iris scissors.  There was minimal undermining beneath the pedicle flap.
Island Pedicle Flap With Canthal Suspension Text: The defect edges were debeveled with a #15 scalpel blade.  Given the location of the defect, shape of the defect and the proximity to free margins an island pedicle advancement flap was deemed most appropriate.  Using a sterile surgical marker, an appropriate advancement flap was drawn incorporating the defect, outlining the appropriate donor tissue and placing the expected incisions within the relaxed skin tension lines where possible. The area thus outlined was incised deep to adipose tissue with a #15 scalpel blade.  The skin margins were undermined to an appropriate distance in all directions around the primary defect and laterally outward around the island pedicle utilizing iris scissors.  There was minimal undermining beneath the pedicle flap. A suspension suture was placed in the canthal tendon to prevent tension and prevent ectropion.
Alar Island Pedicle Flap Text: The defect edges were debeveled with a #15 scalpel blade.  Given the location of the defect, shape of the defect and the proximity to the alar rim an island pedicle advancement flap was deemed most appropriate.  Using a sterile surgical marker, an appropriate advancement flap was drawn incorporating the defect, outlining the appropriate donor tissue and placing the expected incisions within the nasal ala running parallel to the alar rim. The area thus outlined was incised with a #15 scalpel blade.  The skin margins were undermined minimally to an appropriate distance in all directions around the primary defect and laterally outward around the island pedicle utilizing iris scissors.  There was minimal undermining beneath the pedicle flap.
Double Island Pedicle Flap Text: The defect edges were debeveled with a #15 scalpel blade.  Given the location of the defect, shape of the defect and the proximity to free margins a double island pedicle advancement flap was deemed most appropriate.  Using a sterile surgical marker, an appropriate advancement flap was drawn incorporating the defect, outlining the appropriate donor tissue and placing the expected incisions within the relaxed skin tension lines where possible.    The area thus outlined was incised deep to adipose tissue with a #15 scalpel blade.  The skin margins were undermined to an appropriate distance in all directions around the primary defect and laterally outward around the island pedicle utilizing iris scissors.  There was minimal undermining beneath the pedicle flap.
Island Pedicle Flap-Requiring Vessel Identification Text: The defect edges were debeveled with a #15 scalpel blade.  Given the location of the defect, shape of the defect and the proximity to free margins an island pedicle advancement flap was deemed most appropriate.  Using a sterile surgical marker, an appropriate advancement flap was drawn, based on the axial vessel mentioned above, incorporating the defect, outlining the appropriate donor tissue and placing the expected incisions within the relaxed skin tension lines where possible.    The area thus outlined was incised deep to adipose tissue with a #15 scalpel blade.  The skin margins were undermined to an appropriate distance in all directions around the primary defect and laterally outward around the island pedicle utilizing iris scissors.  There was minimal undermining beneath the pedicle flap.
Keystone Flap Text: The defect edges were debeveled with a #15 scalpel blade.  Given the location of the defect, shape of the defect a keystone flap was deemed most appropriate.  Using a sterile surgical marker, an appropriate keystone flap was drawn incorporating the defect, outlining the appropriate donor tissue and placing the expected incisions within the relaxed skin tension lines where possible. The area thus outlined was incised deep to adipose tissue with a #15 scalpel blade.  The skin margins were undermined to an appropriate distance in all directions around the primary defect and laterally outward around the flap utilizing iris scissors.
O-T Plasty Text: The defect edges were debeveled with a #15 scalpel blade.  Given the location of the defect, shape of the defect and the proximity to free margins an O-T plasty was deemed most appropriate.  Using a sterile surgical marker, an appropriate O-T plasty was drawn incorporating the defect and placing the expected incisions within the relaxed skin tension lines where possible.    The area thus outlined was incised deep to adipose tissue with a #15 scalpel blade.  The skin margins were undermined to an appropriate distance in all directions utilizing iris scissors.
O-Z Plasty Text: The defect edges were debeveled with a #15 scalpel blade.  Given the location of the defect, shape of the defect and the proximity to free margins an O-Z plasty (double transposition flap) was deemed most appropriate.  Using a sterile surgical marker, the appropriate transposition flaps were drawn incorporating the defect and placing the expected incisions within the relaxed skin tension lines where possible.    The area thus outlined was incised deep to adipose tissue with a #15 scalpel blade.  The skin margins were undermined to an appropriate distance in all directions utilizing iris scissors.  Hemostasis was achieved with electrocautery.  The flaps were then transposed into place, one clockwise and the other counterclockwise, and anchored with interrupted buried subcutaneous sutures.
Double O-Z Plasty Text: The defect edges were debeveled with a #15 scalpel blade.  Given the location of the defect, shape of the defect and the proximity to free margins a Double O-Z plasty (double transposition flap) was deemed most appropriate.  Using a sterile surgical marker, the appropriate transposition flaps were drawn incorporating the defect and placing the expected incisions within the relaxed skin tension lines where possible. The area thus outlined was incised deep to adipose tissue with a #15 scalpel blade.  The skin margins were undermined to an appropriate distance in all directions utilizing iris scissors.  Hemostasis was achieved with electrocautery.  The flaps were then transposed into place, one clockwise and the other counterclockwise, and anchored with interrupted buried subcutaneous sutures.
V-Y Plasty Text: The defect edges were debeveled with a #15 scalpel blade.  Given the location of the defect, shape of the defect and the proximity to free margins an V-Y advancement flap was deemed most appropriate.  Using a sterile surgical marker, an appropriate advancement flap was drawn incorporating the defect and placing the expected incisions within the relaxed skin tension lines where possible.    The area thus outlined was incised deep to adipose tissue with a #15 scalpel blade.  The skin margins were undermined to an appropriate distance in all directions utilizing iris scissors.
H Plasty Text: Given the location of the defect, shape of the defect and the proximity to free margins a H-plasty was deemed most appropriate for repair.  Using a sterile surgical marker, the appropriate advancement arms of the H-plasty were drawn incorporating the defect and placing the expected incisions within the relaxed skin tension lines where possible. The area thus outlined was incised deep to adipose tissue with a #15 scalpel blade. The skin margins were undermined to an appropriate distance in all directions utilizing iris scissors.  The opposing advancement arms were then advanced into place in opposite direction and anchored with interrupted buried subcutaneous sutures.
W Plasty Text: The lesion was extirpated to the level of the fat with a #15 scalpel blade.  Given the location of the defect, shape of the defect and the proximity to free margins a W-plasty was deemed most appropriate for repair.  Using a sterile surgical marker, the appropriate transposition arms of the W-plasty were drawn incorporating the defect and placing the expected incisions within the relaxed skin tension lines where possible.    The area thus outlined was incised deep to adipose tissue with a #15 scalpel blade.  The skin margins were undermined to an appropriate distance in all directions utilizing iris scissors.  The opposing transposition arms were then transposed into place in opposite direction and anchored with interrupted buried subcutaneous sutures.
Z Plasty Text: The lesion was extirpated to the level of the fat with a #15 scalpel blade.  Given the location of the defect, shape of the defect and the proximity to free margins a Z-plasty was deemed most appropriate for repair.  Using a sterile surgical marker, the appropriate transposition arms of the Z-plasty were drawn incorporating the defect and placing the expected incisions within the relaxed skin tension lines where possible.    The area thus outlined was incised deep to adipose tissue with a #15 scalpel blade.  The skin margins were undermined to an appropriate distance in all directions utilizing iris scissors.  The opposing transposition arms were then transposed into place in opposite direction and anchored with interrupted buried subcutaneous sutures.
Double Z Plasty Text: The lesion was extirpated to the level of the fat with a #15 scalpel blade. Given the location of the defect, shape of the defect and the proximity to free margins a double Z-plasty was deemed most appropriate for repair. Using a sterile surgical marker, the appropriate transposition arms of the double Z-plasty were drawn incorporating the defect and placing the expected incisions within the relaxed skin tension lines where possible. The area thus outlined was incised deep to adipose tissue with a #15 scalpel blade. The skin margins were undermined to an appropriate distance in all directions utilizing iris scissors. The opposing transposition arms were then transposed and carried over into place in opposite direction and anchored with interrupted buried subcutaneous sutures.
Zygomaticofacial Flap Text: Given the location of the defect, shape of the defect and the proximity to free margins a zygomaticofacial flap was deemed most appropriate for repair.  Using a sterile surgical marker, the appropriate flap was drawn incorporating the defect and placing the expected incisions within the relaxed skin tension lines where possible. The area thus outlined was incised deep to adipose tissue with a #15 scalpel blade with preservation of a vascular pedicle.  The skin margins were undermined to an appropriate distance in all directions utilizing iris scissors.  The flap was then placed into the defect and anchored with interrupted buried subcutaneous sutures.
Cheek Interpolation Flap Text: A decision was made to reconstruct the defect utilizing an interpolation axial flap and a staged reconstruction.  A telfa template was made of the defect.  This telfa template was then used to outline the Cheek Interpolation flap.  The donor area for the pedicle flap was then injected with anesthesia.  The flap was excised through the skin and subcutaneous tissue down to the layer of the underlying musculature.  The interpolation flap was carefully excised within this deep plane to maintain its blood supply.  The edges of the donor site were undermined.   The donor site was closed in a primary fashion.  The pedicle was then rotated into position and sutured.  Once the tube was sutured into place, adequate blood supply was confirmed with blanching and refill.  The pedicle was then wrapped with xeroform gauze and dressed appropriately with a telfa and gauze bandage to ensure continued blood supply and protect the attached pedicle.
Cheek-To-Nose Interpolation Flap Text: A decision was made to reconstruct the defect utilizing an interpolation axial flap and a staged reconstruction.  A telfa template was made of the defect.  This telfa template was then used to outline the Cheek-To-Nose Interpolation flap.  The donor area for the pedicle flap was then injected with anesthesia.  The flap was excised through the skin and subcutaneous tissue down to the layer of the underlying musculature.  The interpolation flap was carefully excised within this deep plane to maintain its blood supply.  The edges of the donor site were undermined.   The donor site was closed in a primary fashion.  The pedicle was then rotated into position and sutured.  Once the tube was sutured into place, adequate blood supply was confirmed with blanching and refill.  The pedicle was then wrapped with xeroform gauze and dressed appropriately with a telfa and gauze bandage to ensure continued blood supply and protect the attached pedicle.
Interpolation Flap Text: A decision was made to reconstruct the defect utilizing an interpolation axial flap and a staged reconstruction.  A telfa template was made of the defect.  This telfa template was then used to outline the interpolation flap.  The donor area for the pedicle flap was then injected with anesthesia.  The flap was excised through the skin and subcutaneous tissue down to the layer of the underlying musculature.  The interpolation flap was carefully excised within this deep plane to maintain its blood supply.  The edges of the donor site were undermined.   The donor site was closed in a primary fashion.  The pedicle was then rotated into position and sutured.  Once the tube was sutured into place, adequate blood supply was confirmed with blanching and refill.  The pedicle was then wrapped with xeroform gauze and dressed appropriately with a telfa and gauze bandage to ensure continued blood supply and protect the attached pedicle.
Melolabial Interpolation Flap Text: A decision was made to reconstruct the defect utilizing an interpolation axial flap and a staged reconstruction.  A telfa template was made of the defect.  This telfa template was then used to outline the melolabial interpolation flap.  The donor area for the pedicle flap was then injected with anesthesia.  The flap was excised through the skin and subcutaneous tissue down to the layer of the underlying musculature.  The pedicle flap was carefully excised within this deep plane to maintain its blood supply.  The edges of the donor site were undermined.   The donor site was closed in a primary fashion.  The pedicle was then rotated into position and sutured.  Once the tube was sutured into place, adequate blood supply was confirmed with blanching and refill.  The pedicle was then wrapped with xeroform gauze and dressed appropriately with a telfa and gauze bandage to ensure continued blood supply and protect the attached pedicle.
Mastoid Interpolation Flap Text: A decision was made to reconstruct the defect utilizing an interpolation axial flap and a staged reconstruction.  A telfa template was made of the defect.  This telfa template was then used to outline the mastoid interpolation flap.  The donor area for the pedicle flap was then injected with anesthesia.  The flap was excised through the skin and subcutaneous tissue down to the layer of the underlying musculature.  The pedicle flap was carefully excised within this deep plane to maintain its blood supply.  The edges of the donor site were undermined.   The donor site was closed in a primary fashion.  The pedicle was then rotated into position and sutured.  Once the tube was sutured into place, adequate blood supply was confirmed with blanching and refill.  The pedicle was then wrapped with xeroform gauze and dressed appropriately with a telfa and gauze bandage to ensure continued blood supply and protect the attached pedicle.
Posterior Auricular Interpolation Flap Text: A decision was made to reconstruct the defect utilizing an interpolation axial flap and a staged reconstruction.  A telfa template was made of the defect.  This telfa template was then used to outline the posterior auricular interpolation flap.  The donor area for the pedicle flap was then injected with anesthesia.  The flap was excised through the skin and subcutaneous tissue down to the layer of the underlying musculature.  The pedicle flap was carefully excised within this deep plane to maintain its blood supply.  The edges of the donor site were undermined.   The donor site was closed in a primary fashion.  The pedicle was then rotated into position and sutured.  Once the tube was sutured into place, adequate blood supply was confirmed with blanching and refill.  The pedicle was then wrapped with xeroform gauze and dressed appropriately with a telfa and gauze bandage to ensure continued blood supply and protect the attached pedicle.
Paramedian Forehead Flap Text: A decision was made to reconstruct the defect utilizing an interpolation axial flap and a staged reconstruction.  A telfa template was made of the defect.  This telfa template was then used to outline the paramedian forehead pedicle flap.  The donor area for the pedicle flap was then injected with anesthesia.  The flap was excised through the skin and subcutaneous tissue down to the layer of the underlying musculature.  The pedicle flap was carefully excised within this deep plane to maintain its blood supply.  The edges of the donor site were undermined.   The donor site was closed in a primary fashion.  The pedicle was then rotated into position and sutured.  Once the tube was sutured into place, adequate blood supply was confirmed with blanching and refill.  The pedicle was then wrapped with xeroform gauze and dressed appropriately with a telfa and gauze bandage to ensure continued blood supply and protect the attached pedicle.
Abbe Flap (Upper To Lower Lip) Text: The defect of the lower lip was assessed and measured.  Given the location and size of the defect, an Abbe flap was deemed most appropriate. Using a sterile surgical marker, an appropriate Abbe flap was measured and drawn on the upper lip. Local anesthesia was then infiltrated.  A scalpel was then used to incise the upper lip through and through the skin, vermilion, muscle and mucosa, leaving the flap pedicled on the opposite side.  The flap was then rotated and transferred to the lower lip defect.  The flap was then sutured into place with a three layer technique, closing the orbicularis oris muscle layer with subcutaneous buried sutures, followed by a mucosal layer and an epidermal layer.
Abbe Flap (Lower To Upper Lip) Text: The defect of the upper lip was assessed and measured.  Given the location and size of the defect, an Abbe flap was deemed most appropriate. Using a sterile surgical marker, an appropriate Abbe flap was measured and drawn on the lower lip. Local anesthesia was then infiltrated. A scalpel was then used to incise the upper lip through and through the skin, vermilion, muscle and mucosa, leaving the flap pedicled on the opposite side.  The flap was then rotated and transferred to the lower lip defect.  The flap was then sutured into place with a three layer technique, closing the orbicularis oris muscle layer with subcutaneous buried sutures, followed by a mucosal layer and an epidermal layer.
Estlander Flap (Upper To Lower Lip) Text: The defect of the lower lip was assessed and measured.  Given the location and size of the defect, an Estlander flap was deemed most appropriate. Using a sterile surgical marker, an appropriate Estlander flap was measured and drawn on the upper lip. Local anesthesia was then infiltrated. A scalpel was then used to incise the lateral aspect of the flap, through skin, muscle and mucosa, leaving the flap pedicled medially.  The flap was then rotated and positioned to fill the lower lip defect.  The flap was then sutured into place with a three layer technique, closing the orbicularis oris muscle layer with subcutaneous buried sutures, followed by a mucosal layer and an epidermal layer.
Lip Wedge Excision Repair Text: Given the location of the defect and the proximity to free margins a full thickness wedge repair was deemed most appropriate.  Using a sterile surgical marker, the appropriate repair was drawn incorporating the defect and placing the expected incisions perpendicular to the vermilion border.  The vermilion border was also meticulously outlined to ensure appropriate reapproximation during the repair.  The area thus outlined was incised through and through with a #15 scalpel blade.  The muscularis and dermis were reaproximated with deep sutures following hemostasis. Care was taken to realign the vermilion border before proceeding with the superficial closure.  Once the vermilion was realigned the superfical and mucosal closure was finished.
Ftsg Text: The defect edges were debeveled with a #15 scalpel blade.  Given the location of the defect, shape of the defect and the proximity to free margins a full thickness skin graft was deemed most appropriate.  Using a sterile surgical marker, the primary defect shape was transferred to the donor site. The area thus outlined was incised deep to adipose tissue with a #15 scalpel blade.  The harvested graft was then trimmed of adipose tissue until only dermis and epidermis was left.  The skin margins of the secondary defect were undermined to an appropriate distance in all directions utilizing iris scissors.  The secondary defect was closed with interrupted buried subcutaneous sutures.  The skin edges were then re-apposed with running  sutures.  The skin graft was then placed in the primary defect and oriented appropriately.
Split-Thickness Skin Graft Text: The defect edges were debeveled with a #15 scalpel blade.  Given the location of the defect, shape of the defect and the proximity to free margins a split thickness skin graft was deemed most appropriate.  Using a sterile surgical marker, the primary defect shape was transferred to the donor site. The split thickness graft was then harvested.  The skin graft was then placed in the primary defect and oriented appropriately.
Pinch Graft Text: The defect edges were debeveled with a #15 scalpel blade. Given the location of the defect, shape of the defect and the proximity to free margins a pinch graft was deemed most appropriate. Using a sterile surgical marker, the primary defect shape was transferred to the donor site. The area thus outlined was incised deep to adipose tissue with a #15 scalpel blade.  The harvested graft was then trimmed of adipose tissue until only dermis and epidermis was left. The skin graft was then placed in the primary defect and oriented appropriately.
Burow's Graft Text: The defect edges were debeveled with a #15 scalpel blade.  Given the location of the defect, shape of the defect, the proximity to free margins and the presence of a standing cone deformity a Burow's skin graft was deemed most appropriate. The standing cone was removed and this tissue was then trimmed to the shape of the primary defect. The adipose tissue was also removed until only dermis and epidermis were left.  The skin margins of the secondary defect were undermined to an appropriate distance in all directions utilizing iris scissors.  The secondary defect was closed with interrupted buried subcutaneous sutures.  The skin edges were then re-apposed with running  sutures.  The skin graft was then placed in the primary defect and oriented appropriately.
Cartilage Graft Text: The defect edges were debeveled with a #15 scalpel blade.  Given the location of the defect, shape of the defect, the fact the defect involved a full thickness cartilage defect a cartilage graft was deemed most appropriate.  An appropriate donor site was identified, cleansed, and anesthetized. The cartilage graft was then harvested and transferred to the recipient site, oriented appropriately and then sutured into place.  The secondary defect was then repaired using a primary closure.
Composite Graft Text: The defect edges were debeveled with a #15 scalpel blade.  Given the location of the defect, shape of the defect, the proximity to free margins and the fact the defect was full thickness a composite graft was deemed most appropriate.  The defect was outline and then transferred to the donor site.  A full thickness graft was then excised from the donor site. The graft was then placed in the primary defect, oriented appropriately and then sutured into place.  The secondary defect was then repaired using a primary closure.
Epidermal Autograft Text: The defect edges were debeveled with a #15 scalpel blade.  Given the location of the defect, shape of the defect and the proximity to free margins an epidermal autograft was deemed most appropriate.  Using a sterile surgical marker, the primary defect shape was transferred to the donor site. The epidermal graft was then harvested.  The skin graft was then placed in the primary defect and oriented appropriately.
Dermal Autograft Text: The defect edges were debeveled with a #15 scalpel blade.  Given the location of the defect, shape of the defect and the proximity to free margins a dermal autograft was deemed most appropriate.  Using a sterile surgical marker, the primary defect shape was transferred to the donor site. The area thus outlined was incised deep to adipose tissue with a #15 scalpel blade.  The harvested graft was then trimmed of adipose and epidermal tissue until only dermis was left.  The skin graft was then placed in the primary defect and oriented appropriately.
Skin Substitute Text: The defect edges were debeveled with a #15 scalpel blade.  Given the location of the defect, shape of the defect and the proximity to free margins a skin substitute graft was deemed most appropriate.  The graft material was trimmed to fit the size of the defect. The graft was then placed in the primary defect and oriented appropriately.
Tissue Cultured Epidermal Autograft Text: The defect edges were debeveled with a #15 scalpel blade.  Given the location of the defect, shape of the defect and the proximity to free margins a tissue cultured epidermal autograft was deemed most appropriate.  The graft was then trimmed to fit the size of the defect.  The graft was then placed in the primary defect and oriented appropriately.
Xenograft Text: The defect edges were debeveled with a #15 scalpel blade.  Given the location of the defect, shape of the defect and the proximity to free margins a xenograft was deemed most appropriate.  The graft was then trimmed to fit the size of the defect.  The graft was then placed in the primary defect and oriented appropriately.
Purse String (Intermediate) Text: Given the location of the defect and the characteristics of the surrounding skin a purse string intermediate closure was deemed most appropriate.  Undermining was performed circumfirentially around the surgical defect.  A purse string suture was then placed and tightened.
Purse String (Simple) Text: Given the location of the defect and the characteristics of the surrounding skin a purse string simple closure was deemed most appropriate.  Undermining was performed circumferentially around the surgical defect.  A purse string suture was then placed and tightened.
Partial Purse String (Intermediate) Text: Given the location of the defect and the characteristics of the surrounding skin an intermediate purse string closure was deemed most appropriate.  Undermining was performed circumferentially around the surgical defect.  A purse string suture was then placed and tightened. Wound tension of the circular defect prevented complete closure of the wound.
Partial Purse String (Simple) Text: Given the location of the defect and the characteristics of the surrounding skin a simple purse string closure was deemed most appropriate.  Undermining was performed circumferentially around the surgical defect.  A purse string suture was then placed and tightened. Wound tension of the circular defect prevented complete closure of the wound.
Complex Repair And Single Advancement Flap Text: The defect edges were debeveled with a #15 scalpel blade.  The primary defect was closed partially with a complex linear closure.  Given the location of the remaining defect, shape of the defect and the proximity to free margins a single advancement flap was deemed most appropriate for complete closure of the defect.  Using a sterile surgical marker, an appropriate advancement flap was drawn incorporating the defect and placing the expected incisions within the relaxed skin tension lines where possible.    The area thus outlined was incised deep to adipose tissue with a #15 scalpel blade.  The skin margins were undermined to an appropriate distance in all directions utilizing iris scissors.
Complex Repair And Double Advancement Flap Text: The defect edges were debeveled with a #15 scalpel blade.  The primary defect was closed partially with a complex linear closure.  Given the location of the remaining defect, shape of the defect and the proximity to free margins a double advancement flap was deemed most appropriate for complete closure of the defect.  Using a sterile surgical marker, an appropriate advancement flap was drawn incorporating the defect and placing the expected incisions within the relaxed skin tension lines where possible.    The area thus outlined was incised deep to adipose tissue with a #15 scalpel blade.  The skin margins were undermined to an appropriate distance in all directions utilizing iris scissors.
Complex Repair And Modified Advancement Flap Text: The defect edges were debeveled with a #15 scalpel blade.  The primary defect was closed partially with a complex linear closure.  Given the location of the remaining defect, shape of the defect and the proximity to free margins a modified advancement flap was deemed most appropriate for complete closure of the defect.  Using a sterile surgical marker, an appropriate advancement flap was drawn incorporating the defect and placing the expected incisions within the relaxed skin tension lines where possible.    The area thus outlined was incised deep to adipose tissue with a #15 scalpel blade.  The skin margins were undermined to an appropriate distance in all directions utilizing iris scissors.
Complex Repair And A-T Advancement Flap Text: The defect edges were debeveled with a #15 scalpel blade.  The primary defect was closed partially with a complex linear closure.  Given the location of the remaining defect, shape of the defect and the proximity to free margins an A-T advancement flap was deemed most appropriate for complete closure of the defect.  Using a sterile surgical marker, an appropriate advancement flap was drawn incorporating the defect and placing the expected incisions within the relaxed skin tension lines where possible.    The area thus outlined was incised deep to adipose tissue with a #15 scalpel blade.  The skin margins were undermined to an appropriate distance in all directions utilizing iris scissors.
Complex Repair And O-T Advancement Flap Text: The defect edges were debeveled with a #15 scalpel blade.  The primary defect was closed partially with a complex linear closure.  Given the location of the remaining defect, shape of the defect and the proximity to free margins an O-T advancement flap was deemed most appropriate for complete closure of the defect.  Using a sterile surgical marker, an appropriate advancement flap was drawn incorporating the defect and placing the expected incisions within the relaxed skin tension lines where possible.    The area thus outlined was incised deep to adipose tissue with a #15 scalpel blade.  The skin margins were undermined to an appropriate distance in all directions utilizing iris scissors.
Complex Repair And O-L Flap Text: The defect edges were debeveled with a #15 scalpel blade.  The primary defect was closed partially with a complex linear closure.  Given the location of the remaining defect, shape of the defect and the proximity to free margins an O-L flap was deemed most appropriate for complete closure of the defect.  Using a sterile surgical marker, an appropriate flap was drawn incorporating the defect and placing the expected incisions within the relaxed skin tension lines where possible.    The area thus outlined was incised deep to adipose tissue with a #15 scalpel blade.  The skin margins were undermined to an appropriate distance in all directions utilizing iris scissors.
Complex Repair And Bilobe Flap Text: The defect edges were debeveled with a #15 scalpel blade.  The primary defect was closed partially with a complex linear closure.  Given the location of the remaining defect, shape of the defect and the proximity to free margins a bilobe flap was deemed most appropriate for complete closure of the defect.  Using a sterile surgical marker, an appropriate advancement flap was drawn incorporating the defect and placing the expected incisions within the relaxed skin tension lines where possible.    The area thus outlined was incised deep to adipose tissue with a #15 scalpel blade.  The skin margins were undermined to an appropriate distance in all directions utilizing iris scissors.
Complex Repair And Melolabial Flap Text: The defect edges were debeveled with a #15 scalpel blade.  The primary defect was closed partially with a complex linear closure.  Given the location of the remaining defect, shape of the defect and the proximity to free margins a melolabial flap was deemed most appropriate for complete closure of the defect.  Using a sterile surgical marker, an appropriate advancement flap was drawn incorporating the defect and placing the expected incisions within the relaxed skin tension lines where possible.    The area thus outlined was incised deep to adipose tissue with a #15 scalpel blade.  The skin margins were undermined to an appropriate distance in all directions utilizing iris scissors.
Complex Repair And Rotation Flap Text: The defect edges were debeveled with a #15 scalpel blade.  The primary defect was closed partially with a complex linear closure.  Given the location of the remaining defect, shape of the defect and the proximity to free margins a rotation flap was deemed most appropriate for complete closure of the defect.  Using a sterile surgical marker, an appropriate advancement flap was drawn incorporating the defect and placing the expected incisions within the relaxed skin tension lines where possible.    The area thus outlined was incised deep to adipose tissue with a #15 scalpel blade.  The skin margins were undermined to an appropriate distance in all directions utilizing iris scissors.
Complex Repair And Rhombic Flap Text: The defect edges were debeveled with a #15 scalpel blade.  The primary defect was closed partially with a complex linear closure.  Given the location of the remaining defect, shape of the defect and the proximity to free margins a rhombic flap was deemed most appropriate for complete closure of the defect.  Using a sterile surgical marker, an appropriate advancement flap was drawn incorporating the defect and placing the expected incisions within the relaxed skin tension lines where possible.    The area thus outlined was incised deep to adipose tissue with a #15 scalpel blade.  The skin margins were undermined to an appropriate distance in all directions utilizing iris scissors.
Complex Repair And Transposition Flap Text: The defect edges were debeveled with a #15 scalpel blade.  The primary defect was closed partially with a complex linear closure.  Given the location of the remaining defect, shape of the defect and the proximity to free margins a transposition flap was deemed most appropriate for complete closure of the defect.  Using a sterile surgical marker, an appropriate advancement flap was drawn incorporating the defect and placing the expected incisions within the relaxed skin tension lines where possible.    The area thus outlined was incised deep to adipose tissue with a #15 scalpel blade.  The skin margins were undermined to an appropriate distance in all directions utilizing iris scissors.
Complex Repair And V-Y Plasty Text: The defect edges were debeveled with a #15 scalpel blade.  The primary defect was closed partially with a complex linear closure.  Given the location of the remaining defect, shape of the defect and the proximity to free margins a V-Y plasty was deemed most appropriate for complete closure of the defect.  Using a sterile surgical marker, an appropriate advancement flap was drawn incorporating the defect and placing the expected incisions within the relaxed skin tension lines where possible.    The area thus outlined was incised deep to adipose tissue with a #15 scalpel blade.  The skin margins were undermined to an appropriate distance in all directions utilizing iris scissors.
Complex Repair And M Plasty Text: The defect edges were debeveled with a #15 scalpel blade.  The primary defect was closed partially with a complex linear closure.  Given the location of the remaining defect, shape of the defect and the proximity to free margins an M plasty was deemed most appropriate for complete closure of the defect.  Using a sterile surgical marker, an appropriate advancement flap was drawn incorporating the defect and placing the expected incisions within the relaxed skin tension lines where possible.    The area thus outlined was incised deep to adipose tissue with a #15 scalpel blade.  The skin margins were undermined to an appropriate distance in all directions utilizing iris scissors.
Complex Repair And Double M Plasty Text: The defect edges were debeveled with a #15 scalpel blade.  The primary defect was closed partially with a complex linear closure.  Given the location of the remaining defect, shape of the defect and the proximity to free margins a double M plasty was deemed most appropriate for complete closure of the defect.  Using a sterile surgical marker, an appropriate advancement flap was drawn incorporating the defect and placing the expected incisions within the relaxed skin tension lines where possible.    The area thus outlined was incised deep to adipose tissue with a #15 scalpel blade.  The skin margins were undermined to an appropriate distance in all directions utilizing iris scissors.
Complex Repair And W Plasty Text: The defect edges were debeveled with a #15 scalpel blade.  The primary defect was closed partially with a complex linear closure.  Given the location of the remaining defect, shape of the defect and the proximity to free margins a W plasty was deemed most appropriate for complete closure of the defect.  Using a sterile surgical marker, an appropriate advancement flap was drawn incorporating the defect and placing the expected incisions within the relaxed skin tension lines where possible.    The area thus outlined was incised deep to adipose tissue with a #15 scalpel blade.  The skin margins were undermined to an appropriate distance in all directions utilizing iris scissors.
Complex Repair And Z Plasty Text: The defect edges were debeveled with a #15 scalpel blade.  The primary defect was closed partially with a complex linear closure.  Given the location of the remaining defect, shape of the defect and the proximity to free margins a Z plasty was deemed most appropriate for complete closure of the defect.  Using a sterile surgical marker, an appropriate advancement flap was drawn incorporating the defect and placing the expected incisions within the relaxed skin tension lines where possible.    The area thus outlined was incised deep to adipose tissue with a #15 scalpel blade.  The skin margins were undermined to an appropriate distance in all directions utilizing iris scissors.
Complex Repair And Dorsal Nasal Flap Text: The defect edges were debeveled with a #15 scalpel blade.  The primary defect was closed partially with a complex linear closure.  Given the location of the remaining defect, shape of the defect and the proximity to free margins a dorsal nasal flap was deemed most appropriate for complete closure of the defect.  Using a sterile surgical marker, an appropriate flap was drawn incorporating the defect and placing the expected incisions within the relaxed skin tension lines where possible.    The area thus outlined was incised deep to adipose tissue with a #15 scalpel blade.  The skin margins were undermined to an appropriate distance in all directions utilizing iris scissors.
Complex Repair And Ftsg Text: The defect edges were debeveled with a #15 scalpel blade.  The primary defect was closed partially with a complex linear closure.  Given the location of the defect, shape of the defect and the proximity to free margins a full thickness skin graft was deemed most appropriate to repair the remaining defect.  The graft was trimmed to fit the size of the remaining defect.  The graft was then placed in the primary defect, oriented appropriately, and sutured into place.
Complex Repair And Burow's Graft Text: The defect edges were debeveled with a #15 scalpel blade.  The primary defect was closed partially with a complex linear closure.  Given the location of the defect, shape of the defect, the proximity to free margins and the presence of a standing cone deformity a Burow's graft was deemed most appropriate to repair the remaining defect.  The graft was trimmed to fit the size of the remaining defect.  The graft was then placed in the primary defect, oriented appropriately, and sutured into place.
Complex Repair And Split-Thickness Skin Graft Text: The defect edges were debeveled with a #15 scalpel blade.  The primary defect was closed partially with a complex linear closure.  Given the location of the defect, shape of the defect and the proximity to free margins a split thickness skin graft was deemed most appropriate to repair the remaining defect.  The graft was trimmed to fit the size of the remaining defect.  The graft was then placed in the primary defect, oriented appropriately, and sutured into place.
Complex Repair And Epidermal Autograft Text: The defect edges were debeveled with a #15 scalpel blade.  The primary defect was closed partially with a complex linear closure.  Given the location of the defect, shape of the defect and the proximity to free margins an epidermal autograft was deemed most appropriate to repair the remaining defect.  The graft was trimmed to fit the size of the remaining defect.  The graft was then placed in the primary defect, oriented appropriately, and sutured into place.
Complex Repair And Dermal Autograft Text: The defect edges were debeveled with a #15 scalpel blade.  The primary defect was closed partially with a complex linear closure.  Given the location of the defect, shape of the defect and the proximity to free margins an dermal autograft was deemed most appropriate to repair the remaining defect.  The graft was trimmed to fit the size of the remaining defect.  The graft was then placed in the primary defect, oriented appropriately, and sutured into place.
Complex Repair And Tissue Cultured Epidermal Autograft Text: The defect edges were debeveled with a #15 scalpel blade.  The primary defect was closed partially with a complex linear closure.  Given the location of the defect, shape of the defect and the proximity to free margins an tissue cultured epidermal autograft was deemed most appropriate to repair the remaining defect.  The graft was trimmed to fit the size of the remaining defect.  The graft was then placed in the primary defect, oriented appropriately, and sutured into place.
Complex Repair And Xenograft Text: The defect edges were debeveled with a #15 scalpel blade.  The primary defect was closed partially with a complex linear closure.  Given the location of the defect, shape of the defect and the proximity to free margins a xenograft was deemed most appropriate to repair the remaining defect.  The graft was trimmed to fit the size of the remaining defect.  The graft was then placed in the primary defect, oriented appropriately, and sutured into place.
Complex Repair And Skin Substitute Graft Text: The defect edges were debeveled with a #15 scalpel blade.  The primary defect was closed partially with a complex linear closure.  Given the location of the remaining defect, shape of the defect and the proximity to free margins a skin substitute graft was deemed most appropriate to repair the remaining defect.  The graft was trimmed to fit the size of the remaining defect.  The graft was then placed in the primary defect, oriented appropriately, and sutured into place.
Include Anticoagulation In Mohs Note?: Please Select the Appropriate Response
Path Notes (To The Dermatopathologist): Please check margins.
Consent was obtained from the patient. The risks and benefits to therapy were discussed in detail. Specifically, the risks of infection, scarring, bleeding, prolonged wound healing, incomplete removal, allergy to anesthesia, nerve injury and recurrence were addressed. Prior to the procedure, the treatment site was clearly identified and confirmed by the patient. All components of Universal Protocol/PAUSE Rule completed.
Post-Care Instructions: I reviewed with the patient in detail post-care instructions. Patient is not to engage in any heavy lifting, exercise, or swimming for the next 14 days. Should the patient develop any fevers, chills, bleeding, severe pain patient will contact the office immediately.
Home Suture Removal Text: Patient was provided a home suture removal kit and will remove their sutures at home.  If they have any questions or difficulties they will call the office.
Where Do You Want The Question To Include Opioid Counseling Located?: Case Summary Tab
Information: Selecting Yes will display possible errors in your note based on the variables you have selected. This validation is only offered as a suggestion for you. PLEASE NOTE THAT THE VALIDATION TEXT WILL BE REMOVED WHEN YOU FINALIZE YOUR NOTE. IF YOU WANT TO FAX A PRELIMINARY NOTE YOU WILL NEED TO TOGGLE THIS TO 'NO' IF YOU DO NOT WANT IT IN YOUR FAXED NOTE.

## 2025-05-16 ENCOUNTER — HOSPITAL ENCOUNTER (OUTPATIENT)
Dept: LAB | Facility: MEDICAL CENTER | Age: 80
End: 2025-05-16
Attending: PHYSICIAN ASSISTANT
Payer: MEDICARE

## 2025-05-16 LAB
ANION GAP SERPL CALC-SCNC: 10 MMOL/L (ref 7–16)
BUN SERPL-MCNC: 32 MG/DL (ref 8–22)
CALCIUM SERPL-MCNC: 9.4 MG/DL (ref 8.5–10.5)
CHLORIDE SERPL-SCNC: 104 MMOL/L (ref 96–112)
CO2 SERPL-SCNC: 23 MMOL/L (ref 20–33)
CREAT SERPL-MCNC: 1.35 MG/DL (ref 0.5–1.4)
GFR SERPLBLD CREATININE-BSD FMLA CKD-EPI: 52 ML/MIN/1.73 M 2
GLUCOSE SERPL-MCNC: 141 MG/DL (ref 65–99)
NT-PROBNP SERPL IA-MCNC: 1423 PG/ML (ref 0–125)
POTASSIUM SERPL-SCNC: 4.7 MMOL/L (ref 3.6–5.5)
SODIUM SERPL-SCNC: 137 MMOL/L (ref 135–145)

## 2025-05-16 PROCEDURE — 83880 ASSAY OF NATRIURETIC PEPTIDE: CPT

## 2025-05-16 PROCEDURE — 36415 COLL VENOUS BLD VENIPUNCTURE: CPT

## 2025-05-16 PROCEDURE — 80048 BASIC METABOLIC PNL TOTAL CA: CPT

## 2025-05-20 ENCOUNTER — OFFICE VISIT (OUTPATIENT)
Dept: MEDICAL GROUP | Facility: PHYSICIAN GROUP | Age: 80
End: 2025-05-20
Payer: MEDICARE

## 2025-05-20 VITALS
TEMPERATURE: 97.9 F | DIASTOLIC BLOOD PRESSURE: 62 MMHG | HEIGHT: 73 IN | RESPIRATION RATE: 16 BRPM | OXYGEN SATURATION: 92 % | WEIGHT: 270.8 LBS | SYSTOLIC BLOOD PRESSURE: 112 MMHG | HEART RATE: 78 BPM | BODY MASS INDEX: 35.89 KG/M2

## 2025-05-20 DIAGNOSIS — I50.32 CHRONIC DIASTOLIC HEART FAILURE (HCC): Primary | ICD-10-CM

## 2025-05-20 DIAGNOSIS — I10 ESSENTIAL HYPERTENSION: ICD-10-CM

## 2025-05-20 PROCEDURE — 99213 OFFICE O/P EST LOW 20 MIN: CPT | Performed by: FAMILY MEDICINE

## 2025-05-20 PROCEDURE — RXMED WILLOW AMBULATORY MEDICATION CHARGE: Performed by: PHYSICIAN ASSISTANT

## 2025-05-20 PROCEDURE — 3078F DIAST BP <80 MM HG: CPT | Performed by: FAMILY MEDICINE

## 2025-05-20 PROCEDURE — 3074F SYST BP LT 130 MM HG: CPT | Performed by: FAMILY MEDICINE

## 2025-05-20 ASSESSMENT — FIBROSIS 4 INDEX: FIB4 SCORE: 2.44
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
## 2025-05-21 ENCOUNTER — PHARMACY VISIT (OUTPATIENT)
Dept: PHARMACY | Facility: MEDICAL CENTER | Age: 80
End: 2025-05-21
Payer: COMMERCIAL

## 2025-05-27 ENCOUNTER — OFFICE VISIT (OUTPATIENT)
Dept: SLEEP MEDICINE | Facility: MEDICAL CENTER | Age: 80
End: 2025-05-27
Attending: STUDENT IN AN ORGANIZED HEALTH CARE EDUCATION/TRAINING PROGRAM
Payer: MEDICARE

## 2025-05-27 VITALS
OXYGEN SATURATION: 93 % | HEART RATE: 60 BPM | RESPIRATION RATE: 16 BRPM | WEIGHT: 260 LBS | DIASTOLIC BLOOD PRESSURE: 60 MMHG | HEIGHT: 73 IN | SYSTOLIC BLOOD PRESSURE: 94 MMHG | BODY MASS INDEX: 34.46 KG/M2

## 2025-05-27 DIAGNOSIS — G47.33 OSA (OBSTRUCTIVE SLEEP APNEA): Primary | ICD-10-CM

## 2025-05-27 DIAGNOSIS — E78.2 MIXED HYPERLIPIDEMIA: ICD-10-CM

## 2025-05-27 PROCEDURE — 99213 OFFICE O/P EST LOW 20 MIN: CPT | Performed by: STUDENT IN AN ORGANIZED HEALTH CARE EDUCATION/TRAINING PROGRAM

## 2025-05-27 PROCEDURE — 99214 OFFICE O/P EST MOD 30 MIN: CPT | Performed by: STUDENT IN AN ORGANIZED HEALTH CARE EDUCATION/TRAINING PROGRAM

## 2025-05-27 PROCEDURE — 3078F DIAST BP <80 MM HG: CPT | Performed by: STUDENT IN AN ORGANIZED HEALTH CARE EDUCATION/TRAINING PROGRAM

## 2025-05-27 PROCEDURE — RXMED WILLOW AMBULATORY MEDICATION CHARGE: Performed by: FAMILY MEDICINE

## 2025-05-27 PROCEDURE — 3074F SYST BP LT 130 MM HG: CPT | Performed by: STUDENT IN AN ORGANIZED HEALTH CARE EDUCATION/TRAINING PROGRAM

## 2025-05-27 RX ORDER — ATORVASTATIN CALCIUM 40 MG/1
40 TABLET, FILM COATED ORAL DAILY
Qty: 100 TABLET | Refills: 0 | Status: SHIPPED | OUTPATIENT
Start: 2025-05-27

## 2025-05-27 ASSESSMENT — FIBROSIS 4 INDEX: FIB4 SCORE: 2.44
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
## 2025-05-28 ENCOUNTER — PHARMACY VISIT (OUTPATIENT)
Dept: PHARMACY | Facility: MEDICAL CENTER | Age: 80
End: 2025-05-28
Payer: COMMERCIAL

## 2025-05-29 ENCOUNTER — APPOINTMENT (OUTPATIENT)
Dept: URBAN - METROPOLITAN AREA CLINIC 6 | Facility: CLINIC | Age: 80
Setting detail: DERMATOLOGY
End: 2025-05-29

## 2025-05-29 ENCOUNTER — TELEPHONE (OUTPATIENT)
Dept: HEALTH INFORMATION MANAGEMENT | Facility: OTHER | Age: 80
End: 2025-05-29
Payer: MEDICARE

## 2025-05-29 DIAGNOSIS — D22 MELANOCYTIC NEVI: ICD-10-CM

## 2025-05-29 DIAGNOSIS — Z00.8 ENCOUNTER FOR OTHER GENERAL EXAMINATION: ICD-10-CM

## 2025-05-29 DIAGNOSIS — Z85.820 PERSONAL HISTORY OF MALIGNANT MELANOMA OF SKIN: ICD-10-CM | Status: STABLE

## 2025-05-29 DIAGNOSIS — L82.1 OTHER SEBORRHEIC KERATOSIS: ICD-10-CM

## 2025-05-29 DIAGNOSIS — L81.4 OTHER MELANIN HYPERPIGMENTATION: ICD-10-CM

## 2025-05-29 DIAGNOSIS — D18.0 HEMANGIOMA: ICD-10-CM

## 2025-05-29 DIAGNOSIS — Z85.828 PERSONAL HISTORY OF OTHER MALIGNANT NEOPLASM OF SKIN: ICD-10-CM | Status: STABLE

## 2025-05-29 DIAGNOSIS — L57.0 ACTINIC KERATOSIS: ICD-10-CM

## 2025-05-29 DIAGNOSIS — Z48.817 ENCOUNTER FOR SURGICAL AFTERCARE FOLLOWING SURGERY ON THE SKIN AND SUBCUTANEOUS TISSUE: ICD-10-CM

## 2025-05-29 DIAGNOSIS — Z71.89 OTHER SPECIFIED COUNSELING: ICD-10-CM

## 2025-05-29 PROBLEM — D22.5 MELANOCYTIC NEVI OF TRUNK: Status: ACTIVE | Noted: 2025-05-29

## 2025-05-29 PROBLEM — D18.01 HEMANGIOMA OF SKIN AND SUBCUTANEOUS TISSUE: Status: ACTIVE | Noted: 2025-05-29

## 2025-05-29 PROCEDURE — ? PRESCRIPTION

## 2025-05-29 PROCEDURE — ? COUNSELING

## 2025-05-29 PROCEDURE — 17000 DESTRUCT PREMALG LESION: CPT

## 2025-05-29 PROCEDURE — 17003 DESTRUCT PREMALG LES 2-14: CPT

## 2025-05-29 PROCEDURE — ? REFERRAL CORRESPONDENCE

## 2025-05-29 PROCEDURE — 99213 OFFICE O/P EST LOW 20 MIN: CPT | Mod: 25

## 2025-05-29 PROCEDURE — ? SUNSCREEN TREATMENT REGIMEN

## 2025-05-29 PROCEDURE — ? PRESCRIPTION MEDICATION MANAGEMENT

## 2025-05-29 PROCEDURE — ? DRESSING CHANGE

## 2025-05-29 PROCEDURE — ? LIQUID NITROGEN

## 2025-05-29 PROCEDURE — ? ORDER TESTS

## 2025-05-29 RX ORDER — DOXYCYCLINE HYCLATE 100 MG/1
1 CAPSULE, GELATIN COATED ORAL BID
Qty: 20 | Refills: 0 | Status: ERX | COMMUNITY
Start: 2025-05-29

## 2025-05-29 RX ADMIN — DOXYCYCLINE HYCLATE 1: 100 CAPSULE, GELATIN COATED ORAL at 00:00

## 2025-05-29 ASSESSMENT — LOCATION SIMPLE DESCRIPTION DERM
LOCATION SIMPLE: LEFT PRETIBIAL REGION
LOCATION SIMPLE: LEFT CHEEK
LOCATION SIMPLE: LEFT HAND
LOCATION SIMPLE: FRONTAL SCALP
LOCATION SIMPLE: CHEST
LOCATION SIMPLE: ANTERIOR SCALP
LOCATION SIMPLE: UPPER BACK
LOCATION SIMPLE: LEFT FOREARM
LOCATION SIMPLE: RIGHT UPPER BACK
LOCATION SIMPLE: LEFT LOWER LEG
LOCATION SIMPLE: ABDOMEN
LOCATION SIMPLE: RIGHT EAR
LOCATION SIMPLE: LEFT SCALP
LOCATION SIMPLE: RIGHT HAND
LOCATION SIMPLE: RIGHT UPPER ARM
LOCATION SIMPLE: LEFT FOREHEAD
LOCATION SIMPLE: RIGHT SCALP
LOCATION SIMPLE: RIGHT FOREHEAD

## 2025-05-29 ASSESSMENT — LOCATION DETAILED DESCRIPTION DERM
LOCATION DETAILED: LEFT DISTAL LATERAL PRETIBIAL REGION
LOCATION DETAILED: SUPERIOR THORACIC SPINE
LOCATION DETAILED: MID-FRONTAL SCALP
LOCATION DETAILED: EPIGASTRIC SKIN
LOCATION DETAILED: RIGHT ANTERIOR EARLOBE
LOCATION DETAILED: LEFT PROXIMAL PRETIBIAL REGION
LOCATION DETAILED: LEFT MEDIAL FRONTAL SCALP
LOCATION DETAILED: LEFT INFERIOR FOREHEAD
LOCATION DETAILED: RIGHT MEDIAL FRONTAL SCALP
LOCATION DETAILED: RIGHT ANTERIOR PROXIMAL UPPER ARM
LOCATION DETAILED: LEFT ULNAR DORSAL HAND
LOCATION DETAILED: INFERIOR THORACIC SPINE
LOCATION DETAILED: RIGHT FOREHEAD
LOCATION DETAILED: MEDIAL FRONTAL SCALP
LOCATION DETAILED: PERIUMBILICAL SKIN
LOCATION DETAILED: LEFT DISTAL PRETIBIAL REGION
LOCATION DETAILED: LEFT DISTAL DORSAL FOREARM
LOCATION DETAILED: RIGHT RADIAL DORSAL HAND
LOCATION DETAILED: RIGHT INFERIOR HELIX
LOCATION DETAILED: RIGHT MID-UPPER BACK
LOCATION DETAILED: STERNUM
LOCATION DETAILED: LEFT SUPERIOR CENTRAL MALAR CHEEK

## 2025-05-29 ASSESSMENT — LOCATION ZONE DERM
LOCATION ZONE: TRUNK
LOCATION ZONE: FACE
LOCATION ZONE: SCALP
LOCATION ZONE: HAND
LOCATION ZONE: EAR
LOCATION ZONE: ARM
LOCATION ZONE: LEG

## 2025-05-29 NOTE — TELEPHONE ENCOUNTER
CHW received call from pt spouse requesting assistance. Shaniqua states they are doing well. Shaniqua discussed that they are having a stair lift installed in the home for pt and they are going to seek tax reimbursement for medical equipment. A prescription is required to fulfill the needs of the form, Shaniqua requested assistance from provider. CHW informed Shaniqua that a message will be sent to PCP for assistance with the prescription.      CHW will route encounter to PCP, then follow up with pt's spouse.

## 2025-06-02 ENCOUNTER — APPOINTMENT (OUTPATIENT)
Dept: URBAN - METROPOLITAN AREA CLINIC 6 | Facility: CLINIC | Age: 80
Setting detail: DERMATOLOGY
End: 2025-06-02

## 2025-06-02 ENCOUNTER — PATIENT OUTREACH (OUTPATIENT)
Dept: HEALTH INFORMATION MANAGEMENT | Facility: OTHER | Age: 80
End: 2025-06-02
Payer: MEDICARE

## 2025-06-02 DIAGNOSIS — I50.32 CHRONIC DIASTOLIC HEART FAILURE (HCC): ICD-10-CM

## 2025-06-02 DIAGNOSIS — Z48.02 ENCOUNTER FOR REMOVAL OF SUTURES: ICD-10-CM

## 2025-06-02 DIAGNOSIS — Z99.81 DEPENDENCE ON SUPPLEMENTAL OXYGEN: ICD-10-CM

## 2025-06-02 DIAGNOSIS — I10 ESSENTIAL HYPERTENSION: Primary | ICD-10-CM

## 2025-06-02 DIAGNOSIS — I70.0 AORTO-ILIAC ATHEROSCLEROSIS (HCC): ICD-10-CM

## 2025-06-02 DIAGNOSIS — J96.11 CHRONIC RESPIRATORY FAILURE WITH HYPOXIA (HCC): ICD-10-CM

## 2025-06-02 DIAGNOSIS — I70.8 AORTO-ILIAC ATHEROSCLEROSIS (HCC): ICD-10-CM

## 2025-06-02 DIAGNOSIS — E78.5 HYPERLIPIDEMIA, UNSPECIFIED HYPERLIPIDEMIA TYPE: ICD-10-CM

## 2025-06-02 DIAGNOSIS — M79.89 SWELLING OF LIMB: ICD-10-CM

## 2025-06-02 DIAGNOSIS — I50.32 CHRONIC DIASTOLIC HEART FAILURE (HCC): Primary | ICD-10-CM

## 2025-06-02 PROCEDURE — ? DRESSING CHANGE

## 2025-06-02 PROCEDURE — ? SUTURE REMOVAL (GLOBAL PERIOD)

## 2025-06-02 ASSESSMENT — LOCATION DETAILED DESCRIPTION DERM: LOCATION DETAILED: LEFT DISTAL LATERAL PRETIBIAL REGION

## 2025-06-02 ASSESSMENT — LOCATION SIMPLE DESCRIPTION DERM: LOCATION SIMPLE: LEFT LOWER LEG

## 2025-06-02 ASSESSMENT — LOCATION ZONE DERM: LOCATION ZONE: LEG

## 2025-06-02 NOTE — CARE PLAN
Problem: Knowledge deficit of hypertension  Goal: Demonstrate Knowledge of Hypertension - long term   Outcome: Progressing     Problem: Patient barriers to managing high blood pressure  Goal: Identify barriers to managing blood pressure - long term   Outcome: Progressing  Note: Patient has vocalized commitment to adherence to medication management     Problem: Knowledge deficit of self-monitoring blood pressure  Goal: Demonstrate the elements of self-monitoring blood pressure - long term   Outcome: Progressing  Note:   -Patient has verbalized commitment to self-monitoring blood pressure at least once a week.    -Patient will log home blood pressures and bring the log to medical provider appointments.       Problem: Patient barriers to managing high blood pressure  Goal: Identify barriers to managing blood pressure - long term   Outcome: Progressing  Note: Patient has vocalized commitment to adherence to medication management     Problem: Knowledge deficit of self-monitoring blood pressure  Goal: Demonstrate the elements of self-monitoring blood pressure - long term   Outcome: Progressing  Note:   -Patient has verbalized commitment to self-monitoring blood pressure at least once a week.    -Patient will log home blood pressures and bring the log to medical provider appointments.       Problem: Adherence to prescribed medications  Goal: Improve medication adherence - long term   Outcome: Progressing     Problem: Knowledge deficient regarding condition, treatment regimen, and self care  Goal: Increase understanding of cardiac function/disease/failure - long term   Outcome: Progressing     Problem: Diet Management  Goal: Learn to Manage Calories - long term   Outcome: Progressing     Problem: Knowledge deficit of congestive heart failure disease process  Goal: HP Increase understanding of congestive heart failure - long term   Outcome: Progressing     Problem: Low Sodium Diet Management  Goal: Limit dietary sodium -  long term   Outcome: Progressing     Problem: Excess Fluid  Goal: Establish a plan for excess fluid management - long term   Outcome: Progressing     Problem: Med Adherence  Goal: Consistently take Medications as Prescribed - long term   Outcome: Progressing  Goal: Establish resources to assist with medical costs - long term   Outcome: Progressing     Problem: Patient is Inactive  Goal: Exercise a designated amount of time daily - long term   Outcome: Progressing    Patient is agreeable to the plan of care created long term goal to significantly improve a his overall quality of life by effectively managing his chronic condition, minimizing complications, and preventing disease progression;  while a short-term goal establishes a personalized care plan with clear action steps, including medication adherence and lifestyle modifications, to achieve measurable improvements in key health metrics within a set timeframe; both aiming to promote patient self-management and engagement with his healthcare team.

## 2025-06-02 NOTE — PROCEDURE: SUTURE REMOVAL (GLOBAL PERIOD)
Detail Level: Detailed
Add 02458 Cpt? (Important Note: In 2017 The Use Of 07241 Is Being Tracked By Cms To Determine Future Global Period Reimbursement For Global Periods): no
Suture Removal Completed By (Optional): Marianela Rasmussen

## 2025-06-02 NOTE — Clinical Note
REFERRAL APPROVAL NOTICE         Sent on June 2, 2025                   Sina Oliver  [address]  [place] [address]                   Dear Mr. Oliver,    After a careful review of the medical information and benefit coverage, [facility] has processed your referral. See below for additional details.    If applicable, you must be actively enrolled with your insurance for coverage of the authorized service. If you have any questions regarding your coverage, please contact your insurance directly.    REFERRAL INFORMATION   Referral #:  [id number]  Referred-To Provider    Referred-By Provider:  Cardiovascular Disease (Cardiology)    ANA LUISA Cortes RAM M      [address]  [place] [id number] [phone] [address]  [facility] [id number] [phone]    Referral Start Date:  06/02/2025  Referral End Date:   06/02/2026             SCHEDULING  If you do not already have an appointment, please call [phone] to make an appointment.     MORE INFORMATION  If you do not already have a [email or web address] account, sign up at: [email or web address]  You can access your medical information, make appointments, see lab results, billing information, and more.  If you have questions regarding this referral, please contact  the [facility] Referrals department at:             [phone]. Monday - Friday 8:00AM - 5:00PM.     Sincerely,    [facility]

## 2025-06-02 NOTE — PROCEDURE: DRESSING CHANGE
Detail Level: Detailed
Wound Evaluated By: Shae Christensen MD
Add 79945 Cpt? (Important Note: In 2017 The Use Of 76521 Is Being Tracked By Cms To Determine Future Global Period Reimbursement For Global Periods): no

## 2025-06-03 ENCOUNTER — PATIENT OUTREACH (OUTPATIENT)
Dept: HEALTH INFORMATION MANAGEMENT | Facility: OTHER | Age: 80
End: 2025-06-03
Payer: MEDICARE

## 2025-06-03 DIAGNOSIS — I10 ESSENTIAL HYPERTENSION: Primary | ICD-10-CM

## 2025-06-04 NOTE — PROGRESS NOTES
CHW was informed by [facility] JAYLIN Hill that the letter has been provided for the stair lift for insurance purposes to pt/spouse. CHW will not follow pt at this time

## 2025-06-05 ENCOUNTER — HOSPITAL ENCOUNTER (OUTPATIENT)
Facility: MEDICAL CENTER | Age: 80
End: 2025-06-05
Attending: FAMILY MEDICINE
Payer: MEDICARE

## 2025-06-05 ENCOUNTER — APPOINTMENT (OUTPATIENT)
Dept: URBAN - METROPOLITAN AREA CLINIC 6 | Facility: CLINIC | Age: 80
Setting detail: DERMATOLOGY
End: 2025-06-05

## 2025-06-05 ENCOUNTER — HOSPITAL ENCOUNTER (OUTPATIENT)
Dept: LAB | Facility: MEDICAL CENTER | Age: 80
End: 2025-06-05
Attending: PHYSICIAN ASSISTANT
Payer: MEDICARE

## 2025-06-05 DIAGNOSIS — D69.2 OTHER NONTHROMBOCYTOPENIC PURPURA: ICD-10-CM

## 2025-06-05 DIAGNOSIS — L57.0 ACTINIC KERATOSIS: ICD-10-CM

## 2025-06-05 DIAGNOSIS — R25.2 MUSCLE CRAMPS: ICD-10-CM

## 2025-06-05 DIAGNOSIS — R79.89 ABNORMAL SERUM CREATININE LEVEL: ICD-10-CM

## 2025-06-05 LAB
ALBUMIN SERPL BCP-MCNC: 4.1 G/DL (ref 3.2–4.9)
ALBUMIN/GLOB SERPL: 1.6 G/DL
ALP SERPL-CCNC: 75 U/L (ref 30–99)
ALT SERPL-CCNC: 30 U/L (ref 2–50)
ANION GAP SERPL CALC-SCNC: 14 MMOL/L (ref 7–16)
AST SERPL-CCNC: 39 U/L (ref 12–45)
BILIRUB SERPL-MCNC: 1.1 MG/DL (ref 0.1–1.5)
BUN SERPL-MCNC: 34 MG/DL (ref 8–22)
CALCIUM ALBUM COR SERPL-MCNC: 9.6 MG/DL (ref 8.5–10.5)
CALCIUM SERPL-MCNC: 9.7 MG/DL (ref 8.5–10.5)
CHLORIDE SERPL-SCNC: 106 MMOL/L (ref 96–112)
CO2 SERPL-SCNC: 17 MMOL/L (ref 20–33)
CREAT SERPL-MCNC: 1.53 MG/DL (ref 0.5–1.4)
GFR SERPLBLD CREATININE-BSD FMLA CKD-EPI: 45 ML/MIN/1.73 M 2
GLOBULIN SER CALC-MCNC: 2.5 G/DL (ref 1.9–3.5)
GLUCOSE SERPL-MCNC: 99 MG/DL (ref 65–99)
MAGNESIUM SERPL-MCNC: 1.9 MG/DL (ref 1.5–2.5)
NT-PROBNP SERPL IA-MCNC: 1633 PG/ML (ref 0–125)
POTASSIUM SERPL-SCNC: 5.4 MMOL/L (ref 3.6–5.5)
PROT SERPL-MCNC: 6.6 G/DL (ref 6–8.2)
SODIUM SERPL-SCNC: 137 MMOL/L (ref 135–145)

## 2025-06-05 PROCEDURE — 80053 COMPREHEN METABOLIC PANEL: CPT

## 2025-06-05 PROCEDURE — 83880 ASSAY OF NATRIURETIC PEPTIDE: CPT

## 2025-06-05 PROCEDURE — ? LIQUID NITROGEN

## 2025-06-05 PROCEDURE — 83735 ASSAY OF MAGNESIUM: CPT

## 2025-06-05 PROCEDURE — ? COUNSELING

## 2025-06-05 ASSESSMENT — LOCATION DETAILED DESCRIPTION DERM
LOCATION DETAILED: RIGHT ANTERIOR PROXIMAL UPPER ARM
LOCATION DETAILED: LEFT DISTAL DORSAL FOREARM
LOCATION DETAILED: RIGHT ANTERIOR DISTAL UPPER ARM

## 2025-06-05 ASSESSMENT — LOCATION ZONE DERM: LOCATION ZONE: ARM

## 2025-06-05 ASSESSMENT — LOCATION SIMPLE DESCRIPTION DERM
LOCATION SIMPLE: RIGHT UPPER ARM
LOCATION SIMPLE: LEFT FOREARM

## 2025-06-09 ENCOUNTER — PHARMACY VISIT (OUTPATIENT)
Dept: PHARMACY | Facility: MEDICAL CENTER | Age: 80
End: 2025-06-09
Payer: COMMERCIAL

## 2025-06-09 PROCEDURE — RXMED WILLOW AMBULATORY MEDICATION CHARGE: Performed by: INTERNAL MEDICINE

## 2025-06-09 PROCEDURE — RXMED WILLOW AMBULATORY MEDICATION CHARGE: Performed by: FAMILY MEDICINE

## 2025-06-09 RX ORDER — LEVOTHYROXINE SODIUM 137 UG/1
137 TABLET ORAL DAILY
Qty: 90 TABLET | Refills: 3 | Status: CANCELLED | OUTPATIENT
Start: 2025-06-06

## 2025-06-10 ENCOUNTER — OFFICE VISIT (OUTPATIENT)
Dept: MEDICAL GROUP | Facility: PHYSICIAN GROUP | Age: 80
End: 2025-06-10
Payer: MEDICARE

## 2025-06-10 ENCOUNTER — PHARMACY VISIT (OUTPATIENT)
Dept: PHARMACY | Facility: MEDICAL CENTER | Age: 80
End: 2025-06-10
Payer: COMMERCIAL

## 2025-06-10 VITALS
OXYGEN SATURATION: 95 % | DIASTOLIC BLOOD PRESSURE: 58 MMHG | TEMPERATURE: 96.5 F | RESPIRATION RATE: 22 BRPM | SYSTOLIC BLOOD PRESSURE: 92 MMHG | HEART RATE: 79 BPM | HEIGHT: 73 IN | BODY MASS INDEX: 34.7 KG/M2 | WEIGHT: 261.8 LBS

## 2025-06-10 DIAGNOSIS — E87.6 HYPOKALEMIA: ICD-10-CM

## 2025-06-10 DIAGNOSIS — G47.33 OSA ON CPAP: Primary | ICD-10-CM

## 2025-06-10 DIAGNOSIS — M62.838 MUSCLE SPASM: ICD-10-CM

## 2025-06-10 DIAGNOSIS — I50.32 CHRONIC DIASTOLIC HEART FAILURE (HCC): ICD-10-CM

## 2025-06-10 PROBLEM — G47.30 SLEEP APNEA: Status: ACTIVE | Noted: 2021-05-03

## 2025-06-10 PROCEDURE — 3078F DIAST BP <80 MM HG: CPT | Performed by: FAMILY MEDICINE

## 2025-06-10 PROCEDURE — 99214 OFFICE O/P EST MOD 30 MIN: CPT | Performed by: FAMILY MEDICINE

## 2025-06-10 PROCEDURE — 3074F SYST BP LT 130 MM HG: CPT | Performed by: FAMILY MEDICINE

## 2025-06-10 RX ORDER — LEVOTHYROXINE SODIUM 137 UG/1
137 TABLET ORAL EVERY MORNING
Qty: 90 TABLET | Refills: 3 | OUTPATIENT
Start: 2025-06-10

## 2025-06-10 ASSESSMENT — FIBROSIS 4 INDEX: FIB4 SCORE: 3.01
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
## 2025-06-18 ENCOUNTER — TELEPHONE (OUTPATIENT)
Dept: HEALTH INFORMATION MANAGEMENT | Facility: OTHER | Age: 80
End: 2025-06-18
Payer: MEDICARE

## 2025-06-18 NOTE — TELEPHONE ENCOUNTER
06/18/25 - Patient spouse called in today and has requested PCP of patient send a referral for an internist in [place], where patient frequently travels to for periods of time throughout the year. Patient spouse, Shaniqua and provided name, address, phone and fax of the entity. I called [facility] assistant line and spoke to Breana, who confirmed patient is able to receive care at  facility or ER with copay out of state but not primary care services. I've relayed the information to spouse and she verbalized understanding. Provided contact to [facility] if she would like to ask any further questions.   PCP Dr. Dick informed as well.

## 2025-07-10 ENCOUNTER — PATIENT OUTREACH (OUTPATIENT)
Dept: HEALTH INFORMATION MANAGEMENT | Facility: OTHER | Age: 80
End: 2025-07-10
Payer: MEDICARE

## 2025-07-10 DIAGNOSIS — I50.32 CHRONIC DIASTOLIC HEART FAILURE (HCC): ICD-10-CM

## 2025-07-10 DIAGNOSIS — E78.5 HYPERLIPIDEMIA, UNSPECIFIED HYPERLIPIDEMIA TYPE: ICD-10-CM

## 2025-07-10 DIAGNOSIS — I10 ESSENTIAL HYPERTENSION: Primary | ICD-10-CM

## 2025-07-10 NOTE — PROGRESS NOTES
7/10/25 Spoke with patients wife, patient is currently out of town. Will pause enrollment and check back in in 4 weeks.

## 2025-07-14 RX ORDER — COLCHICINE 0.6 MG/1
TABLET ORAL
Qty: 30 TABLET | Refills: 0 | Status: SHIPPED | OUTPATIENT
Start: 2025-07-14

## 2025-08-07 RX ORDER — COLCHICINE 0.6 MG/1
TABLET ORAL
Qty: 60 TABLET | Refills: 0 | Status: SHIPPED | OUTPATIENT
Start: 2025-08-07

## 2025-08-18 ENCOUNTER — TELEPHONE (OUTPATIENT)
Dept: MEDICAL GROUP | Facility: PHYSICIAN GROUP | Age: 80
End: 2025-08-18
Payer: MEDICARE

## 2025-08-19 ENCOUNTER — PATIENT OUTREACH (OUTPATIENT)
Dept: MEDICAL GROUP | Facility: PHYSICIAN GROUP | Age: 80
End: 2025-08-19
Payer: MEDICARE

## 2025-08-19 DIAGNOSIS — J96.11 CHRONIC RESPIRATORY FAILURE WITH HYPOXIA (HCC): ICD-10-CM

## 2025-08-19 DIAGNOSIS — I50.32 CHRONIC DIASTOLIC HEART FAILURE (HCC): Primary | ICD-10-CM

## 2025-08-19 RX ORDER — METOLAZONE 2.5 MG/1
1.25 TABLET ORAL PRN
Qty: 100 TABLET | Refills: 0 | OUTPATIENT
Start: 2025-08-19

## 2025-08-19 RX ORDER — CYCLOBENZAPRINE HCL 10 MG
TABLET ORAL
Qty: 100 TABLET | Refills: 0 | Status: SHIPPED | OUTPATIENT
Start: 2025-08-19 | End: 2025-11-17

## 2025-08-19 RX ORDER — METOLAZONE 2.5 MG/1
1.25 TABLET ORAL PRN
Qty: 30 TABLET | OUTPATIENT
Start: 2025-08-19

## 2025-08-19 RX ORDER — CYCLOBENZAPRINE HCL 10 MG
TABLET ORAL
Qty: 100 TABLET | Refills: 0 | OUTPATIENT
Start: 2025-08-19 | End: 2025-11-12

## (undated) DEVICE — NEEDLE FILTER ASPIRATION 18 GA X 1 1/2 IN (100EA/BX)

## (undated) DEVICE — CANNULA INJECTION 27G (EYE) - 10/BX ALCON

## (undated) DEVICE — KIT ANESTHESIA W/CIRCUIT & 3/LT BAG W/FILTER (20EA/CA)

## (undated) DEVICE — SUTURE 3-0 VICRYL PLUS SH - 27 INCH (36/BX)

## (undated) DEVICE — ELECTRODE 850 FOAM ADHESIVE - HYDROGEL RADIOTRNSPRNT (50/PK)

## (undated) DEVICE — BLADE 3.0MM ANGLED DBL BEVEL WITH SAFETY (10/CA)

## (undated) DEVICE — PROBE PRASS STAND STIMULATING (5EA/PK)

## (undated) DEVICE — EXPANDER PUPIL I RING (6EA/SP)

## (undated) DEVICE — CON SEDATION/>5 YR 1ST 15 MIN

## (undated) DEVICE — CLIP MED INTNL HRZN TI ESCP - (25/BX)

## (undated) DEVICE — TIP POLYMER I&A

## (undated) DEVICE — MASK ANESTHESIA ADULT  - (100/CA)

## (undated) DEVICE — GVL 4 STAT DISPOSABLE - (10/BX)

## (undated) DEVICE — CONNECTOR HOSE NEPTUNE FOR CYSTO ROOM

## (undated) DEVICE — PACK MINOR BASIN - (2EA/CA)

## (undated) DEVICE — BOVIE NEEDLE TIP 3CM COLORADO

## (undated) DEVICE — WATER IRRIGATION STERILE 1000ML (12EA/CA)

## (undated) DEVICE — SUCTION INSTRUMENT YANKAUER BULBOUS TIP W/O VENT (50EA/CA)

## (undated) DEVICE — DRAPE MAGNETIC (INSTRA-MAG) - (30/CA)

## (undated) DEVICE — SPONGE GAUZESTER 4 X 4 4PLY - (128PK/CA)

## (undated) DEVICE — TUBING CLEARLINK DUO-VENT - C-FLO (48EA/CA)

## (undated) DEVICE — SUTURE 3-0 VICRYL PLUS SH - 8X 18 INCH (12/BX)

## (undated) DEVICE — PACK BASIC CATARACT - (4/BX)

## (undated) DEVICE — SET LEADWIRE 5 LEAD BEDSIDE DISPOSABLE ECG (1SET OF 5/EA)

## (undated) DEVICE — STOCKINET BIAS 4 IN STERILE - (20/CA)

## (undated) DEVICE — SLEEVE VASO CALF MED - (10PR/CA)

## (undated) DEVICE — CATHETER URETHRAL OPEN END AXXCESS (10EA/BX)

## (undated) DEVICE — GLOVE BIOGEL SZ 7.5 SURGICAL PF LTX - (50PR/BX 4BX/CA)

## (undated) DEVICE — GOWN WARMING STANDARD FLEX - (30/CA)

## (undated) DEVICE — BASKET ZERO TIP

## (undated) DEVICE — TIP MINI FLARE 30 DEGREE OZIL - (6/BX)

## (undated) DEVICE — SLEEVE, VASO, THIGH, MED

## (undated) DEVICE — LACTATED RINGERS INJ 1000 ML - (14EA/CA 60CA/PF)

## (undated) DEVICE — CANISTER SUCTION RIGID RED 1500CC (40EA/CA)

## (undated) DEVICE — FIBRILLAR SURGICEL 4X4 - 10/CA

## (undated) DEVICE — SUTURE GENERAL

## (undated) DEVICE — Device

## (undated) DEVICE — GLOVE BIOGEL SZ 8 SURGICAL PF LTX - (50PR/BX 4BX/CA)

## (undated) DEVICE — CATHETER IV 20 GA X 1-1/4 ---SURG.& SDS ONLY--- (50EA/BX)

## (undated) DEVICE — PADDING CAST 4 IN STERILE - 4 X 4 YDS (24/CA)

## (undated) DEVICE — WIRE GUIDE SENSOR DUAL FLEX - 5/BX

## (undated) DEVICE — NEEDLE CYSTOTOME OPTH VSTC  0.4MM X 16MM - (10/CA)

## (undated) DEVICE — TUBE CONNECTING SUCTION - CLEAR PLASTIC STERILE 72 IN (50EA/CA)

## (undated) DEVICE — SUTURE 3-0 VICRYL PLUS RB-1 - 8 X 18 INCH (12/BX)

## (undated) DEVICE — DRAIN SUCTION 7MM FLAT - (10/CS)

## (undated) DEVICE — GLOVE SZ 8 BIOGEL PI MICRO - PF LF (50PR/BX)

## (undated) DEVICE — KIT  I.V. START (100EA/CA)

## (undated) DEVICE — ELECTRODE DUAL RETURN W/ CORD - (50/PK)

## (undated) DEVICE — SENSOR SPO2 NEO LNCS ADHESIVE (20/BX) SEE USER NOTES

## (undated) DEVICE — KNIFE MICROSURGICAL 15 DEGREE GREEN

## (undated) DEVICE — SET IRRIGATION CYSTOSCOPY Y-TYPE L81 IN (20EA/CA)

## (undated) DEVICE — GOWN SURGICAL X-LARGE ULTRA - FILM-REINFORCED (20/CA)

## (undated) DEVICE — SPONGE PEANUT - (5/PK 50PK/CA)

## (undated) DEVICE — CHLORAPREP 26 ML APPLICATOR - ORANGE TINT(25/CA)

## (undated) DEVICE — SODIUM CHL IRRIGATION 0.9% 1000ML (12EA/CA)

## (undated) DEVICE — HEAD HOLDER JUNIOR/ADULT

## (undated) DEVICE — GLOVE BIOGEL M SZ 8 SURGICAL PF LTX - (50/BX 4BX/CA)

## (undated) DEVICE — SET EXTENSION WITH 2 PORTS (48EA/CA) ***PART #2C8610 IS A SUBSTITUTE*****

## (undated) DEVICE — SPONGE XRAY 8X4 STERL. 12PL - (10EA/TY 80TY/CA)

## (undated) DEVICE — SHEATH NAVIGATOR 11/13 X 36 URETERAL ACCESS

## (undated) DEVICE — KIT, EYE POST-OP FOR PHACOS

## (undated) DEVICE — DRAPE LG. APERTURE 33 X 51" - (10EA/BX)"

## (undated) DEVICE — CANISTER SUCTION 3000ML MECHANICAL FILTER AUTO SHUTOFF MEDI-VAC NONSTERILE LF DISP  (40EA/CA)

## (undated) DEVICE — PACK UPPER EXTREMITY (2EA/CA)

## (undated) DEVICE — SHEAR HS FOCUS 9CM CVD - (6/BX)

## (undated) DEVICE — PACK CYSTOSCOPY III - (8/CA)

## (undated) DEVICE — NEPTUNE 4 PORT MANIFOLD - (20/PK)

## (undated) DEVICE — BOVIE NEEDLE TIP INSULATD NON-SAFETY 2CM (50/PK)

## (undated) DEVICE — CATHETER URET OPEN END 6FR (10EA/BX)

## (undated) DEVICE — PAD PREP 24 X 48 CUFFED - (100/CA)

## (undated) DEVICE — COVER LIGHT HANDLE FLEXIBLE - SOFT (2EA/PK 80PK/CA)

## (undated) DEVICE — RESERVOIR SUCTION 100 CC - SILICONE (20EA/CA)

## (undated) DEVICE — DRESSING TRANSPARENT FILM TEGADERM 4 X 4.75" (50EA/BX)"

## (undated) DEVICE — PROTECTOR ULNA NERVE - (36PR/CA)

## (undated) DEVICE — GOWN SURGEONS LARGE - (32/CA)

## (undated) DEVICE — BAG URODRAIN WITH TUBING - (20/CA)

## (undated) DEVICE — MASK OXYGEN VNYL ADLT MED CONC WITH 7 FOOT TUBING  - (50EA/CA)

## (undated) DEVICE — MASK, LARYNGEAL AIRWAY #4

## (undated) DEVICE — SUTURE 4-0 ETHILON FS-2 18 (36PK/BX)"

## (undated) DEVICE — TUBE CONNECT SUCTION CLEAR 120 X 1/4" (50EA/CA)"

## (undated) DEVICE — SHEET THYROID - (10EA/CA)

## (undated) DEVICE — KIT ROOM DECONTAMINATION

## (undated) DEVICE — JELLY SURGILUBE STERILE TUBE 4.25 OZ (1/EA)

## (undated) DEVICE — CLIP SM INTNL HRZN TI ESCP LGT - (24EA/PK 25PK/BX)

## (undated) DEVICE — GLOVE BIOGEL INDICATOR SZ 6.5 SURGICAL PF LTX - (50PR/BX 4BX/CA)

## (undated) DEVICE — COVER FOOT UNIVERSAL DISP. - (25EA/CA)

## (undated) DEVICE — SENSOR OXIMETER ADULT SPO2 RD SET (20EA/BX)

## (undated) DEVICE — SUTURE 5-0 MONOCRYL PLUS PC-3 - (12/BX)

## (undated) DEVICE — CORD ELECTROSURG. TUR DISP (50EA/CA)

## (undated) DEVICE — CATHETER URET DUAL LUMEN

## (undated) DEVICE — GOWN SURGEONS X-LARGE - DISP. (30/CA)

## (undated) DEVICE — TOWEL STOP TIMEOUT SAFETY FLAG (40EA/CA)

## (undated) DEVICE — CANNULA O2 COMFORT SOFT EAR ADULT 7 FT TUBING (50/CA)

## (undated) DEVICE — GLOVE BIOGEL PI INDICATOR SZ 8.0 SURGICAL PF LF -(50/BX 4BX/CA)

## (undated) DEVICE — WATER IRRIG. STER 3000 ML - (4/CA)

## (undated) DEVICE — LASER FIBER HOLM 365 MICRON 100 WATT (5EA/BX)